# Patient Record
Sex: MALE | Race: WHITE | NOT HISPANIC OR LATINO | URBAN - METROPOLITAN AREA
[De-identification: names, ages, dates, MRNs, and addresses within clinical notes are randomized per-mention and may not be internally consistent; named-entity substitution may affect disease eponyms.]

---

## 2017-02-22 ENCOUNTER — APPOINTMENT (RX ONLY)
Dept: URBAN - METROPOLITAN AREA CLINIC 349 | Facility: CLINIC | Age: 72
Setting detail: DERMATOLOGY
End: 2017-02-22

## 2017-02-22 DIAGNOSIS — L57.0 ACTINIC KERATOSIS: ICD-10-CM

## 2017-02-22 DIAGNOSIS — L30.9 DERMATITIS, UNSPECIFIED: ICD-10-CM | Status: RESOLVED

## 2017-02-22 PROCEDURE — ? LIQUID NITROGEN

## 2017-02-22 PROCEDURE — 17000 DESTRUCT PREMALG LESION: CPT

## 2017-02-22 PROCEDURE — ? COUNSELING

## 2017-02-22 PROCEDURE — 99213 OFFICE O/P EST LOW 20 MIN: CPT | Mod: 25

## 2017-02-22 PROCEDURE — ? RECOMMENDATIONS

## 2017-02-22 ASSESSMENT — LOCATION DETAILED DESCRIPTION DERM
LOCATION DETAILED: LEFT LATERAL FRONTAL SCALP
LOCATION DETAILED: LEFT THENAR EMINENCE
LOCATION DETAILED: RIGHT THENAR EMINENCE
LOCATION DETAILED: LEFT LATERAL FRONTAL SCALP

## 2017-02-22 ASSESSMENT — LOCATION ZONE DERM
LOCATION ZONE: SCALP
LOCATION ZONE: HAND
LOCATION ZONE: SCALP

## 2017-02-22 ASSESSMENT — LOCATION SIMPLE DESCRIPTION DERM
LOCATION SIMPLE: LEFT SCALP
LOCATION SIMPLE: LEFT SCALP
LOCATION SIMPLE: LEFT HAND
LOCATION SIMPLE: RIGHT HAND

## 2017-02-22 ASSESSMENT — PAIN INTENSITY VAS: HOW INTENSE IS YOUR PAIN 0 BEING NO PAIN, 10 BEING THE MOST SEVERE PAIN POSSIBLE?: NO PAIN

## 2017-02-22 NOTE — PROCEDURE: LIQUID NITROGEN
Number Of Freeze-Thaw Cycles: 2 freeze-thaw cycles
Consent: The patient's consent was obtained including but not limited to risks of crusting, scabbing, blistering, scarring, darker or lighter pigmentary change, recurrence, incomplete removal and infection.
Post-Care Instructions: I reviewed with the patient in detail post-care instructions. Patient is to wear sunprotection, and avoid picking at any of the treated lesions. Pt may apply Vaseline to crusted or scabbing areas.
Detail Level: Detailed
Render Post-Care Instructions In Note?: no
Duration Of Freeze Thaw-Cycle (Seconds): 4

## 2017-02-22 NOTE — PROCEDURE: RECOMMENDATIONS
Recommendations (Free Text): Continue triamcinolone cream twice daily to affected area as needed \\nCerave moisturizer twice a day
Detail Level: Detailed

## 2017-04-13 ENCOUNTER — HOSPITAL ENCOUNTER (OUTPATIENT)
Dept: GENERAL RADIOLOGY | Age: 72
Discharge: HOME | End: 2017-04-13
Payer: COMMERCIAL

## 2017-04-13 DIAGNOSIS — R05.9 COUGH: ICD-10-CM

## 2017-04-13 PROCEDURE — 71020 XR CHEST PA LAT: CPT

## 2017-08-23 ENCOUNTER — APPOINTMENT (RX ONLY)
Dept: URBAN - METROPOLITAN AREA CLINIC 349 | Facility: CLINIC | Age: 72
Setting detail: DERMATOLOGY
End: 2017-08-23

## 2017-08-23 DIAGNOSIS — L57.8 OTHER SKIN CHANGES DUE TO CHRONIC EXPOSURE TO NONIONIZING RADIATION: ICD-10-CM | Status: RESOLVED

## 2017-08-23 PROBLEM — D04.4 CARCINOMA IN SITU OF SKIN OF SCALP AND NECK: Status: ACTIVE | Noted: 2017-08-23

## 2017-08-23 PROCEDURE — A4550 SURGICAL TRAYS: HCPCS

## 2017-08-23 PROCEDURE — 88305 TISSUE EXAM BY PATHOLOGIST: CPT

## 2017-08-23 PROCEDURE — 11100: CPT

## 2017-08-23 PROCEDURE — ? COUNSELING

## 2017-08-23 PROCEDURE — 99213 OFFICE O/P EST LOW 20 MIN: CPT | Mod: 25

## 2017-08-23 PROCEDURE — ? PATHOLOGY BILLING

## 2017-08-23 PROCEDURE — ? BIOPSY BY SHAVE METHOD

## 2017-08-23 ASSESSMENT — LOCATION ZONE DERM: LOCATION ZONE: FACE

## 2017-08-23 ASSESSMENT — LOCATION DETAILED DESCRIPTION DERM: LOCATION DETAILED: SUPERIOR MID FOREHEAD

## 2017-08-23 ASSESSMENT — LOCATION SIMPLE DESCRIPTION DERM: LOCATION SIMPLE: SUPERIOR FOREHEAD

## 2017-08-23 NOTE — PROCEDURE: BIOPSY BY SHAVE METHOD
Bill For Surgical Tray: yes
Hemostasis: Aluminum Chloride
Size Of Lesion In Cm: 0.6
Cryotherapy Text: The wound bed was treated with cryotherapy after the biopsy was performed.
Anesthesia Volume In Cc (Will Not Render If 0): 0.5
Accession #: [id number]
Notification Instructions: Patient will be notified of biopsy results. However, patient instructed to call the office if not contacted within 2 weeks.
Wound Care: Vaseline
Additional Anesthesia Volume In Cc (Will Not Render If 0): 0
Post-Care Instructions: I reviewed with the patient in detail post-care instructions. Patient is to keep the biopsy site dry overnight, and then apply bacitracin twice daily until healed. Patient may apply hydrogen peroxide soaks to remove any crusting.\\nAft the procedure, the patient was observed for 5-10 minutes and was oriented to person, place, and time.  Denied feeling dizzy, queasy, and stated that they did not feel that they were going to faint.
Destruction After The Procedure: No
Electrodesiccation Text: The wound bed was treated with electrodesiccation after the biopsy was performed.
Biopsy Type: H and E
Anesthesia Type: 1% lidocaine with 1:500,000 epinephrine and a 1:10 solution of 8.4% sodium bicarbonate
Billing Type: Third-Party Bill
Type Of Destruction Used: Electrodesiccation
Detail Level: Detailed
Biopsy Method: Cheryl oneal
Dressing: bandage
Consent: Written consent was obtained and risks were reviewed including but not limited to scarring, infection, bleeding, scabbing, incomplete removal, nerve damage and allergy to anesthesia.

## 2017-09-22 ENCOUNTER — APPOINTMENT (RX ONLY)
Dept: URBAN - METROPOLITAN AREA CLINIC 349 | Facility: CLINIC | Age: 72
Setting detail: DERMATOLOGY
End: 2017-09-22

## 2017-09-22 PROBLEM — D04.4 CARCINOMA IN SITU OF SKIN OF SCALP AND NECK: Status: ACTIVE | Noted: 2017-09-22

## 2017-09-22 PROBLEM — L85.3 XEROSIS CUTIS: Status: ACTIVE | Noted: 2017-09-22

## 2017-09-22 PROCEDURE — ? COUNSELING

## 2017-09-22 PROCEDURE — A4550 SURGICAL TRAYS: HCPCS

## 2017-09-22 PROCEDURE — ? CURETTAGE AND DESTRUCTION

## 2017-09-22 PROCEDURE — 17271 DSTR MAL LES S/N/H/F/G 0.6-1: CPT

## 2017-09-22 NOTE — PROCEDURE: CURETTAGE AND DESTRUCTION
Bill For Surgical Tray: yes
Number Of Curettages: 3
Post-Care Instructions: I reviewed with the patient in detail post-care instructions. Patient is to keep the area dry for 48 hours, and not to engage in any swimming until the area is healed. Should the patient develop any fevers, chills, bleeding, severe pain patient will contact the office immediately.
Cautery Type: electrodesiccation
Anesthesia Type: 1% lidocaine with 1:100,000 epinephrine and a 1:10 solution of 8.4% sodium bicarbonate
Size Of Lesion After Curettage: 0.7
Size Of Lesion In Cm: 0.6
Render Post-Care Instructions In Note?: no
Bill As A Line Item Or As Units: Line Item
Additional Information: (Optional): The wound was cleaned, and a pressure dressing was applied.  The patient received detailed post-op details in length. Aft the procedure, the patient was observed for 5-10 minutes and was oriented to person, place, and time.  Denied feeling dizzy, queasy, and stated that they did not feel that they were going to faint.
What Was Performed First?: Curettage
Consent was obtained from the patient. The risks, benefits and alternatives to therapy were discussed in detail. Specifically, the risks of infection, scarring, bleeding, prolonged wound healing, nerve injury, incomplete removal, allergy to anesthesia and recurrence were addressed. Alternatives to ED&C, such as: surgical removal and XRT were also discussed.  Prior to the procedure, the treatment site was clearly identified and confirmed by the patient. All components of Universal Protocol/PAUSE Rule completed.
Anesthesia Volume In Cc: 0.5
Detail Level: Detailed

## 2018-02-22 ENCOUNTER — APPOINTMENT (RX ONLY)
Dept: URBAN - METROPOLITAN AREA CLINIC 349 | Facility: CLINIC | Age: 73
Setting detail: DERMATOLOGY
End: 2018-02-22

## 2018-02-22 DIAGNOSIS — L57.0 ACTINIC KERATOSIS: ICD-10-CM

## 2018-02-22 DIAGNOSIS — M67.4 GANGLION: ICD-10-CM

## 2018-02-22 DIAGNOSIS — Z85.828 PERSONAL HISTORY OF OTHER MALIGNANT NEOPLASM OF SKIN: ICD-10-CM

## 2018-02-22 PROBLEM — L85.3 XEROSIS CUTIS: Status: ACTIVE | Noted: 2018-02-22

## 2018-02-22 PROBLEM — D48.5 NEOPLASM OF UNCERTAIN BEHAVIOR OF SKIN: Status: ACTIVE | Noted: 2018-02-22

## 2018-02-22 PROBLEM — M67.432 GANGLION, LEFT WRIST: Status: ACTIVE | Noted: 2018-02-22

## 2018-02-22 PROCEDURE — 17000 DESTRUCT PREMALG LESION: CPT

## 2018-02-22 PROCEDURE — 17003 DESTRUCT PREMALG LES 2-14: CPT

## 2018-02-22 PROCEDURE — ? COUNSELING

## 2018-02-22 PROCEDURE — ? RECOMMENDATIONS

## 2018-02-22 PROCEDURE — 99213 OFFICE O/P EST LOW 20 MIN: CPT | Mod: 25

## 2018-02-22 PROCEDURE — ? LIQUID NITROGEN

## 2018-02-22 ASSESSMENT — LOCATION DETAILED DESCRIPTION DERM
LOCATION DETAILED: LEFT SUPERIOR PARIETAL SCALP
LOCATION DETAILED: RIGHT MEDIAL MALAR CHEEK
LOCATION DETAILED: LEFT VENTRAL WRIST
LOCATION DETAILED: RIGHT CENTRAL MALAR CHEEK
LOCATION DETAILED: RIGHT LATERAL FOREHEAD

## 2018-02-22 ASSESSMENT — LOCATION SIMPLE DESCRIPTION DERM
LOCATION SIMPLE: LEFT WRIST
LOCATION SIMPLE: RIGHT CHEEK
LOCATION SIMPLE: SCALP
LOCATION SIMPLE: RIGHT FOREHEAD

## 2018-02-22 ASSESSMENT — PAIN INTENSITY VAS: HOW INTENSE IS YOUR PAIN 0 BEING NO PAIN, 10 BEING THE MOST SEVERE PAIN POSSIBLE?: NO PAIN

## 2018-02-22 ASSESSMENT — LOCATION ZONE DERM
LOCATION ZONE: FACE
LOCATION ZONE: SCALP
LOCATION ZONE: ARM

## 2018-02-22 NOTE — PROCEDURE: RECOMMENDATIONS
Detail Level: Zone
Recommendations (Free Text): Pt t9 see an orthopedist to have them assess lesion\\nPt states that he already has an apt and will have them look at area at that time

## 2018-03-12 ENCOUNTER — HOSPITAL ENCOUNTER (OUTPATIENT)
Dept: PHYSICAL THERAPY | Age: 73
Discharge: HOME | End: 2018-03-12
Payer: COMMERCIAL

## 2018-03-12 ENCOUNTER — HOSPITAL ENCOUNTER (OUTPATIENT)
Dept: SURGERY | Age: 73
Discharge: HOME | End: 2018-03-12
Payer: COMMERCIAL

## 2018-03-12 LAB
ANION GAP SERPL CALC-SCNC: 14 MMOL/L (ref 7–16)
APPEARANCE UR: NORMAL
APTT PPP: 28.6 SEC (ref 23.2–35.3)
ATRIAL RATE: 80 BPM
BACTERIA SPEC CULT: ABNORMAL
BASOPHILS # BLD: 0 K/UL (ref 0–0.2)
BASOPHILS NFR BLD: 0 % (ref 0–2)
BILIRUB UR QL: NEGATIVE
BUN SERPL-MCNC: 9 MG/DL (ref 8–23)
CALCIUM SERPL-MCNC: 9.8 MG/DL (ref 8.3–10.4)
CALCULATED P AXIS, ECG09: 53 DEGREES
CALCULATED R AXIS, ECG10: 47 DEGREES
CALCULATED T AXIS, ECG11: 35 DEGREES
CHLORIDE SERPL-SCNC: 104 MMOL/L (ref 98–107)
CO2 SERPL-SCNC: 27 MMOL/L (ref 21–32)
COLOR UR: YELLOW
CREAT SERPL-MCNC: 0.81 MG/DL (ref 0.8–1.5)
DIAGNOSIS, 93000: NORMAL
DIFFERENTIAL METHOD BLD: ABNORMAL
EOSINOPHIL # BLD: 0.1 K/UL (ref 0–0.8)
EOSINOPHIL NFR BLD: 1 % (ref 0.5–7.8)
ERYTHROCYTE [DISTWIDTH] IN BLOOD BY AUTOMATED COUNT: 13.5 % (ref 11.9–14.6)
GLUCOSE SERPL-MCNC: 98 MG/DL (ref 65–100)
GLUCOSE UR STRIP.AUTO-MCNC: NEGATIVE MG/DL
HCT VFR BLD AUTO: 39.6 % (ref 41.1–50.3)
HGB BLD-MCNC: 13.6 G/DL (ref 13.6–17.2)
HGB UR QL STRIP: NEGATIVE
IMM GRANULOCYTES # BLD: 0 K/UL (ref 0–0.5)
IMM GRANULOCYTES NFR BLD AUTO: 0 % (ref 0–5)
INR PPP: 0.9
KETONES UR QL STRIP.AUTO: NEGATIVE MG/DL
LEUKOCYTE ESTERASE UR QL STRIP.AUTO: NEGATIVE
LYMPHOCYTES # BLD: 2.1 K/UL (ref 0.5–4.6)
LYMPHOCYTES NFR BLD: 21 % (ref 13–44)
MCH RBC QN AUTO: 32.8 PG (ref 26.1–32.9)
MCHC RBC AUTO-ENTMCNC: 34.3 G/DL (ref 31.4–35)
MCV RBC AUTO: 95.4 FL (ref 79.6–97.8)
MONOCYTES # BLD: 0.7 K/UL (ref 0.1–1.3)
MONOCYTES NFR BLD: 7 % (ref 4–12)
NEUTS SEG # BLD: 6.9 K/UL (ref 1.7–8.2)
NEUTS SEG NFR BLD: 71 % (ref 43–78)
NITRITE UR QL STRIP.AUTO: NEGATIVE
P-R INTERVAL, ECG05: 162 MS
PH UR STRIP: 6 [PH] (ref 5–9)
PLATELET # BLD AUTO: 274 K/UL (ref 150–450)
PMV BLD AUTO: 10.2 FL (ref 10.8–14.1)
POTASSIUM SERPL-SCNC: 3.8 MMOL/L (ref 3.5–5.1)
PROT UR STRIP-MCNC: NEGATIVE MG/DL
PROTHROMBIN TIME: 12.7 SEC (ref 11.5–14.5)
Q-T INTERVAL, ECG07: 364 MS
QRS DURATION, ECG06: 98 MS
QTC CALCULATION (BEZET), ECG08: 419 MS
RBC # BLD AUTO: 4.15 M/UL (ref 4.23–5.67)
SERVICE CMNT-IMP: ABNORMAL
SODIUM SERPL-SCNC: 145 MMOL/L (ref 136–145)
SP GR UR REFRACTOMETRY: 1.01 (ref 1–1.02)
UROBILINOGEN UR QL STRIP.AUTO: 0.2 EU/DL (ref 0.2–1)
VENTRICULAR RATE, ECG03: 80 BPM
WBC # BLD AUTO: 9.9 K/UL (ref 4.3–11.1)

## 2018-03-12 PROCEDURE — G8980 MOBILITY D/C STATUS: HCPCS

## 2018-03-12 PROCEDURE — 80048 BASIC METABOLIC PNL TOTAL CA: CPT | Performed by: ORTHOPAEDIC SURGERY

## 2018-03-12 PROCEDURE — 87641 MR-STAPH DNA AMP PROBE: CPT | Performed by: ORTHOPAEDIC SURGERY

## 2018-03-12 PROCEDURE — G8978 MOBILITY CURRENT STATUS: HCPCS

## 2018-03-12 PROCEDURE — 81003 URINALYSIS AUTO W/O SCOPE: CPT | Performed by: ORTHOPAEDIC SURGERY

## 2018-03-12 PROCEDURE — 85025 COMPLETE CBC W/AUTO DIFF WBC: CPT | Performed by: ORTHOPAEDIC SURGERY

## 2018-03-12 PROCEDURE — 85730 THROMBOPLASTIN TIME PARTIAL: CPT | Performed by: ORTHOPAEDIC SURGERY

## 2018-03-12 PROCEDURE — 97161 PT EVAL LOW COMPLEX 20 MIN: CPT

## 2018-03-12 PROCEDURE — 36415 COLL VENOUS BLD VENIPUNCTURE: CPT | Performed by: ORTHOPAEDIC SURGERY

## 2018-03-12 PROCEDURE — 85610 PROTHROMBIN TIME: CPT | Performed by: ORTHOPAEDIC SURGERY

## 2018-03-12 PROCEDURE — 77030027138 HC INCENT SPIROMETER -A

## 2018-03-12 PROCEDURE — 93005 ELECTROCARDIOGRAM TRACING: CPT | Performed by: ANESTHESIOLOGY

## 2018-03-12 PROCEDURE — G8979 MOBILITY GOAL STATUS: HCPCS

## 2018-03-12 RX ORDER — CHOLECALCIFEROL (VITAMIN D3) 125 MCG
CAPSULE ORAL
COMMUNITY
End: 2018-03-12

## 2018-03-12 RX ORDER — LEVOCETIRIZINE DIHYDROCHLORIDE 5 MG/1
5 TABLET, FILM COATED ORAL DAILY
COMMUNITY

## 2018-03-12 RX ORDER — MELOXICAM 15 MG/1
15 TABLET ORAL
COMMUNITY
End: 2018-04-04

## 2018-03-12 NOTE — PERIOP NOTES
Patient verified name, , and surgery as listed in [facility]. Type 3 surgery, walk in assessment complete. Labs per surgeon: CBC, BMP, U/A, PT/PTT ; results within [facility] protocols. MRSA nasal swab pending. Labs per anesthesia protocol: included in surgeons orders. EKG: done today; within [facility] protocols. Hibiclens and instructions to return bottle on DOS given per hospital policy. Nasal Swab collected per MD order and instructions for Mupirocin nasal ointment if required. Patient provided with handouts including Guide to Surgery, Pain Management, Hand Hygiene, Blood Transfusion Education, and [facility] [facility] Brochure. Patient answered medical/surgical history questions at their best of ability. All prior to admission medications documented in [facility]. Original medication prescription bottles visualized during patient appointment. Patient instructed to hold all vitamins 7 days prior to surgery and NSAIDS 5 days prior to surgery. Medications to be held: vitamins, meloxicam.     Patient instructed to continue previous medications as prescribed prior to surgery and to take the following medications the day of surgery according to anesthesia guidelines with a small sip of water: proair if needed, 81 mg aspirin, atorvastatin, mucinex, xyzal, flonase, montelukast, pantoprazole. Instructed to bring inhaler, flonase, xyzal dos. Patient teach back successful and patient demonstrates knowledge of instruction.

## 2018-03-12 NOTE — PROGRESS NOTES
Alex Wolff  : (38 y.o.) [address] at [address], [address] [place] [address]  [phone]       Physical Therapy Prehab Plan of Treatment and Evaluation Summary:3/12/2018    ICD-10: Treatment Diagnosis:   · Pain in Left Knee (M25.562)  · Stiffness of Left Knee, Not elsewhere classified (M25.662)  · Difficulty in walking, Not elsewhere classified (R26.2)  Precautions/Allergies:   Review of patient's allergies indicates no known allergies. MEDICAL/REFERRING DIAGNOSIS:  Unilateral primary osteoarthritis, left knee [M17.12]  REFERRING PHYSICIAN: Tony Willis MD  DATE OF SURGERY: 4/3/18   Assessment:   Comments:  Pt. Plans to go home with wife. He reports right sided back and hip pain as well. PROBLEM LIST (Impacting functional limitations):  Mr. Trinity Rehman presents with the following left lower extremity(s) problems:  1. Strength  2. Range of Motion  3. Home Exercise Program  4. Pain   INTERVENTIONS PLANNED:  1. Home Exercise Program  2. Educational Discussion     TREATMENT PLAN: Effective Dates: 3/12/2018 TO 3/12/2018. Frequency/Duration: Patient to continue to perform home exercise program at least twice per day up until his surgery. GOALS: (Goals have been discussed and agreed upon with patient.)  Discharge Goals: Time Frame: 1 Day  1. Patient will demonstrate independence with a home exercise program designed to increase strength, range of motion and pain control to minimize functional deficits and optimize patient for total joint replacement. Rehabilitation Potential For Stated Goals: Good  Regarding Lety Begummelissa Penningtoney's therapy, I certify that the treatment plan above will be carried out by a therapist or under their direction.   Thank you for this referral,  Jimenez Chaney, PT               HISTORY:   Present Symptoms:  Pain Intensity 1:  (5 at worst)  Pain Location 1: Knee   History of Present Injury/Illness (Reason for Referral):  Medical/Referring Diagnosis: Unilateral primary osteoarthritis, left knee [M17.12]   Past Medical History/Comorbidities:   Mr. Lucia Barajas  has a past medical history of Stewart esophagus; Current smoker; GERD (gastroesophageal reflux disease); High frequency hearing loss; Hypercholesteremia; Hypertension; OA (osteoarthritis); Rhinitis; SNHL (sensorineural hearing loss); Tinnitus of both ears; and Wheezing. He also has no past medical history of Adverse effect of anesthesia; Aneurysm ([address] Arrhythmia; Asthma; Autoimmune disease ([address] CAD (coronary artery disease); Cancer ([address] Chronic kidney disease; Chronic obstructive pulmonary disease ([address] Chronic pain; Coagulation disorder ([address] Diabetes ([address] Difficult intubation; Endocarditis; Heart failure ([address] Ill-defined condition; Liver disease; Malignant hyperthermia due to anesthesia; Morbid obesity ([address] Nausea & vomiting; Nicotine vapor product user; Non-nicotine vapor product user; Pseudocholinesterase deficiency; Psychiatric disorder; PUD (peptic ulcer disease); Rheumatic fever; Seizures ([address] Sleep apnea; Stroke [facility]); Thromboembolus ([address] or Thyroid disease. Mr. Lucia Barajas  has a past surgical history that includes hx colonoscopy and hx knee arthroscopy (Right, 2005).   Social History/Living Environment:   Home Environment: Private residence  # Steps to Enter: 0  One/Two Story Residence: Two story  # of Interior Steps: 12  Interior Rails: Left  Lift Chair Available: No  Living Alone: No  Support Systems: Spouse/Significant Other/Partner  Patient Expects to be Discharged to[de-identified] Private residence  Current DME Used/Available at Home: None  Tub or Shower Type: Shower  Work/Activity:  Employed, light activity  Dominant Side:  LEFT  Current Medications:  See Pre-assessment nursing note   Number of Personal Factors/Comorbidities that affect the Plan of Care: 1-2: MODERATE COMPLEXITY   EXAMINATION:   ADLs (Current Functional Status):   Ambulation:  [x] Independent  [] Walk Indoors Only  [] Walk Outdoors  [] Use Assistive Device  [] Use Wheelchair Only Dressing:  [x] Independent  Requires Assistance from Someone for:  [] Sock/Shoes  [] Pants  [] Everything   Bathing/Showering:   [x] Independent  [] Requires Assistance from Someone  [] Sponge Bath Only Household Activities:  [x] Routine house and yard work  [] Light Housework Only  [] None   Observation/Orthostatic Postural Assessment:   Exceptions to WDLGenu varus left  ROM/Flexibility:   Gross Assessment: Yes  AROM: Within functional limits (right LE)                LLE Assessment  LLE Assessment (WDL): Exception to WDL  LLE AROM  L Knee Flexion: 103  L Knee Extension: 3          Strength:   Gross Assessment: Yes  Strength: Within functional limits (right LE)       LLE Strength  L Knee Flexion: 4  L Knee Extension: 4          Functional Mobility:    Gross Assessment: Yes    Gait Description (WDL): Exceptions to WDL  Stand to Sit: Independent, Additional time  Sit to Stand: Independent, Additional time  Distance (ft): 250 Feet (ft)  Ambulation - Level of Assistance: Independent  Speed/Afsaneh: Delayed  Stance: Left decreased  Gait Abnormalities: Antalgic          Balance:    Sitting: Intact  Standing: Intact   Body Structures Involved:  1. Bones  2. Joints  3. Muscles  4. Ligaments Body Functions Affected:  1. Movement Related Activities and Participation Affected:  1. Mobility   Number of elements that affect the Plan of Care: 3: MODERATE COMPLEXITY   CLINICAL PRESENTATION:   Presentation: Stable and uncomplicated: LOW COMPLEXITY   CLINICAL DECISION MAKING:   Outcome Measure: Tool Used: Lower Extremity Functional Scale (LEFS)  Score:  Initial: 37/80 Most Recent: X/80 (Date: -- )   Interpretation of Score: 20 questions each scored on a 5 point scale with 0 representing \"extreme difficulty or unable to perform\" and 4 representing \"no difficulty\". The lower the score, the greater the functional disability. 80/80 represents no disability.   Minimal detectable change is 9 points. Score 80 79-65 64-49 48-33 32-17 16-1 0   Modifier CH CI CJ CK CL CM CN     ? Mobility - Walking and Moving Around:     - CURRENT STATUS: CK - 40%-59% impaired, limited or restricted    - GOAL STATUS: CK - 40%-59% impaired, limited or restricted    - D/C STATUS:  CK - 40%-59% impaired, limited or restricted  Medical Necessity:   · Mr. Tammie Shay is expected to optimize his lower extremity strength and ROM in preparation for joint replacement surgery. Reason for Services/Other Comments:  · Achieve baseline assesment of musculoskeletal system, functional mobility and home environment. , educate in PT HEP in preparation for surgery, educate in hospital plan of care. Use of outcome tool(s) and clinical judgement create a POC that gives a: Clear prediction of patient's progress: LOW COMPLEXITY   TREATMENT:   Treatment/Session Assessment:  Patient was instructed in PT- HEP to increase strength and ROM in LEs. Answered all questions. · Post session pain:  No complaints  · Compliance with Program/Exercises: compliant most of the time.   Total Treatment Duration:  PT Patient Time In/Time Out  Time In: 1300  Time Out: 200 [facility] [place]

## 2018-03-12 NOTE — PERIOP NOTES
Gogo Toledo MD    Your patient recently had labs drawn during a hospital appointment due to an upcoming surgery. The results are attached. If you have any questions or concerns please reach out to your patient for a follow-up in your office. Please do not respond to this message as my mailbox is not monitored. You may call [phone] with questions or concerns. Recent Results (from the past 12 hour(s))   CBC WITH AUTOMATED DIFF    Collection Time: 03/12/18 12:00 PM   Result Value Ref Range    WBC 9.9 4.3 - 11.1 K/uL    RBC 4.15 (L) 4.23 - 5.67 M/uL    HGB 13.6 13.6 - 17.2 g/dL    HCT 39.6 (L) 41.1 - 50.3 %    MCV 95.4 79.6 - 97.8 FL    MCH 32.8 26.1 - 32.9 PG    MCHC 34.3 31.4 - 35.0 g/dL    RDW 13.5 11.9 - 14.6 %    PLATELET 940 901 - 920 K/uL    MPV 10.2 (L) 10.8 - 14.1 FL    DF AUTOMATED      NEUTROPHILS 71 43 - 78 %    LYMPHOCYTES 21 13 - 44 %    MONOCYTES 7 4.0 - 12.0 %    EOSINOPHILS 1 0.5 - 7.8 %    BASOPHILS 0 0.0 - 2.0 %    IMMATURE GRANULOCYTES 0 0.0 - 5.0 %    ABS. NEUTROPHILS 6.9 1.7 - 8.2 K/UL    ABS. LYMPHOCYTES 2.1 0.5 - 4.6 K/UL    ABS. MONOCYTES 0.7 0.1 - 1.3 K/UL    ABS. EOSINOPHILS 0.1 0.0 - 0.8 K/UL    ABS. BASOPHILS 0.0 0.0 - 0.2 K/UL    ABS. IMM.  GRANS. 0.0 0.0 - 0.5 K/UL   METABOLIC PANEL, BASIC    Collection Time: 03/12/18 12:00 PM   Result Value Ref Range    Sodium 145 136 - 145 mmol/L    Potassium 3.8 3.5 - 5.1 mmol/L    Chloride 104 98 - 107 mmol/L    CO2 27 21 - 32 mmol/L    Anion gap 14 7 - 16 mmol/L    Glucose 98 65 - 100 mg/dL    BUN 9 8 - 23 MG/DL    Creatinine 0.81 0.8 - 1.5 MG/DL    GFR est AA >60 >60 ml/min/1.73m2    GFR est non-AA >60 >60 ml/min/1.73m2    Calcium 9.8 8.3 - 10.4 MG/DL   PROTHROMBIN TIME + INR    Collection Time: 03/12/18 12:00 PM   Result Value Ref Range    Prothrombin time 12.7 11.5 - 14.5 sec    INR 0.9     PTT    Collection Time: 03/12/18 12:00 PM   Result Value Ref Range    aPTT 28.6 23.2 - 35.3 SEC   URINALYSIS W/ RFLX MICROSCOPIC    Collection Time: 03/12/18 12:00 PM   Result Value Ref Range    Color YELLOW      Appearance CLOUDY      Specific gravity 1.009 1.001 - 1.023      pH (UA) 6.0 5.0 - 9.0      Protein NEGATIVE  NEG mg/dL    Glucose NEGATIVE  mg/dL    Ketone NEGATIVE  NEG mg/dL    Bilirubin NEGATIVE  NEG      Blood NEGATIVE  NEG      Urobilinogen 0.2 0.2 - 1.0 EU/dL    Nitrites NEGATIVE  NEG      Leukocyte Esterase NEGATIVE  NEG     EKG, 12 LEAD, INITIAL    Collection Time: 03/12/18  1:20 PM   Result Value Ref Range    Ventricular Rate 80 BPM    Atrial Rate 80 BPM    P-R Interval 162 ms    QRS Duration 98 ms    Q-T Interval 364 ms    QTC Calculation (Bezet) 419 ms    Calculated P Axis 53 degrees    Calculated R Axis 47 degrees    Calculated T Axis 35 degrees    Diagnosis       Normal sinus rhythm  Normal ECG  When compared with ECG of 12-MAR-2018 13:17,  Sinus rhythm has replaced Ectopic atrial rhythm

## 2018-03-13 PROBLEM — I10 UNCONTROLLED HYPERTENSION: Status: ACTIVE | Noted: 2018-03-13

## 2018-03-13 RX ORDER — MUPIROCIN 20 MG/G
OINTMENT TOPICAL 2 TIMES DAILY
COMMUNITY
Start: 2018-03-29 | End: 2018-04-04

## 2018-03-13 NOTE — ADVANCED PRACTICE NURSE
Total Joint Surgery Preoperative Chart Review      Patient ID:  Darwin Sapp  [id number]  4 y.o.  1945  Surgeon: Dr. Roger Wilder  Date of Surgery: 4/3/2018  Procedure: Total Left Knee Arthroplasty  Primary Care Physician: Yuni Palmer [phone]  Specialty Physician(s):      Subjective:   Darwin Sapp is a 72 y.o. WHITE OR  male who presents for preoperative evaluation for Total Left Knee arthroplasty. This is a preoperative chart review note based on data collected by the nurse at the surgical Pre-Assessment visit. Past Medical History:   Diagnosis Date    Stewart esophagus     Current smoker     0.5 pack/day    GERD (gastroesophageal reflux disease)     on med for control     High frequency hearing loss     Hypercholesteremia     on med    Hypertension     on med for control     OA (osteoarthritis)     Rhinitis     SNHL (sensorineural hearing loss)     Tinnitus of both ears     Wheezing     pro-air inhaler prn; last used 3/12/18; [facility] work-up was negative for asthma       Past Surgical History:   Procedure Laterality Date    HX COLONOSCOPY      HX KNEE ARTHROSCOPY Right 2005    muscle repaired     Family History   Problem Relation Age of Onset    Hypertension Mother     High Cholesterol Mother     Hypertension Father     Depression Father     Ovarian Cancer Sister     Hypertension Brother     Depression Brother     Heart Attack Maternal Grandmother     Heart Attack Maternal Grandfather     Heart Attack Paternal Grandmother       Social History   Substance Use Topics    Smoking status: Current Every Day Smoker     Packs/day: 0.50     Years: 30.00     Types: Cigarettes    Smokeless tobacco: Never Used      Comment: smokes <1 pack of cigarettes per day    Alcohol use Yes      Comment: occasionally        Prior to Admission medications    Medication Sig Start Date End Date Taking?  Authorizing Provider   mupirocin (BACTROBAN) 2 % ointment by Both Nostrils route two (2) times a day. 3/29/18 4/3/18 Yes Historical Provider   fluticasone (FLONASE SENSIMIST) 27.5 mcg/actuation nasal spray 1 [place] by Both Nostrils route daily. Take / use AM day of surgery  per anesthesia protocols. Indications: Allergic Rhinitis   Yes Historical Provider   guaiFENesin (MUCINEX) 1,200 mg Ta12 ER tablet Take 1,200 mg by mouth two (2) times a day. Take / use AM day of surgery  per anesthesia protocols. Yes Historical Provider   meloxicam (MOBIC) 15 mg tablet Take 15 mg by mouth nightly. Indications: OSTEOARTHRITIS   Yes Historical Provider   levocetirizine (XYZAL) 5 mg tablet Take 5 mg by mouth daily. Take / use AM day of surgery  per anesthesia protocols. Indications: Allergic Rhinitis   Yes Historical Provider   aspirin delayed-release 81 mg tablet Take 81 mg by mouth daily. Take / use AM day of surgery  per anesthesia protocols. Yes Historical Provider   GLUC العلي/CHONDRO العلي A/VIT C/MN (GLUCOSAMINE 1500 COMPLEX PO) Take 1,500 mg by mouth daily. Yes Historical Provider   albuterol (PROAIR HFA) 90 mcg/actuation inhaler Take 1 Puff by inhalation every six (6) hours as needed for Wheezing. 2/19/18  Yes Sharon Maynard MD   montelukast (SINGULAIR) 10 mg tablet TAKE 1 TABLET BY MOUTH DAILY 11/27/17  Yes Sharon Maynard MD   atorvastatin (LIPITOR) 20 mg tablet TAKE 1 TABLET BY MOUTH DAILY 11/27/17  Yes Sharon Maynard MD   losartan (COZAAR) 50 mg tablet Take 1 Tab by mouth daily. 10/17/17  Yes Sharon Maynard MD   pantoprazole (PROTONIX) 40 mg tablet TAKE 1 TABLET BY MOUTH EVERY DAY 10/16/17  Yes Sharon Maynard MD   multivitamin (ONE A DAY) tablet Take 1 Tab by mouth daily. Yes Historical Provider   FERROUS FUMARATE/VIT BCOMP,C (SUPER B COMPLEX PO) Take 1 Tab by mouth daily.    Yes Historical Provider     No Known Allergies       Objective:     Physical Exam:   Patient Vitals for the past 24 hrs:   Temp Pulse BP SpO2   03/12/18 1342 96.2 °F (35.7 °C) 86 (!) 165/101 98 % ECG:    EKG Results     Procedure 720 Value Units Date/Time    EKG, 12 LEAD, INITIAL [[id number]] Collected:  03/12/18 1320    Order Status:  Completed Updated:  03/12/18 1510     Ventricular Rate 80 BPM      Atrial Rate 80 BPM      P-R Interval 162 ms      QRS Duration 98 ms      Q-T Interval 364 ms      QTC Calculation (Bezet) 419 ms      Calculated P Axis 53 degrees      Calculated R Axis 47 degrees      Calculated T Axis 35 degrees      Diagnosis --     Normal sinus rhythm  Normal ECG  When compared with ECG of 12-MAR-2018 13:17,  Sinus rhythm has replaced Ectopic atrial rhythm  Confirmed by GONZALEZ SHELTON (), iRchar Ortega ([id number]) on 3/12/2018 3:10:29 PM            Data Review:   Labs:   Recent Results (from the past 24 hour(s))   EKG, 12 LEAD, INITIAL    Collection Time: 03/12/18  1:20 PM   Result Value Ref Range    Ventricular Rate 80 BPM    Atrial Rate 80 BPM    P-R Interval 162 ms    QRS Duration 98 ms    Q-T Interval 364 ms    QTC Calculation (Bezet) 419 ms    Calculated P Axis 53 degrees    Calculated R Axis 47 degrees    Calculated T Axis 35 degrees    Diagnosis       Normal sinus rhythm  Normal ECG  When compared with ECG of 12-MAR-2018 13:17,  Sinus rhythm has replaced Ectopic atrial rhythm  Confirmed by Fiona Zhang MD (), Richar Ortega ([id number]) on 3/12/2018 3:10:29 PM     MSSA/MRSA SC BY PCR, NASAL SWAB    Collection Time: 03/12/18  2:13 PM   Result Value Ref Range    Special Requests: NO SPECIAL REQUESTS      Culture result: (A)       MRSA target DNA detected, SA target DNA detected. A positive test result does not necessarily indicate the presence of viable organisms. It is however, presumptive for the presence of MRSA or SA. Results for Deysi Evans (MRN [id number]) as of 3/13/2018 12:12   Ref.  Range 3/12/2018 12:00   WBC Latest Ref Range: 4.3 - 11.1 K/uL 9.9   RBC Latest Ref Range: 4.23 - 5.67 M/uL 4.15 (L)   HGB Latest Ref Range: 13.6 - 17.2 g/dL 13.6   HCT Latest Ref Range: 41.1 - 50.3 % 39.6 (L)   MCV Latest Ref Range: 79.6 - 97.8 FL 95.4   MCH Latest Ref Range: 26.1 - 32.9 PG 32.8   MCHC Latest Ref Range: 31.4 - 35.0 g/dL 34.3   RDW Latest Ref Range: 11.9 - 14.6 % 13.5     Results for Michelle Zapata (MRN [id number]) as of 3/13/2018 12:12   Ref. Range 3/12/2018 12:00   Sodium Latest Ref Range: 136 - 145 mmol/L 145   Potassium Latest Ref Range: 3.5 - 5.1 mmol/L 3.8   Chloride Latest Ref Range: 98 - 107 mmol/L 104   CO2 Latest Ref Range: 21 - 32 mmol/L 27   Anion gap Latest Ref Range: 7 - 16 mmol/L 14   Glucose Latest Ref Range: 65 - 100 mg/dL 98   BUN Latest Ref Range: 8 - 23 MG/DL 9   Creatinine Latest Ref Range: 0.8 - 1.5 MG/DL 0.81   Calcium Latest Ref Range: 8.3 - 10.4 MG/DL 9.8   GFR est non-AA Latest Ref Range: >60 ml/min/1.73m2 >60   GFR est AA Latest Ref Range: >60 ml/min/1.73m2 >60     Problem List:  )  Patient Active Problem List   Diagnosis Code    Hypercholesteremia E78.00    Hypertension I10    Tinnitus of both ears H93.13    SNHL (sensorineural hearing loss) H90.5    High frequency hearing loss H91.90    Uncontrolled hypertension I10       Total Joint Surgery Pre-Assessment Recommendations:           Advanced age 72 with GERD and uncontrolled HTN with /101  Patient would benefit from inpatient hospitalization with total knee surgery.          Signed By: Julio Gan NP-ALMAS    March 13, 2018

## 2018-03-13 NOTE — PERIOP NOTES
Nasal swab results reviewed:   Culture result:  (A)    Final   MRSA target DNA detected, SA target DNA detected.       A positive test result does not necessarily indicate the presence of viable organisms.  It is however, presumptive for the presence of MRSA or SA         Called pt and informed of results    Instructed:Called Mupirocin Rx to [facility] . Pt to apply to ea nostril intranasally twice a day for 5 days beginning :3/29/18

## 2018-03-14 VITALS
HEART RATE: 86 BPM | HEIGHT: 70 IN | OXYGEN SATURATION: 98 % | SYSTOLIC BLOOD PRESSURE: 165 MMHG | TEMPERATURE: 96.2 F | DIASTOLIC BLOOD PRESSURE: 101 MMHG | WEIGHT: 193.5 LBS | BODY MASS INDEX: 27.7 KG/M2

## 2018-03-14 NOTE — PROGRESS NOTES
18 1200   Oxygen Therapy   O2 Sat (%) 98 %   Pulse via Oximetry 85 beats per minute   O2 Device Room air   Pre-Treatment   Breath Sounds Bilateral Rhonchi  (CLEARED AFTER COUGH)   Pre FEV1 (liters) 2.3 liters   % Predicted 80   Incentive Spirometry Treatment   Actual Volume (ml) 2750 ml     Initial respiratory Assessment completed with pt. Pt was interviewed and evaluated in Joint camp prior to surgery. Patient ID:  Thien Fu  [id number]  75 y.o.  1945  Surgeon: Dr. Nancy Plummer  Date of Surgery: 4/3/2018  Procedure: Total Left Knee Arthroplasty    Primary Care Physician: Ariana Sutherland [phone]  Specialists: DR Dean [facility] 2017 FOR COUGH                                 Pt instructed in the use of Incentive Spirometry. Pt instructed to bring Incentive Spirometer back on date of surgery & to start using Is upon return to pt room.     Pt taught proper cough technique    History of smokin/2 PPD FOR 30 YEARS                                                       CURRENT-SMOKING CESSATION INFO TO PT  Quit date:            Secondhand smoke:      Past procedures with Oxygen desaturation:NONE    Past Medical History:   Diagnosis Date    Stewart esophagus     Current smoker     0.5 pack/day    GERD (gastroesophageal reflux disease)     on med for control     High frequency hearing loss     Hypercholesteremia     on med    Hypertension     on med for control     OA (osteoarthritis)     Rhinitis     SNHL (sensorineural hearing loss)     Tinnitus of both ears     Wheezing     pro-air inhaler prn; last used 3/12/18; [facility] work-[facility] was negative for asthma                                                                                                                                                          Respiratory history:DENIES SOB                                                                  Respiratory meds:  PROAIR MDI FAMILY PRESENT:            SPOUSE,                                                                                       PAST SLEEP STUDY:                          NO  HX OF RUTHANN:                                         NO                                     RUTHANN assessment:                                               SLEEPS ON SIDE                                                         PHYSICAL EXAM   Body mass index is 27.76 kg/(m^2).    Visit Vitals    BP (!) 165/101 (BP 1 Location: Right arm, BP Patient Position: At rest;Sitting)    Pulse 86    Temp 96.2 °F (35.7 °C)    Ht 5' 10\" (1.778 m)    Wt 87.8 kg (193 lb 8 oz)    SpO2 98%    BMI 27.76 kg/m2     Neck circumference:  42.5    cm    Loud snoring:        YES                                 Witnessed apnea or wakening gasping or choking:,                                                                                                            APNEA    Awakens with headaches:                                                  DENIES    Morning or daytime tiredness/ sleepiness:                    DENIES                                                                                           Dry mouth or sore throat in morning:                                                                                        DENIES    Segura stage:  3    SACS score:37    STOP/BAN                              CPAP:                       NONE                                        ALBUTEROL NEB Q6 PRN          SPACER         CONT SAT HS             Referrals:    Pt. Phone Number:

## 2018-03-30 NOTE — H&P
H&P    Patient ID:  Mauricio Painter  [id number]  25 y.o.  1945  Surgeon:  Jordin Heller MD  Date of Surgery: * No surgery date entered *  Procedure: Left Knee Total Arthroplasty  Primary Care Physician: Terry Prince MD        Subjective:  Mauricio Painter is a 72 y.o. WHITE [place]  male who presents with Left Knee pain. He has history of Left Knee pain for several months ago. Symptoms worse with walking and squatting and relieved with rest. Conservative treatment consisting of  therapeutic injections into the knee has not helped. The patient  lives with their spouse. The patients goal after surgery is improved pain and function.         Past Medical History:   Diagnosis Date    Stewart esophagus     Current smoker     0.5 pack/day    GERD (gastroesophageal reflux disease)     on med for control     High frequency hearing loss     Hypercholesteremia     on med    Hypertension     on med for control     OA (osteoarthritis)     Rhinitis     SNHL (sensorineural hearing loss)     Tinnitus of both ears     Wheezing     pro-air inhaler prn; last used 3/12/18; [facility] work-[facility] was negative for asthma       Past Surgical History:   Procedure Laterality Date    HX COLONOSCOPY      HX KNEE ARTHROSCOPY Right 2005    muscle repaired     Family History   Problem Relation Age of Onset    Hypertension Mother     High Cholesterol Mother     Hypertension Father     Depression Father     Ovarian Cancer Sister     Hypertension Brother     Depression Brother     Heart Attack Maternal Grandmother     Heart Attack Maternal Grandfather     Heart Attack Paternal Grandmother       Social History   Substance Use Topics    Smoking status: Current Every Day Smoker     Packs/day: 0.50     Years: 30.00     Types: Cigarettes    Smokeless tobacco: Never Used      Comment: smokes <1 pack of cigarettes per day    Alcohol use Yes      Comment: occasionally        Prior to Admission medications Medication Sig Start Date End Date Taking? Authorizing Provider   mupirocin (BACTROBAN) 2 % ointment by Both Nostrils route two (2) times a day. 3/29/18 4/3/18  Historical Provider   fluticasone (FLONASE SENSIMIST) 27.5 mcg/actuation nasal spray 1 Golva by Both Nostrils route daily. Take / use AM day of surgery  per anesthesia protocols. Indications: Allergic Rhinitis    Historical Provider   guaiFENesin (MUCINEX) 1,200 mg Ta12 ER tablet Take 1,200 mg by mouth two (2) times a day. Take / use AM day of surgery  per anesthesia protocols. Historical Provider   meloxicam (MOBIC) 15 mg tablet Take 15 mg by mouth nightly. Indications: OSTEOARTHRITIS    Historical Provider   levocetirizine (XYZAL) 5 mg tablet Take 5 mg by mouth daily. Take / use AM day of surgery  per anesthesia protocols. Indications: Allergic Rhinitis    Historical Provider   aspirin delayed-release 81 mg tablet Take 81 mg by mouth daily. Take / use AM day of surgery  per anesthesia protocols. Historical Provider   GLUC العلي/CHONDRO العلي A/VIT C/MN (GLUCOSAMINE 1500 COMPLEX PO) Take 1,500 mg by mouth daily. Historical Provider   albuterol (PROAIR HFA) 90 mcg/actuation inhaler Take 1 Puff by inhalation every six (6) hours as needed for Wheezing. 2/19/18   Kelsey Wills MD   montelukast (SINGULAIR) 10 mg tablet TAKE 1 TABLET BY MOUTH DAILY 11/27/17   Kelsey Wills MD   atorvastatin (LIPITOR) 20 mg tablet TAKE 1 TABLET BY MOUTH DAILY 11/27/17   Kelsey Wills MD   losartan (COZAAR) 50 mg tablet Take 1 Tab by mouth daily. 10/17/17   Kelsey Wills MD   pantoprazole (PROTONIX) 40 mg tablet TAKE 1 TABLET BY MOUTH EVERY DAY 10/16/17   Kelsey Wills MD   multivitamin (ONE A DAY) tablet Take 1 Tab by mouth daily. Historical Provider   FERROUS FUMARATE/VIT BCOMP,C (SUPER B COMPLEX PO) Take 1 Tab by mouth daily.     Historical Provider     No Known Allergies     REVIEW OF SYSTEMS:  CONSTITUTIONAL: Denies fever, decreased appetite, weight loss/gain, night sweats or fatigue. HEENT: Denies vision or hearing changes. denies glasses. denies hearing aids. CARDIAC: Denies CP, palpitations, rheumatic fever, murmur, peripheral edema, carotid artery disease or syncopal episodes. RESPIRATORY: Denies dyspnea on exertion, asthma, COPD or orthopnea. GI: Denies GERD, history of GI bleed or melena, PUD, hepatitis or cirrhosis. : Denies dysuria, hematuria. denies BPH symptoms. HEMATOLOGIC: Denies anemia or blood disorders. ENDOCRINE: Denies thyroid disease. MUSCULOSKELETAL: See HPI. NEUROLOGIC: Denies seizure, peripheral neuropathy or memory loss. PSYCH: Denies depression, anxiety or insomnia. SKIN: Denies rash or open sores. Objective:    PHYSICAL EXAM  GENERAL: No data found. EYES: PERRL. EOM intact. MOUTH:Teeth and Gums normal. NECK: Full ROM. Trachea midline. No thyromegaly or JVD. CARDIOVASCULAR: Regular rate and rhythm. No murmur or gallops. No carotid bruits. Peripheral pulses: radial 2 +, PT 2+, DP 2+ bilaterally. LUNGS: CTA bilaterally. No wheezes, rhonchi or rales. GI: positive BS. Abdomen nontender. NEUROLOGIC: Alert and oriented x 3. Bilateral equal strong had grasp and bilateral equal strong plantar flexion and dorsiflexion. GAIT: abnormal  MUSCULOSKELETAL: ROM: full range with pain. Tenderness: None. Crepitus: present. SKIN: No rash, bruising, swelling, redness or warmth. Labs:  No results found for this or any previous visit (from the past 24 hour(s)). Xray Left Knee: Joint space narrowing    Assessment:  Advanced Left Knee Osteoarthritis. Total Left Knee Arthroplasty Indicated.   Patient Active Problem List   Diagnosis Code    Hypercholesteremia E78.00    Hypertension I10    Tinnitus of both ears H93.13    SNHL (sensorineural hearing loss) H90.5    High frequency hearing loss H91.90    Uncontrolled hypertension I10       Plan:  I have advised the patient of the risks and consequences, including possible complications of performing total joint replacement, as well as not doing this operation. The patient had the opportunity to ask questions and have them answered to their satisfaction.      Signed:  ZHANNA Cordova 3/30/2018

## 2018-04-02 ENCOUNTER — ANESTHESIA EVENT (OUTPATIENT)
Dept: SURGERY | Age: 73
DRG: 470 | End: 2018-04-02
Payer: COMMERCIAL

## 2018-04-03 ENCOUNTER — ANESTHESIA (OUTPATIENT)
Dept: SURGERY | Age: 73
DRG: 470 | End: 2018-04-03
Payer: COMMERCIAL

## 2018-04-03 ENCOUNTER — HOME HEALTH ADMISSION (OUTPATIENT)
Dept: HOME HEALTH SERVICES | Facility: HOME HEALTH | Age: 73
End: 2018-04-03
Payer: COMMERCIAL

## 2018-04-03 ENCOUNTER — HOSPITAL ENCOUNTER (INPATIENT)
Age: 73
LOS: 1 days | Discharge: HOME HEALTH | DRG: 470 | End: 2018-04-04
Attending: ORTHOPAEDIC SURGERY | Admitting: ORTHOPAEDIC SURGERY
Payer: COMMERCIAL

## 2018-04-03 DIAGNOSIS — Z96.652 TOTAL KNEE REPLACEMENT STATUS, LEFT: Primary | ICD-10-CM

## 2018-04-03 PROBLEM — M19.90 OSTEOARTHRITIS: Status: ACTIVE | Noted: 2018-04-03

## 2018-04-03 LAB
ABO + RH BLD: NORMAL
BLOOD GROUP ANTIBODIES SERPL: NORMAL
GLUCOSE BLD STRIP.AUTO-MCNC: 127 MG/DL (ref 65–100)
HGB BLD-MCNC: 11.7 G/DL (ref 13.6–17.2)
SPECIMEN EXP DATE BLD: NORMAL

## 2018-04-03 PROCEDURE — 77030032490 HC SLV COMPR SCD KNE COVD -B

## 2018-04-03 PROCEDURE — 74011250636 HC RX REV CODE- 250/636: Performed by: ANESTHESIOLOGY

## 2018-04-03 PROCEDURE — 74011250636 HC RX REV CODE- 250/636: Performed by: ORTHOPAEDIC SURGERY

## 2018-04-03 PROCEDURE — 74011000250 HC RX REV CODE- 250

## 2018-04-03 PROCEDURE — C1713 ANCHOR/SCREW BN/BN,TIS/BN: HCPCS | Performed by: ORTHOPAEDIC SURGERY

## 2018-04-03 PROCEDURE — 77030036688 HC BLNKT CLD THER S2SG -B

## 2018-04-03 PROCEDURE — 86900 BLOOD TYPING SEROLOGIC ABO: CPT | Performed by: ORTHOPAEDIC SURGERY

## 2018-04-03 PROCEDURE — 76010000162 HC OR TIME 1.5 TO 2 HR INTENSV-TIER 1: Performed by: ORTHOPAEDIC SURGERY

## 2018-04-03 PROCEDURE — 77030006777 HC BLD SAW OSC CNMD -B: Performed by: ORTHOPAEDIC SURGERY

## 2018-04-03 PROCEDURE — 77030011208: Performed by: ORTHOPAEDIC SURGERY

## 2018-04-03 PROCEDURE — 77030034849: Performed by: ORTHOPAEDIC SURGERY

## 2018-04-03 PROCEDURE — 77030013727 HC IRR FAN PULSVC ZIMM -B: Performed by: ORTHOPAEDIC SURGERY

## 2018-04-03 PROCEDURE — 76942 ECHO GUIDE FOR BIOPSY: CPT | Performed by: ORTHOPAEDIC SURGERY

## 2018-04-03 PROCEDURE — 97161 PT EVAL LOW COMPLEX 20 MIN: CPT

## 2018-04-03 PROCEDURE — 77030003665 HC NDL SPN BBMI -A: Performed by: ANESTHESIOLOGY

## 2018-04-03 PROCEDURE — 77030020782 HC GWN BAIR PAWS FLX 3M -B: Performed by: ANESTHESIOLOGY

## 2018-04-03 PROCEDURE — 74011250636 HC RX REV CODE- 250/636

## 2018-04-03 PROCEDURE — 36415 COLL VENOUS BLD VENIPUNCTURE: CPT | Performed by: ORTHOPAEDIC SURGERY

## 2018-04-03 PROCEDURE — 77030018836 HC SOL IRR NACL ICUM -A: Performed by: ORTHOPAEDIC SURGERY

## 2018-04-03 PROCEDURE — 82962 GLUCOSE BLOOD TEST: CPT

## 2018-04-03 PROCEDURE — 77030037623 HC FEM TRIAL PK ATTUNE INTTN J&J -D: Performed by: ORTHOPAEDIC SURGERY

## 2018-04-03 PROCEDURE — 77030006804 HC BLD SAW RECIP CNMD -B: Performed by: ORTHOPAEDIC SURGERY

## 2018-04-03 PROCEDURE — 65270000029 HC RM PRIVATE

## 2018-04-03 PROCEDURE — 76010010054 HC POST OP PAIN BLOCK: Performed by: ORTHOPAEDIC SURGERY

## 2018-04-03 PROCEDURE — 94760 N-INVAS EAR/PLS OXIMETRY 1: CPT

## 2018-04-03 PROCEDURE — 74011000250 HC RX REV CODE- 250: Performed by: ORTHOPAEDIC SURGERY

## 2018-04-03 PROCEDURE — 94762 N-INVAS EAR/PLS OXIMTRY CONT: CPT

## 2018-04-03 PROCEDURE — 77030011640 HC PAD GRND REM COVD -A: Performed by: ORTHOPAEDIC SURGERY

## 2018-04-03 PROCEDURE — 77030008467 HC STPLR SKN COVD -B: Performed by: ORTHOPAEDIC SURGERY

## 2018-04-03 PROCEDURE — 76210000017 HC OR PH I REC 1.5 TO 2 HR: Performed by: ORTHOPAEDIC SURGERY

## 2018-04-03 PROCEDURE — 74011000258 HC RX REV CODE- 258: Performed by: ORTHOPAEDIC SURGERY

## 2018-04-03 PROCEDURE — 77030013819 HC MX SYS CEM ZIMM -B: Performed by: ORTHOPAEDIC SURGERY

## 2018-04-03 PROCEDURE — 86580 TB INTRADERMAL TEST: CPT | Performed by: ORTHOPAEDIC SURGERY

## 2018-04-03 PROCEDURE — 77030003602 HC NDL NRV BLK BBMI -B: Performed by: ANESTHESIOLOGY

## 2018-04-03 PROCEDURE — 77030012935 HC DRSG AQUACEL BMS -B: Performed by: ORTHOPAEDIC SURGERY

## 2018-04-03 PROCEDURE — 77030019557 HC ELECTRD VES SEAL MEDT -F: Performed by: ORTHOPAEDIC SURGERY

## 2018-04-03 PROCEDURE — 74011000302 HC RX REV CODE- 302: Performed by: ORTHOPAEDIC SURGERY

## 2018-04-03 PROCEDURE — 97165 OT EVAL LOW COMPLEX 30 MIN: CPT

## 2018-04-03 PROCEDURE — 77030007880 HC KT SPN EPDRL BBMI -B: Performed by: ANESTHESIOLOGY

## 2018-04-03 PROCEDURE — 0SRD0J9 REPLACEMENT OF LEFT KNEE JOINT WITH SYNTHETIC SUBSTITUTE, CEMENTED, OPEN APPROACH: ICD-10-PCS | Performed by: ORTHOPAEDIC SURGERY

## 2018-04-03 PROCEDURE — 76060000034 HC ANESTHESIA 1.5 TO 2 HR: Performed by: ORTHOPAEDIC SURGERY

## 2018-04-03 PROCEDURE — 74011000250 HC RX REV CODE- 250: Performed by: ANESTHESIOLOGY

## 2018-04-03 PROCEDURE — 85018 HEMOGLOBIN: CPT | Performed by: ORTHOPAEDIC SURGERY

## 2018-04-03 PROCEDURE — 77030033067 HC SUT PDO STRATFX SPIR J&J -B: Performed by: ORTHOPAEDIC SURGERY

## 2018-04-03 PROCEDURE — 77030031139 HC SUT VCRL2 J&J -A: Performed by: ORTHOPAEDIC SURGERY

## 2018-04-03 PROCEDURE — 74011250637 HC RX REV CODE- 250/637: Performed by: ORTHOPAEDIC SURGERY

## 2018-04-03 PROCEDURE — C1776 JOINT DEVICE (IMPLANTABLE): HCPCS | Performed by: ORTHOPAEDIC SURGERY

## 2018-04-03 PROCEDURE — 77030018673: Performed by: ORTHOPAEDIC SURGERY

## 2018-04-03 DEVICE — ATTUNE KNEE SYSTEM TIBIAL INSERT ROTATING PLATFORM POSTERIOR STABILIZED 6 5MM AOX
Type: IMPLANTABLE DEVICE | Site: KNEE | Status: FUNCTIONAL
Brand: ATTUNE

## 2018-04-03 DEVICE — ATTUNE PATELLA MEDIALIZED DOME 38MM CEMENTED AOX
Type: IMPLANTABLE DEVICE | Site: KNEE | Status: FUNCTIONAL
Brand: ATTUNE

## 2018-04-03 DEVICE — ATTUNE KNEE SYSTEM TIBIAL BASE ROTATING PLATFORM SIZE 5 CEMENTED
Type: IMPLANTABLE DEVICE | Site: KNEE | Status: FUNCTIONAL
Brand: ATTUNE

## 2018-04-03 DEVICE — ATTUNE KNEE SYSTEM FEMORAL POSTERIOR STABILIZED SIZE 6 LEFT CEMENTED
Type: IMPLANTABLE DEVICE | Site: KNEE | Status: FUNCTIONAL
Brand: ATTUNE

## 2018-04-03 DEVICE — (D)CEMENT BNE HV R 40GM -- DUPE USE ITEM 353850: Type: IMPLANTABLE DEVICE | Site: KNEE | Status: FUNCTIONAL

## 2018-04-03 RX ORDER — LIDOCAINE HYDROCHLORIDE 10 MG/ML
0.1 INJECTION INFILTRATION; PERINEURAL AS NEEDED
Status: DISCONTINUED | OUTPATIENT
Start: 2018-04-03 | End: 2018-04-03 | Stop reason: HOSPADM

## 2018-04-03 RX ORDER — OXYCODONE HYDROCHLORIDE 5 MG/1
5 TABLET ORAL
Status: DISCONTINUED | OUTPATIENT
Start: 2018-04-03 | End: 2018-04-03 | Stop reason: HOSPADM

## 2018-04-03 RX ORDER — OXYCODONE HYDROCHLORIDE 5 MG/1
10 TABLET ORAL
Status: DISCONTINUED | OUTPATIENT
Start: 2018-04-03 | End: 2018-04-03 | Stop reason: HOSPADM

## 2018-04-03 RX ORDER — ACETAMINOPHEN 500 MG
1000 TABLET ORAL EVERY 6 HOURS
Status: DISCONTINUED | OUTPATIENT
Start: 2018-04-04 | End: 2018-04-04 | Stop reason: HOSPADM

## 2018-04-03 RX ORDER — TRANEXAMIC ACID 100 MG/ML
INJECTION, SOLUTION INTRAVENOUS AS NEEDED
Status: DISCONTINUED | OUTPATIENT
Start: 2018-04-03 | End: 2018-04-03 | Stop reason: HOSPADM

## 2018-04-03 RX ORDER — SODIUM CHLORIDE 0.9 % (FLUSH) 0.9 %
5-10 SYRINGE (ML) INJECTION EVERY 8 HOURS
Status: DISCONTINUED | OUTPATIENT
Start: 2018-04-03 | End: 2018-04-04 | Stop reason: HOSPADM

## 2018-04-03 RX ORDER — CELECOXIB 200 MG/1
200 CAPSULE ORAL EVERY 12 HOURS
Status: DISCONTINUED | OUTPATIENT
Start: 2018-04-03 | End: 2018-04-04 | Stop reason: HOSPADM

## 2018-04-03 RX ORDER — ROPIVACAINE HYDROCHLORIDE 2 MG/ML
INJECTION, SOLUTION EPIDURAL; INFILTRATION; PERINEURAL AS NEEDED
Status: DISCONTINUED | OUTPATIENT
Start: 2018-04-03 | End: 2018-04-03 | Stop reason: HOSPADM

## 2018-04-03 RX ORDER — ACETAMINOPHEN 10 MG/ML
1000 INJECTION, SOLUTION INTRAVENOUS ONCE
Status: COMPLETED | OUTPATIENT
Start: 2018-04-03 | End: 2018-04-03

## 2018-04-03 RX ORDER — ASPIRIN 81 MG/1
81 TABLET ORAL EVERY 12 HOURS
Status: DISCONTINUED | OUTPATIENT
Start: 2018-04-03 | End: 2018-04-04 | Stop reason: HOSPADM

## 2018-04-03 RX ORDER — ATORVASTATIN CALCIUM 10 MG/1
20 TABLET, FILM COATED ORAL DAILY
Status: DISCONTINUED | OUTPATIENT
Start: 2018-04-05 | End: 2018-04-04 | Stop reason: HOSPADM

## 2018-04-03 RX ORDER — LORATADINE 10 MG/1
10 TABLET ORAL DAILY
Status: DISCONTINUED | OUTPATIENT
Start: 2018-04-04 | End: 2018-04-04 | Stop reason: HOSPADM

## 2018-04-03 RX ORDER — OXYCODONE HYDROCHLORIDE 5 MG/1
10 TABLET ORAL
Status: DISCONTINUED | OUTPATIENT
Start: 2018-04-03 | End: 2018-04-04 | Stop reason: HOSPADM

## 2018-04-03 RX ORDER — MIDAZOLAM HYDROCHLORIDE 1 MG/ML
2 INJECTION, SOLUTION INTRAMUSCULAR; INTRAVENOUS ONCE
Status: DISCONTINUED | OUTPATIENT
Start: 2018-04-03 | End: 2018-04-03 | Stop reason: HOSPADM

## 2018-04-03 RX ORDER — VANCOMYCIN HYDROCHLORIDE
1250 ONCE
Status: COMPLETED | OUTPATIENT
Start: 2018-04-03 | End: 2018-04-03

## 2018-04-03 RX ORDER — PROMETHAZINE HYDROCHLORIDE 25 MG/1
25 TABLET ORAL
Status: DISCONTINUED | OUTPATIENT
Start: 2018-04-03 | End: 2018-04-04 | Stop reason: HOSPADM

## 2018-04-03 RX ORDER — DIPHENHYDRAMINE HCL 25 MG
25 CAPSULE ORAL
Status: DISCONTINUED | OUTPATIENT
Start: 2018-04-03 | End: 2018-04-04 | Stop reason: HOSPADM

## 2018-04-03 RX ORDER — SODIUM CHLORIDE 9 MG/ML
100 INJECTION, SOLUTION INTRAVENOUS CONTINUOUS
Status: DISCONTINUED | OUTPATIENT
Start: 2018-04-03 | End: 2018-04-04 | Stop reason: HOSPADM

## 2018-04-03 RX ORDER — HYDROMORPHONE HYDROCHLORIDE 2 MG/ML
1 INJECTION, SOLUTION INTRAMUSCULAR; INTRAVENOUS; SUBCUTANEOUS
Status: DISCONTINUED | OUTPATIENT
Start: 2018-04-03 | End: 2018-04-04 | Stop reason: HOSPADM

## 2018-04-03 RX ORDER — FENTANYL CITRATE 50 UG/ML
100 INJECTION, SOLUTION INTRAMUSCULAR; INTRAVENOUS ONCE
Status: DISCONTINUED | OUTPATIENT
Start: 2018-04-03 | End: 2018-04-03 | Stop reason: HOSPADM

## 2018-04-03 RX ORDER — FAMOTIDINE 20 MG/1
20 TABLET, FILM COATED ORAL ONCE
Status: DISCONTINUED | OUTPATIENT
Start: 2018-04-03 | End: 2018-04-03 | Stop reason: HOSPADM

## 2018-04-03 RX ORDER — LOSARTAN POTASSIUM 50 MG/1
50 TABLET ORAL DAILY
Status: DISCONTINUED | OUTPATIENT
Start: 2018-04-04 | End: 2018-04-04 | Stop reason: HOSPADM

## 2018-04-03 RX ORDER — CEFAZOLIN SODIUM/WATER 2 G/20 ML
2 SYRINGE (ML) INTRAVENOUS ONCE
Status: COMPLETED | OUTPATIENT
Start: 2018-04-03 | End: 2018-04-03

## 2018-04-03 RX ORDER — MIDAZOLAM HYDROCHLORIDE 1 MG/ML
2 INJECTION, SOLUTION INTRAMUSCULAR; INTRAVENOUS ONCE
Status: COMPLETED | OUTPATIENT
Start: 2018-04-03 | End: 2018-04-03

## 2018-04-03 RX ORDER — SODIUM CHLORIDE, SODIUM LACTATE, POTASSIUM CHLORIDE, CALCIUM CHLORIDE 600; 310; 30; 20 MG/100ML; MG/100ML; MG/100ML; MG/100ML
75 INJECTION, SOLUTION INTRAVENOUS CONTINUOUS
Status: DISCONTINUED | OUTPATIENT
Start: 2018-04-03 | End: 2018-04-03 | Stop reason: HOSPADM

## 2018-04-03 RX ORDER — PANTOPRAZOLE SODIUM 40 MG/1
40 TABLET, DELAYED RELEASE ORAL
Status: DISCONTINUED | OUTPATIENT
Start: 2018-04-04 | End: 2018-04-04 | Stop reason: HOSPADM

## 2018-04-03 RX ORDER — NALOXONE HYDROCHLORIDE 0.4 MG/ML
.2-.4 INJECTION, SOLUTION INTRAMUSCULAR; INTRAVENOUS; SUBCUTANEOUS
Status: DISCONTINUED | OUTPATIENT
Start: 2018-04-03 | End: 2018-04-04 | Stop reason: HOSPADM

## 2018-04-03 RX ORDER — ONDANSETRON 8 MG/1
8 TABLET, ORALLY DISINTEGRATING ORAL
Status: DISCONTINUED | OUTPATIENT
Start: 2018-04-03 | End: 2018-04-04 | Stop reason: HOSPADM

## 2018-04-03 RX ORDER — GUAIFENESIN 600 MG/1
1200 TABLET, EXTENDED RELEASE ORAL EVERY 12 HOURS
Status: DISCONTINUED | OUTPATIENT
Start: 2018-04-03 | End: 2018-04-04 | Stop reason: HOSPADM

## 2018-04-03 RX ORDER — MONTELUKAST SODIUM 10 MG/1
10 TABLET ORAL
Status: DISCONTINUED | OUTPATIENT
Start: 2018-04-03 | End: 2018-04-03

## 2018-04-03 RX ORDER — MIDAZOLAM HYDROCHLORIDE 1 MG/ML
INJECTION, SOLUTION INTRAMUSCULAR; INTRAVENOUS AS NEEDED
Status: DISCONTINUED | OUTPATIENT
Start: 2018-04-03 | End: 2018-04-03 | Stop reason: HOSPADM

## 2018-04-03 RX ORDER — FLUTICASONE PROPIONATE 50 MCG
1 SPRAY, SUSPENSION (ML) NASAL DAILY
Status: DISCONTINUED | OUTPATIENT
Start: 2018-04-04 | End: 2018-04-04 | Stop reason: HOSPADM

## 2018-04-03 RX ORDER — MONTELUKAST SODIUM 10 MG/1
10 TABLET ORAL DAILY
Status: DISCONTINUED | OUTPATIENT
Start: 2018-04-05 | End: 2018-04-04 | Stop reason: HOSPADM

## 2018-04-03 RX ORDER — AMOXICILLIN 250 MG
2 CAPSULE ORAL DAILY
Status: DISCONTINUED | OUTPATIENT
Start: 2018-04-04 | End: 2018-04-04 | Stop reason: HOSPADM

## 2018-04-03 RX ORDER — CEFAZOLIN SODIUM/WATER 2 G/20 ML
2 SYRINGE (ML) INTRAVENOUS EVERY 8 HOURS
Status: COMPLETED | OUTPATIENT
Start: 2018-04-03 | End: 2018-04-04

## 2018-04-03 RX ORDER — ZOLPIDEM TARTRATE 5 MG/1
5 TABLET ORAL
Status: DISCONTINUED | OUTPATIENT
Start: 2018-04-03 | End: 2018-04-04 | Stop reason: HOSPADM

## 2018-04-03 RX ORDER — DEXAMETHASONE SODIUM PHOSPHATE 4 MG/ML
INJECTION, SOLUTION INTRA-ARTICULAR; INTRALESIONAL; INTRAMUSCULAR; INTRAVENOUS; SOFT TISSUE AS NEEDED
Status: DISCONTINUED | OUTPATIENT
Start: 2018-04-03 | End: 2018-04-03 | Stop reason: HOSPADM

## 2018-04-03 RX ORDER — FENTANYL CITRATE 50 UG/ML
INJECTION, SOLUTION INTRAMUSCULAR; INTRAVENOUS AS NEEDED
Status: DISCONTINUED | OUTPATIENT
Start: 2018-04-03 | End: 2018-04-03 | Stop reason: HOSPADM

## 2018-04-03 RX ORDER — DEXAMETHASONE SODIUM PHOSPHATE 100 MG/10ML
10 INJECTION INTRAMUSCULAR; INTRAVENOUS ONCE
Status: DISCONTINUED | OUTPATIENT
Start: 2018-04-04 | End: 2018-04-04 | Stop reason: HOSPADM

## 2018-04-03 RX ORDER — PROPOFOL 10 MG/ML
INJECTION, EMULSION INTRAVENOUS
Status: DISCONTINUED | OUTPATIENT
Start: 2018-04-03 | End: 2018-04-03 | Stop reason: HOSPADM

## 2018-04-03 RX ORDER — ONDANSETRON 2 MG/ML
INJECTION INTRAMUSCULAR; INTRAVENOUS AS NEEDED
Status: DISCONTINUED | OUTPATIENT
Start: 2018-04-03 | End: 2018-04-03 | Stop reason: HOSPADM

## 2018-04-03 RX ORDER — SODIUM CHLORIDE 0.9 % (FLUSH) 0.9 %
5-10 SYRINGE (ML) INJECTION AS NEEDED
Status: DISCONTINUED | OUTPATIENT
Start: 2018-04-03 | End: 2018-04-04 | Stop reason: HOSPADM

## 2018-04-03 RX ORDER — BUPIVACAINE HYDROCHLORIDE 7.5 MG/ML
INJECTION, SOLUTION INTRASPINAL AS NEEDED
Status: DISCONTINUED | OUTPATIENT
Start: 2018-04-03 | End: 2018-04-03 | Stop reason: HOSPADM

## 2018-04-03 RX ORDER — OXYCODONE HYDROCHLORIDE 5 MG/1
5 TABLET ORAL
Status: DISCONTINUED | OUTPATIENT
Start: 2018-04-03 | End: 2018-04-04 | Stop reason: HOSPADM

## 2018-04-03 RX ORDER — ATORVASTATIN CALCIUM 10 MG/1
20 TABLET, FILM COATED ORAL
Status: DISCONTINUED | OUTPATIENT
Start: 2018-04-03 | End: 2018-04-03

## 2018-04-03 RX ORDER — KETOROLAC TROMETHAMINE 30 MG/ML
INJECTION, SOLUTION INTRAMUSCULAR; INTRAVENOUS AS NEEDED
Status: DISCONTINUED | OUTPATIENT
Start: 2018-04-03 | End: 2018-04-03 | Stop reason: HOSPADM

## 2018-04-03 RX ORDER — ALBUTEROL SULFATE 90 UG/1
1 AEROSOL, METERED RESPIRATORY (INHALATION)
Status: DISCONTINUED | OUTPATIENT
Start: 2018-04-03 | End: 2018-04-04 | Stop reason: HOSPADM

## 2018-04-03 RX ORDER — HYDROMORPHONE HYDROCHLORIDE 2 MG/ML
0.5 INJECTION, SOLUTION INTRAMUSCULAR; INTRAVENOUS; SUBCUTANEOUS
Status: DISCONTINUED | OUTPATIENT
Start: 2018-04-03 | End: 2018-04-03 | Stop reason: HOSPADM

## 2018-04-03 RX ORDER — ROPIVACAINE HYDROCHLORIDE 5 MG/ML
INJECTION, SOLUTION EPIDURAL; INFILTRATION; PERINEURAL AS NEEDED
Status: DISCONTINUED | OUTPATIENT
Start: 2018-04-03 | End: 2018-04-03 | Stop reason: HOSPADM

## 2018-04-03 RX ADMIN — ROPIVACAINE HYDROCHLORIDE 40 ML: 2 INJECTION, SOLUTION EPIDURAL; INFILTRATION; PERINEURAL at 08:40

## 2018-04-03 RX ADMIN — FENTANYL CITRATE 25 MCG: 50 INJECTION, SOLUTION INTRAMUSCULAR; INTRAVENOUS at 09:52

## 2018-04-03 RX ADMIN — ACETAMINOPHEN 1000 MG: 10 INJECTION, SOLUTION INTRAVENOUS at 17:54

## 2018-04-03 RX ADMIN — MIDAZOLAM HYDROCHLORIDE 2 MG: 1 INJECTION, SOLUTION INTRAMUSCULAR; INTRAVENOUS at 08:37

## 2018-04-03 RX ADMIN — ASPIRIN 81 MG: 81 TABLET, COATED ORAL at 21:52

## 2018-04-03 RX ADMIN — Medication 2 G: at 09:14

## 2018-04-03 RX ADMIN — Medication 2 G: at 17:54

## 2018-04-03 RX ADMIN — MIDAZOLAM HYDROCHLORIDE 1 MG: 1 INJECTION, SOLUTION INTRAMUSCULAR; INTRAVENOUS at 09:18

## 2018-04-03 RX ADMIN — PROPOFOL: 10 INJECTION, EMULSION INTRAVENOUS at 10:10

## 2018-04-03 RX ADMIN — GUAIFENESIN 1200 MG: 600 TABLET, EXTENDED RELEASE ORAL at 17:54

## 2018-04-03 RX ADMIN — ONDANSETRON 4 MG: 2 INJECTION INTRAMUSCULAR; INTRAVENOUS at 09:28

## 2018-04-03 RX ADMIN — CELECOXIB 200 MG: 200 CAPSULE ORAL at 21:52

## 2018-04-03 RX ADMIN — FENTANYL CITRATE 25 MCG: 50 INJECTION, SOLUTION INTRAMUSCULAR; INTRAVENOUS at 10:31

## 2018-04-03 RX ADMIN — BUPIVACAINE HYDROCHLORIDE 1.8 ML: 7.5 INJECTION, SOLUTION INTRASPINAL at 09:20

## 2018-04-03 RX ADMIN — LIDOCAINE HYDROCHLORIDE 0.1 ML: 10 INJECTION, SOLUTION INFILTRATION; PERINEURAL at 08:01

## 2018-04-03 RX ADMIN — SODIUM CHLORIDE, SODIUM LACTATE, POTASSIUM CHLORIDE, AND CALCIUM CHLORIDE: 600; 310; 30; 20 INJECTION, SOLUTION INTRAVENOUS at 09:56

## 2018-04-03 RX ADMIN — SODIUM CHLORIDE, SODIUM LACTATE, POTASSIUM CHLORIDE, AND CALCIUM CHLORIDE 75 ML/HR: 600; 310; 30; 20 INJECTION, SOLUTION INTRAVENOUS at 07:59

## 2018-04-03 RX ADMIN — FENTANYL CITRATE 25 MCG: 50 INJECTION, SOLUTION INTRAMUSCULAR; INTRAVENOUS at 10:04

## 2018-04-03 RX ADMIN — SODIUM CHLORIDE 100 ML/HR: 900 INJECTION, SOLUTION INTRAVENOUS at 17:58

## 2018-04-03 RX ADMIN — SODIUM CHLORIDE, SODIUM LACTATE, POTASSIUM CHLORIDE, AND CALCIUM CHLORIDE: 600; 310; 30; 20 INJECTION, SOLUTION INTRAVENOUS at 09:12

## 2018-04-03 RX ADMIN — SODIUM CHLORIDE 100 ML/HR: 900 INJECTION, SOLUTION INTRAVENOUS at 21:55

## 2018-04-03 RX ADMIN — VANCOMYCIN HYDROCHLORIDE 1250 MG: 10 INJECTION, POWDER, LYOPHILIZED, FOR SOLUTION INTRAVENOUS at 08:01

## 2018-04-03 RX ADMIN — DEXAMETHASONE SODIUM PHOSPHATE 10 MG: 4 INJECTION, SOLUTION INTRA-ARTICULAR; INTRALESIONAL; INTRAMUSCULAR; INTRAVENOUS; SOFT TISSUE at 09:28

## 2018-04-03 RX ADMIN — TRANEXAMIC ACID 1000 MG: 100 INJECTION, SOLUTION INTRAVENOUS at 09:25

## 2018-04-03 RX ADMIN — MIDAZOLAM HYDROCHLORIDE 1 MG: 1 INJECTION, SOLUTION INTRAMUSCULAR; INTRAVENOUS at 09:25

## 2018-04-03 RX ADMIN — TUBERCULIN PURIFIED PROTEIN DERIVATIVE 5 UNITS: 5 INJECTION, SOLUTION INTRADERMAL at 08:02

## 2018-04-03 RX ADMIN — PROPOFOL 50 MCG/KG/MIN: 10 INJECTION, EMULSION INTRAVENOUS at 09:25

## 2018-04-03 RX ADMIN — FENTANYL CITRATE 25 MCG: 50 INJECTION, SOLUTION INTRAMUSCULAR; INTRAVENOUS at 09:56

## 2018-04-03 NOTE — PROGRESS NOTES
TRANSFER - IN REPORT:    Verbal report received from [facility] RN(name) on Rodney Urrutia  being received from PACU(unit) for routine post - op      Report consisted of patients Situation, Background, Assessment and   Recommendations(SBAR). Information from the following report(s) SBAR, Kardex, OR Summary, Procedure Summary, Intake/Output and MAR was reviewed with the receiving nurse. Opportunity for questions and clarification was provided. Assessment completed upon patients arrival to unit and care assumed.

## 2018-04-03 NOTE — OP NOTES
[facility]  Cemented Total Knee Arthroplasty  Patient:Dayton Bustillo   : 1945  Medical Record ETWPRF:[id number]  Pre-operative Diagnosis:  Primary osteoarthritis of left knee [M17.12]  Post-operative Diagnosis: Primary osteoarthritis of left knee [M17.12]    Surgeon: Donald Graves MD  Assistant: Saji Alarcon PA-C    Anesthesia: Spinal    Procedure: Total Knee Arthroplasty with use of Bone Cement  The complexity of the total joint surgery requires the use of a first assistant for positioning, retraction and assistance in closure. The patient's Body mass index is 27.76 kg/(m^2). , BMI's greater then 40 make surgical exposure and retraction extremely difficult and increase operative time. Tourniquet Time: none  EBL: 150cc  Additional Findings: Severe DJD  Releases none    Magy Galvan was brought to the operating room and positioned on the operating table. He was anethestized  A castillo catheter was placed preoperatively and IV antibiotics was administered. Prior to the incision being made a timeout was called identifying the patient, procedure ,operative side and surgeon. . The left leg was prepped and draped in the usual sterile manner  An anterior longitudinal incision was accomplished just medial to the tibial tubercle and extending approximal 6 centimeters proximal to the superior pole of the patella. A medial parapatellar capsular incision was performed. The medial capsular flap was elevated around to the insertion of the semimembranous tendon. The patella was everted and the knee flexed and externally rotated. The medial and external menisci were excised. The lateral half of the fat pad excised and the patella femoral ligament was released. The anterior cruciate ligament was resected and the posterior cruciate ligament was substituted. Using extramedullary instrumentation, the tibial cut was accomplished with appropriate posterior slope.   Approxiamately 2 mm of bone was removed from the low side of the tibia. The distal femur was next addressed. A drill hole was made above the intracondylar notch. Using appropriate intramedullary instrumentation,a 6 degree valgus distal cut was accomplished. A femur was sized. The anterior and posterior cuts were then made about the distal femur. The osteophytes were removed from the tibial and femoral surfaces. The flexion and extension gaps were assessed with the appropriate spacer blocks. Additional surgical procedures included none. The flexion and extension gaps were deemed appropriately balanced. The appropriate cutting blocks were then utilized to perform the anterior chamfer, posterior chamfer and notch cuts, with appropriate lateral tranlation accomplished for the patellofemoral groove. The tibia was sized. The tibial base plate was pinned into place with the appropriate external rotation and stem site prepared. A preliminary range of motion was accomplished with the above size trial components. A polyethylene insert allowed the patient to obtain full extension as well as appropriate flexion. The patient's ligaments were stable in flexion and extension to medial and lateral stressing and the alignment was through the appropriate mechanical axis. The patella was then everted. The bone was resected appropriately for a pegged patella button. A trial reduction revealed appropriate tracking through the patellofemoral groove. All trial components were removed and the cut surfaces prepared for cementing with irrigation and debridement of the bone interstices. There were no femoral deficiencies. There were no tibial deficiencies. No augmentation was utilized. The implants were cemented into position and pressurized in the usual fashion. Bone and cement debris were meticulously removed. The betadine lavage protocol was used.     Major Silvia knee was placed through range of motion and noted to be stable as mentioned above with the trail components. The wound was dry, therefore no drain was used. The operative knee was injected with 60cc of Naropin, 10 cc's of morphine and 1 cc of 30mg of Toradol. The capsular layer was closed using a #1 vicryl suture, while subcutaneous layers were closed using 2-0 Vicryl interrupted sutures. Finally the skin was closed using 3-0 Vicryl and skin staples, which were applied in occlusive fashion and sterile bandage applied. An Iceman cryo pad was applied on the operative leg. Sponge count and needle counts were correct. Amador Cummins left the operating room     Implants:   Implant Name Type Inv.  Item Serial No.  Lot No. LRB No. Used   CEMENT BNE HV R 40GM -- PALACOS R [id number] - [id number]  CEMENT BNE HV R 40GM -- PALACOS R [id number] [address] [id number] Left 1   CEMENT BNE HV R 40GM -- PALACOS R [id number] - [id number]  CEMENT BNE HV R 40GM -- Cem Genin [id number] [address] [id number] Left 1   PAT KALA DOME MEDIAL 38MM -- ATTUNE - [id number]  PAT KALA DOME MEDIAL 38MM -- ATTUNE [id number] [facility] [id number] Left 1   BASEPLT TIB FB SZ 5 KALA -- ATTUNE - [id number]  BASEPLT TIB FB SZ 5 KALA -- ATTUNE [id number] [facility] [id number] Left 1   FEM PS SZ 6 LT KALA -- ATTUNE - [id number]  FEM PS SZ 6 LT KALA -- ATTUNE [id number] [facility] [id number] Left 1   IMPLANT RECORD             1     Signed By: Deep Solis MD

## 2018-04-03 NOTE — PERIOP NOTES
Betadine lavage:  17.5cc of betadine lot #[id number]  , exp. Date : 11/19 ,  in 500cc of . 9NS Lot #[id number] , exp. Date : [id number].

## 2018-04-03 NOTE — ANESTHESIA PROCEDURE NOTES
Peripheral Block    Start time: 4/3/2018 8:37 AM  End time: 4/3/2018 8:40 AM  Performed by: Rafi Gauthier  Authorized by: Rafi Gauthier       Pre-procedure: Indications: at surgeon's request, post-op pain management and procedure for pain    Preanesthetic Checklist: patient identified, risks and benefits discussed, site marked, timeout performed, anesthesia consent given and patient being monitored    Timeout Time: 08:37          Block Type:   Block Type:   Adductor canal  Laterality:  Left  Monitoring:  Standard ASA monitoring, continuous pulse ox, frequent vital sign checks, heart rate, responsive to questions and oxygen  Injection Technique:  Single shot  Procedures: ultrasound guided    Patient Position: supine  Prep: chlorhexidine    Location:  Mid thigh  Needle Type:  Stimuplex  Needle Gauge:  21 G  Needle Localization:  Ultrasound guidance and anatomical landmarks  Medication Injected:  0.2%  ropivacaine  Volume (mL):  40    Assessment:  Number of attempts:  1  Injection Assessment:  Incremental injection every 5 mL, local visualized surrounding nerve on ultrasound, negative aspiration for blood, no paresthesia, no intravascular symptoms and ultrasound image on chart  Patient tolerance:  Patient tolerated the procedure well with no immediate complications

## 2018-04-03 NOTE — PERIOP NOTES
TRANSFER - OUT REPORT:    Verbal report given to Aren Jaime. RN on Ledell Epley  being transferred to PACU for routine progression of care       Report consisted of patients Situation, Background, Assessment and   Recommendations(SBAR). Information from the following report(s) SBAR, Kardex, [facility] and Recent Results was reviewed with the receiving nurse. Lines:   Peripheral IV 04/03/18 Right Hand (Active)   Site Assessment Clean, dry, & intact 4/3/2018  7:59 AM   Phlebitis Assessment 0 4/3/2018  7:59 AM   Infiltration Assessment 0 4/3/2018  7:59 AM   Dressing Status Clean, dry, & intact 4/3/2018  7:59 AM   Dressing Type Transparent;Tape 4/3/2018  7:59 AM   Hub Color/Line Status Pink; Infusing 4/3/2018  7:59 AM   Action Taken Blood drawn 4/3/2018  7:59 AM        Opportunity for questions and clarification was provided.       Patient transported with:   Tv[employer]i

## 2018-04-03 NOTE — ANESTHESIA PROCEDURE NOTES
Spinal Block    Performed by: Jhonathan Nelson  Authorized by: Jhonathan Nelson     Pre-procedure:   Indications: primary anesthetic  Preanesthetic Checklist: patient identified, risks and benefits discussed, anesthesia consent, patient being monitored and timeout performed    Timeout Time: 09:17          Spinal Block:   Patient Position:  Seated  Prep Region:  Lumbar  Prep: Betadine      Location:  L3-4  Technique:  Single shot  Local:  Lidocaine 1%  Local Dose (mL):  3    Needle:   Needle Type:  Quincke  Needle Gauge:  25 G  Attempts:  1      Events: CSF confirmed, no blood with aspiration and no paresthesia        Assessment:  Insertion:  Uncomplicated  Patient tolerance:  Patient tolerated the procedure well with no immediate complications

## 2018-04-03 NOTE — PERIOP NOTES
TRANSFER - IN REPORT:    Verbal report received from [facility] RN (name) on Magy Galvan  being received from [facility] [place] (unit) for routine progression of care      Report consisted of patients Situation, Background, Assessment and   Recommendations(SBAR). Information from the following report(s) SBAR, Kardex, MAR, Recent Results and Med Rec Status was reviewed with the receiving nurse. Opportunity for questions and clarification was provided.

## 2018-04-03 NOTE — ANESTHESIA POSTPROCEDURE EVALUATION
Post-Anesthesia Evaluation and Assessment    Patient: Merlene Mooney MRN: [id number]  SSN: [id number]    YOB: 1945  Age: 72 y.o. Sex: male       Cardiovascular Function/Vital Signs  Visit Vitals    /65    Pulse 74    Temp 36.2 °C (97.2 °F)    Resp 16    Ht 5' 10\" (1.778 m)    Wt 87.8 kg (193 lb 8 oz)    SpO2 100%    BMI 27.76 kg/m2       Patient is status post spinal anesthesia for Procedure(s):  LEFT KNEE ARTHROPLASTY TOTAL/DEPUY /  Lethaniel Males 2gm / Claven Simmer. Nausea/Vomiting: None    Postoperative hydration reviewed and adequate. Pain:  Pain Scale 1: Visual (04/03/18 1105)  Pain Intensity 1: 0 (04/03/18 1105)   Managed    Neurological Status:   Neuro (WDL): Exceptions to WDL (04/03/18 1110)  Neuro  Neurologic State: Drowsy (04/03/18 1110)  LLE Motor Response: Pharmacologically paralyzed;Numbness (04/03/18 1110)  RLE Motor Response: Pharmacologically paralyzed;Numbness (04/03/18 1110)   At baseline    Mental Status and Level of Consciousness: Arousable    Pulmonary Status:   O2 Device: Nasal cannula (04/03/18 1105)   Adequate oxygenation and airway patent    Complications related to anesthesia: None    Post-anesthesia assessment completed.  No concerns    Signed By: Baldwin Hammans, MD     April 3, 2018

## 2018-04-03 NOTE — PHYSICIAN ADVISORY
Letter of Determination: Inpatient Status Appropriate    This patient was originally hospitalized as Inpatient Status on 4/3/2018 for scheduled left total knee arthroplasty. This patient is appropriate for Inpatient Admission in accordance with CMS regulation Section 43 .3. Specifically, patient's stay is expected to be more than Two Midnights and was medically necessary. The patient's stay was medically necessary based on age > 72, hypertension with blood pressure of 165/101 mmHg. Consistent with CMS guidelines, patient meets for inpatient status. It is our recommendation that this patient's hospitalization status should be INPATIENT status.      The final decision regarding the patient's hospitalization status depends on the attending physician's judgement.     Pearl Tolliver MD, JOAQUINA,   Physician Stephen Diaz.

## 2018-04-03 NOTE — INTERVAL H&P NOTE
H&P Update:  Ledell Epley was seen and examined. History and physical has been reviewed. The patient has been examined.  There have been no significant clinical changes since the completion of the originally dated History and Physical.    Signed By: ZHANNA Peres     April 3, 2018 8:49 AM

## 2018-04-03 NOTE — IP AVS SNAPSHOT
[address] 
[phone] Patient: Magy Galvan MRN: [id number] CXX:[id number] About your hospitalization You were admitted on:  April 3, 2018 You last received care in the:  [facility] You were discharged on:  April 4, 2018 Why you were hospitalized Your primary diagnosis was: Total Knee Replacement Status, Left Your diagnoses also included:  Osteoarthritis, Hypercholesteremia, Hypertension Follow-up Information Follow up With Details Comments Contact Info oSny Hobbs MD  As needed [address] 
[address] [facility] [address] 
[phone] Donald Graves MD  As scheduled by office [address] [address] 
[phone] [address]  Will contact you within 48 hrs [address] [facility] [address] [facility] [address] 
[phone] Discharge Orders None A check lisa indicates which time of day the medication should be taken. My Medications START taking these medications Instructions Each Dose to Equal  
 Morning Noon Evening Bedtime  
 oxyCODONE IR 5 mg immediate release tablet Commonly known as:  Gayatri Parody Your next dose is: Today Take 1-2 Tabs by mouth every three (3) hours as needed. Max Daily Amount: 80 mg.  
 5-10 mg CHANGE how you take these medications Instructions Each Dose to Equal  
 Morning Noon Evening Bedtime  
 aspirin delayed-release 81 mg tablet What changed:   
- when to take this 
- additional instructions Your next dose is: Today Take 1 Tab by mouth every twelve (12) hours every twelve (12) hours for 30 days. 81 mg CONTINUE taking these medications Instructions Each Dose to Equal  
 Morning Noon Evening Bedtime  
 albuterol 90 mcg/actuation inhaler Commonly known as:  PROAIR HFA  
 Your next dose is: Today Take 1 Puff by inhalation every six (6) hours as needed for Wheezing. 1 Puff  
    
   
   
  
   
  
 atorvastatin 20 mg tablet Commonly known as:  LIPITOR Your next dose is:  Tomorrow TAKE 1 TABLET BY MOUTH DAILY  
     
  
   
   
   
  
 FLONASE SENSIMIST 27.5 mcg/actuation nasal spray Generic drug:  fluticasone Your next dose is:  Tomorrow 1 [place] by Both Nostrils route daily. Take / use AM day of surgery  per anesthesia protocols. Indications: Allergic Rhinitis 1 Spray  
    
  
   
   
   
  
 losartan 50 mg tablet Commonly known as:  COZAAR Your next dose is:  Tomorrow Take 1 Tab by mouth daily. 50 mg  
    
  
   
   
   
  
 montelukast 10 mg tablet Commonly known as:  SINGULAIR Your next dose is:  Tomorrow TAKE 1 TABLET BY MOUTH DAILY MUCINEX 1,200 mg Ta12 ER tablet Generic drug:  guaiFENesin Your next dose is: Today Take 1,200 mg by mouth two (2) times a day. Take / use AM day of surgery  per anesthesia protocols. 1200 mg  
    
   
   
  
   
  
 pantoprazole 40 mg tablet Commonly known as:  PROTONIX Your next dose is:  Tomorrow TAKE 1 TABLET BY MOUTH EVERY DAY  
     
  
   
   
   
  
 XYZAL 5 mg tablet Generic drug:  levocetirizine Your next dose is:  Tomorrow Take 5 mg by mouth daily. Take / use AM day of surgery  per anesthesia protocols. Indications: Allergic Rhinitis 5 mg STOP taking these medications GLUCOSAMINE 1500 COMPLEX PO  
   
  
 meloxicam 15 mg tablet Commonly known as:  MOBIC  
   
  
 multivitamin tablet Commonly known as:  ONE A DAY  
   
  
 mupirocin 2 % ointment Commonly known as:  BACTROBAN  
   
  
 SUPER B COMPLEX PO Where to Get Your Medications Information on where to get these meds will be given to you by the nurse or doctor. ! Ask your nurse or doctor about these medications  
  aspirin delayed-release 81 mg tablet  
 oxyCODONE IR 5 mg immediate release tablet Opioid Education Prescription Opioids: What You Need to Know: 
 
Prescription opioids can be used to help relieve moderate-to-severe pain and are often prescribed following a surgery or injury, or for certain health conditions. These medications can be an important part of treatment but also come with serious risks. Opioids are strong pain medicines. Examples include hydrocodone, oxycodone, fentanyl, and morphine. Heroin is an example of an illegal opioid. It is important to work with your health care provider to make sure you are getting the safest, most effective care. WHAT ARE THE RISKS AND SIDE EFFECTS OF OPIOID USE? Prescription opioids carry serious risks of addiction and overdose, especially with prolonged use. An opioid overdose, often marked by slow breathing, can cause sudden death. The use of prescription opioids can have a number of side effects as well, even when taken as directed. · Tolerance-meaning you might need to take more of a medication for the same pain relief · Physical dependence-meaning you have symptoms of withdrawal when the medication is stopped. Withdrawal symptoms can include nausea, sweating, chills, diarrhea, stomach cramps, and muscle aches. Withdrawal can last up to several weeks, depending on which drug you took and how long you took it. · Increased sensitivity to pain · Constipation · Nausea, vomiting, and dry mouth · Sleepiness and dizziness · Confusion · Depression · Low levels of testosterone that can result in lower sex drive, energy, and strength · Itching and sweating RISKS ARE GREATER WITH:      
· History of drug misuse, substance use disorder, or overdose · Mental health conditions (such as depression or anxiety) · Sleep apnea · Older age (72 years or older) · Pregnancy Avoid alcohol while taking prescription opioids. Also, unless specifically advised by your health care provider, medications to avoid include: · Benzodiazepines (such as Xanax or Valium) · Muscle relaxants (such as Soma or Flexeril) · Hypnotics (such as Ambien or Lunesta) · Other prescription opioids KNOW YOUR OPTIONS Talk to your health care provider about ways to manage your pain that don't involve prescription opioids. Some of these options may actually work better and have fewer risks and side effects. Options may include: 
· Pain relievers such as acetaminophen, ibuprofen, and naproxen · Some medications that are also used for depression or seizures · Physical therapy and exercise · Counseling to help patients learn how to cope better with triggers of pain and stress. · Application of heat or cold compress · Massage therapy · Relaxation techniques Be Informed Make sure you know the name of your medication, how much and how often to take it, and its potential risks & side effects. IF YOU ARE PRESCRIBED OPIOIDS FOR PAIN: 
· Never take opioids in greater amounts or more often than prescribed. Remember the goal is not to be pain-free but to manage your pain at a tolerable level. · Follow up with your primary care provider to: · Work together to create a plan on how to manage your pain. · Talk about ways to help manage your pain that don't involve prescription opioids. · Talk about any and all concerns and side effects. · Help prevent misuse and abuse. · Never sell or share prescription opioids · Help prevent misuse and abuse. · Store prescription opioids in a secure place and out of reach of others (this may include visitors, children, friends, and family).  
· Safely dispose of unused/unwanted prescription opioids: Find your community drug take-back program or your pharmacy mail-back program, or flush them down the toilet, following guidance from the Food and Drug Administration (www.fda.gov/Drugs/ResourcesForYou). · Visit www.cdc.gov/drugoverdose to learn about the risks of opioid abuse and overdose. · If you believe you may be struggling with addiction, tell your health care provider and ask for guidance or call Vishal Barakat at [phone]. Discharge Instructions [facility] [employer] Patient Discharge Instructions Guy Tello / [id number] : 1945 Admitted 4/3/2018 Discharged: 2018 IF YOU HAVE ANY PROBLEMS ONCE YOU ARE AT HOME CALL THE FOLLOWING NUMBERS:  
Main office number: [phone] Take Home Medications · It is important that you take the medication exactly as they are prescribed. · Keep your medication in the bottles provided by the pharmacist and keep a list of the medication names, dosages, and times to be taken in your wallet. · Do not take other medications without consulting your doctor. What to do at [facility] Resume your prehospital diet. If you have excessive nausea or vomitting call your doctor's office Home Physical Therapy is arranged. Use rolling walker when walking. Patients who have had a joint replacement should not drive until you are seen for your follow up appointment by Dr. Dusty Ormond. When to Call - Call if you have a temperature greater then 101 
- Unable to keep food down - Loose control of your bladder or bowel function - Are unable to bear any weight  
- Need a pain medication refill DISCHARGE SUMMARY from Nurse The following personal items collected during your admission are returned to you:  
Dental Appliance: Dental Appliances: None Vision: Visual Aid: Glasses Hearing Aid:   na 
Jewelry: Jewelry: None Clothing: Clothing: At bedside Other Valuables: Other Valuables: Cell Phone (Cell phone given to [place]) Valuables sent to safe:   na 
 
PATIENT INSTRUCTIONS: 
 
 After general anesthesia or intravenous sedation, for 24 hours or while taking prescription Narcotics: · Limit your activities · Do not drive and operate hazardous machinery · Do not make important personal or business decisions · Do  not drink alcoholic beverages · If you have not urinated within 8 hours after discharge, please contact your surgeon on call. Report the following to your surgeon: 
· Excessive pain, swelling, redness or odor of or around the surgical area · Temperature over 101 · Nausea and vomiting lasting longer than 4 hours or if unable to take medications · Any signs of decreased circulation or nerve impairment to extremity: change in color, persistent  numbness, tingling, coldness or increase pain · Any questions, call office @ [phone] Keep scheduled follow up appointment. If need to change, call office @ [phone]. *  Please give a list of your current medications to your Primary Care Provider. *  Please update this list whenever your medications are discontinued, doses are 
    changed, or new medications (including over-the-counter products) are added. *  Please carry medication information at all times in case of emergency situations. Total Knee Replacement: What to Expect at Home Your Recovery When you leave the hospital, you should be able to move around with a walker or crutches. But you will need someone to help you at home for the next few weeks or until you have more energy and can move around better. If there is no one to help you at home, you may go to a rehabilitation center. You will go home with a bandage and stitches or staples. Change the bandage as your doctor tells you to. Your doctor will remove your stitches or staples 10 to 21 days after your surgery. You may still have some mild pain, and the area may be swollen for 3 to 6 months after surgery. Your knee will continue to improve for 6 to 12 months.  You will probably use a walker for 1 to 3 weeks and then use crutches. When you are ready, you can use a cane. You will probably be able to walk on your own in 4 to 8 weeks. You will need to do months of physical rehabilitation (rehab) after a knee replacement. Rehab will help you strengthen the muscles of the knee and help you regain movement. After you recover, your artificial knee will allow you to do normal daily activities with less pain or no pain at all. You may be able to hike, dance, ride a bike, and play golf. Talk to your doctor about whether you can do more strenuous activities. Always tell your caregivers that you have an artificial knee. How long it will take to walk on your own, return to normal activities, and go back to work depends on your health and how well your rehabilitation (rehab) program goes. The better you do with your rehab exercises, the quicker you will get your strength and movement back. This care sheet gives you a general idea about how long it will take for you to recover. But each person recovers at a different pace. Follow the steps below to get better as quickly as possible. How can you care for yourself at home? Activity ? · Rest when you feel tired. You may take a nap, but do not stay in bed all day. When you sit, use a chair with arms. You can use the arms to help you stand up. ? · Work with your physical therapist to find the best way to exercise. You may be able to take frequent, short walks using crutches or a walker. What you can do as your knee heals will depend on whether your new knee is cemented or uncemented. You may not be able to do certain things for a while if your new knee is uncemented. ? · After your knee has healed enough, you can do more strenuous activities with caution. ¨ You can golf, but use a golf cart, and do not wear shoes with spikes. ¨ You can bike on a flat road or on a stationary bike. Avoid biking up hills. ¨ Your doctor may suggest that you stay away from activities that put stress on your knee. These include tennis or badminton, squash or racquetball, contact sports like football, jumping (such as in basketball), jogging, or running. ¨ Avoid activities where you might fall. These include horseback riding, skiing, and mountain biking. ? · Do not sit for more than 1 hour at a time. Get up and walk around for a while before you sit again. If you must sit for a long time, prop up your leg with a chair or footstool. This will help you avoid swelling. ? · Ask your doctor when you can shower. You may need to take sponge baths until your stitches or staples have been removed. ? · Ask your doctor when you can drive again. It may take up to 8 weeks after knee replacement surgery before it is safe for you to drive. ? · When you get into a car, sit on the edge of the seat. Then pull in your legs, and turn to face the front. ? · You should be able to do many everyday activities 3 to 6 weeks after your surgery. You will probably need to take 4 to 16 weeks off from work. When you can go back to work depends on the type of work you do and how you feel. ? · Ask your doctor when it is okay for you to have sex. ? · Do not lift anything heavier than 10 pounds and do not lift weights for 12 weeks. Diet ? · By the time you leave the hospital, you should be eating your normal diet. If your stomach is upset, try bland, low-fat foods like plain rice, broiled chicken, toast, and yogurt. Your doctor may suggest that you take iron and vitamin supplements. ? · Drink plenty of fluids (unless your doctor tells you not to). ? · Eat healthy foods, and watch your portion sizes. Try to stay at your ideal weight. Too much weight puts more stress on your new knee. ? · You may notice that your bowel movements are not regular right after your surgery. This is common.  Try to avoid constipation and straining with bowel movements. You may want to take a fiber supplement every day. If you have not had a bowel movement after a couple of days, ask your doctor about taking a mild laxative. Medicines ? · Your doctor will tell you if and when you can restart your medicines. He or she will also give you instructions about taking any new medicines. ? · If you take blood thinners, such as warfarin (Coumadin), clopidogrel (Plavix), or aspirin, be sure to talk to your doctor. He or she will tell you if and when to start taking those medicines again. Make sure that you understand exactly what your doctor wants you to do.  
? · Your doctor may give you a blood-thinning medicine to prevent blood clots. If you take a blood thinner, be sure you get instructions about how to take your medicine safely. Blood thinners can cause serious bleeding problems. This medicine could be in pill form or as a shot (injection). If a shot is necessary, your doctor will tell you how to do this. ? · Be safe with medicines. Take pain medicines exactly as directed. ¨ If the doctor gave you a prescription medicine for pain, take it as prescribed. ¨ If you are not taking a prescription pain medicine, ask your doctor if you can take an over-the-counter medicine. ¨ Plan to take your pain medicine 30 minutes before exercises. It is easier to prevent pain before it starts than to stop it once it has started. ? · If you think your pain medicine is making you sick to your stomach: 
¨ Take your medicine after meals (unless your doctor has told you not to). ¨ Ask your doctor for a different pain medicine. ? · If your doctor prescribed antibiotics, take them as directed. Do not stop taking them just because you feel better. You need to take the full course of antibiotics. Incision care ? · You will have a bandage over the cut (incision) and staples or stitches. Take the bandage off when your doctor says it is okay. ? · Your doctor will remove the staples or stitches 10 days to 3 weeks after the surgery and replace them with strips of tape. Leave the tape on for a week or until it falls off. Exercise ? · Your rehab program will give you a number of exercises to do to help you get back your knee's range of motion and strength. Always do them as your therapist tells you. Ice and elevation ? · For pain and swelling, put ice or a cold pack on the area for 10 to 20 minutes at a time. Put a thin cloth between the ice and your skin. Other instructions ? · Continue to wear your support stockings as your doctor says. These help to prevent blood clots. The length of time that you will have to wear them depends on your activity level and the amount of swelling. ? · You have metal pieces in your knee. These may set off some airport metal detectors. Carry a medical alert card that says you have an artificial joint, just in case. Follow-up care is a key part of your treatment and safety. Be sure to make and go to all appointments, and call your doctor if you are having problems. It's also a good idea to know your test results and keep a list of the medicines you take. When should you call for help? Call 911 anytime you think you may need emergency care. For example, call if: 
? · You passed out (lost consciousness). ? · You have severe trouble breathing. ? · You have sudden chest pain and shortness of breath, or you cough up blood. ?Call your doctor now or seek immediate medical care if: 
? · You have signs of infection, such as: 
¨ Increased pain, swelling, warmth, or redness. ¨ Red streaks leading from the incision. ¨ Pus draining from the incision. ¨ A fever. ? · You have signs of a blood clot, such as: 
¨ Pain in your calf, back of the knee, thigh, or groin. ¨ Redness and swelling in your leg or groin. ? · Your incision comes open and begins to bleed, or the bleeding increases. ? · You have pain that does not get better after you take pain medicine. ? Watch closely for changes in your health, and be sure to contact your doctor if: 
? · You do not have a bowel movement after taking a laxative. Where can you learn more? Go to http://rosalind-isa.info/. Enter G775 in the search box to learn more about \"Total Knee Replacement: What to Expect at Home. \" Current as of: March 21, 2017 Content Version: 11.4 © 2081-3786 [employer]. Care instructions adapted under license by [employer] (which disclaims liability or warranty for this information). If you have questions about a medical condition or this instruction, always ask your healthcare professional. [address] any warranty or liability for your use of this information. These are general instructions for a healthy lifestyle: No smoking/ No tobacco products/ Avoid exposure to second hand smoke Surgeon General's Warning:  Quitting smoking now greatly reduces serious risk to your health. Obesity, smoking, and sedentary lifestyle greatly increases your risk for illness A healthy diet, regular physical exercise & weight monitoring are important for maintaining a healthy lifestyle You may be retaining fluid if you have a history of heart failure or if you experience any of the following symptoms:  Weight gain of 3 pounds or more overnight or 5 pounds in a week, increased swelling in our hands or feet or shortness of breath while lying flat in bed. Please call your doctor as soon as you notice any of these symptoms; do not wait until your next office visit. Recognize signs and symptoms of STROKE: 
 
F-face looks uneven A-arms unable to move or move even S-speech slurred or non-existent T-time-call 911 as soon as signs and symptoms begin-DO NOT go Back to bed or wait to see if you get better-TIME IS BRAIN. The discharge information has been reviewed with the patient. The patient verbalized understanding. Information obtained by : 
I understand that if any problems occur once I am at home I am to contact my physician. I understand and acknowledge receipt of the instructions indicated above. Physician's or R.N.'s Signature                                                                  Date/Time Patient or Representative Signature                                                          Date/Time [place] Announcement We are excited to announce that we are making your provider's discharge notes available to you in [place]. You will see these notes when they are completed and signed by the physician that discharged you from your recent hospital stay. If you have any questions or concerns about any information you see in [place], please call the Health Information Department where you were seen or reach out to your Primary Care Provider for more information about your plan of care. Introducing [facility]! Dear Patrick Bianchi: Thank you for requesting a [place] account. Our records indicate that you already have an active [place] account. You can access your account anytime at [email or web address]. [employer] Did you know that you can access your hospital and ER discharge instructions at any time in [place]? You can also review all of your test results from your hospital stay or ER visit. Additional Information If you have questions, please visit the Frequently Asked Questions section of the [place] website at [email or web address] [employer]/. Remember, MyChart is NOT to be used for urgent needs. For medical emergencies, dial 911. Now available from your iPhone and Android! Introducing Raymundo Green As a [employer] patient, I wanted to make you aware of our electronic visit tool called Raymundo Green. [employer] 24/7 allows you to connect within minutes with a medical provider 24 hours a day, seven days a week via a mobile device or tablet or logging into a secure website from your computer. You can access Raymundo Green from anywhere in the [place]. A virtual visit might be right for you when you have a simple condition and feel like you just dont want to get out of bed, or cant get away from work for an appointment, when your regular [employer] provider is not available (evenings, weekends or holidays), or when youre out of town and need minor care. Electronic visits cost only $49 and if the [employer] 24/7 provider determines a prescription is needed to treat your condition, one can be electronically transmitted to a nearby pharmacy*. Please take a moment to enroll today if you have not already done so. The enrollment process is free and takes just a few minutes. To enroll, please download the [employer] 24/7 micah to your tablet or phone, or visit [email or web address] to enroll on your computer. And, as an [address] [place] [address] patient with a [employer] account, the results of your visits will be scanned into your electronic medical record and your primary care provider will be able to view the scanned results. We urge you to continue to see your regular [employer] provider for your ongoing medical care. And while your primary care provider may not be the one available when you seek a Raymundo Green virtual visit, the peace of mind you get from getting a real diagnosis real time can be priceless.    
 
For more information on [facility], view our Frequently Asked Questions (FAQs) at [email or web address]. Sincerely, 
 
Ruthie Pickard MD 
Chief Medical Officer [address] *:  certain medications cannot be prescribed via [facility] Providers Seen During Your Hospitalization Provider Specialty Primary office phone Mendy Hernandez MD Orthopedic Surgery [phone] Immunizations Administered for This Admission Name Date  
 TB Skin Test (PPD) Intradermal 4/3/2018 Your Primary Care Physician (PCP) Primary Care Physician Office Phone Office Fax Jorge Weiner [phone] You are allergic to the following No active allergies Recent Documentation Height Weight BMI Smoking Status 1.778 m 87.8 kg 27.76 kg/m2 Current Every Day Smoker Emergency Contacts Name Discharge Info Relation Home Work Mobile SaucedoSuzanne DISCHARGE CAREGIVER [3] Spouse [3]   [phone] Patient Belongings The following personal items are in your possession at time of discharge: 
  Dental Appliances: None  Visual Aid: Glasses      Home Medications: None   Jewelry: None  Clothing: At bedside    Other Valuables: Cell Phone (Cell phone given to [place]) Please provide this summary of care documentation to your next provider. Signatures-by signing, you are acknowledging that this After Visit Summary has been reviewed with you and you have received a copy. Patient Signature:  ____________________________________________________________ Date:  ____________________________________________________________  
  
Fayrene Retort Provider Signature:  ____________________________________________________________ Date:  ____________________________________________________________

## 2018-04-03 NOTE — CONSULTS
Hospitalist H&P/Consult Note     Admit Date:  4/3/2018  5:39 AM   Name:  Jarod Diaz   Age:  72 y.o.  :  1945   MRN:  [id number]   PCP:  Tiff Kirkpatrick MD  Treatment Team: Attending Provider: Eduardo Posada MD; Consulting Provider: Giana Chicas MD; Consulting Provider: Kyle Ortega MD; Utilization Review: Davis Ge    HPI:     72years old M with PMH of DJD, OA, GERD presented to the hospital for Total  L Knee replacement. Hospitalist consulted for asthma/COPD. Patient stated he had  a full work up at Pulmonary office and he was informed he just has \"allergies\". Patient denies previous history of asthma or COPD. Patient stated is having L knee pain for years. Patient reported trying \"steroids shots\" with no improvement of the symptoms. Patient stated is not having any pain after surgery. Patient denies SOB, wheezing or any other complaints. 10 systems reviewed and negative except as noted in HPI. Past Medical History:   Diagnosis Date    Stewart esophagus     Current smoker     0.5 pack/day    GERD (gastroesophageal reflux disease)     on med for control     High frequency hearing loss     Hypercholesteremia     on med    Hypertension     on med for control     OA (osteoarthritis)     Rhinitis     SNHL (sensorineural hearing loss)     Tinnitus of both ears     Wheezing     pro-air inhaler prn; last used 3/12/18; [facility] work-up was negative for asthma       Past Surgical History:   Procedure Laterality Date    HX COLONOSCOPY      HX KNEE ARTHROSCOPY Right     muscle repaired      Prior to Admission Medications   Prescriptions Last Dose Informant Patient Reported? Taking? FERROUS FUMARATE/VIT BCOMP,C (SUPER B COMPLEX PO) 3/27/2018 at Unknown time  Yes Yes   Sig: Take 1 Tab by mouth daily. GLUC العلي/CHONDRO العلي A/VIT C/MN (GLUCOSAMINE 1500 COMPLEX PO) 3/27/2018 at Unknown time  Yes Yes   Sig: Take 1,500 mg by mouth daily.    albuterol (PROAIR HFA) 90 mcg/actuation inhaler 4/3/2018 at Unknown time  No Yes   Sig: Take 1 Puff by inhalation every six (6) hours as needed for Wheezing. aspirin delayed-release 81 mg tablet 4/3/2018 at Unknown time  Yes Yes   Sig: Take 81 mg by mouth daily. Take / use AM day of surgery  per anesthesia protocols. atorvastatin (LIPITOR) 20 mg tablet 4/3/2018 at Unknown time  No Yes   Sig: TAKE 1 TABLET BY MOUTH DAILY   fluticasone (FLONASE SENSIMIST) 27.5 mcg/actuation nasal spray 2018 at Unknown time  Yes Yes   [place] by Both Nostrils route daily. Take / use AM day of surgery  per anesthesia protocols. Indications: Allergic Rhinitis   guaiFENesin (MUCINEX) 1,200 mg Ta12 ER tablet 2018 at Unknown time  Yes Yes   Sig: Take 1,200 mg by mouth two (2) times a day. Take / use AM day of surgery  per anesthesia protocols. levocetirizine (XYZAL) 5 mg tablet 4/3/2018 at Unknown time  Yes Yes   Sig: Take 5 mg by mouth daily. Take / use AM day of surgery  per anesthesia protocols. Indications: Allergic Rhinitis   losartan (COZAAR) 50 mg tablet 2018 at Unknown time  No Yes   Sig: Take 1 Tab by mouth daily. meloxicam (MOBIC) 15 mg tablet 3/27/2018 at Unknown time  Yes Yes   Sig: Take 15 mg by mouth nightly. Indications: OSTEOARTHRITIS   montelukast (SINGULAIR) 10 mg tablet 4/3/2018 at Unknown time  No Yes   Sig: TAKE 1 TABLET BY MOUTH DAILY   multivitamin (ONE A DAY) tablet 3/27/2018 at Unknown time  Yes Yes   Sig: Take 1 Tab by mouth daily. mupirocin (BACTROBAN) 2 % ointment 4/3/2018 at Unknown time  Yes Yes   Sig: by Both Nostrils route two (2) times a day.    pantoprazole (PROTONIX) 40 mg tablet 4/3/2018 at Unknown time  No Yes   Sig: TAKE 1 TABLET BY MOUTH EVERY DAY      Facility-Administered Medications: None     No Known Allergies   Social History   Substance Use Topics    Smoking status: Current Every Day Smoker     Packs/day: 0.50     Years: 30.00     Types: Cigarettes    Smokeless tobacco: Never Used Comment: smokes <1 pack of cigarettes per day    Alcohol use Yes      Comment: occasionally       Family History   Problem Relation Age of Onset    Hypertension Mother     High Cholesterol Mother     Hypertension Father     Depression Father     Ovarian Cancer Sister     Hypertension Brother     Depression Brother     Heart Attack Maternal Grandmother     Heart Attack Maternal Grandfather     Heart Attack Paternal Grandmother       Immunization History   Administered Date(s) Administered    Influenza Vaccine 10/31/2016    Pneumococcal Conjugate (PCV-13) 02/19/2018    TB Skin Test (PPD) Intradermal 04/03/2018       Objective:     Patient Vitals for the past 24 hrs:   Temp Pulse Resp BP SpO2   04/03/18 1253 97.8 °F (36.6 °C) 81 18 113/79 98 %   04/03/18 1251 - - - - 93 %   04/03/18 1215 - 77 16 113/65 99 %   04/03/18 1200 - 75 16 119/67 99 %   04/03/18 1145 - 74 16 117/65 100 %   04/03/18 1130 - 74 16 106/68 100 %   04/03/18 1115 - 79 16 101/66 99 %   04/03/18 1110 - 81 16 97/61 -   04/03/18 1105 - 83 16 97/57 97 %   04/03/18 1100 97.2 °F (36.2 °C) 86 16 104/63 98 %   04/03/18 0855 - 79 16 125/73 99 %   04/03/18 0850 - 84 14 116/72 97 %   04/03/18 0845 - 81 16 121/75 97 %   04/03/18 0840 - 83 14 131/79 98 %   04/03/18 0837 - 83 16 133/80 99 %   04/03/18 0831 - 88 16 127/81 98 %   04/03/18 0727 97.6 °F (36.4 °C) 88 16 133/87 95 %     Oxygen Therapy  O2 Sat (%): 98 % (04/03/18 1253)  Pulse via Oximetry: 88 beats per minute (04/03/18 1251)  O2 Device: Room air (04/03/18 1251)  O2 Flow Rate (L/min): 2 l/min (04/03/18 1215)    Intake/Output Summary (Last 24 hours) at 04/03/18 1532  Last data filed at 04/03/18 1154   Gross per 24 hour   Intake             1920 ml   Output              450 ml   Net             1470 ml       Physical Exam:  General:    Well nourished. Alert. Eyes:   Normal sclera. Extraocular movements intact. ENT:  Normocephalic, atraumatic. Moist mucous membranes  CV:   RRR.   No murmur, rub, or gallop. Lungs:  CTAB. No wheezing, rhonchi, or rales. Abdomen: Soft, nontender, nondistended. Bowel sounds normal.   Extremities: L knee with positive pulse, moving toes. No tenderness or drainage      Data Review:   Recent Results (from the past 24 hour(s))   TYPE & SCREEN    Collection Time: 04/03/18  8:08 AM   Result Value Ref Range    Crossmatch Expiration 04/06/2018     ABO/Rh(D) A POSITIVE     Antibody screen NEG    GLUCOSE, POC    Collection Time: 04/03/18  8:12 AM   Result Value Ref Range    Glucose (POC) 127 (H) 65 - 100 mg/dL       Imaging Studies:  CXR Results  (Last 48 hours)    None        CT Results  (Last 48 hours)    None          Assessment and Plan:     Hospital Problems as of 4/3/2018  Date Reviewed: 2/19/2018          Codes Class Noted - Resolved POA    Osteoarthritis ICD-10-CM: M19.90  ICD-9-CM: 715.90  4/3/2018 - Present Unknown        Hypercholesteremia ICD-10-CM: E78.00  ICD-9-CM: 272.0  Unknown - Present Yes        Hypertension ICD-10-CM: I10  ICD-9-CM: 401.9  Unknown - Present Yes              A/P:  -Total L Knee Arthroplasty  DVT ppx and management as primary team    -History COPD/asthma  No wheezing and denies history    -HTN  Controlled  Cont losartan    We appreciate the opportunity to participate in this patient's care. Will sign off as medical conditions are stable. Please call PRN        Signed:   Jorge Malcolm MD

## 2018-04-03 NOTE — PROGRESS NOTES
Problem: Mobility Impaired (Adult and Pediatric)  Goal: *Acute Goals and Plan of Care (Insert Text)  GOALS (1-4 days):  (1.)Mr. Maricruz Austin will move from supine to sit and sit to supine  in bed with STAND BY ASSIST.  (2.)Mr. Maricruz Austin will transfer from bed to chair and chair to bed with STAND BY ASSIST using the least restrictive device. (3.)Mr. Maricruz Austin will ambulate with STAND BY ASSIST for 200 feet with the least restrictive device. (4.)Mr. Maricruz Austin will ambulate up/down 12 steps with left railing with MINIMAL ASSIST with device as needed. (5.)Mr. Maricruz Austin will increase left knee ROM to 5°-80°.  ________________________________________________________________________________________________      PHYSICAL THERAPY [facility]Ka: Initial Assessment, PM 4/3/2018  INPATIENT: Hospital Day: 1  [place] / Product Type: Medicare /      NAME/AGE/GENDER: Ledell Epley is a 72 y.o. male   PRIMARY DIAGNOSIS:  Primary osteoarthritis of left knee [M17.12]   Procedure(s) and Anesthesia Type:     * LEFT KNEE ARTHROPLASTY TOTAL/DEPUY /  Nile Orozco 2gm / Amelia Montemayorer - Spinal (Left)  ICD-10: Treatment Diagnosis:    · Pain in Left Knee (M25.562)  · Stiffness of Left Knee, Not elsewhere classified (M25.662)  · Difficulty in walking, Not elsewhere classified (R26.2)      ASSESSMENT:     Mr. Maricruz Austin presents with limited rom and strength of left LE as well as decreased functional mobility and gait s/p left tka. He plans on going home with HHPT. This section established at most recent assessment   PROBLEM LIST (Impairments causing functional limitations):  1. Decreased Strength  2. Decreased ADL/Functional Activities  3. Decreased Transfer Abilities  4. Decreased Ambulation Ability/Technique  5. Decreased Balance  6. Increased Pain  7. Decreased Activity Tolerance  8. Decreased Flexibility/Joint Mobility  9.  Decreased [place] with Home Exercise Program   INTERVENTIONS PLANNED: (Benefits and precautions of physical therapy have been discussed with the patient.)  1. Bed Mobility  2. Gait Training  3. Home Exercise Program (HEP)  4. Therapeutic Exercise/Strengthening  5. Transfer Training  6. Range of Motion: active/assisted/passive  7. Therapeutic Activities  8. Group Therapy     TREATMENT PLAN: Frequency/Duration: Follow patient BID for duration of hospital stay to address above goals. Rehabilitation Potential For Stated Goals: Good     RECOMMENDED REHABILITATION/EQUIPMENT: (at time of discharge pending progress): Continue Skilled Therapy and Home Health: Physical Therapy. HISTORY:   History of Present Injury/Illness (Reason for Referral):  S/p left tka  Past Medical History/Comorbidities:   Mr. Feliciano Renteria  has a past medical history of Stewart esophagus; Current smoker; GERD (gastroesophageal reflux disease); High frequency hearing loss; Hypercholesteremia; Hypertension; OA (osteoarthritis); Rhinitis; SNHL (sensorineural hearing loss); Tinnitus of both ears; and Wheezing. He also has no past medical history of Adverse effect of anesthesia; Aneurysm ([address] Arrhythmia; Asthma; Autoimmune disease ([address] CAD (coronary artery disease); Cancer ([address] Chronic kidney disease; Chronic obstructive pulmonary disease ([address] Chronic pain; Coagulation disorder ([address] Diabetes ([address] Difficult intubation; Endocarditis; Heart failure ([address] Ill-defined condition; Liver disease; Malignant hyperthermia due to anesthesia; Morbid obesity ([address] Nausea & vomiting; Nicotine vapor product user; Non-nicotine vapor product user; Pseudocholinesterase deficiency; Psychiatric disorder; PUD (peptic ulcer disease); Rheumatic fever; Seizures ([address] Sleep apnea; Stroke [facility]); Thromboembolus ([address] or Thyroid disease. Mr. Feliciano Renteria  has a past surgical history that includes hx colonoscopy and hx knee arthroscopy (Right, 2005).   Social History/Living Environment:   Patient Expects to be Discharged to[de-identified] Other (comment) (OR)  Prior Level of Function/Work/Activity:  independent   Number of Personal Factors/Comorbidities that affect the Plan of Care: 1-2: MODERATE COMPLEXITY   EXAMINATION:   Most Recent Physical Functioning:      Gross Assessment  AROM: Within functional limits (right LE)  Strength: Within functional limits (right LE)          LLE AROM  L Knee Flexion: 60  L Knee Extension: 15          Bed Mobility  Supine to Sit: Contact guard assistance  Sit to Supine: Contact guard assistance    Transfers  Sit to Stand: Minimum assistance; Additional time  Stand to Sit: Additional time;Minimum assistance    Balance  Sitting: Intact  Standing: Pull to stand; With support              Weight Bearing Status  Left Side Weight Bearing: As tolerated  Distance (ft): 5 Feet (ft)  Ambulation - Level of Assistance: Minimal assistance  Assistive Device: Walker, rolling  Speed/Afsaneh: Delayed  Step Length: Left shortened;Right shortened  Stance: Left decreased  Gait Abnormalities: Antalgic  Interventions: Manual cues; Safety awareness training; Tactile cues; Verbal cues     Braces/Orthotics:     Left Knee Cold  Type: Cryocuff      Body Structures Involved:  1. Bones  2. Joints  3. Muscles  4. Ligaments Body Functions Affected:  1. Movement Related Activities and Participation Affected:  1. Mobility   Number of elements that affect the Plan of Care: 3: MODERATE COMPLEXITY   CLINICAL PRESENTATION:   Presentation: Stable and uncomplicated: LOW COMPLEXITY   CLINICAL DECISION MAKIN [place] [id number] AM-PAC 6 Clicks   Basic Mobility Inpatient Short Form  How much difficulty does the patient currently have. .. Unable A Lot A Little None   1. Turning over in bed (including adjusting bedclothes, sheets and blankets)? [] 1   [] 2   [x] 3   [] 4   2. Sitting down on and standing up from a chair with arms ( e.g., wheelchair, bedside commode, etc.)   [] 1   [] 2   [x] 3   [] 4   3. Moving from lying on back to sitting on the side of the bed?    [] 1   [] 2   [x] 3   [] 4   How much help from another person does the patient currently need. .. Total A Lot A Little None   4. Moving to and from a bed to a chair (including a wheelchair)? [] 1   [] 2   [x] 3   [] 4   5. Need to walk in hospital room? [] 1   [] 2   [x] 3   [] 4   6. Climbing 3-5 steps with a railing? [] 1   [x] 2   [] 3   [] 4   © 2007, Trustees of [address] [place] [address], under license to [employer]. All rights reserved        Score:  Initial: 17 Most Recent: X (Date: -- )    Interpretation of Tool:  Represents activities that are increasingly more difficult (i.e. Bed mobility, Transfers, Gait). Score 24 23 22-20 19-15 14-10 9-7 6     Modifier CH CI CJ CK CL CM CN      ? Mobility - Walking and Moving Around:     - CURRENT STATUS: CK - 40%-59% impaired, limited or restricted    - GOAL STATUS: CJ - 20%-39% impaired, limited or restricted    - D/C STATUS:  ---------------To be determined---------------  [place] / Product Type: Medicare /      Medical Necessity:     · Patient is expected to demonstrate progress in strength, range of motion and balance to decrease assistance required with theraputic exercises and functional mobility. Reason for Services/Other Comments:  · Patient continues to require present interventions due to patient's inability to perform theraputic exercises and functional mobility independently.    Use of outcome tool(s) and clinical judgement create a POC that gives a: Clear prediction of patient's progress: LOW COMPLEXITY            TREATMENT:   (In addition to Assessment/Re-Assessment sessions the following treatments were rendered)     Pre-treatment Symptoms/Complaints:  none  Pain: Initial:      Post Session:  0     Assessment/Reassessment only, no treatment provided today    Date:   Date:   Date:     ACTIVITY/EXERCISE AM PM AM PM AM PM   GROUP THERAPY  []  []  []  []  []  []   Ankle Pumps         Quad Sets         Gluteal Sets Hip ABd/ADduction         Straight Leg Raises         Knee Slides         Short Arc Quads         Long Arc Quads         Chair Slides                  B = bilateral; AA = active assistive; A = active; P = passive      Treatment/Session Assessment:     Response to Treatment:  Pt. Did well, some LE numbness limiting gait. Education:  [x] Home Exercises  [x] Fall Precautions  [] Hip Precautions [] D/C Instruction Review  [x] Knee/Hip Prosthesis Review  [x] Walker Management/Safety [] Adaptive Equipment as Needed       Interdisciplinary Collaboration:   o Occupational Therapist  o Registered Nurse    After treatment position/precautions:   o Supine in bed  o Bed/Chair-wheels locked  o Bed in low position  o Caregiver at bedside  o Call light within reach  o Family at bedside    Compliance with Program/Exercises: Will assess as treatment progresses. No questions. Recommendations/Intent for next treatment session:  Treatment next visit will focus on increasing Mr. Pedersons independence with bed mobility, transfers, gait training, strength/ROM exercises, modalities for pain, and patient education.       Total Treatment Duration:  PT Patient Time In/Time Out  Time In: 1410  Time Out: KEVYN Aquino

## 2018-04-03 NOTE — PROGRESS NOTES
Received to room from PACU. Alert and oriented x3. Denies pain. Surgical bandage to left knee is clean dry and intact. No NV deficits noted. Discussed pain and diet. Oriented to bed functions. Family at bedside.

## 2018-04-03 NOTE — PROGRESS NOTES
04/03/18 1251   Oxygen Therapy   O2 Sat (%) 93 %   Pulse via Oximetry 88 beats per minute   O2 Device Room air   Incentive Spirometry Treatment   Actual Volume (ml) 2500 ml   Number of Attempts 2   Patient achieved 2500 Ml/sec on IS. Patient encouraged to do 10 breaths every hour while awake-patient agreed and demonstrated. No shortness of breath or distress noted. BS are clear b/l. [facility] notes reviewed- continuous SAT monitoring ordered during hours of sleep and Albuterol ordered as needed.

## 2018-04-03 NOTE — ANESTHESIA PREPROCEDURE EVALUATION
Anesthetic History               Review of Systems / Medical History  Patient summary reviewed and pertinent labs reviewed    Pulmonary    COPD: moderate      Smoker         Neuro/Psych              Cardiovascular    Hypertension                   GI/Hepatic/Renal     GERD           Endo/Other        Arthritis     Other Findings              Physical Exam    Airway  Mallampati: II  TM Distance: > 6 cm  Neck ROM: normal range of motion        Cardiovascular  Regular rate and rhythm,  S1 and S2 normal,  no murmur, click, rub, or gallop  Rhythm: regular  Rate: normal         Dental  No notable dental hx       Pulmonary  Breath sounds clear to auscultation               Abdominal         Other Findings            Anesthetic Plan    ASA: 3  Anesthesia type: spinal      Post-op pain plan if not by surgeon: peripheral nerve block single      Anesthetic plan and risks discussed with: Patient

## 2018-04-03 NOTE — PERIOP NOTES
TRANSFER - OUT REPORT:    Verbal report given to [facility] on Amador Cummins  being transferred to [facility] room 313 for routine progression of care       Report consisted of patients Situation, Background, Assessment and   Recommendations(SBAR). Information from the following report(s) SBAR, Intake/Output and MAR was reviewed with the receiving nurse. Opportunity for questions and clarification was provided.       Patient transported with:   O2 @ 2 liters  Tech

## 2018-04-04 VITALS
WEIGHT: 193.5 LBS | RESPIRATION RATE: 20 BRPM | BODY MASS INDEX: 27.7 KG/M2 | HEART RATE: 90 BPM | OXYGEN SATURATION: 98 % | TEMPERATURE: 98 F | SYSTOLIC BLOOD PRESSURE: 138 MMHG | HEIGHT: 70 IN | DIASTOLIC BLOOD PRESSURE: 77 MMHG

## 2018-04-04 PROBLEM — Z96.652 TOTAL KNEE REPLACEMENT STATUS, LEFT: Status: ACTIVE | Noted: 2018-04-04

## 2018-04-04 LAB
ANION GAP SERPL CALC-SCNC: 12 MMOL/L (ref 7–16)
BUN SERPL-MCNC: 13 MG/DL (ref 8–23)
CALCIUM SERPL-MCNC: 8 MG/DL (ref 8.3–10.4)
CHLORIDE SERPL-SCNC: 105 MMOL/L (ref 98–107)
CO2 SERPL-SCNC: 23 MMOL/L (ref 21–32)
CREAT SERPL-MCNC: 0.93 MG/DL (ref 0.8–1.5)
GLUCOSE SERPL-MCNC: 137 MG/DL (ref 65–100)
HGB BLD-MCNC: 11 G/DL (ref 13.6–17.2)
POTASSIUM SERPL-SCNC: 3.9 MMOL/L (ref 3.5–5.1)
SODIUM SERPL-SCNC: 140 MMOL/L (ref 136–145)

## 2018-04-04 PROCEDURE — 85018 HEMOGLOBIN: CPT | Performed by: ORTHOPAEDIC SURGERY

## 2018-04-04 PROCEDURE — 74011250637 HC RX REV CODE- 250/637: Performed by: ORTHOPAEDIC SURGERY

## 2018-04-04 PROCEDURE — 74011250636 HC RX REV CODE- 250/636: Performed by: ORTHOPAEDIC SURGERY

## 2018-04-04 PROCEDURE — 36415 COLL VENOUS BLD VENIPUNCTURE: CPT | Performed by: ORTHOPAEDIC SURGERY

## 2018-04-04 PROCEDURE — 97150 GROUP THERAPEUTIC PROCEDURES: CPT

## 2018-04-04 PROCEDURE — 97535 SELF CARE MNGMENT TRAINING: CPT

## 2018-04-04 PROCEDURE — 97110 THERAPEUTIC EXERCISES: CPT

## 2018-04-04 PROCEDURE — 94760 N-INVAS EAR/PLS OXIMETRY 1: CPT

## 2018-04-04 PROCEDURE — 97116 GAIT TRAINING THERAPY: CPT

## 2018-04-04 PROCEDURE — 80048 BASIC METABOLIC PNL TOTAL CA: CPT | Performed by: ORTHOPAEDIC SURGERY

## 2018-04-04 RX ORDER — ASPIRIN 81 MG/1
81 TABLET ORAL EVERY 12 HOURS
Qty: 60 TAB | Refills: 0 | Status: SHIPPED | OUTPATIENT
Start: 2018-04-04 | End: 2018-05-04

## 2018-04-04 RX ORDER — OXYCODONE HYDROCHLORIDE 5 MG/1
5-10 TABLET ORAL
Qty: 60 TAB | Refills: 0 | Status: SHIPPED | OUTPATIENT
Start: 2018-04-04 | End: 2018-09-10

## 2018-04-04 RX ADMIN — ACETAMINOPHEN 1000 MG: 500 TABLET, FILM COATED ORAL at 11:39

## 2018-04-04 RX ADMIN — ACETAMINOPHEN 1000 MG: 500 TABLET, FILM COATED ORAL at 00:54

## 2018-04-04 RX ADMIN — ACETAMINOPHEN 1000 MG: 500 TABLET, FILM COATED ORAL at 05:14

## 2018-04-04 RX ADMIN — Medication 2 G: at 00:54

## 2018-04-04 RX ADMIN — SENNOSIDES AND DOCUSATE SODIUM 2 TABLET: 8.6; 5 TABLET ORAL at 08:32

## 2018-04-04 RX ADMIN — LOSARTAN POTASSIUM 50 MG: 50 TABLET ORAL at 08:32

## 2018-04-04 RX ADMIN — PANTOPRAZOLE SODIUM 40 MG: 40 TABLET, DELAYED RELEASE ORAL at 05:14

## 2018-04-04 RX ADMIN — LORATADINE 10 MG: 10 TABLET ORAL at 08:32

## 2018-04-04 RX ADMIN — ASPIRIN 81 MG: 81 TABLET, COATED ORAL at 08:32

## 2018-04-04 RX ADMIN — GUAIFENESIN 600 MG: 600 TABLET, EXTENDED RELEASE ORAL at 08:32

## 2018-04-04 RX ADMIN — CELECOXIB 200 MG: 200 CAPSULE ORAL at 08:32

## 2018-04-04 NOTE — PROGRESS NOTES
Problem: Mobility Impaired (Adult and Pediatric)  Goal: *Acute Goals and Plan of Care (Insert Text)  GOALS (1-4 days):  (1.)Mr. Belinda Tang will move from supine to sit and sit to supine  in bed with STAND BY ASSIST. 4/4  (2.)Mr. Belinda Tang will transfer from bed to chair and chair to bed with STAND BY ASSIST using the least restrictive device. 4/4  (3.)Mr. Belinda Tang will ambulate with STAND BY ASSIST for 200 feet with the least restrictive device. 4/4  (4.)Mr. Belinda Tang will ambulate up/down 12 steps with left railing with MINIMAL ASSIST with device as needed. 4/4  (5.)Mr. Belinda Tang will increase left knee ROM to 5°-80°. 4/4  ________________________________________________________________________________________________      PHYSICAL THERAPY [facility] tKa: Daily Note, Discharge, PM 4/4/2018  INPATIENT: Hospital Day: 2  Payor: Armando Maher / Plan: [address] 954 / Product Type: PPO /      NAME/AGE/GENDER: Vasu Boyd is a 72 y.o. male   PRIMARY DIAGNOSIS:  Primary osteoarthritis of left knee [M17.12]   Procedure(s) and Anesthesia Type:     * LEFT KNEE ARTHROPLASTY TOTAL/DEPUY /  [place] [address] 2gm / Edmundo Diones - Spinal (Left)   : Treatment Diagnosis:    · Pain in Left Knee (M25.562)  · Stiffness of Left Knee, Not elsewhere classified (M25.662)  · Difficulty in walking, Not elsewhere classified (R26.2)      ASSESSMENT:     Mr. Belinda Tang presents with limited rom and strength of left LE as well as decreased functional mobility and gait s/p left tka. He plans on going home with HHPT. 4/4 am he is supine upon arrival.  He performs exercises in the bed without any problems. He has a good quad contraction and can do a SLR without help. He increase his distance using RW with CGA and no LOB. He will sit up for awhile and then come to group therapy and ? Leaving afternoon. 4/4 pm he performs exercises without any problems. He increase his distance using RW with SBA. He is ready for D/C.   Mr. Murcia Mohamud functional progress occurred more rapidly than expected as evidenced by goal attainment. He will be discharge to his home environment with a PT HEP, assistive device(s), and Home Health PT services. This section established at most recent assessment   PROBLEM LIST (Impairments causing functional. limitations):  1. Decreased Strength  2. Decreased ADL/Functional Activities  3. Decreased Transfer Abilities  4. Decreased Ambulation Ability/Technique  5. Decreased Balance  6. Increased Pain  7. Decreased Activity Tolerance  8. Decreased Flexibility/Joint Mobility  9. Decreased [place] with Home Exercise Program   INTERVENTIONS PLANNED: (Benefits and precautions of physical therapy have been discussed with the patient.)  1. Bed Mobility  2. Gait Training  3. Home Exercise Program (HEP)  4. Therapeutic Exercise/Strengthening  5. Transfer Training  6. Range of Motion: active/assisted/passive  7. Therapeutic Activities  8. Group Therapy     TREATMENT PLAN: Frequency/Duration: Follow patient BID for duration of hospital stay to address above goals. Rehabilitation Potential For Stated Goals: Good     RECOMMENDED REHABILITATION/EQUIPMENT: (at time of discharge pending progress): Continue Skilled Therapy and Home Health: Physical Therapy. HISTORY:   History of Present Injury/Illness (Reason for Referral):  S/p left tka  Past Medical History/Comorbidities:   Mr. Feliciano Renteria  has a past medical history of Stewart esophagus; Current smoker; GERD (gastroesophageal reflux disease); High frequency hearing loss; Hypercholesteremia; Hypertension; OA (osteoarthritis); Rhinitis; SNHL (sensorineural hearing loss); Tinnitus of both ears; and Wheezing. He also has no past medical history of Adverse effect of anesthesia; Aneurysm ([address] Arrhythmia; Asthma; Autoimmune disease ([address] CAD (coronary artery disease); Cancer ([address] Chronic kidney disease; Chronic obstructive pulmonary disease ([address] Chronic pain; Coagulation disorder ([address]  Diabetes ([address] Difficult intubation; Endocarditis; Heart failure ([facility] Ill-defined condition; Liver disease; Malignant hyperthermia due to anesthesia; Morbid obesity ([facility] Nausea & vomiting; Nicotine vapor product user; Non-nicotine vapor product user; Pseudocholinesterase deficiency; Psychiatric disorder; PUD (peptic ulcer disease); Rheumatic fever; Seizures ([facility] [address] [facility] Sleep apnea; Stroke [facility]); Thromboembolus ([facility] [address] [facility] or Thyroid disease. Mr. Marlo Torre  has a past surgical history that includes hx colonoscopy and hx knee arthroscopy (Right, 2005). Social History/Living Environment:   Home Environment: Private residence  # Steps to Enter: 0  One/Two Story Residence: Two story  # of Interior Steps: 12  Interior Rails: Left  Lift Chair Available: Yes  Living Alone: No  Support Systems: Spouse/Significant Other/Partner  Patient Expects to be Discharged to[de-identified] Private residence  Current DME Used/Available at Home: None  Tub or Shower Type: Shower  Prior Level of Function/Work/Activity:  independent   Number of Personal Factors/Comorbidities that affect the Plan of Care: 1-2: MODERATE COMPLEXITY   EXAMINATION:   Most Recent Physical Functioning:                 LLE AROM  L Knee Flexion: 93  L Knee Extension: 5          Bed Mobility  Supine to Sit: Stand-by assistance  Sit to Supine:  (left up in chair)    Transfers  Sit to Stand: Stand-by assistance  Stand to Sit: Stand-by assistance                   Weight Bearing Status  Left Side Weight Bearing: As tolerated  Distance (ft): 288 Feet (ft) (x 2)  Ambulation - Level of Assistance: Stand-by assistance  Assistive Device: Walker, rolling  Speed/Afsaneh: Delayed  Step Length: Left shortened;Right shortened  Stance: Left decreased  Gait Abnormalities: Antalgic  Interventions: Safety awareness training     Braces/Orthotics:     Left Knee Cold  Type: Cryocuff      Body Structures Involved:  1. Bones  2. Joints  3. Muscles  4. Ligaments Body Functions Affected:  1.  Movement Related Activities and Participation Affected:  1. Mobility   Number of elements that affect the Plan of Care: 3: MODERATE COMPLEXITY   CLINICAL PRESENTATION:   Presentation: Stable and uncomplicated: LOW COMPLEXITY   CLINICAL DECISION MAKING:   [facility] AM-PAC 6 Clicks   Basic Mobility Inpatient Short Form  How much difficulty does the patient currently have. .. Unable A Lot A Little None   1. Turning over in bed (including adjusting bedclothes, sheets and blankets)? [] 1   [] 2   [x] 3   [] 4   2. Sitting down on and standing up from a chair with arms ( e.g., wheelchair, bedside commode, etc.)   [] 1   [] 2   [x] 3   [] 4   3. Moving from lying on back to sitting on the side of the bed? [] 1   [] 2   [x] 3   [] 4   How much help from another person does the patient currently need. .. Total A Lot A Little None   4. Moving to and from a bed to a chair (including a wheelchair)? [] 1   [] 2   [x] 3   [] 4   5. Need to walk in hospital room? [] 1   [] 2   [x] 3   [] 4   6. Climbing 3-5 steps with a railing? [] 1   [x] 2   [] 3   [] 4   © 2007, Trustees of [facility], under license to [employer]. All rights reserved        Score:  Initial: 17 Most Recent: X (Date: -- )    Interpretation of Tool:  Represents activities that are increasingly more difficult (i.e. Bed mobility, Transfers, Gait). Score 24 23 22-20 19-15 14-10 9-7 6     Modifier CH CI CJ CK CL CM CN      ? Mobility - Walking and Moving Around:     - CURRENT STATUS: CK - 40%-59% impaired, limited or restricted    - GOAL STATUS: CJ - 20%-39% impaired, limited or restricted    - D/C STATUS:  ---------------To be determined---------------  Payor: BLUE CROSS / [place] BLUE [employer] [facility] / Product Type: PPO /      Medical Necessity:     · Patient is expected to demonstrate progress in strength, range of motion and balance to decrease assistance required with theraputic exercises and functional mobility.   Reason for Services/Other Comments:  · Patient continues to require present interventions due to patient's inability to perform theraputic exercises and functional mobility independently. Use of outcome tool(s) and clinical judgement create a POC that gives a: Clear prediction of patient's progress: LOW COMPLEXITY            TREATMENT:   (In addition to Assessment/Re-Assessment sessions the following treatments were rendered)     Pre-treatment Symptoms/Complaints:  none  Pain: Initial:   Pain Intensity 1: 0 (0/10 after therapy)  Pain: after tx     Gait Training (15 Minutes):  Gait training to improve and/or restore physical functioning as related to mobility. Ambulated 288 Feet (ft) (x 2) with Stand-by assistance using a Walker, rolling and minimal Safety awareness training related to their knee position and motion to promote proper body alignment. Therapeutic Exercise: (45 Minutes (group therapy)):  Exercises per grid below to improve strength. Required minimal verbal cues to promote proper body alignment. Progressed range as indicated. Date:  4/4   Date:   Date:     ACTIVITY/EXERCISE AM PM AM PM AM PM   GROUP THERAPY  []  [x]  []  []  []  []   Ankle Pumps 15 15       Quad Sets 15 15       Gluteal Sets 15 15       Hip ABd/ADduction 15 15       Straight Leg Raises 15 15       Knee Slides 15 15       Short Arc Quads 15 15       Long Arc Quads         Chair Slides  15                B = bilateral; AA = active assistive; A = active; P = passive      Treatment/Session Assessment:     Response to Treatment:  Pt tolerated group therapy well.   He is ready for D/C    Education:  [x] Home Exercises  [x] Fall Precautions  [] Hip Precautions [] D/C Instruction Review  [] Knee/Hip Prosthesis Review  [x] Walker Management/Safety [] Adaptive Equipment as Needed       Interdisciplinary Collaboration:   o Registered Nurse    After treatment position/precautions:   o Up in chair  o Bed/Chair-wheels locked  o Bed in low position  o Caregiver at bedside  o Call light within reach  o Family at bedside    Compliance with Program/Exercises: Will assess as treatment progresses. No questions.     Recommendations/Intent for next treatment session:  He is being D/C with [facility] PT     Total Treatment Duration:  PT Patient Time In/Time Out  Time In: 1300  Time Out: 920 Alyssia Azevedo, PTA

## 2018-04-04 NOTE — PROGRESS NOTES
04/04/18 0758   Oxygen Therapy   O2 Sat (%) 96 %   Pulse via Oximetry 81 beats per minute   O2 Device Room air   O2 Flow Rate (L/min) 0 l/min   Incentive Spirometry Treatment   Actual Volume (ml) 2200 ml   Number of Attempts 1

## 2018-04-04 NOTE — PROGRESS NOTES
Problem: Self Care Deficits Care Plan (Adult)  Goal: *Acute Goals and Plan of Care (Insert Text)  GOALS:   DISCHARGE GOALS (in preparation for going home/rehab):  3 days  1. Mr. Charmaine Langston will perform one lower body dressing activity with minimal assistance required to demonstrate improved functional mobility and safety. 2.  Mr. Charmaine Langston will perform one lower body bathing activity with minimal assistance required to demonstrate improved functional mobility and safety. 3.  Mr. Charmaine Langston will perform toileting/toilet transfer with contact guard assistance to demonstrate improved functional mobility and safety. 4.  Mr. Charmaine Langston will perform shower transfer with contact guard assistance to demonstrate improved functional mobility and safety. JOINT CAMP OCCUPATIONAL THERAPY TKA: Daily Note and AM 4/4/2018  INPATIENT: Hospital Day: 2  [place] / [place] / Product Type: Medicare /      NAME/AGE/GENDER: Mauricio Painter is a 72 y.o. male   PRIMARY DIAGNOSIS:  Primary osteoarthritis of left knee [M17.12]   Procedure(s) and Anesthesia Type:     * LEFT KNEE ARTHROPLASTY TOTAL/DEPUY /  Zunilda Bound 2gm / Vinie Byrne - Spinal (Left)  ICD-10: Treatment Diagnosis:    · Pain in Left Knee (M25.562)  · Stiffness of Left Knee, Not elsewhere classified (R84.615)  · Other lack of cordination (R27.8)      ASSESSMENT:     Mr. Charmaine Langston is s/p left TKA and presents with decreased weight bearing on left LE and decreased independence with functional mobility and activities of daily living. Patient would benefit from skilled Occupational Therapy to maximize independence and safety with self-care task and functional mobility. Pt would also benefit from education on adaptive equipment and safety precautions in preparation for going home or for recommendations for post-hospital rehab program.  Patient plans for further rehab at home with home health services and good family support.   OT reviewed therapy schedule and plan of care with patient. Patient was able to transfer and preform self care skills as charted below. Patient instructed to call for assistance when needing to get up from the bed and all needs in reach. Patient verbalized understanding of call light. 4/4/2018 patient received dressed and sitting in recliner. He plans to go home today with assist from his wife. He declined shower and was educated on safety in the shower as well as hibaclens use. He was trained in effective ADL compensatory techniques. Educated patient on home safety with walker use. Patient in recliner and educated to call for assistance when getting out of recliner. Patient verbalized understanding of call light. This section established at most recent assessment   PROBLEM LIST (Impairments causing functional limitations):  1. Decreased Strength  2. Decreased ADL/Functional Activities  3. Decreased Transfer Abilities  4. Increased Pain  5. Increased Fatigue  6. Decreased Flexibility/Joint Mobility  7. Decreased Knowledge of Precautions   INTERVENTIONS PLANNED: (Benefits and precautions of occupational therapy have been discussed with the patient.)  1. Activities of daily living training  2. Adaptive equipment training  3. Balance training  4. Clothing management  5. Donning&doffing training  6. Theraputic activity     TREATMENT PLAN: Frequency/Duration: Follow patient 1 time to address above goals. Rehabilitation Potential For Stated Goals: Good     RECOMMENDED REHABILITATION/EQUIPMENT: (at time of discharge pending progress): Continue Skilled Therapy and Home Health: Physical Therapy. OCCUPATIONAL PROFILE AND HISTORY:   History of Present Injury/Illness (Reason for Referral): Pt presents this date s/p (left) TKA. Past Medical History/Comorbidities:   Mr. PRESENCE SAINT ALISA [place]  has a past medical history of Stewart esophagus; Current smoker; GERD (gastroesophageal reflux disease); High frequency hearing loss; Hypercholesteremia;  Hypertension; OA (osteoarthritis); Rhinitis; SNHL (sensorineural hearing loss); Tinnitus of both ears; and Wheezing. He also has no past medical history of Adverse effect of anesthesia; Aneurysm ([address] Arrhythmia; Asthma; Autoimmune disease ([address] CAD (coronary artery disease); Cancer ([address] Chronic kidney disease; Chronic obstructive pulmonary disease ([facility] Chronic pain; Coagulation disorder ([facility] [address] [facility] Diabetes ([facility] Difficult intubation; Endocarditis; Heart failure ([address] Ill-defined condition; Liver disease; Malignant hyperthermia due to anesthesia; Morbid obesity ([facility] Nausea & vomiting; Nicotine vapor product user; Non-nicotine vapor product user; Pseudocholinesterase deficiency; Psychiatric disorder; PUD (peptic ulcer disease); Rheumatic fever; Seizures ([facility] Sleep apnea; Stroke [facility]); Thromboembolus ([facility] [address] or Thyroid disease. Mr. Carlyle Huddleston  has a past surgical history that includes hx colonoscopy and hx knee arthroscopy (Right, 2005). Social History/Living Environment:   Home Environment: Private residence  # Steps to Enter: 0  One/Two Story Residence: Two story  # of Interior Steps: 12  Interior Rails: Left  Lift Chair Available: Yes  Living Alone: No  Support Systems: Spouse/Significant Other/Partner  Patient Expects to be Discharged to[de-identified] Private residence  Current DME Used/Available at Home: None  Tub or Shower Type: Shower  Prior Level of Function/Work/Activity:  Mod I with ADLs no dme     Number of Personal Factors/Comorbidities that affect the Plan of Care: Brief history (0):  LOW COMPLEXITY   ASSESSMENT OF OCCUPATIONAL PERFORMANCE[de-identified]   Most Recent Physical Functioning:                                                   Basic ADLs (From Assessment) Complex ADLs (From Assessment)   Basic ADL  Feeding: Setup  Oral Facial Hygiene/Grooming: Supervision  Bathing: Moderate assistance  Type of Bath: Patient refused  Upper Body Dressing: Supervision  Lower Body Dressing: Moderate assistance  Toileting:  Total assistance (catheter)     Grooming/Bathing/Dressing Activities of Daily Living                           Lower Body Dressing Assistance  Socks: Stand-by assistance  Slip on Shoes with Back: Stand-by assistance Bed/Mat Mobility  Supine to Sit: Stand-by assistance  Sit to Supine:  (left up in chair)  Sit to Stand: Contact guard assistance         Physical Skills Involved:  1. Range of Motion  2. Balance  3. Strength Cognitive Skills Affected (resulting in the inability to perform in a timely and safe manner): 1. none Psychosocial Skills Affected:  1. none   Number of elements that affect the Plan of Care: 1-3:  LOW COMPLEXITY   CLINICAL DECISION MAKIN[address] [place] [address] AM-PAC 6 Clicks   Daily Activity Inpatient Short Form  How much help from another person does the patient currently need. .. Total A Lot A Little None   1. Putting on and taking off regular lower body clothing? [] 1   [x] 2   [] 3   [] 4   2. Bathing (including washing, rinsing, drying)? [] 1   [x] 2   [] 3   [] 4   3. Toileting, which includes using toilet, bedpan or urinal?   [x] 1   [] 2   [] 3   [] 4   4. Putting on and taking off regular upper body clothing? [] 1   [] 2   [x] 3   [] 4   5. Taking care of personal grooming such as brushing teeth? [] 1   [] 2   [x] 3   [] 4   6. Eating meals? [] 1   [] 2   [] 3   [x] 4   © , Trustees of [address] [place] [address], under license to [employer]. All rights reserved     Score:  Initial: 15 Most Recent: X (Date: -- )    Interpretation of Tool:  Represents activities that are increasingly more difficult (i.e. Bed mobility, Transfers, Gait). Score 24 23 22-20 19-15 14-10 9-7 6     Modifier CH CI CJ CK CL CM CN      ?  Self Care:     - CURRENT STATUS: CK - 40%-59% impaired, limited or restricted    - GOAL STATUS: CJ - 20%-39% impaired, limited or restricted    - D/C STATUS:  ---------------To be determined---------------  [place] / [place] / Product Type: Medicare /      Medical Necessity:     · Patient is expected to demonstrate progress in balance, coordination and functional technique to decrease assistance required with self care and functional mobility and improve safety during self care and functional mobility. Reason for Services/Other Comments:  · Patient continues to require skilled intervention due to decreased self care and functional mobility. Use of outcome tool(s) and clinical judgement create a POC that gives a: LOW COMPLEXITY            TREATMENT:   (In addition to Assessment/Re-Assessment sessions the following treatments were rendered)     Pre-treatment Symptoms/Complaints:    Pain: Initial:   Pain Intensity 1: 0  Post Session:  0/10 rest, iceman in place     Self Care: (10): Procedure(s) (per grid) utilized to improve and/or restore self-care/home management as related to dressing and bathing. Required minimal verbal and   cueing to facilitate activities of daily living skills, compensatory activities and home safety and DME recommendations. Treatment/Session Assessment:     Response to Treatment:  Declined shower, wants to discharge today, educated on home safety and safe ADL task performance    Education:  [] Home Exercises  [x] Fall Precautions  [] Hip Precautions [] Going Home Video  [x] Knee/Hip Prosthesis Review  [x] Walker Management/Safety [x] Adaptive Equipment as Needed       Interdisciplinary Collaboration:   o Physical Therapy Assistant  o Occupational Therapist  o Registered Nurse    After treatment position/precautions:   o Up in chair  o Bed/Chair-wheels locked  o Bed in low position  o Call light within reach  o RN notified     Compliance with Program/Exercises: compliant all of the time. Recommendations/Intent for next treatment session:  Treatment next visit will focus on increasing Mr. Pedersons independence with bed mobility, transfers, self care, functional mobility, modalities for pain, and patient education. Total Treatment Duration:10 minutes  OT Patient Time In/Time Out  Time In: 1010  Time Out: [address], OT

## 2018-04-04 NOTE — PROGRESS NOTES
Patient is A&Ox4. Able to verbalize needs. Resting quietly with no distress noted. Shift assessment completed. Dressing to surgical site is dry and intact. Neurovascular and peripheral vascular checks WNL. Bush draining clear yellow urine to bag. PIV infusing without difficulty. PIV dressing intact with no s/s of infection. Iceman applied to knee. Denies needs. Bed low and locked. Call light within reach. Instructed to call for assistance. Patient verbalizes understanding. Will continue to monitor.

## 2018-04-04 NOTE — PROGRESS NOTES
2018         Post Op day: 1 Day Post-Op     Admit Date: 4/3/2018  Admit Diagnosis: Primary osteoarthritis of left knee [M17.12]        Subjective: Doing well, No complaints, No SOB, No Chest Pain, No Nausea or Vomiting     Objective:   Vital Signs are Stable, No Acute Distress, Alert and Oriented, Dressing is Dry,  Neurovascular exam is normal.     Assessment / Plan :  Patient Active Problem List   Diagnosis Code    Hypercholesteremia E78.00    Hypertension I10    Tinnitus of both ears H93.13    SNHL (sensorineural hearing loss) H90.5    High frequency hearing loss H91.90    Uncontrolled hypertension I10    Osteoarthritis M19.90    Total knee replacement status, left E35.369    Patient Vitals for the past 8 hrs:   BP Temp Pulse Resp SpO2   18 0744 121/73 97 °F (36.1 °C) 85 20 95 %   18 0356 123/77 97.4 °F (36.3 °C) 81 16 94 %    Temp (24hrs), Av.6 °F (36.4 °C), Min:97 °F (36.1 °C), Max:98 °F (36.7 °C)    Body mass index is 27.76 kg/(m^2).     Lab Results   Component Value Date/Time    HGB 11.0 (L) 2018 06:11 AM      Pt seen by and discussed with Supervising Physician   Continue PT  Home with home health today        Signed By: ZHANNA Gaston

## 2018-04-04 NOTE — DISCHARGE SUMMARY
[employer] [facility]  Total Joint Discharge Summary      Patient ID:  Merlene Hills  [id number]  40 y.o.  1945    Admit date: 4/3/2018  Discharge date and time: 4-4-18  Admitting Physician: Mario Green MD  Surgeon: Same  Admission Diagnoses: Primary osteoarthritis of left knee [M17.12]  Discharge Diagnoses: Principal Problem: Total knee replacement status, left (4/4/2018)    Active Problems:    Hypercholesteremia ()      Hypertension ()      Osteoarthritis (4/3/2018)                                Perioperative Antibiotics: Ancef 1 to 2 mg was given depending on patient's weight. If allergic to Ancef or due to other indications, patient was given Vancomycin. Hospital Medications given:   [unfilled]  [unfilled]  [unfilled]    Discharge Medications given:  Current Discharge Medication List      START taking these medications    Details   oxyCODONE IR (ROXICODONE) 5 mg immediate release tablet Take 1-2 Tabs by mouth every three (3) hours as needed. Max Daily Amount: 80 mg.  Qty: 60 Tab, Refills: 0    Associated Diagnoses: Total knee replacement status, left         CONTINUE these medications which have CHANGED    Details   aspirin delayed-release 81 mg tablet Take 1 Tab by mouth every twelve (12) hours every twelve (12) hours for 30 days. Qty: 60 Tab, Refills: 0         CONTINUE these medications which have NOT CHANGED    Details   fluticasone (FLONASE SENSIMIST) 27.5 mcg/actuation nasal spray 1 [place] by Both Nostrils route daily. Take / use AM day of surgery  per anesthesia protocols. Indications: Allergic Rhinitis      guaiFENesin (MUCINEX) 1,200 mg Ta12 ER tablet Take 1,200 mg by mouth two (2) times a day. Take / use AM day of surgery  per anesthesia protocols. levocetirizine (XYZAL) 5 mg tablet Take 5 mg by mouth daily. Take / use AM day of surgery  per anesthesia protocols. Indications:  Allergic Rhinitis      albuterol (PROAIR HFA) 90 mcg/actuation inhaler Take 1 Puff by inhalation every six (6) hours as needed for Wheezing. Qty: 1 Inhaler, Refills: 5      montelukast (SINGULAIR) 10 mg tablet TAKE 1 TABLET BY MOUTH DAILY  Qty: 30 Tab, Refills: 5      atorvastatin (LIPITOR) 20 mg tablet TAKE 1 TABLET BY MOUTH DAILY  Qty: 30 Tab, Refills: 5      losartan (COZAAR) 50 mg tablet Take 1 Tab by mouth daily. Qty: 90 Tab, Refills: 3      pantoprazole (PROTONIX) 40 mg tablet TAKE 1 TABLET BY MOUTH EVERY DAY  Qty: 30 Tab, Refills: 5         STOP taking these medications       mupirocin (BACTROBAN) 2 % ointment Comments:   Reason for Stopping:         meloxicam (MOBIC) 15 mg tablet Comments:   Reason for Stopping:         GLUC العلي/CHONDRO العلي A/VIT C/MN (GLUCOSAMINE 1500 COMPLEX PO) Comments:   Reason for Stopping:         multivitamin (ONE A DAY) tablet Comments:   Reason for Stopping:         FERROUS FUMARATE/VIT BCOMP,C (SUPER B COMPLEX PO) Comments:   Reason for Stopping:                Additional DVT Prophylaxis:  [place] Hose,Plexi-Pulse    Postoperative transfusions:   none  Post Op complications: none    Hemoglobin at discharge:   Lab Results   Component Value Date/Time    HGB 11.0 (L) 04/04/2018 06:11 AM       Wound appears to be healing without any evidence of infection. Physical Therapy started on the day following surgery and progressed to independent ambulation with the aid of a walker. At the time of discharge, able to go up and down stairs and had understanding of precautions needed following surgery.       PT/OT:            Assistive Device: Walker (comment)        LLE AROM  L Knee Flexion: 60  L Knee Extension: 15       Discharged to: home    Discharge instructions:  -Rx pain medication given  - Anticoagulate with: Ecotrin 81 mg PO BID x 4 weeks  -Resume pre hospital diet             -Resume home medications per medical continuation form     -Ambulate with walker, appropriate total joint protocol  -Follow up in office as scheduled       Signed:  Lizett Olivarez PA  4/4/2018  7:49 AM

## 2018-04-04 NOTE — PROGRESS NOTES
04/03/18 2030   Oxygen Therapy   O2 Sat (%) 96 %   Pulse via Oximetry 90 beats per minute   O2 Device Room air   O2 Flow Rate (L/min) 0 l/min   Patient placed on continuous sat monitor # 12. Monitor history deleted prior to placing on patient. Pt on room air. Alarms set per protocol.

## 2018-04-04 NOTE — DISCHARGE INSTRUCTIONS
[facility]   Patient Discharge Instructions    Jacquelyn Brower / [id number] : 1945    Admitted 4/3/2018 Discharged: 2018     IF YOU HAVE ANY PROBLEMS ONCE YOU ARE AT HOME CALL THE FOLLOWING NUMBERS:   Main office number: [phone]    Take Home Medications     · It is important that you take the medication exactly as they are prescribed. · Keep your medication in the bottles provided by the pharmacist and keep a list of the medication names, dosages, and times to be taken in your wallet. · Do not take other medications without consulting your doctor. What to do at [address] your prehospital diet. If you have excessive nausea or vomitting call your doctor's office     Home Physical Therapy is arranged. Use rolling walker when walking. Patients who have had a joint replacement should not drive until you are seen for your follow up appointment by Dr. Al aRmirez. When to Call    - Call if you have a temperature greater then 101  - Unable to keep food down  - Loose control of your bladder or bowel function  - Are unable to bear any weight   - Need a pain medication refill       DISCHARGE SUMMARY from Nurse    The following personal items collected during your admission are returned to you:   Dental Appliance: Dental Appliances: None  Vision: Visual Aid: Glasses  Hearing Aid:   na  Jewelry: Jewelry: None  Clothing: Clothing: At bedside  Other Valuables: Other Valuables: Cell Phone (Cell phone given to [place])  Valuables sent to safe:   na    PATIENT INSTRUCTIONS:    After general anesthesia or intravenous sedation, for 24 hours or while taking prescription Narcotics:  · Limit your activities  · Do not drive and operate hazardous machinery  · Do not make important personal or business decisions  · Do  not drink alcoholic beverages  · If you have not urinated within 8 hours after discharge, please contact your surgeon on call.     Report the following to your surgeon:  · Excessive pain, swelling, redness or odor of or around the surgical area  · Temperature over 101  · Nausea and vomiting lasting longer than 4 hours or if unable to take medications  · Any signs of decreased circulation or nerve impairment to extremity: change in color, persistent  numbness, tingling, coldness or increase pain  · Any questions, call office @ [phone]      Keep scheduled follow up appointment. If need to change, call office @ [phone]. *  Please give a list of your current medications to your Primary Care Provider. *  Please update this list whenever your medications are discontinued, doses are      changed, or new medications (including over-the-counter products) are added. *  Please carry medication information at all times in case of emergency situations. Total Knee Replacement: What to Expect at [address]    When you leave the hospital, you should be able to move around with a walker or crutches. But you will need someone to help you at home for the next few weeks or until you have more energy and can move around better. If there is no one to help you at home, you may go to a rehabilitation center. You will go home with a bandage and stitches or staples. Change the bandage as your doctor tells you to. Your doctor will remove your stitches or staples 10 to 21 days after your surgery. You may still have some mild pain, and the area may be swollen for 3 to 6 months after surgery. Your knee will continue to improve for 6 to 12 months. You will probably use a walker for 1 to 3 weeks and then use crutches. When you are ready, you can use a cane. You will probably be able to walk on your own in 4 to 8 weeks. You will need to do months of physical rehabilitation (rehab) after a knee replacement. Rehab will help you strengthen the muscles of the knee and help you regain movement.  After you recover, your artificial knee will allow you to do normal daily activities with less pain or no pain at all. You may be able to hike, dance, ride a bike, and play golf. Talk to your doctor about whether you can do more strenuous activities. Always tell your caregivers that you have an artificial knee. How long it will take to walk on your own, return to normal activities, and go back to work depends on your health and how well your rehabilitation (rehab) program goes. The better you do with your rehab exercises, the quicker you will get your strength and movement back. This care sheet gives you a general idea about how long it will take for you to recover. But each person recovers at a different pace. Follow the steps below to get better as quickly as possible. How can you care for yourself at home? Activity  ? · Rest when you feel tired. You may take a nap, but do not stay in bed all day. When you sit, use a chair with arms. You can use the arms to help you stand up. ? · Work with your physical therapist to find the best way to exercise. You may be able to take frequent, short walks using crutches or a walker. What you can do as your knee heals will depend on whether your new knee is cemented or uncemented. You may not be able to do certain things for a while if your new knee is uncemented. ? · After your knee has healed enough, you can do more strenuous activities with caution. ¨ You can golf, but use a golf cart, and do not wear shoes with spikes. ¨ You can bike on a flat road or on a stationary bike. Avoid biking up hills. ¨ Your doctor may suggest that you stay away from activities that put stress on your knee. These include tennis or badminton, squash or racquetball, contact sports like football, jumping (such as in basketball), jogging, or running. ¨ Avoid activities where you might fall. These include horseback riding, skiing, and mountain biking. ? · Do not sit for more than 1 hour at a time. Get up and walk around for a while before you sit again.  If you must sit for a long time, prop up your leg with a chair or footstool. This will help you avoid swelling. ? · Ask your doctor when you can shower. You may need to take sponge baths until your stitches or staples have been removed. ? · Ask your doctor when you can drive again. It may take up to 8 weeks after knee replacement surgery before it is safe for you to drive. ? · When you get into a car, sit on the edge of the seat. Then pull in your legs, and turn to face the front. ? · You should be able to do many everyday activities 3 to 6 weeks after your surgery. You will probably need to take 4 to 16 weeks off from work. When you can go back to work depends on the type of work you do and how you feel. ? · Ask your doctor when it is okay for you to have sex. ? · Do not lift anything heavier than 10 pounds and do not lift weights for 12 weeks. Diet  ? · By the time you leave the hospital, you should be eating your normal diet. If your stomach is upset, try bland, low-fat foods like plain rice, broiled chicken, toast, and yogurt. Your doctor may suggest that you take iron and vitamin supplements. ? · Drink plenty of fluids (unless your doctor tells you not to). ? · Eat healthy foods, and watch your portion sizes. Try to stay at your ideal weight. Too much weight puts more stress on your new knee. ? · You may notice that your bowel movements are not regular right after your surgery. This is common. Try to avoid constipation and straining with bowel movements. You may want to take a fiber supplement every day. If you have not had a bowel movement after a couple of days, ask your doctor about taking a mild laxative. Medicines  ? · Your doctor will tell you if and when you can restart your medicines. He or she will also give you instructions about taking any new medicines. ? · If you take blood thinners, such as warfarin (Coumadin), clopidogrel (Plavix), or aspirin, be sure to talk to your doctor.  He or she will tell you if and when to start taking those medicines again. Make sure that you understand exactly what your doctor wants you to do.   ? · Your doctor may give you a blood-thinning medicine to prevent blood clots. If you take a blood thinner, be sure you get instructions about how to take your medicine safely. Blood thinners can cause serious bleeding problems. This medicine could be in pill form or as a shot (injection). If a shot is necessary, your doctor will tell you how to do this. ? · Be safe with medicines. Take pain medicines exactly as directed. ¨ If the doctor gave you a prescription medicine for pain, take it as prescribed. ¨ If you are not taking a prescription pain medicine, ask your doctor if you can take an over-the-counter medicine. ¨ Plan to take your pain medicine 30 minutes before exercises. It is easier to prevent pain before it starts than to stop it once it has started. ? · If you think your pain medicine is making you sick to your stomach:  ¨ Take your medicine after meals (unless your doctor has told you not to). ¨ Ask your doctor for a different pain medicine. ? · If your doctor prescribed antibiotics, take them as directed. Do not stop taking them just because you feel better. You need to take the full course of antibiotics. Incision care  ? · You will have a bandage over the cut (incision) and staples or stitches. Take the bandage off when your doctor says it is okay. ? · Your doctor will remove the staples or stitches 10 days to 3 weeks after the surgery and replace them with strips of tape. Leave the tape on for a week or until it falls off. Exercise  ? · Your rehab program will give you a number of exercises to do to help you get back your knee's range of motion and strength. Always do them as your therapist tells you. Ice and elevation  ? · For pain and swelling, put ice or a cold pack on the area for 10 to 20 minutes at a time.  Put a thin cloth between the ice and your skin. Other instructions  ? · Continue to wear your support stockings as your doctor says. These help to prevent blood clots. The length of time that you will have to wear them depends on your activity level and the amount of swelling. ? · You have metal pieces in your knee. These may set off some airport metal detectors. Carry a medical alert card that says you have an artificial joint, just in case. Follow-up care is a key part of your treatment and safety. Be sure to make and go to all appointments, and call your doctor if you are having problems. It's also a good idea to know your test results and keep a list of the medicines you take. When should you call for help? Call 911 anytime you think you may need emergency care. For example, call if:  ? · You passed out (lost consciousness). ? · You have severe trouble breathing. ? · You have sudden chest pain and shortness of breath, or you cough up blood. ?Call your doctor now or seek immediate medical care if:  ? · You have signs of infection, such as:  ¨ Increased pain, swelling, warmth, or redness. ¨ Red streaks leading from the incision. ¨ Pus draining from the incision. ¨ A fever. ? · You have signs of a blood clot, such as:  ¨ Pain in your calf, back of the knee, thigh, or groin. ¨ Redness and swelling in your leg or groin. ? · Your incision comes open and begins to bleed, or the bleeding increases. ? · You have pain that does not get better after you take pain medicine. ? Watch closely for changes in your health, and be sure to contact your doctor if:  ? · You do not have a bowel movement after taking a laxative. Where can you learn more? Go to http://rosalind-isa.info/. Enter Z262 in the search box to learn more about \"Total Knee Replacement: What to Expect at Home. \"  Current as of: March 21, 2017  Content Version: 11.4  © 4480-6216 [employer].  Care instructions adapted under license by [employer] (which disclaims liability or warranty for this information). If you have questions about a medical condition or this instruction, always ask your healthcare professional. [address] any warranty or liability for your use of this information. These are general instructions for a healthy lifestyle:    No smoking/ No tobacco products/ Avoid exposure to second hand smoke    Surgeon General's Warning:  Quitting smoking now greatly reduces serious risk to your health. Obesity, smoking, and sedentary lifestyle greatly increases your risk for illness    A healthy diet, regular physical exercise & weight monitoring are important for maintaining a healthy lifestyle    You may be retaining fluid if you have a history of heart failure or if you experience any of the following symptoms:  Weight gain of 3 pounds or more overnight or 5 pounds in a week, increased swelling in our hands or feet or shortness of breath while lying flat in bed. Please call your doctor as soon as you notice any of these symptoms; do not wait until your next office visit. Recognize signs and symptoms of STROKE:    F-face looks uneven    A-arms unable to move or move even    S-speech slurred or non-existent    T-time-call 911 as soon as signs and symptoms begin-DO NOT go       Back to bed or wait to see if you get better-TIME IS BRAIN. The discharge information has been reviewed with the patient. The patient verbalized understanding. Information obtained by :  I understand that if any problems occur once I am at home I am to contact my physician. I understand and acknowledge receipt of the instructions indicated above.                                                                                                                                            Physician's or R.N.'s Signature                                                                  Date/Time Patient or Representative Signature                                                          Date/Time

## 2018-04-04 NOTE — PROGRESS NOTES
Problem: Mobility Impaired (Adult and Pediatric)  Goal: *Acute Goals and Plan of Care (Insert Text)  GOALS (1-4 days):  (1.)Mr. Spero Klinefelter will move from supine to sit and sit to supine  in bed with STAND BY ASSIST. 4/4  (2.)Mr. Spero Klinefelter will transfer from bed to chair and chair to bed with STAND BY ASSIST using the least restrictive device. (3.)Mr. Spero Klinefelter will ambulate with STAND BY ASSIST for 200 feet with the least restrictive device. (4.)Mr. Spero Klinefelter will ambulate up/down 12 steps with left railing with MINIMAL ASSIST with device as needed. (5.)Mr. Spero Klinefelter will increase left knee ROM to 5°-80°.  ________________________________________________________________________________________________      PHYSICAL THERAPY [facility] [place] tKa: Daily Note, AM 4/4/2018  INPATIENT: Hospital Day: 2  [place] / Product Type: Medicare /      NAME/AGE/GENDER: Seven Thompson is a 72 y.o. male   PRIMARY DIAGNOSIS:  Primary osteoarthritis of left knee [M17.12]   Procedure(s) and Anesthesia Type:     * LEFT KNEE ARTHROPLASTY TOTAL/DEPUY /  Latonya Gonzalez 2gm / Sheral New - Spinal (Left)  ICD-10: Treatment Diagnosis:    · Pain in Left Knee (M25.562)  · Stiffness of Left Knee, Not elsewhere classified (M25.662)  · Difficulty in walking, Not elsewhere classified (R26.2)      ASSESSMENT:     Mr. Spero Klinefelter presents with limited rom and strength of left LE as well as decreased functional mobility and gait s/p left tka. He plans on going home with HHPT. 4/4 am he is supine upon arrival.  He performs exercises in the bed without any problems. He has a good quad contraction and can do a SLR without help. He increase his distance using RW with CGA and no LOB. He will sit up for awhile and then come to group therapy and ? leaviing this afternoon. This section established at most recent assessment   PROBLEM LIST (Impairments causing functional limitations):  1. Decreased Strength  2.  Decreased ADL/Functional Activities  3. Decreased Transfer Abilities  4. Decreased Ambulation Ability/Technique  5. Decreased Balance  6. Increased Pain  7. Decreased Activity Tolerance  8. Decreased Flexibility/Joint Mobility  9. Decreased [place] with Home Exercise Program   INTERVENTIONS PLANNED: (Benefits and precautions of physical therapy have been discussed with the patient.)  1. Bed Mobility  2. Gait Training  3. Home Exercise Program (HEP)  4. Therapeutic Exercise/Strengthening  5. Transfer Training  6. Range of Motion: active/assisted/passive  7. Therapeutic Activities  8. Group Therapy     TREATMENT PLAN: Frequency/Duration: Follow patient BID for duration of hospital stay to address above goals. Rehabilitation Potential For Stated Goals: Good     RECOMMENDED REHABILITATION/EQUIPMENT: (at time of discharge pending progress): Continue Skilled Therapy and Home Health: Physical Therapy. HISTORY:   History of Present Injury/Illness (Reason for Referral):  S/p left tka  Past Medical History/Comorbidities:   Mr. Niles Estrada  has a past medical history of Stewart esophagus; Current smoker; GERD (gastroesophageal reflux disease); High frequency hearing loss; Hypercholesteremia; Hypertension; OA (osteoarthritis); Rhinitis; SNHL (sensorineural hearing loss); Tinnitus of both ears; and Wheezing. He also has no past medical history of Adverse effect of anesthesia; Aneurysm ([address] Arrhythmia; Asthma; Autoimmune disease ([address] CAD (coronary artery disease); Cancer ([address] Chronic kidney disease; Chronic obstructive pulmonary disease ([address] Chronic pain; Coagulation disorder ([address] Diabetes ([address] Difficult intubation; Endocarditis; Heart failure ([address] Ill-defined condition; Liver disease; Malignant hyperthermia due to anesthesia; Morbid obesity ([address] Nausea & vomiting; Nicotine vapor product user; Non-nicotine vapor product user; Pseudocholinesterase deficiency; Psychiatric disorder; PUD (peptic ulcer disease);  Rheumatic fever; Seizures ([facility] Sleep apnea; Stroke [facility]); Thromboembolus ([facility] or Thyroid disease. Mr. Elier Pate  has a past surgical history that includes hx colonoscopy and hx knee arthroscopy (Right, ). Social History/Living Environment:   Home Environment: Private residence  # Steps to Enter: 0  One/Two Story Residence: Two story  # of Interior Steps: 12  Interior Rails: Left  Lift Chair Available: Yes  Living Alone: No  Support Systems: Spouse/Significant Other/Partner  Patient Expects to be Discharged to[de-identified] Private residence  Current DME Used/Available at Home: None  Tub or Shower Type: Shower  Prior Level of Function/Work/Activity:  independent   Number of Personal Factors/Comorbidities that affect the Plan of Care: 1-2: MODERATE COMPLEXITY   EXAMINATION:   Most Recent Physical Functioning:                            Bed Mobility  Supine to Sit: Stand-by assistance  Sit to Supine:  (left up in chair)    Transfers  Sit to Stand: Contact guard assistance  Stand to Sit: Contact guard assistance                   Weight Bearing Status  Left Side Weight Bearing: As tolerated  Distance (ft): 80 Feet (ft)  Ambulation - Level of Assistance: Contact guard assistance  Assistive Device: Walker, rolling  Speed/Afsaneh: Delayed  Step Length: Left shortened;Right shortened  Stance: Left decreased  Gait Abnormalities: Antalgic  Interventions: Safety awareness training     Braces/Orthotics:     Left Knee Cold  Type: Cryocuff      Body Structures Involved:  1. Bones  2. Joints  3. Muscles  4. Ligaments Body Functions Affected:  1. Movement Related Activities and Participation Affected:  1. Mobility   Number of elements that affect the Plan of Care: 3: MODERATE COMPLEXITY   CLINICAL PRESENTATION:   Presentation: Stable and uncomplicated: LOW COMPLEXITY   CLINICAL DECISION MAKIN [facility] [address] [address] AM-PAC 6 Clicks   Basic Mobility Inpatient Short Form  How much difficulty does the patient currently have. ..  Unable A Lot A Little None 1.  Turning over in bed (including adjusting bedclothes, sheets and blankets)? [] 1   [] 2   [x] 3   [] 4   2. Sitting down on and standing up from a chair with arms ( e.g., wheelchair, bedside commode, etc.)   [] 1   [] 2   [x] 3   [] 4   3. Moving from lying on back to sitting on the side of the bed? [] 1   [] 2   [x] 3   [] 4   How much help from another person does the patient currently need. .. Total A Lot A Little None   4. Moving to and from a bed to a chair (including a wheelchair)? [] 1   [] 2   [x] 3   [] 4   5. Need to walk in hospital room? [] 1   [] 2   [x] 3   [] 4   6. Climbing 3-5 steps with a railing? [] 1   [x] 2   [] 3   [] 4   © 2007, Trustees of [address] [place] [address], under license to [employer]. All rights reserved        Score:  Initial: 17 Most Recent: X (Date: -- )    Interpretation of Tool:  Represents activities that are increasingly more difficult (i.e. Bed mobility, Transfers, Gait). Score 24 23 22-20 19-15 14-10 9-7 6     Modifier CH CI CJ CK CL CM CN      ? Mobility - Walking and Moving Around:     - CURRENT STATUS: CK - 40%-59% impaired, limited or restricted    - GOAL STATUS: CJ - 20%-39% impaired, limited or restricted    - D/C STATUS:  ---------------To be determined---------------  [place] / Product Type: Medicare /      Medical Necessity:     · Patient is expected to demonstrate progress in strength, range of motion and balance to decrease assistance required with theraputic exercises and functional mobility. Reason for Services/Other Comments:  · Patient continues to require present interventions due to patient's inability to perform theraputic exercises and functional mobility independently.    Use of outcome tool(s) and clinical judgement create a POC that gives a: Clear prediction of patient's progress: LOW COMPLEXITY            TREATMENT:   (In addition to Assessment/Re-Assessment sessions the following treatments were rendered)     Pre-treatment Symptoms/Complaints:  none  Pain: Initial:   Pain Intensity 1: 0 (just sore)  Pain: after tx     Gait Training (15 Minutes):  Gait training to improve and/or restore physical functioning as related to mobility. Ambulated 80 Feet (ft) with Contact guard assistance using a Walker, rolling and minimal Safety awareness training related to their knee position and motion to promote proper body alignment. Therapeutic Exercise: (15 Minutes):  Exercises per grid below to improve strength. Required minimal verbal cues to promote proper body alignment. Progressed range as indicated. Date:  4/4   Date:   Date:     ACTIVITY/EXERCISE AM PM AM PM AM PM   GROUP THERAPY  []  []  []  []  []  []   Ankle Pumps 15        Quad Sets 15        Gluteal Sets 15        Hip ABd/ADduction 15        Straight Leg Raises 15        Knee Slides 15        Short Arc Quads 15        Long Arc Quads         Chair Slides                  B = bilateral; AA = active assistive; A = active; P = passive      Treatment/Session Assessment:     Response to Treatment:  Pt tolerated session well    Education:  [x] Home Exercises  [x] Fall Precautions  [] Hip Precautions [] D/C Instruction Review  [] Knee/Hip Prosthesis Review  [x] Walker Management/Safety [] Adaptive Equipment as Needed       Interdisciplinary Collaboration:   o Registered Nurse    After treatment position/precautions:   o Up in chair  o Bed/Chair-wheels locked  o Bed in low position  o Caregiver at bedside  o Call light within reach  o Family at bedside    Compliance with Program/Exercises: Will assess as treatment progresses. No questions. Recommendations/Intent for next treatment session:  Treatment next visit will focus on increasing Mr. Dupont independence with bed mobility, transfers, gait training, strength/ROM exercises, modalities for pain, and patient education.       Total Treatment Duration:  PT Patient Time In/Time Out  Time In: 0700  Time Out: 0730    Wanda Azevedo, PTA

## 2018-04-05 ENCOUNTER — HOME CARE VISIT (OUTPATIENT)
Dept: SCHEDULING | Facility: HOME HEALTH | Age: 73
End: 2018-04-05
Payer: COMMERCIAL

## 2018-04-05 VITALS
RESPIRATION RATE: 16 BRPM | HEART RATE: 78 BPM | DIASTOLIC BLOOD PRESSURE: 80 MMHG | SYSTOLIC BLOOD PRESSURE: 135 MMHG | TEMPERATURE: 97.4 F

## 2018-04-05 PROCEDURE — 3331090001 HH PPS REVENUE CREDIT

## 2018-04-05 PROCEDURE — 400013 HH SOC

## 2018-04-05 PROCEDURE — G0151 HHCP-SERV OF PT,EA 15 MIN: HCPCS

## 2018-04-05 PROCEDURE — 3331090002 HH PPS REVENUE DEBIT

## 2018-04-05 NOTE — PROGRESS NOTES
Care Management Interventions  Mode of Transport at Discharge: Self  Transition of Care Consult (CM Consult): [address]: Yes  Physical Therapy Consult: Yes  Occupational Therapy Consult: Yes  Current Support Network: Lives with Spouse  Confirm Follow Up Transport: Family  Plan discussed with Pt/Family/Caregiver: Yes  Freedom of Choice Offered: Yes  Discharge Location  Discharge Placement: Home with home health  Patient is a 72y.o. year old male admitted for Left TKA . Patient lives with His spouse and plans to return home on discharge. Order received to arrange home health. Patient without preference towards agency. Referral sent to [facility]. Patient denies any equipment needs as he has a walker and declined need for a BSC. . Will follow until discharge.   Sepideh Merino

## 2018-04-05 NOTE — PROGRESS NOTES
[facility]  Face to Face Encounter    Patients Name: Mateo Murdock    YOB: 1945    Ordering Physician: Davida Caicedo    Primary Diagnosis: Primary osteoarthritis of left knee [M17.12]  S/p left TKA    Date of Face to Face:   4-3-18                                 Face to Face Encounter findings are related to primary reason for home care:   yes. 1. I certify that the patient needs intermittent care as follows: physical therapy: gait/stair training    2. I certify that this patient is homebound, that is: 1) patient requires the use of a walker device, special transportation, or assistance of another to leave the home; or 2) patient's condition makes leaving the home medically contraindicated; and 3) patient has a normal inability to leave the home and leaving the home requires considerable and taxing effort. Patient may leave the home for infrequent and short duration for medical reasons, and occasional absences for non-medical reasons. Homebound status is due to the following functional limitations: Patient's ambulation limited secondary to severe pain and requires the use of an assistive device and the assistance of a caregiver for safe completion. Patient with strength and ROM deficits limiting ambulation endurance requiring the use of an assistive device and the assistance of a caregiver. Patient deemed temporarily homebound secondary to increased risk for infection when leaving home and going out into the community. 3. I certify that this patient is under my care and that I, or a nurse practitioner or [facility], or clinical nurse specialist, or certified nurse midwife, working with me, had a Face-to-Face Encounter that meets the physician Face-to-Face Encounter requirements.   The following are the clinical findings from the [address] [place] [address] encounter that support the need for skilled services and is a summary of the encounter: see hospital chart        Gilberto Epstein BK  4/5/2018      THE FOLLOWING TO BE COMPLETED BY THE COMMUNITY PHYSICIAN:    I concur with the findings described above from the F2F encounter that this patient is homebound and in need of a skilled service.     Certifying Physician: _____________________________________      Printed Certifying Physician Name: _____________________________________    Date: _________________

## 2018-04-06 PROCEDURE — 3331090002 HH PPS REVENUE DEBIT

## 2018-04-06 PROCEDURE — 3331090001 HH PPS REVENUE CREDIT

## 2018-04-07 PROCEDURE — 3331090001 HH PPS REVENUE CREDIT

## 2018-04-07 PROCEDURE — 3331090002 HH PPS REVENUE DEBIT

## 2018-04-08 PROCEDURE — 3331090001 HH PPS REVENUE CREDIT

## 2018-04-08 PROCEDURE — 3331090002 HH PPS REVENUE DEBIT

## 2018-04-09 ENCOUNTER — HOME CARE VISIT (OUTPATIENT)
Dept: SCHEDULING | Facility: HOME HEALTH | Age: 73
End: 2018-04-09
Payer: COMMERCIAL

## 2018-04-09 VITALS
RESPIRATION RATE: 16 BRPM | SYSTOLIC BLOOD PRESSURE: 150 MMHG | TEMPERATURE: 96.4 F | DIASTOLIC BLOOD PRESSURE: 85 MMHG | HEART RATE: 90 BPM

## 2018-04-09 PROCEDURE — 3331090001 HH PPS REVENUE CREDIT

## 2018-04-09 PROCEDURE — G0151 HHCP-SERV OF PT,EA 15 MIN: HCPCS

## 2018-04-09 PROCEDURE — 3331090002 HH PPS REVENUE DEBIT

## 2018-04-10 PROCEDURE — 3331090001 HH PPS REVENUE CREDIT

## 2018-04-10 PROCEDURE — 3331090002 HH PPS REVENUE DEBIT

## 2018-04-11 ENCOUNTER — HOME CARE VISIT (OUTPATIENT)
Dept: SCHEDULING | Facility: HOME HEALTH | Age: 73
End: 2018-04-11
Payer: COMMERCIAL

## 2018-04-11 VITALS
RESPIRATION RATE: 16 BRPM | HEART RATE: 97 BPM | TEMPERATURE: 96 F | DIASTOLIC BLOOD PRESSURE: 90 MMHG | SYSTOLIC BLOOD PRESSURE: 135 MMHG

## 2018-04-11 PROCEDURE — 3331090001 HH PPS REVENUE CREDIT

## 2018-04-11 PROCEDURE — 3331090002 HH PPS REVENUE DEBIT

## 2018-04-11 PROCEDURE — G0151 HHCP-SERV OF PT,EA 15 MIN: HCPCS

## 2018-04-12 PROCEDURE — 3331090001 HH PPS REVENUE CREDIT

## 2018-04-12 PROCEDURE — 3331090002 HH PPS REVENUE DEBIT

## 2018-04-12 NOTE — PROGRESS NOTES
Problem: Self Care Deficits Care Plan (Adult)  Goal: *Acute Goals and Plan of Care (Insert Text)  GOALS:   DISCHARGE GOALS (in preparation for going home/rehab):  3 days  1. Mr. Alicia Smith will perform one lower body dressing activity with minimal assistance required to demonstrate improved functional mobility and safety. 2.  Mr. Alicia Smith will perform one lower body bathing activity with minimal assistance required to demonstrate improved functional mobility and safety. 3.  Mr. Alicia Smith will perform toileting/toilet transfer with contact guard assistance to demonstrate improved functional mobility and safety. 4.  Mr. Alicia Smith will perform shower transfer with contact guard assistance to demonstrate improved functional mobility and safety. JOINT CAMP OCCUPATIONAL THERAPY TKA: Initial Assessment and PM 4/3/2018  INPATIENT: Hospital Day: 1  [place] / Product Type: Medicare /      NAME/AGE/GENDER: Scott Ledbetter is a 72 y.o. male   PRIMARY DIAGNOSIS:  Primary osteoarthritis of left knee [M17.12]   Procedure(s) and Anesthesia Type:     * LEFT KNEE ARTHROPLASTY TOTAL/DEPUY /  Sam Sjogren 2gm / Marthena Bruins - Spinal (Left)  ICD-10: Treatment Diagnosis:    · Pain in Left Knee (M25.562)  · Stiffness of Left Knee, Not elsewhere classified (R16.223)  · Other lack of cordination (R27.8)      ASSESSMENT:     Mr. Alicia Smith is s/p left TKA and presents with decreased weight bearing on left LE and decreased independence with functional mobility and activities of daily living. Patient would benefit from skilled Occupational Therapy to maximize independence and safety with self-care task and functional mobility. Pt would also benefit from education on adaptive equipment and safety precautions in preparation for going home or for recommendations for post-hospital rehab program.  Patient plans for further rehab at home with home health services and good family support.   OT reviewed therapy schedule and Is This A New Presentation, Or A Follow-Up?: Skin Lesion plan of care with patient. Patient was able to transfer and preform self care skills as charted below. Patient instructed to call for assistance when needing to get up from the bed and all needs in reach. Patient verbalized understanding of call light. This section established at most recent assessment   PROBLEM LIST (Impairments causing functional limitations):  1. Decreased Strength  2. Decreased ADL/Functional Activities  3. Decreased Transfer Abilities  4. Increased Pain  5. Increased Fatigue  6. Decreased Flexibility/Joint Mobility  7. Decreased Knowledge of Precautions   INTERVENTIONS PLANNED: (Benefits and precautions of occupational therapy have been discussed with the patient.)  1. Activities of daily living training  2. Adaptive equipment training  3. Balance training  4. Clothing management  5. Donning&doffing training  6. Theraputic activity     TREATMENT PLAN: Frequency/Duration: Follow patient 1 time to address above goals. Rehabilitation Potential For Stated Goals: Good     RECOMMENDED REHABILITATION/EQUIPMENT: (at time of discharge pending progress): Continue Skilled Therapy and Home Health: Physical Therapy. OCCUPATIONAL PROFILE AND HISTORY:   History of Present Injury/Illness (Reason for Referral): Pt presents this date s/p (left) TKA. Past Medical History/Comorbidities:   Mr. Niles Estrada  has a past medical history of Stewart esophagus; Current smoker; GERD (gastroesophageal reflux disease); High frequency hearing loss; Hypercholesteremia; Hypertension; OA (osteoarthritis); Rhinitis; SNHL (sensorineural hearing loss); Tinnitus of both ears; and Wheezing. He also has no past medical history of Adverse effect of anesthesia; Aneurysm ([address] Arrhythmia; Asthma; Autoimmune disease ([address] CAD (coronary artery disease); Cancer ([address] Chronic kidney disease; Chronic obstructive pulmonary disease ([address] Chronic pain; Coagulation disorder ([address] Diabetes ([address]  Difficult intubation; Endocarditis; Heart failure ([facility] Ill-defined condition; Liver disease; Malignant hyperthermia due to anesthesia; Morbid obesity ([facility] Nausea & vomiting; Nicotine vapor product user; Non-nicotine vapor product user; Pseudocholinesterase deficiency; Psychiatric disorder; PUD (peptic ulcer disease); Rheumatic fever; Seizures ([facility] [address] Sleep apnea; Stroke [facility]); Thromboembolus ([facility] or Thyroid disease. Mr. Mark Soria  has a past surgical history that includes hx colonoscopy and hx knee arthroscopy (Right, 2005). Social History/Living Environment:   Home Environment: Private residence  # Steps to Enter: 0  One/Two Story Residence: Two story  # of Interior Steps: 12  Interior Rails: Left  Living Alone: No  Support Systems: Spouse/Significant Other/Partner  Patient Expects to be Discharged to[de-identified] Private residence  Current DME Used/Available at Home: None  Tub or Shower Type: Shower  Prior Level of Function/Work/Activity:  Mod I with ADLs no dme     Number of Personal Factors/Comorbidities that affect the Plan of Care: Brief history (0):  LOW COMPLEXITY   ASSESSMENT OF OCCUPATIONAL PERFORMANCE[de-identified]   Most Recent Physical Functioning:   Balance  Sitting: Intact  Standing: Pull to stand; With support       Gross Assessment  AROM: Within functional limits (right LE)  Strength: Within functional limits (right LE)          LLE AROM  L Knee Flexion: 60  L Knee Extension: 15 Coordination  Fine Motor Skills-Upper: Left Intact; Right Intact  Gross Motor Skills-Upper: Left Intact; Right Intact         Mental Status  Neurologic State: Alert; Appropriate for age  Orientation Level: Appropriate for age  Cognition: Appropriate decision making; Appropriate for age attention/concentration; Appropriate safety awareness; Follows commands  Perception: Appears intact  Perseveration: No perseveration noted  Safety/Judgement: Awareness of environment; Fall prevention                Basic ADLs (From Assessment) Complex ADLs (From Assessment)   Basic How Severe Is Your Skin Lesion?: mild ADL  Feeding: Setup  Oral Facial Hygiene/Grooming: Supervision  Bathing: Moderate assistance  Upper Body Dressing: Supervision  Lower Body Dressing: Moderate assistance  Toileting: Total assistance (catheter)     Grooming/Bathing/Dressing Activities of Daily Living     Cognitive Retraining  Safety/Judgement: Awareness of environment; Fall prevention                 Functional Transfers  Toilet Transfer : Minimum assistance  Shower Transfer: Minimum assistance     Bed/Mat Mobility  Supine to Sit: Contact guard assistance  Sit to Supine: Contact guard assistance  Sit to Stand: Minimum assistance; Additional time         Physical Skills Involved:  1. Range of Motion  2. Balance  3. Strength Cognitive Skills Affected (resulting in the inability to perform in a timely and safe manner): 1. none Psychosocial Skills Affected:  1. none   Number of elements that affect the Plan of Care: 1-3:  LOW COMPLEXITY   CLINICAL DECISION MAKIN[address] [place] [address] AM-PAC 6 Clicks   Daily Activity Inpatient Short Form  How much help from another person does the patient currently need. .. Total A Lot A Little None   1. Putting on and taking off regular lower body clothing? [] 1   [x] 2   [] 3   [] 4   2. Bathing (including washing, rinsing, drying)? [] 1   [x] 2   [] 3   [] 4   3. Toileting, which includes using toilet, bedpan or urinal?   [x] 1   [] 2   [] 3   [] 4   4. Putting on and taking off regular upper body clothing? [] 1   [] 2   [x] 3   [] 4   5. Taking care of personal grooming such as brushing teeth? [] 1   [] 2   [x] 3   [] 4   6. Eating meals? [] 1   [] 2   [] 3   [x] 4   © , Trustees of [address] [place] [id number], under license to [employer]. All rights reserved     Score:  Initial: 15 Most Recent: X (Date: -- )    Interpretation of Tool:  Represents activities that are increasingly more difficult (i.e. Bed mobility, Transfers, Gait).    Score 24 23 22-20 19-15 14-10 9-7 6     Modifier CH CI CJ CK CL CM CN Has Your Skin Lesion Been Treated?: not been treated ? Self Care:     - CURRENT STATUS: CK - 40%-59% impaired, limited or restricted    - GOAL STATUS: CJ - 20%-39% impaired, limited or restricted    - D/C STATUS:  ---------------To be determined---------------  [place] / Product Type: Medicare /      Medical Necessity:     · Patient is expected to demonstrate progress in balance, coordination and functional technique to decrease assistance required with self care and functional mobility and improve safety during self care and functional mobility. Reason for Services/Other Comments:  · Patient continues to require skilled intervention due to decreased self care and functional mobility. Use of outcome tool(s) and clinical judgement create a POC that gives a: LOW COMPLEXITY            TREATMENT:   (In addition to Assessment/Re-Assessment sessions the following treatments were rendered)     Pre-treatment Symptoms/Complaints:    Pain: Initial:   Pain Intensity 1: 0  Post Session:  0/10 rest, iceman in place     Assessment/Reassessment only, no treatment provided today    Treatment/Session Assessment:     Response to Treatment:  Tolerated well, plans to return home with wife to assist..    Education:  [] Home Exercises  [x] Fall Precautions  [] Hip Precautions [] Going Home Video  [x] Knee/Hip Prosthesis Review  [x] Walker Management/Safety [x] Adaptive Equipment as Needed       Interdisciplinary Collaboration:   o Physical Therapist  o Occupational Therapist  o Registered Nurse    After treatment position/precautions:   o Supine in bed  o Bed alarm/tab alert on  o Bed/Chair-wheels locked  o Bed in low position  o Call light within reach  o RN notified  o Family at bedside  o Side rails x 3     Compliance with Program/Exercises: compliant all of the time. Recommendations/Intent for next treatment session:  Treatment next visit will focus on increasing Mr. Saucedo's independence with bed mobility, transfers, self care, functional mobility, modalities for pain, and patient education.       Total Treatment Duration:  OT Patient Time In/Time Out  Time In: 1420  Time Out: [address] [facility] [address], OT

## 2018-04-13 ENCOUNTER — HOME CARE VISIT (OUTPATIENT)
Dept: SCHEDULING | Facility: HOME HEALTH | Age: 73
End: 2018-04-13
Payer: COMMERCIAL

## 2018-04-13 VITALS
RESPIRATION RATE: 16 BRPM | HEART RATE: 88 BPM | DIASTOLIC BLOOD PRESSURE: 90 MMHG | SYSTOLIC BLOOD PRESSURE: 140 MMHG | TEMPERATURE: 97 F

## 2018-04-13 PROCEDURE — G0151 HHCP-SERV OF PT,EA 15 MIN: HCPCS

## 2018-04-13 PROCEDURE — 3331090001 HH PPS REVENUE CREDIT

## 2018-04-13 PROCEDURE — 3331090002 HH PPS REVENUE DEBIT

## 2018-04-14 PROCEDURE — 3331090001 HH PPS REVENUE CREDIT

## 2018-04-14 PROCEDURE — 3331090002 HH PPS REVENUE DEBIT

## 2018-04-15 PROCEDURE — 3331090002 HH PPS REVENUE DEBIT

## 2018-04-15 PROCEDURE — 3331090001 HH PPS REVENUE CREDIT

## 2018-04-16 ENCOUNTER — HOME CARE VISIT (OUTPATIENT)
Dept: SCHEDULING | Facility: HOME HEALTH | Age: 73
End: 2018-04-16
Payer: COMMERCIAL

## 2018-04-16 VITALS
DIASTOLIC BLOOD PRESSURE: 76 MMHG | RESPIRATION RATE: 17 BRPM | SYSTOLIC BLOOD PRESSURE: 146 MMHG | HEART RATE: 100 BPM | TEMPERATURE: 97.1 F

## 2018-04-16 PROCEDURE — A4247 BETADINE/IODINE SWABS/WIPES: HCPCS

## 2018-04-16 PROCEDURE — G0157 HHC PT ASSISTANT EA 15: HCPCS

## 2018-04-16 PROCEDURE — A4649 SURGICAL SUPPLIES: HCPCS

## 2018-04-16 PROCEDURE — 3331090002 HH PPS REVENUE DEBIT

## 2018-04-16 PROCEDURE — 3331090001 HH PPS REVENUE CREDIT

## 2018-04-17 PROCEDURE — 3331090002 HH PPS REVENUE DEBIT

## 2018-04-17 PROCEDURE — 3331090001 HH PPS REVENUE CREDIT

## 2018-04-18 ENCOUNTER — HOME CARE VISIT (OUTPATIENT)
Dept: SCHEDULING | Facility: HOME HEALTH | Age: 73
End: 2018-04-18
Payer: COMMERCIAL

## 2018-04-18 ENCOUNTER — HOME CARE VISIT (OUTPATIENT)
Dept: HOME HEALTH SERVICES | Facility: HOME HEALTH | Age: 73
End: 2018-04-18
Payer: COMMERCIAL

## 2018-04-18 VITALS
DIASTOLIC BLOOD PRESSURE: 78 MMHG | SYSTOLIC BLOOD PRESSURE: 140 MMHG | TEMPERATURE: 97 F | RESPIRATION RATE: 18 BRPM | HEART RATE: 95 BPM

## 2018-04-18 PROCEDURE — G0157 HHC PT ASSISTANT EA 15: HCPCS

## 2018-04-18 PROCEDURE — 3331090002 HH PPS REVENUE DEBIT

## 2018-04-18 PROCEDURE — 3331090001 HH PPS REVENUE CREDIT

## 2018-04-19 PROCEDURE — 3331090002 HH PPS REVENUE DEBIT

## 2018-04-19 PROCEDURE — 3331090001 HH PPS REVENUE CREDIT

## 2018-04-20 ENCOUNTER — HOME CARE VISIT (OUTPATIENT)
Dept: SCHEDULING | Facility: HOME HEALTH | Age: 73
End: 2018-04-20
Payer: COMMERCIAL

## 2018-04-20 VITALS
HEART RATE: 95 BPM | RESPIRATION RATE: 16 BRPM | TEMPERATURE: 96.7 F | SYSTOLIC BLOOD PRESSURE: 130 MMHG | DIASTOLIC BLOOD PRESSURE: 89 MMHG

## 2018-04-20 PROCEDURE — G0151 HHCP-SERV OF PT,EA 15 MIN: HCPCS

## 2018-04-20 PROCEDURE — 3331090002 HH PPS REVENUE DEBIT

## 2018-04-20 PROCEDURE — 3331090001 HH PPS REVENUE CREDIT

## 2018-04-21 PROCEDURE — 3331090001 HH PPS REVENUE CREDIT

## 2018-04-21 PROCEDURE — 3331090002 HH PPS REVENUE DEBIT

## 2018-04-22 PROCEDURE — 3331090002 HH PPS REVENUE DEBIT

## 2018-04-22 PROCEDURE — 3331090001 HH PPS REVENUE CREDIT

## 2018-04-23 ENCOUNTER — HOSPITAL ENCOUNTER (OUTPATIENT)
Dept: PHYSICAL THERAPY | Age: 73
Discharge: HOME | End: 2018-04-23
Payer: COMMERCIAL

## 2018-04-23 PROCEDURE — 3331090001 HH PPS REVENUE CREDIT

## 2018-04-23 PROCEDURE — 3331090002 HH PPS REVENUE DEBIT

## 2018-04-23 PROCEDURE — G8978 MOBILITY CURRENT STATUS: HCPCS

## 2018-04-23 PROCEDURE — G8979 MOBILITY GOAL STATUS: HCPCS

## 2018-04-23 PROCEDURE — 97161 PT EVAL LOW COMPLEX 20 MIN: CPT

## 2018-04-23 NOTE — PROGRESS NOTES
Ambulatory/Rehab Services H2 Model Falls Risk Assessment    Risk Factor Pts. ·   Confusion/Disorientation/Impulsivity  []    4 ·   Symptomatic Depression  []   2 ·   Altered Elimination  []   1 ·   Dizziness/Vertigo  []   1 ·   Gender (Male)  [x]   1 ·   Any administered antiepileptics (anticonvulsants):  []   2 ·   Any administered benzodiazepines:  []   1 ·   Visual Impairment (specify):  []   1 ·   Portable Oxygen Use  []   1 ·   Orthostatic ? BP  []   1 ·   History of Recent Falls (within 3 mos.)  []   5     Ability to Rise from Chair (choose one) Pts. ·   Ability to rise in a single movement  [x]   0 ·   Pushes up, successful in one attempt  []   1 ·   Multiple attempts, but successful  []   3 ·   Unable to rise without assistance  []   4   Total: (5 or greater = High Risk) 1     Falls Prevention Plan:   []                Physical Limitations to Exercise (specify):   []                Mobility Assistance Device (type):   []                Exercise/Equipment Adaptation (specify):    ©2010 [facility] [address] Patent #[id number].  Federal Law prohibits the replication, distribution or use without written permission from [facility] [employer]

## 2018-04-23 NOTE — THERAPY EVALUATION
Shimon Bravo  : 1945  Primary: [facility] [employer] [facility]  [place] [facility] Center at [address]  [phone]   [phone]       OUTPATIENT PHYSICAL THERAPY:Initial Assessment 2018    ICD-10: Treatment Diagnosis:  Pain in joint, left M25.562, Effusion of joint, knee, left M25.462  Precautions/Allergies:   Review of patient's allergies indicates no known allergies. Fall Risk Score: 1 (? 5 = High Risk)  MD Orders: Evaluate and treat MEDICAL/REFERRING DIAGNOSIS:  Status post total left knee replacement [Z96.652]   DATE OF ONSET: 4/3/18  REFERRING PHYSICIAN: Noni Long MD  RETURN PHYSICIAN APPOINTMENT: 18     INITIAL ASSESSMENT:  Mr. Emery Sandoval presents with decreased left knee A/PROM and strength limiting all functional mobility. Pt is ambulating without any AD and is eager to return to cycling and work duties. PROBLEM LIST (Impacting functional limitations):  1. Decreased Strength  2. Decreased ADL/Functional Activities  3. Decreased Ambulation Ability/Technique  4. Decreased Balance  5. Increased Pain INTERVENTIONS PLANNED:  1. Balance Exercise  2. Gait Training  3. Home Exercise Program (HEP)  4. Manual Therapy  5. Neuromuscular Re-education/Strengthening  6. Therapeutic Exercise/Strengthening   TREATMENT PLAN:  Effective Dates: 2018 TO 2018 (90 days). Frequency/Duration: 2 times a week for 90 Days  GOALS: (Goals have been discussed and agreed upon with patient.)  Short-Term Functional Goals: Time Frame: 45 days  1. Pt to demonstrate left knee AROM 0-115 degrees without end range pain. 2. Pt to demonstrate an improvement in left knee strength to equal right. 3. Pt to report decreased disability as per LEFS with a score of 63-79. Discharge Goals: Time Frame: 90 days  1. Pt to demonstrate left knee A/PROM to equal right to allow for symmetrical gait pattern.   2. Pt to return to cycling outdoors with wife for at least 2 miles at a time without difficulty. Rehabilitation Potential For Stated Goals: Good  Regarding Mike Saucedo's therapy, I certify that the treatment plan above will be carried out by a therapist or under their direction. Thank you for this referral,  Nya Welch PT             The information in this section was collected on 4/23/18 (except where otherwise noted). HISTORY:   History of Present Injury/Illness (Reason for Referral):  Pt with total knee replacement on 4/3/18. Pt was independent with ambulation prior to surgery and was working up to 60 hours a week as a . Pt and his wife ride their bikes outdoors on a weekly basis and he would like to return to it as soon as possible. Pt reporting 0/10 pain mostly, is having posterior left knee pain tightness and having some issues staying asleep at night. He is sleeping on his side and does not like having a pillow in between his knees although he does have pain relief when his wife massages his knee and when he uses a cold pack. Pt has 12 steps with left HR inside his house and negotiates it without any problems. Pt is only using tylenol for pain. Pt does have a history of lumbar stenosis which has not been exacerbated since knee surgery. Past Medical History/Comorbidities:   Mr. Severino Richards  has a past medical history of Stewart esophagus; Current smoker; GERD (gastroesophageal reflux disease); High frequency hearing loss; Hypercholesteremia; Hypertension; OA (osteoarthritis); Rhinitis; SNHL (sensorineural hearing loss); Tinnitus of both ears; and Wheezing. He also has no past medical history of Adverse effect of anesthesia; Aneurysm ([address] Arrhythmia; Asthma; Autoimmune disease ([address] CAD (coronary artery disease); Cancer ([address] Chronic kidney disease; Chronic obstructive pulmonary disease ([address] Chronic pain; Coagulation disorder ([address] Diabetes ([address] Difficult intubation; Endocarditis;  Heart failure ([facility] Ill-defined condition; Liver disease; Malignant hyperthermia due to anesthesia; Morbid obesity ([facility] [facility] Nausea & vomiting; Nicotine vapor product user; Non-nicotine vapor product user; Pseudocholinesterase deficiency; Psychiatric disorder; PUD (peptic ulcer disease); Rheumatic fever; Seizures ([facility] [address] Sleep apnea; Stroke [facility]); [facility] or Thyroid disease. Mr. Mega Hermosillo  has a past surgical history that includes hx colonoscopy and hx knee arthroscopy (Right, 2005). Social History/Living Environment:    Pt lives with his wife in a 2 level home. Prior Level of Function/Work/Activity:  Pt is an avid cyclist and works as a . Current Medications:       Current Outpatient Prescriptions:     aspirin delayed-release 81 mg tablet, Take 1 Tab by mouth every twelve (12) hours every twelve (12) hours for 30 days. , Disp: 60 Tab, Rfl: 0    oxyCODONE IR (ROXICODONE) 5 mg immediate release tablet, Take 1-2 Tabs by mouth every three (3) hours as needed. Max Daily Amount: 80 mg., Disp: 60 Tab, Rfl: 0    fluticasone (FLONASE SENSIMIST) 27.5 mcg/actuation nasal spray, 1 [place] by Both Nostrils route daily. Take / use AM day of surgery  per anesthesia protocols. Indications: Allergic Rhinitis, Disp: , Rfl:     guaiFENesin (MUCINEX) 1,200 mg Ta12 ER tablet, Take 1,200 mg by mouth two (2) times a day. Take / use AM day of surgery  per anesthesia protocols. , Disp: , Rfl:     levocetirizine (XYZAL) 5 mg tablet, Take 5 mg by mouth daily. Take / use AM day of surgery  per anesthesia protocols. Indications:  Allergic Rhinitis, Disp: , Rfl:     albuterol (PROAIR HFA) 90 mcg/actuation inhaler, Take 1 Puff by inhalation every six (6) hours as needed for Wheezing., Disp: 1 Inhaler, Rfl: 5    montelukast (SINGULAIR) 10 mg tablet, TAKE 1 TABLET BY MOUTH DAILY, Disp: 30 Tab, Rfl: 5    atorvastatin (LIPITOR) 20 mg tablet, TAKE 1 TABLET BY MOUTH DAILY, Disp: 30 Tab, Rfl: 5    losartan (COZAAR) 50 mg tablet, Take 1 Tab by mouth daily. , Disp: 90 Tab, Rfl: 3    pantoprazole (PROTONIX) 40 mg tablet, TAKE 1 TABLET BY MOUTH EVERY DAY, Disp: 30 Tab, Rfl: 5   Date Last Reviewed:  4/23/2018   Number of Personal Factors/Comorbidities that affect the Plan of Care: 0: LOW COMPLEXITY   EXAMINATION:   Observation/Orthostatic Postural Assessment:  Mid patella measurement R 39 cm, L 44 cm        ROM:     AROM(PROM) Right Left   Knee flexion 125 92   Knee extension 0 10   Ankle dorsiflexion (DF) - knee extended 0 0   Ankle plantarflexion 45 52     Strength:     Manual Muscle Test (*/5) Right Left   Knee extension 4+ 3   Knee flexion 4+ 3   Hip flexion 4+ 4   Hip ER 4+ 4   Hip IR 4+ 4   Hip extension 4+ 4   Hip abduction 4+ 4   Hip adduction 4+ 4   Ankle DF 4+ 4   Ankle PF 4+ 4      Body Structures Involved:  1. Muscles  2. Ligaments Body Functions Affected:  1. Sensory/Pain  2. Neuromusculoskeletal  3. Movement Related Activities and Participation Affected:  1. General Tasks and Demands  2. Mobility  3. Self Care   Number of elements (examined above) that affect the Plan of Care: 3: MODERATE COMPLEXITY   CLINICAL PRESENTATION:   Presentation: Stable and uncomplicated: LOW COMPLEXITY   CLINICAL DECISION MAKING:   Outcome Measure: Tool Used: Lower Extremity Functional Scale (LEFS)  Score:  Initial: 47/80 Most Recent: X/80 (Date: -- )   Interpretation of Score: 20 questions each scored on a 5 point scale with 0 representing \"extreme difficulty or unable to perform\" and 4 representing \"no difficulty\". The lower the score, the greater the functional disability. 80/80 represents no disability. Minimal detectable change is 9 points. Score 80 79-63 62-48 47-32 31-16 15-1 0   Modifier CH CI CJ CK CL CM CN     ?  Mobility - Walking and Moving Around:     - CURRENT STATUS: CK - 40%-59% impaired, limited or restricted    - GOAL STATUS: CI - 1%-19% impaired, limited or restricted    - D/C STATUS:  ---------------To be determined---------------    Medical Necessity:   · Patient demonstrates good rehab potential due to higher previous functional level. Reason for Services/Other Comments:  · Patient continues to require modification of therapeutic interventions to increase complexity of exercises. Use of outcome tool(s) and clinical judgement create a POC that gives a: Clear prediction of patient's progress: LOW COMPLEXITY            TREATMENT:   (In addition to Assessment/Re-Assessment sessions the following treatments were rendered)  Pre-treatment Symptoms/Complaints:  Pt with reporting posterior left knee pain. Pain: Initial:   Pain Intensity 1: 0  Post Session:    0     Assessment only today, no treatment provided. Treatment/Session Assessment:    · Response to Treatment:  Pt tolerated initial evaluation today. Pt given HEP, demonstrated understanding. · Compliance with Program/Exercises: Will assess as treatment progresses. · Recommendations/Intent for next treatment session: \"Next visit will focus on advancements to more challenging activities\".   Total Treatment Duration:  60 minutes  PT Patient Time In/Time Out  Time In: 1100  Time Out: [address] Ivana Anderson PT

## 2018-04-24 PROCEDURE — 3331090002 HH PPS REVENUE DEBIT

## 2018-04-24 PROCEDURE — 3331090001 HH PPS REVENUE CREDIT

## 2018-04-25 PROCEDURE — 3331090001 HH PPS REVENUE CREDIT

## 2018-04-25 PROCEDURE — 3331090002 HH PPS REVENUE DEBIT

## 2018-04-27 ENCOUNTER — HOSPITAL ENCOUNTER (OUTPATIENT)
Dept: PHYSICAL THERAPY | Age: 73
Discharge: HOME | End: 2018-04-27
Payer: COMMERCIAL

## 2018-04-27 PROCEDURE — 97140 MANUAL THERAPY 1/> REGIONS: CPT

## 2018-04-27 PROCEDURE — 97110 THERAPEUTIC EXERCISES: CPT

## 2018-04-27 NOTE — PROGRESS NOTES
Mark Cooney  : 1945  Primary: [facility]  [place] [facility] at [address]  [phone]   [phone]       OUTPATIENT PHYSICAL THERAPY:Daily Note 2018    ICD-10: Treatment Diagnosis:  Pain in joint, left M25.562, Effusion of joint, knee, left M25.462  Precautions/Allergies:   Review of patient's allergies indicates no known allergies. Fall Risk Score: 1 (? 5 = High Risk)  MD Orders: Evaluate and treat MEDICAL/REFERRING DIAGNOSIS:  Status post total left knee replacement [Z96.652]   DATE OF ONSET: 4/3/18  REFERRING PHYSICIAN: Naig Woo MD  RETURN PHYSICIAN APPOINTMENT: 18     INITIAL ASSESSMENT:  Mr. Brendon Swanson presents with decreased left knee A/PROM and strength limiting all functional mobility. Pt is ambulating without any AD and is eager to return to cycling and work duties. PROBLEM LIST (Impacting functional limitations):  1. Decreased Strength  2. Decreased ADL/Functional Activities  3. Decreased Ambulation Ability/Technique  4. Decreased Balance  5. Increased Pain INTERVENTIONS PLANNED:  1. Balance Exercise  2. Gait Training  3. Home Exercise Program (HEP)  4. Manual Therapy  5. Neuromuscular Re-education/Strengthening  6. Therapeutic Exercise/Strengthening   TREATMENT PLAN:  Effective Dates: 2018 TO 2018 (90 days). Frequency/Duration: 2 times a week for 90 Days  GOALS: (Goals have been discussed and agreed upon with patient.)  Short-Term Functional Goals: Time Frame: 45 days  1. Pt to demonstrate left knee AROM 0-115 degrees without end range pain. 2. Pt to demonstrate an improvement in left knee strength to equal right. 3. Pt to report decreased disability as per LEFS with a score of 63-79. Discharge Goals: Time Frame: 90 days  1. Pt to demonstrate left knee A/PROM to equal right to allow for symmetrical gait pattern.   2. Pt to return to cycling outdoors with wife for at least 2 miles at a time without difficulty. Rehabilitation Potential For Stated Goals: Good  Regarding Maury Saucedo's therapy, I certify that the treatment plan above will be carried out by a therapist or under their direction. Thank you for this referral,  Priya Tong PT             The information in this section was collected on 4/23/18 (except where otherwise noted). HISTORY:   History of Present Injury/Illness (Reason for Referral):  Pt with total knee replacement on 4/3/18. Pt was independent with ambulation prior to surgery and was working up to 60 hours a week as a . Pt and his wife ride their bikes outdoors on a weekly basis and he would like to return to it as soon as possible. Pt reporting 0/10 pain mostly, is having posterior left knee pain tightness and having some issues staying asleep at night. He is sleeping on his side and does not like having a pillow in between his knees although he does have pain relief when his wife massages his knee and when he uses a cold pack. Pt has 12 steps with left HR inside his house and negotiates it without any problems. Pt is only using tylenol for pain. Pt does have a history of lumbar stenosis which has not been exacerbated since knee surgery. Past Medical History/Comorbidities:   Mr. Nain Blevins  has a past medical history of Stewart esophagus; Current smoker; GERD (gastroesophageal reflux disease); High frequency hearing loss; Hypercholesteremia; Hypertension; OA (osteoarthritis); Rhinitis; SNHL (sensorineural hearing loss); Tinnitus of both ears; and Wheezing. He also has no past medical history of Adverse effect of anesthesia; Aneurysm ([address] Arrhythmia; Asthma; Autoimmune disease ([address] CAD (coronary artery disease); Cancer ([address] Chronic kidney disease; Chronic obstructive pulmonary disease ([address] Chronic pain; Coagulation disorder ([address] Diabetes ([address] Difficult intubation; Endocarditis;  Heart failure ([facility] Ill-defined condition; Liver disease; Malignant hyperthermia due to anesthesia; Morbid obesity ([facility] Nausea & vomiting; Nicotine vapor product user; Non-nicotine vapor product user; Pseudocholinesterase deficiency; Psychiatric disorder; PUD (peptic ulcer disease); Rheumatic fever; Seizures ([facility] Sleep apnea; Stroke [facility]); [facility]embolus ([facility] or Thyroid disease. Mr. Sarah Roberts  has a past surgical history that includes hx colonoscopy and hx knee arthroscopy (Right, 2005). Social History/Living Environment:    Pt lives with his wife in a 2 level home. Prior Level of Function/Work/Activity:  Pt is an avid cyclist and works as a . Current Medications:       Current Outpatient Prescriptions:     aspirin delayed-release 81 mg tablet, Take 1 Tab by mouth every twelve (12) hours every twelve (12) hours for 30 days. , Disp: 60 Tab, Rfl: 0    oxyCODONE IR (ROXICODONE) 5 mg immediate release tablet, Take 1-2 Tabs by mouth every three (3) hours as needed. Max Daily Amount: 80 mg., Disp: 60 Tab, Rfl: 0    fluticasone (FLONASE SENSIMIST) 27.5 mcg/actuation nasal spray, 1 [place] by Both Nostrils route daily. Take / use AM day of surgery  per anesthesia protocols. Indications: Allergic Rhinitis, Disp: , Rfl:     guaiFENesin (MUCINEX) 1,200 mg Ta12 ER tablet, Take 1,200 mg by mouth two (2) times a day. Take / use AM day of surgery  per anesthesia protocols. , Disp: , Rfl:     levocetirizine (XYZAL) 5 mg tablet, Take 5 mg by mouth daily. Take / use AM day of surgery  per anesthesia protocols. Indications:  Allergic Rhinitis, Disp: , Rfl:     albuterol (PROAIR HFA) 90 mcg/actuation inhaler, Take 1 Puff by inhalation every six (6) hours as needed for Wheezing., Disp: 1 Inhaler, Rfl: 5    montelukast (SINGULAIR) 10 mg tablet, TAKE 1 TABLET BY MOUTH DAILY, Disp: 30 Tab, Rfl: 5    atorvastatin (LIPITOR) 20 mg tablet, TAKE 1 TABLET BY MOUTH DAILY, Disp: 30 Tab, Rfl: 5    losartan (COZAAR) 50 mg tablet, Take 1 Tab by mouth daily. , Disp: 90 Tab, Rfl: 3    pantoprazole (PROTONIX) 40 mg tablet, TAKE 1 TABLET BY MOUTH EVERY DAY, Disp: 30 Tab, Rfl: 5   Date Last Reviewed:  4/27/2018   Number of Personal Factors/Comorbidities that affect the Plan of Care: 0: LOW COMPLEXITY   EXAMINATION:   Observation/Orthostatic Postural Assessment:  Mid patella measurement R 39 cm, L 44 cm        ROM:     AROM(PROM) Right Left   Knee flexion 125 92   Knee extension 0 10   Ankle dorsiflexion (DF) - knee extended 0 0   Ankle plantarflexion 45 52     Strength:     Manual Muscle Test (*/5) Right Left   Knee extension 4+ 3   Knee flexion 4+ 3   Hip flexion 4+ 4   Hip ER 4+ 4   Hip IR 4+ 4   Hip extension 4+ 4   Hip abduction 4+ 4   Hip adduction 4+ 4   Ankle DF 4+ 4   Ankle PF 4+ 4      Body Structures Involved:  1. Muscles  2. Ligaments Body Functions Affected:  1. Sensory/Pain  2. Neuromusculoskeletal  3. Movement Related Activities and Participation Affected:  1. General Tasks and Demands  2. Mobility  3. Self Care   Number of elements (examined above) that affect the Plan of Care: 3: MODERATE COMPLEXITY   CLINICAL PRESENTATION:   Presentation: Stable and uncomplicated: LOW COMPLEXITY   CLINICAL DECISION MAKING:   Outcome Measure: Tool Used: Lower Extremity Functional Scale (LEFS)  Score:  Initial: 47/80 Most Recent: X/80 (Date: -- )   Interpretation of Score: 20 questions each scored on a 5 point scale with 0 representing \"extreme difficulty or unable to perform\" and 4 representing \"no difficulty\". The lower the score, the greater the functional disability. 80/80 represents no disability. Minimal detectable change is 9 points. Score 80 79-63 62-48 47-32 31-16 15-1 0   Modifier CH CI CJ CK CL CM CN     ?  Mobility - Walking and Moving Around:     - CURRENT STATUS: CK - 40%-59% impaired, limited or restricted    - GOAL STATUS: CI - 1%-19% impaired, limited or restricted    - D/C STATUS:  ---------------To be determined---------------    Medical Necessity:   · Patient demonstrates good rehab potential due to higher previous functional level. Reason for Services/Other Comments:  · Patient continues to require modification of therapeutic interventions to increase complexity of exercises. Use of outcome tool(s) and clinical judgement create a POC that gives a: Clear prediction of patient's progress: LOW COMPLEXITY            TREATMENT:   (In addition to Assessment/Re-Assessment sessions the following treatments were rendered)  Pre-treatment Symptoms/Complaints:  Pt states that he feels that his knee is stiff at times but has not had any problems with his HEP or ambulation. Pt drove to therapy today. Pain: Initial:   Pain Intensity 1: 1  Pain Location 1: Knee  Pain Orientation 1: Left  Post Session:    0/10      Therapeutic Exercise: ( 45 minutes):  Exercises per grid below to improve mobility and strength. Required minimal verbal cues to promote proper body alignment. Progressed complexity of movement as indicated. Date:  4/27/18 Date:   Date:     Activity/Exercise Parameters Parameters Parameters   Bike x10 minutes   1 mile     Rockerboard all ways 2x10     Step taps 8 inch forward 3x10     Calf stretch 3x30''     Squats 3x10     Knee flexion on strep  3x10     SLR flexion 3x10     Side lying knee curls, abduction 3x10     Hamstring stretch 3x30''       Manual Therapy (  15 minutes): Manual techniques to facilitate improved motion and decreased pain.  (Used abbreviations: MET - muscle energy technique; PNF - proprioceptive neuromuscular facilitation; NMR - neuromuscular re-education; a/p - anterior to posterior; p/a - posterior to anterior)   · Scar massage with pt in supine  · Manual knee extension in supine  · PROM into flexion with pt in side lying  · STM to posterior and anterior knee in sidelying      Treatment/Session Assessment:    · Response to Treatment: Pt with no complaints during session, pt ambulates with improved knee flexion. Right knee observed to have hyperextensions when performing squats. · Compliance with Program/Exercises: Will assess as treatment progresses. · Recommendations/Intent for next treatment session: \"Next visit will focus on advancements to more challenging activities\".   Total Treatment Duration:  55 minutes  PT Patient Time In/Time Out  Time In: 1100  Time Out: [address] [place]

## 2018-04-30 ENCOUNTER — HOSPITAL ENCOUNTER (OUTPATIENT)
Dept: PHYSICAL THERAPY | Age: 73
Discharge: HOME | End: 2018-04-30
Payer: COMMERCIAL

## 2018-04-30 PROCEDURE — 97140 MANUAL THERAPY 1/> REGIONS: CPT

## 2018-04-30 PROCEDURE — 97110 THERAPEUTIC EXERCISES: CPT

## 2018-04-30 NOTE — PROGRESS NOTES
Scott Ledbetter  : 1945  [place] [facility]  [place] [facility] at [address]  [phone]   [phone]       OUTPATIENT PHYSICAL THERAPY:Daily Note 2018    ICD-10: Treatment Diagnosis:  Pain in joint, left M25.562, Effusion of joint, knee, left M25.462  Precautions/Allergies:   Review of patient's allergies indicates no known allergies. Fall Risk Score: 1 (? 5 = High Risk)  MD Orders: Evaluate and treat MEDICAL/REFERRING DIAGNOSIS:  Status post total left knee replacement [Z96.652]   DATE OF ONSET: 4/3/18  REFERRING PHYSICIAN: Michael Villatoro MD  RETURN PHYSICIAN APPOINTMENT: 18     INITIAL ASSESSMENT:  Mr. Alicia Smith presents with decreased left knee A/PROM and strength limiting all functional mobility. Pt is ambulating without any AD and is eager to return to cycling and work duties. PROBLEM LIST (Impacting functional limitations):  1. Decreased Strength  2. Decreased ADL/Functional Activities  3. Decreased Ambulation Ability/Technique  4. Decreased Balance  5. Increased Pain INTERVENTIONS PLANNED:  1. Balance Exercise  2. Gait Training  3. Home Exercise Program (HEP)  4. Manual Therapy  5. Neuromuscular Re-education/Strengthening  6. Therapeutic Exercise/Strengthening   TREATMENT PLAN:  Effective Dates: 2018 TO 2018 (90 days). Frequency/Duration: 2 times a week for 90 Days  GOALS: (Goals have been discussed and agreed upon with patient.)  Short-Term Functional Goals: Time Frame: 45 days  1. Pt to demonstrate left knee AROM 0-115 degrees without end range pain. 2. Pt to demonstrate an improvement in left knee strength to equal right. 3. Pt to report decreased disability as per LEFS with a score of 63-79. Discharge Goals: Time Frame: 90 days  1. Pt to demonstrate left knee A/PROM to equal right to allow for symmetrical gait pattern.   2. Pt to return to cycling outdoors with wife for at least 2 miles at a time without difficulty. Rehabilitation Potential For Stated Goals: Good  Regarding Olivia Saucedo's therapy, I certify that the treatment plan above will be carried out by a therapist or under their direction. Thank you for this referral,  Dinora Tran PT             The information in this section was collected on 4/23/18 (except where otherwise noted). HISTORY:   History of Present Injury/Illness (Reason for Referral):  Pt with total knee replacement on 4/3/18. Pt was independent with ambulation prior to surgery and was working up to 60 hours a week as a . Pt and his wife ride their bikes outdoors on a weekly basis and he would like to return to it as soon as possible. Pt reporting 0/10 pain mostly, is having posterior left knee pain tightness and having some issues staying asleep at night. He is sleeping on his side and does not like having a pillow in between his knees although he does have pain relief when his wife massages his knee and when he uses a cold pack. Pt has 12 steps with left HR inside his house and negotiates it without any problems. Pt is only using tylenol for pain. Pt does have a history of lumbar stenosis which has not been exacerbated since knee surgery. Past Medical History/Comorbidities:   Mr. Shant Kirk  has a past medical history of Stewart esophagus; Current smoker; GERD (gastroesophageal reflux disease); High frequency hearing loss; Hypercholesteremia; Hypertension; OA (osteoarthritis); Rhinitis; SNHL (sensorineural hearing loss); Tinnitus of both ears; and Wheezing. He also has no past medical history of Adverse effect of anesthesia; Aneurysm ([address] Arrhythmia; Asthma; Autoimmune disease ([address] CAD (coronary artery disease); Cancer ([address] Chronic kidney disease; Chronic obstructive pulmonary disease ([address] Chronic pain; Coagulation disorder ([address] Diabetes ([address] Difficult intubation; Endocarditis;  Heart failure ([facility] Ill-defined condition; Liver disease; Malignant hyperthermia due to anesthesia; Morbid obesity ([facility] Nausea & vomiting; Nicotine vapor product user; Non-nicotine vapor product user; Pseudocholinesterase deficiency; Psychiatric disorder; PUD (peptic ulcer disease); Rheumatic fever; Seizures ([facility] Sleep apnea; Stroke [facility]); [facility]embolus ([facility] or Thyroid disease. Mr. Tejal Higgins  has a past surgical history that includes hx colonoscopy and hx knee arthroscopy (Right, 2005). Social History/Living Environment:    Pt lives with his wife in a 2 level home. Prior Level of Function/Work/Activity:  Pt is an avid cyclist and works as a . Current Medications:       Current Outpatient Prescriptions:     aspirin delayed-release 81 mg tablet, Take 1 Tab by mouth every twelve (12) hours every twelve (12) hours for 30 days. , Disp: 60 Tab, Rfl: 0    oxyCODONE IR (ROXICODONE) 5 mg immediate release tablet, Take 1-2 Tabs by mouth every three (3) hours as needed. Max Daily Amount: 80 mg., Disp: 60 Tab, Rfl: 0    fluticasone (FLONASE SENSIMIST) 27.5 mcg/actuation nasal spray, 1 [place] by Both Nostrils route daily. Take / use AM day of surgery  per anesthesia protocols. Indications: Allergic Rhinitis, Disp: , Rfl:     guaiFENesin (MUCINEX) 1,200 mg Ta12 ER tablet, Take 1,200 mg by mouth two (2) times a day. Take / use AM day of surgery  per anesthesia protocols. , Disp: , Rfl:     levocetirizine (XYZAL) 5 mg tablet, Take 5 mg by mouth daily. Take / use AM day of surgery  per anesthesia protocols. Indications:  Allergic Rhinitis, Disp: , Rfl:     albuterol (PROAIR HFA) 90 mcg/actuation inhaler, Take 1 Puff by inhalation every six (6) hours as needed for Wheezing., Disp: 1 Inhaler, Rfl: 5    montelukast (SINGULAIR) 10 mg tablet, TAKE 1 TABLET BY MOUTH DAILY, Disp: 30 Tab, Rfl: 5    atorvastatin (LIPITOR) 20 mg tablet, TAKE 1 TABLET BY MOUTH DAILY, Disp: 30 Tab, Rfl: 5    losartan (COZAAR) 50 mg tablet, Take 1 Tab by mouth daily. , Disp: 90 Tab, Rfl: 3    pantoprazole (PROTONIX) 40 mg tablet, TAKE 1 TABLET BY MOUTH EVERY DAY, Disp: 30 Tab, Rfl: 5   Date Last Reviewed:  4/30/2018   Number of Personal Factors/Comorbidities that affect the Plan of Care: 0: LOW COMPLEXITY   EXAMINATION:   Observation/Orthostatic Postural Assessment:  Mid patella measurement R 39 cm, L 44 cm        ROM:     AROM(PROM) Right Left   Knee flexion 125 92   Knee extension 0 10   Ankle dorsiflexion (DF) - knee extended 0 0   Ankle plantarflexion 45 52     Strength:     Manual Muscle Test (*/5) Right Left   Knee extension 4+ 3   Knee flexion 4+ 3   Hip flexion 4+ 4   Hip ER 4+ 4   Hip IR 4+ 4   Hip extension 4+ 4   Hip abduction 4+ 4   Hip adduction 4+ 4   Ankle DF 4+ 4   Ankle PF 4+ 4      Body Structures Involved:  1. Muscles  2. Ligaments Body Functions Affected:  1. Sensory/Pain  2. Neuromusculoskeletal  3. Movement Related Activities and Participation Affected:  1. General Tasks and Demands  2. Mobility  3. Self Care   Number of elements (examined above) that affect the Plan of Care: 3: MODERATE COMPLEXITY   CLINICAL PRESENTATION:   Presentation: Stable and uncomplicated: LOW COMPLEXITY   CLINICAL DECISION MAKING:   Outcome Measure: Tool Used: Lower Extremity Functional Scale (LEFS)  Score:  Initial: 47/80 Most Recent: X/80 (Date: -- )   Interpretation of Score: 20 questions each scored on a 5 point scale with 0 representing \"extreme difficulty or unable to perform\" and 4 representing \"no difficulty\". The lower the score, the greater the functional disability. 80/80 represents no disability. Minimal detectable change is 9 points. Score 80 79-63 62-48 47-32 31-16 15-1 0   Modifier CH CI CJ CK CL CM CN     ?  Mobility - Walking and Moving Around:     - CURRENT STATUS: CK - 40%-59% impaired, limited or restricted    - GOAL STATUS: CI - 1%-19% impaired, limited or restricted    - D/C STATUS:  ---------------To be determined---------------    Medical Necessity:   · Patient demonstrates good rehab potential due to higher previous functional level. Reason for Services/Other Comments:  · Patient continues to require modification of therapeutic interventions to increase complexity of exercises. Use of outcome tool(s) and clinical judgement create a POC that gives a: Clear prediction of patient's progress: LOW COMPLEXITY            TREATMENT:   (In addition to Assessment/Re-Assessment sessions the following treatments were rendered)  Pre-treatment Symptoms/Complaints:   \"I'm sleeping better these days\" Pt stated that his wife gives him a leg massage (posterior knee) and then puts an ice pack on the knee which has been helping. Pain: Initial:   Pain Intensity 1: 1  Pain Location 1: Knee  Pain Orientation 1: Left  Post Session:    0/10      Therapeutic Exercise: ( 45 minutes):  Exercises per grid below to improve mobility and strength. Required minimal verbal cues to promote proper body alignment. Progressed complexity of movement as indicated. Date:  4/27/18 Date:  4/30/18 Date:     Activity/Exercise Parameters Parameters Parameters   Bike x10 minutes   1 mile x12 minutes  1.6 miles    Rockerboard all ways 2x10 2x10    Step taps 8 inch forward 3x10 3x10    Calf stretch 3x30'' 3x30''    Squats 3x10 3x10    Knee flexion with strap  3x10 3x10    SLR flexion 3x10 3x10    Side lying knee curls, abduction 3x10 3x10    Hamstring stretch 3x30'' 3x10      Manual Therapy (  15 minutes): Manual techniques to facilitate improved motion and decreased pain.  (Used abbreviations: MET - muscle energy technique; PNF - proprioceptive neuromuscular facilitation; NMR - neuromuscular re-education; a/p - anterior to posterior; p/a - posterior to anterior)   · Scar massage with pt in supine  · Manual knee extension in supine  · PROM into flexion with pt in supine  · STM to posterior and anterior knee in sidelying    Treatment/Session Assessment: · Response to Treatment: Pt with significant improvement in range of motion particularly noticed when squatting. · Compliance with Program/Exercises: Will assess as treatment progresses. · Recommendations/Intent for next treatment session: \"Next visit will focus on advancements to more challenging activities\".   Total Treatment Duration:  60 minutes  PT Patient Time In/Time Out  Time In: 1100  Time Out: 1500 Rivera Garcia, KEVYN

## 2018-05-02 ENCOUNTER — HOSPITAL ENCOUNTER (OUTPATIENT)
Dept: PHYSICAL THERAPY | Age: 73
Discharge: HOME | End: 2018-05-02
Payer: COMMERCIAL

## 2018-05-02 PROCEDURE — 97110 THERAPEUTIC EXERCISES: CPT

## 2018-05-02 NOTE — PROGRESS NOTES
Tres Salinas  : 1945  Primary: [facility]  [place] [facility] at [address]  [phone]   [phone]       OUTPATIENT PHYSICAL THERAPY:Daily Note 2018    ICD-10: Treatment Diagnosis:  Pain in joint, left M25.562, Effusion of joint, knee, left M25.462  Precautions/Allergies:   Review of patient's allergies indicates no known allergies. Fall Risk Score: 1 (? 5 = High Risk)  MD Orders: Evaluate and treat MEDICAL/REFERRING DIAGNOSIS:  Status post total left knee replacement [Z96.652]   DATE OF ONSET: 4/3/18  REFERRING PHYSICIAN: Susan Castañeda MD  RETURN PHYSICIAN APPOINTMENT: 18     INITIAL ASSESSMENT:  Mr. Morin School presents with decreased left knee A/PROM and strength limiting all functional mobility. Pt is ambulating without any AD and is eager to return to cycling and work duties. PROBLEM LIST (Impacting functional limitations):  1. Decreased Strength  2. Decreased ADL/Functional Activities  3. Decreased Ambulation Ability/Technique  4. Decreased Balance  5. Increased Pain INTERVENTIONS PLANNED:  1. Balance Exercise  2. Gait Training  3. Home Exercise Program (HEP)  4. Manual Therapy  5. Neuromuscular Re-education/Strengthening  6. Therapeutic Exercise/Strengthening   TREATMENT PLAN:  Effective Dates: 2018 TO 2018 (90 days). Frequency/Duration: 2 times a week for 90 Days  GOALS: (Goals have been discussed and agreed upon with patient.)  Short-Term Functional Goals: Time Frame: 45 days  1. Pt to demonstrate left knee AROM 0-115 degrees without end range pain. 2. Pt to demonstrate an improvement in left knee strength to equal right. 3. Pt to report decreased disability as per LEFS with a score of 63-79. Discharge Goals: Time Frame: 90 days  1. Pt to demonstrate left knee A/PROM to equal right to allow for symmetrical gait pattern.   2. Pt to return to cycling outdoors with wife for at least 2 miles at a time without difficulty. Rehabilitation Potential For Stated Goals: Good  Regarding Tram Saucedo's therapy, I certify that the treatment plan above will be carried out by a therapist or under their direction. Thank you for this referral,  Tom Dalton PT             The information in this section was collected on 4/23/18 (except where otherwise noted). HISTORY:   History of Present Injury/Illness (Reason for Referral):  Pt with total knee replacement on 4/3/18. Pt was independent with ambulation prior to surgery and was working up to 60 hours a week as a . Pt and his wife ride their bikes outdoors on a weekly basis and he would like to return to it as soon as possible. Pt reporting 0/10 pain mostly, is having posterior left knee pain tightness and having some issues staying asleep at night. He is sleeping on his side and does not like having a pillow in between his knees although he does have pain relief when his wife massages his knee and when he uses a cold pack. Pt has 12 steps with left HR inside his house and negotiates it without any problems. Pt is only using tylenol for pain. Pt does have a history of lumbar stenosis which has not been exacerbated since knee surgery. Past Medical History/Comorbidities:   Mr. Cole Lewis  has a past medical history of Stewart esophagus; Current smoker; GERD (gastroesophageal reflux disease); High frequency hearing loss; Hypercholesteremia; Hypertension; OA (osteoarthritis); Rhinitis; SNHL (sensorineural hearing loss); Tinnitus of both ears; and Wheezing. He also has no past medical history of Adverse effect of anesthesia; Aneurysm ([address] Arrhythmia; Asthma; Autoimmune disease ([address] CAD (coronary artery disease); Cancer ([address] Chronic kidney disease; Chronic obstructive pulmonary disease ([address] Chronic pain; Coagulation disorder ([address] Diabetes ([address] Difficult intubation; Endocarditis;  Heart failure ([facility] Ill-defined condition; Liver disease; Malignant hyperthermia due to anesthesia; Morbid obesity ([facility] Nausea & vomiting; Nicotine vapor product user; Non-nicotine vapor product user; Pseudocholinesterase deficiency; Psychiatric disorder; PUD (peptic ulcer disease); Rheumatic fever; Seizures ([facility] Sleep apnea; Stroke [facility]); [facility] ([facility] or Thyroid disease. Mr. Tejal Higgins  has a past surgical history that includes hx colonoscopy and hx knee arthroscopy (Right, 2005). Social History/Living Environment:    Pt lives with his wife in a 2 level home. Prior Level of Function/Work/Activity:  Pt is an avid cyclist and works as a . Current Medications:       Current Outpatient Prescriptions:     aspirin delayed-release 81 mg tablet, Take 1 Tab by mouth every twelve (12) hours every twelve (12) hours for 30 days. , Disp: 60 Tab, Rfl: 0    oxyCODONE IR (ROXICODONE) 5 mg immediate release tablet, Take 1-2 Tabs by mouth every three (3) hours as needed. Max Daily Amount: 80 mg., Disp: 60 Tab, Rfl: 0    fluticasone (FLONASE SENSIMIST) 27.5 mcg/actuation nasal spray, 1 [place] by Both Nostrils route daily. Take / use AM day of surgery  per anesthesia protocols. Indications: Allergic Rhinitis, Disp: , Rfl:     guaiFENesin (MUCINEX) 1,200 mg Ta12 ER tablet, Take 1,200 mg by mouth two (2) times a day. Take / use AM day of surgery  per anesthesia protocols. , Disp: , Rfl:     levocetirizine (XYZAL) 5 mg tablet, Take 5 mg by mouth daily. Take / use AM day of surgery  per anesthesia protocols. Indications:  Allergic Rhinitis, Disp: , Rfl:     albuterol (PROAIR HFA) 90 mcg/actuation inhaler, Take 1 Puff by inhalation every six (6) hours as needed for Wheezing., Disp: 1 Inhaler, Rfl: 5    montelukast (SINGULAIR) 10 mg tablet, TAKE 1 TABLET BY MOUTH DAILY, Disp: 30 Tab, Rfl: 5    atorvastatin (LIPITOR) 20 mg tablet, TAKE 1 TABLET BY MOUTH DAILY, Disp: 30 Tab, Rfl: 5    losartan (COZAAR) 50 mg tablet, Take 1 Tab by mouth daily. , Disp: 90 Tab, Rfl: 3    pantoprazole (PROTONIX) 40 mg tablet, TAKE 1 TABLET BY MOUTH EVERY DAY, Disp: 30 Tab, Rfl: 5   Date Last Reviewed:  5/2/2018   Number of Personal Factors/Comorbidities that affect the Plan of Care: 0: LOW COMPLEXITY   EXAMINATION:   Observation/Orthostatic Postural Assessment:  Mid patella measurement R 39 cm, L 44 cm        ROM:     AROM(PROM) Right Left   Knee flexion 125 92   Knee extension 0 10   Ankle dorsiflexion (DF) - knee extended 0 0   Ankle plantarflexion 45 52     Strength:     Manual Muscle Test (*/5) Right Left   Knee extension 4+ 3   Knee flexion 4+ 3   Hip flexion 4+ 4   Hip ER 4+ 4   Hip IR 4+ 4   Hip extension 4+ 4   Hip abduction 4+ 4   Hip adduction 4+ 4   Ankle DF 4+ 4   Ankle PF 4+ 4      Body Structures Involved:  1. Muscles  2. Ligaments Body Functions Affected:  1. Sensory/Pain  2. Neuromusculoskeletal  3. Movement Related Activities and Participation Affected:  1. General Tasks and Demands  2. Mobility  3. Self Care   Number of elements (examined above) that affect the Plan of Care: 3: MODERATE COMPLEXITY   CLINICAL PRESENTATION:   Presentation: Stable and uncomplicated: LOW COMPLEXITY   CLINICAL DECISION MAKING:   Outcome Measure: Tool Used: Lower Extremity Functional Scale (LEFS)  Score:  Initial: 47/80 Most Recent: X/80 (Date: -- )   Interpretation of Score: 20 questions each scored on a 5 point scale with 0 representing \"extreme difficulty or unable to perform\" and 4 representing \"no difficulty\". The lower the score, the greater the functional disability. 80/80 represents no disability. Minimal detectable change is 9 points. Score 80 79-63 62-48 47-32 31-16 15-1 0   Modifier CH CI CJ CK CL CM CN     ?  Mobility - Walking and Moving Around:     - CURRENT STATUS: CK - 40%-59% impaired, limited or restricted    - GOAL STATUS: CI - 1%-19% impaired, limited or restricted    - D/C STATUS:  ---------------To be determined---------------    Medical Necessity:   · Patient demonstrates good rehab potential due to higher previous functional level. Reason for Services/Other Comments:  · Patient continues to require modification of therapeutic interventions to increase complexity of exercises. Use of outcome tool(s) and clinical judgement create a POC that gives a: Clear prediction of patient's progress: LOW COMPLEXITY            TREATMENT:   (In addition to Assessment/Re-Assessment sessions the following treatments were rendered)  Pre-treatment Symptoms/Complaints:   Pt with no new complaints. He stated that he has less tightness in the posterior aspect of his knee which is allowing him for restful sleep. Pain: Initial:   Pain Intensity 1: 0  Post Session:    0/10      Therapeutic Exercise: ( 60 minutes):  Exercises per grid below to improve mobility and strength. Required minimal verbal cues to promote proper body alignment. Progressed complexity of movement as indicated. Date:  4/27/18 Date:  4/30/18 Date:  5/2/18   Activity/Exercise Parameters Parameters Parameters   Bike x10 minutes   1 mile x12 minutes  1.6 miles x10 minutes  1.5 miles   Rockerboard all ways 2x10 2x10 2x10   Step taps 8 inch forward 3x10 3x10 3x10   Calf stretch 3x30'' 3x30'' 3x30''   Squats 3x10 3x10 3x10   Knee flexion with strap  3x10 3x10 10x10''   SLR flexion 3x10 3x10 3x10   Side lying knee curls, abduction 3x10 3x10 3x10   Hamstring stretch 3x30'' 3x30'' 3x30''     Treatment/Session Assessment:    · Response to Treatment: Pt continues to need verbal cues when squatting for proper body mechanics. .  · Compliance with Program/Exercises: Will assess as treatment progresses. · Recommendations/Intent for next treatment session: \"Next visit will focus on advancements to more challenging activities\".   Total Treatment Duration:  60 minutes  PT Patient Time In/Time Out  Time In: 1000  Time Out: 8350 [employer]

## 2018-05-07 ENCOUNTER — HOSPITAL ENCOUNTER (OUTPATIENT)
Dept: PHYSICAL THERAPY | Age: 73
Discharge: HOME | End: 2018-05-07
Payer: COMMERCIAL

## 2018-05-07 PROCEDURE — 97110 THERAPEUTIC EXERCISES: CPT

## 2018-05-07 PROCEDURE — 97140 MANUAL THERAPY 1/> REGIONS: CPT

## 2018-05-07 NOTE — PROGRESS NOTES
Wilma Castro  : 1945  Primary: [facility]  [place] [facility] at [address]  [phone]   [phone]       OUTPATIENT PHYSICAL THERAPY:Daily Note and Progress Report 2018    ICD-10: Treatment Diagnosis:  Pain in joint, left M25.562, Effusion of joint, knee, left M25.462  Precautions/Allergies:   Review of patient's allergies indicates no known allergies. Fall Risk Score: 1 (? 5 = High Risk)  MD Orders: Evaluate and treat MEDICAL/REFERRING DIAGNOSIS:  Status post total left knee replacement [Z96.652]   DATE OF ONSET: 4/3/18  REFERRING PHYSICIAN: Radha Burch MD  RETURN PHYSICIAN APPOINTMENT: 18:  Pt has attended 5 visits including initial evaluation and is progressing towards goals well, pt is approaching left knee AROM to WNL and continues to ambulate without use of assisted device. Pt to see surgeon on 18 for follow up. Pt looking forward to returning to work. INITIAL ASSESSMENT:  Mr. Aggie Brizuela presents with decreased left knee A/PROM and strength limiting all functional mobility. Pt is ambulating without any AD and is eager to return to cycling and work duties. PROBLEM LIST (Impacting functional limitations):  1. Decreased Strength  2. Decreased ADL/Functional Activities  3. Decreased Ambulation Ability/Technique  4. Decreased Balance  5. Increased Pain INTERVENTIONS PLANNED:  1. Balance Exercise  2. Gait Training  3. Home Exercise Program (HEP)  4. Manual Therapy  5. Neuromuscular Re-education/Strengthening  6. Therapeutic Exercise/Strengthening   TREATMENT PLAN:  Effective Dates: 2018 TO 2018 (90 days). Frequency/Duration: 2 times a week for 90 Days  GOALS: (Goals have been discussed and agreed upon with patient.)  Short-Term Functional Goals: Time Frame: 45 days  1. Pt to demonstrate left knee AROM 0-115 degrees without end range pain. GOAL MET FOR FLEXION ONGOING FOR EXTENSION 5/7/18  2. Pt to demonstrate an improvement in left knee strength to equal right. GOAL ONGOING  3. Pt to report decreased disability as per LEFS with a score of 63-79. GOAL ONGOING  Discharge Goals: Time Frame: 90 days  1. Pt to demonstrate left knee A/PROM to equal right to allow for symmetrical gait pattern. GOAL ONGOING  2. Pt to return to cycling outdoors with wife for at least 2 miles at a time without difficulty. GOAL ONGOING  Rehabilitation Potential For Stated Goals: Good  Regarding Addie Saucedo's therapy, I certify that the treatment plan above will be carried out by a therapist or under their direction. Thank you for this referral,  Eilene Najjar, PT             The information in this section was collected on 4/23/18 (except where otherwise noted). HISTORY:   History of Present Injury/Illness (Reason for Referral):  Pt with total knee replacement on 4/3/18. Pt was independent with ambulation prior to surgery and was working up to 60 hours a week as a . Pt and his wife ride their bikes outdoors on a weekly basis and he would like to return to it as soon as possible. Pt reporting 0/10 pain mostly, is having posterior left knee pain tightness and having some issues staying asleep at night. He is sleeping on his side and does not like having a pillow in between his knees although he does have pain relief when his wife massages his knee and when he uses a cold pack. Pt has 12 steps with left HR inside his house and negotiates it without any problems. Pt is only using tylenol for pain. Pt does have a history of lumbar stenosis which has not been exacerbated since knee surgery. Past Medical History/Comorbidities:   Mr. Starr Carbajal  has a past medical history of Stewart esophagus; Current smoker; GERD (gastroesophageal reflux disease); High frequency hearing loss; Hypercholesteremia; Hypertension; OA (osteoarthritis);  Rhinitis; SNHL (sensorineural hearing loss); Tinnitus of both ears; and Wheezing. He also has no past medical history of Adverse effect of anesthesia; Aneurysm ([facility] [address] Arrhythmia; Asthma; Autoimmune disease ([facility] [address] CAD (coronary artery disease); Cancer ([facility] [address] Chronic kidney disease; Chronic obstructive pulmonary disease ([facility] [address] Chronic pain; Coagulation disorder ([facility] [address] Diabetes ([facility] [address] Difficult intubation; Endocarditis; Heart failure ([facility] [address] Ill-defined condition; Liver disease; Malignant hyperthermia due to anesthesia; Morbid obesity ([facility] [address] Nausea & vomiting; Nicotine vapor product user; Non-nicotine vapor product user; Pseudocholinesterase deficiency; Psychiatric disorder; PUD (peptic ulcer disease); Rheumatic fever; Seizures ([facility] [address] Sleep apnea; Stroke [facility]); Thromboembolus ([facility] [address] or Thyroid disease. Mr. Nohemi Garcia  has a past surgical history that includes hx colonoscopy and hx knee arthroscopy (Right, 2005). Social History/Living Environment:    Pt lives with his wife in a 2 level home. Prior Level of Function/Work/Activity:  Pt is an avid cyclist and works as a . Current Medications:       Current Outpatient Prescriptions:     oxyCODONE IR (ROXICODONE) 5 mg immediate release tablet, Take 1-2 Tabs by mouth every three (3) hours as needed. Max Daily Amount: 80 mg., Disp: 60 Tab, Rfl: 0    fluticasone (FLONASE SENSIMIST) 27.5 mcg/actuation nasal spray, 1 [place] by Both Nostrils route daily. Take / use AM day of surgery  per anesthesia protocols. Indications: Allergic Rhinitis, Disp: , Rfl:     guaiFENesin (MUCINEX) 1,200 mg Ta12 ER tablet, Take 1,200 mg by mouth two (2) times a day. Take / use AM day of surgery  per anesthesia protocols. , Disp: , Rfl:     levocetirizine (XYZAL) 5 mg tablet, Take 5 mg by mouth daily. Take / use AM day of surgery  per anesthesia protocols. Indications:  Allergic Rhinitis, Disp: , Rfl:     albuterol (PROAIR HFA) 90 mcg/actuation inhaler, Take 1 Puff by inhalation every six (6) hours as needed for Wheezing., Disp: 1 Inhaler, Rfl: 5    montelukast (SINGULAIR) 10 mg tablet, TAKE 1 TABLET BY MOUTH DAILY, Disp: 30 Tab, Rfl: 5    atorvastatin (LIPITOR) 20 mg tablet, TAKE 1 TABLET BY MOUTH DAILY, Disp: 30 Tab, Rfl: 5    losartan (COZAAR) 50 mg tablet, Take 1 Tab by mouth daily. , Disp: 90 Tab, Rfl: 3    pantoprazole (PROTONIX) 40 mg tablet, TAKE 1 TABLET BY MOUTH EVERY DAY, Disp: 30 Tab, Rfl: 5   Date Last Reviewed:  5/7/2018   Number of Personal Factors/Comorbidities that affect the Plan of Care: 0: LOW COMPLEXITY   EXAMINATION:   Observation/Orthostatic Postural Assessment:  Mid patella measurement R 39 cm, L 44 cm        ROM:     AROM(PROM) Right Left   Knee flexion 125 118   Knee extension 0 3   Ankle dorsiflexion (DF) - knee extended 0 0   Ankle plantarflexion 45 52     Strength:     Manual Muscle Test (*/5) Right Left   Knee extension 4+ 3   Knee flexion 4+ 3   Hip flexion 4+ 4   Hip ER 4+ 4   Hip IR 4+ 4   Hip extension 4+ 4   Hip abduction 4+ 4   Hip adduction 4+ 4   Ankle DF 4+ 4   Ankle PF 4+ 4      Body Structures Involved:  1. Muscles  2. Ligaments Body Functions Affected:  1. Sensory/Pain  2. Neuromusculoskeletal  3. Movement Related Activities and Participation Affected:  1. General Tasks and Demands  2. Mobility  3. Self Care   Number of elements (examined above) that affect the Plan of Care: 3: MODERATE COMPLEXITY   CLINICAL PRESENTATION:   Presentation: Stable and uncomplicated: LOW COMPLEXITY   CLINICAL DECISION MAKING:   Outcome Measure: Tool Used: Lower Extremity Functional Scale (LEFS)  Score:  Initial: 47/80 Most Recent: X/80 (Date: -- )   Interpretation of Score: 20 questions each scored on a 5 point scale with 0 representing \"extreme difficulty or unable to perform\" and 4 representing \"no difficulty\". The lower the score, the greater the functional disability. 80/80 represents no disability. Minimal detectable change is 9 points.   Score 80 79-63 62-48 47-32 31-16 15-1 0 Modifier CH CI CJ CK CL CM CN     ? Mobility - Walking and Moving Around:     - CURRENT STATUS: CK - 40%-59% impaired, limited or restricted    - GOAL STATUS: CI - 1%-19% impaired, limited or restricted    - D/C STATUS:  ---------------To be determined---------------    Medical Necessity:   · Patient demonstrates good rehab potential due to higher previous functional level. Reason for Services/Other Comments:  · Patient continues to require modification of therapeutic interventions to increase complexity of exercises. Use of outcome tool(s) and clinical judgement create a POC that gives a: Clear prediction of patient's progress: LOW COMPLEXITY            TREATMENT:   (In addition to Assessment/Re-Assessment sessions the following treatments were rendered)  Pre-treatment Symptoms/Complaints: Pt continues to deny any pain in the left knee as was able to walk around a the [employer] [place] yesterday without any problems. Pt has an appointment to see the surgeon this afternoon. .   Pain: Initial:   Pain Intensity 1: 0  Post Session:    0/10      Therapeutic Exercise: ( 30 minutes):  Exercises per grid below to improve mobility and strength. Required minimal verbal cues to promote proper body alignment. Progressed complexity of movement as indicated. Date:  5/7/18   Activity/Exercise Parameters   Bike x10 minutes  1.25 miles   Rockerboard all ways 2x10   Step taps 8 inch forward 3x10   Calf stretch 3x30''   Squats 3x10   Knee flexion with strap  10x10''   SLR flexion 3x10 2#   Side lying knee curls, abduction 3x10 2#   Hamstring stretch 3x30''     Manual Therapy (   15 minutes  ): Manual techniques to facilitate improved motion and decreased pain.  (Used abbreviations: MET - muscle energy technique; PNF - proprioceptive neuromuscular facilitation; NMR - neuromuscular re-education; a/p - anterior to posterior; p/a - posterior to anterior)   · STM to posterior and anterior left knee  · Scar management and scar massage  · PROM into left knee flexion/extension in both supine and prone  · Manual hamstring stretch    Treatment/Session Assessment:    · Response to Treatment: Pt encouraged to continue with exercises at home with focus on AROM into knee flexion and extension. .  · Compliance with Program/Exercises: Will assess as treatment progresses. · Recommendations/Intent for next treatment session: \"Next visit will focus on advancements to more challenging activities\".   Total Treatment Duration:  45 minutes  PT Patient Time In/Time Out  Time In: 1100  Time Out: Eric Valerio

## 2018-05-09 ENCOUNTER — APPOINTMENT (RX ONLY)
Dept: URBAN - METROPOLITAN AREA CLINIC 349 | Facility: CLINIC | Age: 73
Setting detail: DERMATOLOGY
End: 2018-05-09

## 2018-05-09 PROBLEM — C44.329 SQUAMOUS CELL CARCINOMA OF SKIN OF OTHER PARTS OF FACE: Status: ACTIVE | Noted: 2018-05-09

## 2018-05-09 PROBLEM — K21.9 GASTRO-ESOPHAGEAL REFLUX DISEASE WITHOUT ESOPHAGITIS: Status: ACTIVE | Noted: 2018-05-09

## 2018-05-09 PROBLEM — D04.4 CARCINOMA IN SITU OF SKIN OF SCALP AND NECK: Status: ACTIVE | Noted: 2018-05-09

## 2018-05-09 PROCEDURE — ? BIOPSY BY SHAVE METHOD

## 2018-05-09 PROCEDURE — 99213 OFFICE O/P EST LOW 20 MIN: CPT | Mod: 25

## 2018-05-09 PROCEDURE — A4550 SURGICAL TRAYS: HCPCS

## 2018-05-09 PROCEDURE — ? COUNSELING

## 2018-05-09 PROCEDURE — 11100: CPT

## 2018-05-09 PROCEDURE — ? PATHOLOGY BILLING

## 2018-05-09 PROCEDURE — 88305 TISSUE EXAM BY PATHOLOGIST: CPT

## 2018-05-09 NOTE — PROCEDURE: BIOPSY BY SHAVE METHOD
Was A Bandage Applied: Yes
Anesthesia Volume In Cc (Will Not Render If 0): 0.5
Electrodesiccation Text: The wound bed was treated with electrodesiccation after the biopsy was performed.
Biopsy Method: Cheryl oneal
Additional Anesthesia Volume In Cc (Will Not Render If 0): 0
Type Of Destruction Used: Electrodesiccation
Notification Instructions: Patient will be notified of biopsy results. However, patient instructed to call the office if not contacted within 2 weeks.
Anesthesia Type: 1% lidocaine with 1:500,000 epinephrine and a 1:10 solution of 8.4% sodium bicarbonate
Billing Type: Third-Party Bill
Dressing: bandage
Biopsy Type: H and E
Hemostasis: Aluminum Chloride
Post-Care Instructions: I reviewed with the patient in detail post-care instructions. Patient is to keep the biopsy site dry overnight, and then apply bacitracin twice daily until healed. Patient may apply hydrogen peroxide soaks to remove any crusting.\\nAft the procedure, the patient was observed for 5-10 minutes and was oriented to person, place, and time.  Denied feeling dizzy, queasy, and stated that they did not feel that they were going to faint.
Accession #: [id number] [email or web address]
Bill [id number] For Specimen Handling/Conveyance To Laboratory?: no
Size Of Lesion In Cm: 0.7
Wound Care: Vaseline
Cryotherapy Text: The wound bed was treated with cryotherapy after the biopsy was performed.
Detail Level: Detailed
Consent: Written consent was obtained and risks were reviewed including but not limited to scarring, infection, bleeding, scabbing, incomplete removal, nerve damage and allergy to anesthesia.

## 2018-05-11 ENCOUNTER — HOSPITAL ENCOUNTER (OUTPATIENT)
Dept: PHYSICAL THERAPY | Age: 73
Discharge: HOME | End: 2018-05-11
Payer: COMMERCIAL

## 2018-05-11 PROCEDURE — 97140 MANUAL THERAPY 1/> REGIONS: CPT

## 2018-05-11 PROCEDURE — 97110 THERAPEUTIC EXERCISES: CPT

## 2018-05-11 NOTE — PROGRESS NOTES
Dayan Rank  : 1945  Primary: [facility]  [place] [facility] at [address]  [phone]   [phone]       OUTPATIENT PHYSICAL THERAPY:Daily Note 2018    ICD-10: Treatment Diagnosis:  Pain in joint, left M25.562, Effusion of joint, knee, left M25.462  Precautions/Allergies:   Review of patient's allergies indicates no known allergies. Fall Risk Score: 1 (? 5 = High Risk)  MD Orders: Evaluate and treat MEDICAL/REFERRING DIAGNOSIS:  Status post total left knee replacement [Z96.652]   DATE OF ONSET: 4/3/18  REFERRING PHYSICIAN: Everardo Juan MD  RETURN PHYSICIAN APPOINTMENT: 18:  Pt has attended 5 visits including initial evaluation and is progressing towards goals well, pt is approaching left knee AROM to WNL and continues to ambulate without use of assisted device. Pt to see surgeon on 18 for follow up. Pt looking forward to returning to work. INITIAL ASSESSMENT:  Mr. Carlyle Huddleston presents with decreased left knee A/PROM and strength limiting all functional mobility. Pt is ambulating without any AD and is eager to return to cycling and work duties. PROBLEM LIST (Impacting functional limitations):  1. Decreased Strength  2. Decreased ADL/Functional Activities  3. Decreased Ambulation Ability/Technique  4. Decreased Balance  5. Increased Pain INTERVENTIONS PLANNED:  1. Balance Exercise  2. Gait Training  3. Home Exercise Program (HEP)  4. Manual Therapy  5. Neuromuscular Re-education/Strengthening  6. Therapeutic Exercise/Strengthening   TREATMENT PLAN:  Effective Dates: 2018 TO 2018 (90 days). Frequency/Duration: 2 times a week for 90 Days  GOALS: (Goals have been discussed and agreed upon with patient.)  Short-Term Functional Goals: Time Frame: 45 days  1. Pt to demonstrate left knee AROM 0-115 degrees without end range pain.  GOAL MET FOR FLEXION ONGOING FOR EXTENSION 5/7/18  2. Pt to demonstrate an improvement in left knee strength to equal right. GOAL ONGOING  3. Pt to report decreased disability as per LEFS with a score of 63-79. GOAL ONGOING  Discharge Goals: Time Frame: 90 days  1. Pt to demonstrate left knee A/PROM to equal right to allow for symmetrical gait pattern. GOAL ONGOING  2. Pt to return to cycling outdoors with wife for at least 2 miles at a time without difficulty. GOAL ONGOING  Rehabilitation Potential For Stated Goals: Good  Regarding Bricenannette Saucedo's therapy, I certify that the treatment plan above will be carried out by a therapist or under their direction. Thank you for this referral,  Rahel Easley, PT             The information in this section was collected on 4/23/18 (except where otherwise noted). HISTORY:   History of Present Injury/Illness (Reason for Referral):  Pt with total knee replacement on 4/3/18. Pt was independent with ambulation prior to surgery and was working up to 60 hours a week as a . Pt and his wife ride their bikes outdoors on a weekly basis and he would like to return to it as soon as possible. Pt reporting 0/10 pain mostly, is having posterior left knee pain tightness and having some issues staying asleep at night. He is sleeping on his side and does not like having a pillow in between his knees although he does have pain relief when his wife massages his knee and when he uses a cold pack. Pt has 12 steps with left HR inside his house and negotiates it without any problems. Pt is only using tylenol for pain. Pt does have a history of lumbar stenosis which has not been exacerbated since knee surgery. Past Medical History/Comorbidities:   Mr. Mega Hermosillo  has a past medical history of Stewart esophagus; Current smoker; GERD (gastroesophageal reflux disease); High frequency hearing loss; Hypercholesteremia; Hypertension; OA (osteoarthritis); Rhinitis; SNHL (sensorineural hearing loss);  Tinnitus of both ears; and Wheezing. He also has no past medical history of Adverse effect of anesthesia; Aneurysm ([address] Arrhythmia; Asthma; Autoimmune disease ([address] CAD (coronary artery disease); Cancer ([address] Chronic kidney disease; Chronic obstructive pulmonary disease ([address] Chronic pain; Coagulation disorder ([address] Diabetes ([address] Difficult intubation; Endocarditis; Heart failure ([address] Ill-defined condition; Liver disease; Malignant hyperthermia due to anesthesia; Morbid obesity ([address] Nausea & vomiting; Nicotine vapor product user; Non-nicotine vapor product user; Pseudocholinesterase deficiency; Psychiatric disorder; PUD (peptic ulcer disease); Rheumatic fever; Seizures ([facility] [address] Sleep apnea; Stroke [facility]); Thromboembolus ([facility] [address] or Thyroid disease. Mr. Elier Pate  has a past surgical history that includes hx colonoscopy and hx knee arthroscopy (Right, 2005). Social History/Living Environment:    Pt lives with his wife in a 2 level home. Prior Level of Function/Work/Activity:  Pt is an avid cyclist and works as a . Current Medications:       Current Outpatient Prescriptions:     atorvastatin (LIPITOR) 20 mg tablet, TAKE 1 TABLET BY MOUTH DAILY, Disp: 30 Tab, Rfl: 5    montelukast (SINGULAIR) 10 mg tablet, TAKE 1 TABLET BY MOUTH DAILY, Disp: 30 Tab, Rfl: 5    oxyCODONE IR (ROXICODONE) 5 mg immediate release tablet, Take 1-2 Tabs by mouth every three (3) hours as needed. Max Daily Amount: 80 mg., Disp: 60 Tab, Rfl: 0    fluticasone (FLONASE SENSIMIST) 27.5 mcg/actuation nasal spray, 1 [place] by Both Nostrils route daily. Take / use AM day of surgery  per anesthesia protocols. Indications: Allergic Rhinitis, Disp: , Rfl:     guaiFENesin (MUCINEX) 1,200 mg Ta12 ER tablet, Take 1,200 mg by mouth two (2) times a day. Take / use AM day of surgery  per anesthesia protocols. , Disp: , Rfl:     levocetirizine (XYZAL) 5 mg tablet, Take 5 mg by mouth daily.  Take / use AM day of surgery  per anesthesia protocols. Indications: Allergic Rhinitis, Disp: , Rfl:     albuterol (PROAIR HFA) 90 mcg/actuation inhaler, Take 1 Puff by inhalation every six (6) hours as needed for Wheezing., Disp: 1 Inhaler, Rfl: 5    losartan (COZAAR) 50 mg tablet, Take 1 Tab by mouth daily. , Disp: 90 Tab, Rfl: 3    pantoprazole (PROTONIX) 40 mg tablet, TAKE 1 TABLET BY MOUTH EVERY DAY, Disp: 30 Tab, Rfl: 5   Date Last Reviewed:  5/11/2018   Number of Personal Factors/Comorbidities that affect the Plan of Care: 0: LOW COMPLEXITY   EXAMINATION:   Observation/Orthostatic Postural Assessment:  Mid patella measurement R 39 cm, L 44 cm        ROM:     AROM(PROM) Right Left   Knee flexion 125 118   Knee extension 0 3   Ankle dorsiflexion (DF) - knee extended 0 0   Ankle plantarflexion 45 52     Strength:     Manual Muscle Test (*/5) Right Left   Knee extension 4+ 3   Knee flexion 4+ 3   Hip flexion 4+ 4   Hip ER 4+ 4   Hip IR 4+ 4   Hip extension 4+ 4   Hip abduction 4+ 4   Hip adduction 4+ 4   Ankle DF 4+ 4   Ankle PF 4+ 4      Body Structures Involved:  1. Muscles  2. Ligaments Body Functions Affected:  1. Sensory/Pain  2. Neuromusculoskeletal  3. Movement Related Activities and Participation Affected:  1. General Tasks and Demands  2. Mobility  3. Self Care   Number of elements (examined above) that affect the Plan of Care: 3: MODERATE COMPLEXITY   CLINICAL PRESENTATION:   Presentation: Stable and uncomplicated: LOW COMPLEXITY   CLINICAL DECISION MAKING:   Outcome Measure: Tool Used: Lower Extremity Functional Scale (LEFS)  Score:  Initial: 47/80 Most Recent: X/80 (Date: -- )   Interpretation of Score: 20 questions each scored on a 5 point scale with 0 representing \"extreme difficulty or unable to perform\" and 4 representing \"no difficulty\". The lower the score, the greater the functional disability. 80/80 represents no disability. Minimal detectable change is 9 points.   Score 80 79-63 62-48 47-32 31-16 15-1 0   Modifier CH CI CJ CK CL CM CN     ? Mobility - Walking and Moving Around:     - CURRENT STATUS: CK - 40%-59% impaired, limited or restricted    - GOAL STATUS: CI - 1%-19% impaired, limited or restricted    - D/C STATUS:  ---------------To be determined---------------    Medical Necessity:   · Patient demonstrates good rehab potential due to higher previous functional level. Reason for Services/Other Comments:  · Patient continues to require modification of therapeutic interventions to increase complexity of exercises. Use of outcome tool(s) and clinical judgement create a POC that gives a: Clear prediction of patient's progress: LOW COMPLEXITY            TREATMENT:   (In addition to Assessment/Re-Assessment sessions the following treatments were rendered)  Pre-treatment Symptoms/Complaints: Pt stated that he has been having some numbness and tingling in his right leg after therapy sessions. Pt has returned to work on Tuesday. Pain: Initial:   Pain Intensity 1: 0  Post Session:    0/10      Therapeutic Exercise: ( 45 minutes):  Exercises per grid below to improve mobility and strength. Required minimal verbal cues to promote proper body alignment. Progressed complexity of movement as indicated. Date:  5/11/18   Activity/Exercise Parameters   Bike x10 minutes  1.25 miles   [place] all ways 2x10   Step taps 8 inch forward 3x10   Calf stretch 3x30''   Squats 3x10   Knee flexion with strap  10x10''   SLR flexion 3x10 2#   Side lying knee curls, abduction 3x10 2#   Hamstring stretch 3x30''     Manual Therapy (   15 minutes  ): Manual techniques to facilitate improved motion and decreased pain.  (Used abbreviations: MET - muscle energy technique; PNF - proprioceptive neuromuscular facilitation; NMR - neuromuscular re-education; a/p - anterior to posterior; p/a - posterior to anterior)   · STM to posterior and anterior left knee  · Scar management and scar massage  · PROM into left knee flexion/extension in both supine and prone  · Manual hamstring stretch    Treatment/Session Assessment:    · Response to Treatment: Pt tolerated session well, educated on sciatic nerve glides in sitting as well as calf and soleus stretch. · Compliance with Program/Exercises: Will assess as treatment progresses. · Recommendations/Intent for next treatment session: \"Next visit will focus on advancements to more challenging activities\".   Total Treatment Duration:  60 minutes  PT Patient Time In/Time Out  Time In: 1100  Time Out: [address] [place] Ivana Adams, KEVYN

## 2018-05-14 ENCOUNTER — HOSPITAL ENCOUNTER (OUTPATIENT)
Dept: PHYSICAL THERAPY | Age: 73
Discharge: HOME | End: 2018-05-14
Payer: COMMERCIAL

## 2018-05-14 PROCEDURE — 97110 THERAPEUTIC EXERCISES: CPT

## 2018-05-14 PROCEDURE — 97140 MANUAL THERAPY 1/> REGIONS: CPT

## 2018-05-14 NOTE — PROGRESS NOTES
Jacquelyn Brower  : 1945  Primary: [facility]  Secondary[place] [facility] at [address]  [phone]   [phone]       OUTPATIENT PHYSICAL THERAPY:Daily Note 2018    ICD-10: Treatment Diagnosis:  Pain in joint, left M25.562, Effusion of joint, knee, left M25.462  Precautions/Allergies:   Review of patient's allergies indicates no known allergies. Fall Risk Score: 1 (? 5 = High Risk)  MD Orders: Evaluate and treat MEDICAL/REFERRING DIAGNOSIS:  Status post total left knee replacement [Z96.652]   DATE OF ONSET: 4/3/18  REFERRING PHYSICIAN: Iman Harper MD  RETURN PHYSICIAN APPOINTMENT: 18:  Pt has attended 5 visits including initial evaluation and is progressing towards goals well, pt is approaching left knee AROM to WNL and continues to ambulate without use of assisted device. Pt to see surgeon on 18 for follow up. Pt looking forward to returning to work. INITIAL ASSESSMENT:  Mr. Cheli Tamez presents with decreased left knee A/PROM and strength limiting all functional mobility. Pt is ambulating without any AD and is eager to return to cycling and work duties. PROBLEM LIST (Impacting functional limitations):  1. Decreased Strength  2. Decreased ADL/Functional Activities  3. Decreased Ambulation Ability/Technique  4. Decreased Balance  5. Increased Pain INTERVENTIONS PLANNED:  1. Balance Exercise  2. Gait Training  3. Home Exercise Program (HEP)  4. Manual Therapy  5. Neuromuscular Re-education/Strengthening  6. Therapeutic Exercise/Strengthening   TREATMENT PLAN:  Effective Dates: 2018 TO 2018 (90 days). Frequency/Duration: 2 times a week for 90 Days  GOALS: (Goals have been discussed and agreed upon with patient.)  Short-Term Functional Goals: Time Frame: 45 days  1. Pt to demonstrate left knee AROM 0-115 degrees without end range pain.  GOAL MET FOR FLEXION ONGOING FOR EXTENSION 5/7/18  2. Pt to demonstrate an improvement in left knee strength to equal right. GOAL ONGOING  3. Pt to report decreased disability as per LEFS with a score of 63-79. GOAL ONGOING  Discharge Goals: Time Frame: 90 days  1. Pt to demonstrate left knee A/PROM to equal right to allow for symmetrical gait pattern. GOAL ONGOING  2. Pt to return to cycling outdoors with wife for at least 2 miles at a time without difficulty. GOAL ONGOING  Rehabilitation Potential For Stated Goals: Good  Regarding Clayton Saucedo's therapy, I certify that the treatment plan above will be carried out by a therapist or under their direction. Thank you for this referral,  Nani Vernon, PT             The information in this section was collected on 4/23/18 (except where otherwise noted). HISTORY:   History of Present Injury/Illness (Reason for Referral):  Pt with total knee replacement on 4/3/18. Pt was independent with ambulation prior to surgery and was working up to 60 hours a week as a . Pt and his wife ride their bikes outdoors on a weekly basis and he would like to return to it as soon as possible. Pt reporting 0/10 pain mostly, is having posterior left knee pain tightness and having some issues staying asleep at night. He is sleeping on his side and does not like having a pillow in between his knees although he does have pain relief when his wife massages his knee and when he uses a cold pack. Pt has 12 steps with left HR inside his house and negotiates it without any problems. Pt is only using tylenol for pain. Pt does have a history of lumbar stenosis which has not been exacerbated since knee surgery. Past Medical History/Comorbidities:   Mr. Zoraida Shine  has a past medical history of Stewart esophagus; Current smoker; GERD (gastroesophageal reflux disease); High frequency hearing loss; Hypercholesteremia; Hypertension; OA (osteoarthritis); Rhinitis; SNHL (sensorineural hearing loss);  Tinnitus of both ears; and Wheezing. He also has no past medical history of Adverse effect of anesthesia; Aneurysm ([address] Arrhythmia; Asthma; Autoimmune disease ([address] CAD (coronary artery disease); Cancer ([address] Chronic kidney disease; Chronic obstructive pulmonary disease ([address] Chronic pain; Coagulation disorder ([address] Diabetes ([address] Difficult intubation; Endocarditis; Heart failure ([address] Ill-defined condition; Liver disease; Malignant hyperthermia due to anesthesia; Morbid obesity ([address] Nausea & vomiting; Nicotine vapor product user; Non-nicotine vapor product user; Pseudocholinesterase deficiency; Psychiatric disorder; PUD (peptic ulcer disease); Rheumatic fever; Seizures ([facility] Sleep apnea; Stroke [facility]); Thromboembolus ([facility] [address] or Thyroid disease. Mr. Elier Pate  has a past surgical history that includes hx colonoscopy and hx knee arthroscopy (Right, 2005). Social History/Living Environment:    Pt lives with his wife in a 2 level home. Prior Level of Function/Work/Activity:  Pt is an avid cyclist and works as a . Current Medications:       Current Outpatient Prescriptions:     pantoprazole (PROTONIX) 40 mg tablet, TAKE 1 TABLET BY MOUTH EVERY DAY, Disp: 30 Tab, Rfl: 5    atorvastatin (LIPITOR) 20 mg tablet, TAKE 1 TABLET BY MOUTH DAILY, Disp: 30 Tab, Rfl: 5    montelukast (SINGULAIR) 10 mg tablet, TAKE 1 TABLET BY MOUTH DAILY, Disp: 30 Tab, Rfl: 5    oxyCODONE IR (ROXICODONE) 5 mg immediate release tablet, Take 1-2 Tabs by mouth every three (3) hours as needed. Max Daily Amount: 80 mg., Disp: 60 Tab, Rfl: 0    fluticasone (FLONASE SENSIMIST) 27.5 mcg/actuation nasal spray, 1 [place] by Both Nostrils route daily. Take / use AM day of surgery  per anesthesia protocols. Indications: Allergic Rhinitis, Disp: , Rfl:     guaiFENesin (MUCINEX) 1,200 mg Ta12 ER tablet, Take 1,200 mg by mouth two (2) times a day. Take / use AM day of surgery  per anesthesia protocols. , Disp: , Rfl:     levocetirizine (XYZAL) 5 mg tablet, Take 5 mg by mouth daily. Take / use AM day of surgery  per anesthesia protocols. Indications: Allergic Rhinitis, Disp: , Rfl:     albuterol (PROAIR HFA) 90 mcg/actuation inhaler, Take 1 Puff by inhalation every six (6) hours as needed for Wheezing., Disp: 1 Inhaler, Rfl: 5    losartan (COZAAR) 50 mg tablet, Take 1 Tab by mouth daily. , Disp: 90 Tab, Rfl: 3   Date Last Reviewed:  5/14/2018   Number of Personal Factors/Comorbidities that affect the Plan of Care: 0: LOW COMPLEXITY   EXAMINATION:   Observation/Orthostatic Postural Assessment:  Mid patella measurement R 39 cm, L 44 cm        ROM:     AROM(PROM) Right Left   Knee flexion 125 118   Knee extension 0 3   Ankle dorsiflexion (DF) - knee extended 0 0   Ankle plantarflexion 45 52     Strength:     Manual Muscle Test (*/5) Right Left   Knee extension 4+ 3   Knee flexion 4+ 3   Hip flexion 4+ 4   Hip ER 4+ 4   Hip IR 4+ 4   Hip extension 4+ 4   Hip abduction 4+ 4   Hip adduction 4+ 4   Ankle DF 4+ 4   Ankle PF 4+ 4      Body Structures Involved:  1. Muscles  2. Ligaments Body Functions Affected:  1. Sensory/Pain  2. Neuromusculoskeletal  3. Movement Related Activities and Participation Affected:  1. General Tasks and Demands  2. Mobility  3. Self Care   Number of elements (examined above) that affect the Plan of Care: 3: MODERATE COMPLEXITY   CLINICAL PRESENTATION:   Presentation: Stable and uncomplicated: LOW COMPLEXITY   CLINICAL DECISION MAKING:   Outcome Measure: Tool Used: Lower Extremity Functional Scale (LEFS)  Score:  Initial: 47/80 Most Recent: X/80 (Date: -- )   Interpretation of Score: 20 questions each scored on a 5 point scale with 0 representing \"extreme difficulty or unable to perform\" and 4 representing \"no difficulty\". The lower the score, the greater the functional disability. 80/80 represents no disability. Minimal detectable change is 9 points.   Score 80 79-63 62-48 47-32 31-16 15-1 0   Modifier CH CI CJ CK CL CM CN ? Mobility - Walking and Moving Around:     - CURRENT STATUS: CK - 40%-59% impaired, limited or restricted    - GOAL STATUS: CI - 1%-19% impaired, limited or restricted    - D/C STATUS:  ---------------To be determined---------------    Medical Necessity:   · Patient demonstrates good rehab potential due to higher previous functional level. Reason for Services/Other Comments:  · Patient continues to require modification of therapeutic interventions to increase complexity of exercises. Use of outcome tool(s) and clinical judgement create a POC that gives a: Clear prediction of patient's progress: LOW COMPLEXITY            TREATMENT:   (In addition to Assessment/Re-Assessment sessions the following treatments were rendered)  Pre-treatment Symptoms/Complaints: Pt with no new complaints. \"It really isn't bothering me, feels normal\"    Pain: Initial:   Pain Intensity 1: 0  Post Session:    0/10      Therapeutic Exercise: ( 15 minutes):  Exercises per grid below to improve mobility and strength. Required minimal verbal cues to promote proper body alignment. Progressed complexity of movement as indicated. Date:  5/14/18   Activity/Exercise Parameters   Bike x10 minutes  1.75 miles   Calf stretch 3x30''   Squats 2x10   Soleus stretch 3x30''     Manual Therapy (   15 minutes  ): Manual techniques to facilitate improved motion and decreased pain. (Used abbreviations: MET - muscle energy technique; PNF - proprioceptive neuromuscular facilitation; NMR - neuromuscular re-education; a/p - anterior to posterior; p/a - posterior to anterior)   · STM to posterior and anterior left knee with pt's left LE in elevation  · PROM into left knee flexion/extension in both supine and prone  · Manual hamstring stretch    Treatment/Session Assessment:    · Response to Treatment: Pt's range of motion approaching WNL, swelling observed into ankle and lower leg. · Compliance with Program/Exercises:  Will assess as treatment progresses. · Recommendations/Intent for next treatment session: \"Next visit will focus on advancements to more challenging activities\".   Total Treatment Duration:  30 minutes  PT Patient Time In/Time Out  Time In: 1100  Time Out: Angel Santiago

## 2018-05-18 ENCOUNTER — APPOINTMENT (OUTPATIENT)
Dept: PHYSICAL THERAPY | Age: 73
End: 2018-05-18
Payer: COMMERCIAL

## 2018-05-21 ENCOUNTER — HOSPITAL ENCOUNTER (OUTPATIENT)
Dept: PHYSICAL THERAPY | Age: 73
Discharge: HOME | End: 2018-05-21
Payer: COMMERCIAL

## 2018-05-21 PROCEDURE — 97110 THERAPEUTIC EXERCISES: CPT

## 2018-05-21 PROCEDURE — 97140 MANUAL THERAPY 1/> REGIONS: CPT

## 2018-05-21 NOTE — THERAPY DISCHARGE
Shiloh Beltran  : 1945  [place]: [place] [facility]  [place] [facility] at [address]  [phone]   [phone]       OUTPATIENT PHYSICAL THERAPY:Daily Note and Discharge 2018    ICD-10: Treatment Diagnosis:  Pain in joint, left M25.562, Effusion of joint, knee, left M25.462  Precautions/Allergies:   Review of patient's allergies indicates no known allergies. Fall Risk Score: 1 (? 5 = High Risk)  MD Orders: Evaluate and treat MEDICAL/REFERRING DIAGNOSIS:  Status post total left knee replacement [Z96.652]   DATE OF ONSET: 4/3/18  REFERRING PHYSICIAN: Rahul Park MD  RETURN PHYSICIAN APPOINTMENT: 18:  Pt has attended 5 visits including initial evaluation and is progressing towards goals well, pt is approaching left knee AROM to WNL and continues to ambulate without use of assisted device. Pt to see surgeon on 18 for follow up. Pt looking forward to returning to work. INITIAL ASSESSMENT:  Mr. Mitchell Roque presents with decreased left knee A/PROM and strength limiting all functional mobility. Pt is ambulating without any AD and is eager to return to cycling and work duties. PROBLEM LIST (Impacting functional limitations):  1. Decreased Strength  2. Decreased ADL/Functional Activities  3. Decreased Ambulation Ability/Technique  4. Decreased Balance  5. Increased Pain INTERVENTIONS PLANNED:  1. Balance Exercise  2. Gait Training  3. Home Exercise Program (HEP)  4. Manual Therapy  5. Neuromuscular Re-education/Strengthening  6. Therapeutic Exercise/Strengthening   TREATMENT PLAN:  Effective Dates: 2018 TO    Frequency/Duration: 2 times a week for 90 Days  GOALS: (Goals have been discussed and agreed upon with patient.)  Short-Term Functional Goals: Time Frame: 45 days  1. Pt to demonstrate left knee AROM 0-115 degrees without end range pain.  GOAL MET 5/21/18  2. Pt to demonstrate an improvement in left knee strength to equal right. GOAL ONGOING  3. Pt to report decreased disability as per LEFS with a score of 63-79. GOAL MET 5/21/18  Discharge Goals: Time Frame: 90 days  1. Pt to demonstrate left knee A/PROM to equal right to allow for symmetrical gait pattern. GOAL ONGOING  2. Pt to return to cycling outdoors with wife for at least 2 miles at a time without difficulty. GOAL ONGOING  Rehabilitation Potential For Stated Goals: Good  Regarding Cecillia Seip Massey's therapy, I certify that the treatment plan above will be carried out by a therapist or under their direction. Thank you for this referral,  Jodi Duke, PT             The information in this section was collected on 4/23/18 (except where otherwise noted). HISTORY:   History of Present Injury/Illness (Reason for Referral):  Pt with total knee replacement on 4/3/18. Pt was independent with ambulation prior to surgery and was working up to 60 hours a week as a . Pt and his wife ride their bikes outdoors on a weekly basis and he would like to return to it as soon as possible. Pt reporting 0/10 pain mostly, is having posterior left knee pain tightness and having some issues staying asleep at night. He is sleeping on his side and does not like having a pillow in between his knees although he does have pain relief when his wife massages his knee and when he uses a cold pack. Pt has 12 steps with left HR inside his house and negotiates it without any problems. Pt is only using tylenol for pain. Pt does have a history of lumbar stenosis which has not been exacerbated since knee surgery. Past Medical History/Comorbidities:   Mr. Jason Hester  has a past medical history of Stewart esophagus; Current smoker; GERD (gastroesophageal reflux disease); High frequency hearing loss; Hypercholesteremia; Hypertension; OA (osteoarthritis); Rhinitis; SNHL (sensorineural hearing loss);  Tinnitus of both ears; and Wheezing. He also has no past medical history of Adverse effect of anesthesia; Aneurysm ([facility] Arrhythmia; Asthma; Autoimmune disease ([facility] CAD (coronary artery disease); Cancer ([facility] Chronic kidney disease; Chronic obstructive pulmonary disease ([address] Chronic pain; Coagulation disorder ([address] Diabetes ([facility] Difficult intubation; Endocarditis; Heart failure ([facility] Ill-defined condition; Liver disease; Malignant hyperthermia due to anesthesia; Morbid obesity ([facility] Nausea & vomiting; Nicotine vapor product user; Non-nicotine vapor product user; Pseudocholinesterase deficiency; Psychiatric disorder; PUD (peptic ulcer disease); Rheumatic fever; Seizures ([facility] Sleep apnea; Stroke [facility]); Thromboembolus ([facility] or Thyroid disease. Mr. Cirilo Alarcon  has a past surgical history that includes hx colonoscopy and hx knee arthroscopy (Right, 2005). Social History/Living Environment:    Pt lives with his wife in a 2 level home. Prior Level of Function/Work/Activity:  Pt is an avid cyclist and works as a . Current Medications:       Current Outpatient Prescriptions:     pantoprazole (PROTONIX) 40 mg tablet, TAKE 1 TABLET BY MOUTH EVERY DAY, Disp: 30 Tab, Rfl: 5    atorvastatin (LIPITOR) 20 mg tablet, TAKE 1 TABLET BY MOUTH DAILY, Disp: 30 Tab, Rfl: 5    montelukast (SINGULAIR) 10 mg tablet, TAKE 1 TABLET BY MOUTH DAILY, Disp: 30 Tab, Rfl: 5    oxyCODONE IR (ROXICODONE) 5 mg immediate release tablet, Take 1-2 Tabs by mouth every three (3) hours as needed. Max Daily Amount: 80 mg., Disp: 60 Tab, Rfl: 0    fluticasone (FLONASE SENSIMIST) 27.5 mcg/actuation nasal spray, 1 [place] by Both Nostrils route daily. Take / use AM day of surgery  per anesthesia protocols. Indications: Allergic Rhinitis, Disp: , Rfl:     guaiFENesin (MUCINEX) 1,200 mg Ta12 ER tablet, Take 1,200 mg by mouth two (2) times a day. Take / use AM day of surgery  per anesthesia protocols. , Disp: , Rfl:     levocetirizine (XYZAL) 5 mg tablet, Take 5 mg by mouth daily. Take / use AM day of surgery  per anesthesia protocols. Indications: Allergic Rhinitis, Disp: , Rfl:     albuterol (PROAIR HFA) 90 mcg/actuation inhaler, Take 1 Puff by inhalation every six (6) hours as needed for Wheezing., Disp: 1 Inhaler, Rfl: 5    losartan (COZAAR) 50 mg tablet, Take 1 Tab by mouth daily. , Disp: 90 Tab, Rfl: 3   Date Last Reviewed:  5/21/2018   Number of Personal Factors/Comorbidities that affect the Plan of Care: 0: LOW COMPLEXITY   EXAMINATION:   Observation/Orthostatic Postural Assessment:  Mid patella measurement R 39 cm, L 44 cm        ROM:     AROM(PROM) Right Left   Knee flexion 125 115/120   Knee extension 0 0   Ankle dorsiflexion (DF) - knee extended 0 0   Ankle plantarflexion 45 52     Strength:     Manual Muscle Test (*/5) Right Left   Knee extension 4+ 4   Knee flexion 4+ 4   Hip flexion 4+ 4   Hip ER 4+ 4   Hip IR 4+ 4   Hip extension 4+ 4   Hip abduction 4+ 4   Hip adduction 4+ 4   Ankle DF 4+ 4   Ankle PF 4+ 4      Body Structures Involved:  1. Muscles  2. Ligaments Body Functions Affected:  1. Sensory/Pain  2. Neuromusculoskeletal  3. Movement Related Activities and Participation Affected:  1. General Tasks and Demands  2. Mobility  3. Self Care   Number of elements (examined above) that affect the Plan of Care: 3: MODERATE COMPLEXITY   CLINICAL PRESENTATION:   Presentation: Stable and uncomplicated: LOW COMPLEXITY   CLINICAL DECISION MAKING:   Outcome Measure: Tool Used: Lower Extremity Functional Scale (LEFS)  Score:  Initial: 47/80 Most Recent: 71/80 (Date: 5/21/18 )   Interpretation of Score: 20 questions each scored on a 5 point scale with 0 representing \"extreme difficulty or unable to perform\" and 4 representing \"no difficulty\". The lower the score, the greater the functional disability. 80/80 represents no disability. Minimal detectable change is 9 points.   Score 80 79-63 62-48 47-32 31-16 15-1 0   Modifier CH CI CJ CK CL CM CN ? Mobility - Walking and Moving Around:     - CURRENT STATUS: CI - 1%-19% impaired, limited or restricted    - GOAL STATUS: CI - 1%-19% impaired, limited or restricted    - D/C STATUS:  CI - 1%-19% impaired, limited or restricted    Medical Necessity:   · Discharge from therapy at this time. Reason for Services/Other Comments:  · Pt has achieved goals indicated above, discharge at this time. Use of outcome tool(s) and clinical judgement create a POC that gives a: Clear prediction of patient's progress: LOW COMPLEXITY            TREATMENT:   (In addition to Assessment/Re-Assessment sessions the following treatments were rendered)  Pre-treatment Symptoms/Complaints: No complaints, \"I think I'm good with today being my last day\". Pt denies any night time discomfort or any questions or concerns at this time. Pain: Initial:     0/10 Post Session:    0/10      Therapeutic Exercise: ( 15 minutes):  Exercises per grid below to improve mobility and strength. Required minimal verbal cues to promote proper body alignment. Progressed complexity of movement as indicated. Date:  5/21/18   Activity/Exercise Parameters   Bike x10 minutes  1.65 miles   Calf stretch 3x30''   Squats 2x10   Soleus stretch 3x30''     Manual Therapy (   15 minutes  ): Manual techniques to facilitate improved motion and decreased pain. (Used abbreviations: MET - muscle energy technique; PNF - proprioceptive neuromuscular facilitation; NMR - neuromuscular re-education; a/p - anterior to posterior; p/a - posterior to anterior)   · STM to posterior and anterior left knee with pt's left LE in elevation  · PROM into left knee flexion/extension in both supine and prone  · Manual hamstring stretch    Treatment/Session Assessment:    · Response to Treatment: Pt tolerated session, discharge from therapy at this time as pt indicates that he is doing well and will continue with HEP.    Total Treatment Duration:  30 minutes  PT Patient Time In/Time Out  Time In: 1200  Time Out: [id number]

## 2018-05-25 ENCOUNTER — APPOINTMENT (OUTPATIENT)
Dept: PHYSICAL THERAPY | Age: 73
End: 2018-05-25
Payer: COMMERCIAL

## 2018-05-30 ENCOUNTER — APPOINTMENT (OUTPATIENT)
Dept: PHYSICAL THERAPY | Age: 73
End: 2018-05-30
Payer: COMMERCIAL

## 2018-05-31 ENCOUNTER — APPOINTMENT (RX ONLY)
Dept: URBAN - METROPOLITAN AREA CLINIC 349 | Facility: CLINIC | Age: 73
Setting detail: DERMATOLOGY
End: 2018-05-31

## 2018-05-31 PROBLEM — C44.329 SQUAMOUS CELL CARCINOMA OF SKIN OF OTHER PARTS OF FACE: Status: ACTIVE | Noted: 2018-05-31

## 2018-05-31 PROCEDURE — A4550 SURGICAL TRAYS: HCPCS

## 2018-05-31 PROCEDURE — 17281 DSTR MAL LS F/E/E/N/L/M .6-1: CPT

## 2018-05-31 PROCEDURE — ? CURETTAGE AND DESTRUCTION

## 2018-05-31 PROCEDURE — ? COUNSELING

## 2018-05-31 NOTE — PROCEDURE: CURETTAGE AND DESTRUCTION
Bill For Surgical Tray: yes
Total Volume (Ccs): 1
Anesthesia Type: 1% lidocaine with 1:100,000 epinephrine and a 1:10 solution of 8.4% sodium bicarbonate
Size Of Lesion In Cm: 0.8
Size Of Lesion After Curettage: 0.9
Post-Care Instructions: I reviewed with the patient in detail post-care instructions. Patient is to keep the area dry for 48 hours, and not to engage in any swimming until the area is healed. Should the patient develop any fevers, chills, bleeding, severe pain patient will contact the office immediately.
Number Of Curettages: 2
Additional Information: (Optional): The wound was cleaned, and a pressure dressing was applied.  The patient received detailed post-op details in length. Aft the procedure, the patient was observed for 5-10 minutes and was oriented to person, place, and time.  Denied feeling dizzy, queasy, and stated that they did not feel that they were going to faint.
Add Ability To Document Additional Intralesional Injection: No
Detail Level: Detailed
Anesthesia Volume In Cc: 0.5
Bill As A Line Item Or As Units: Line Item
What Was Performed First?: Destruction
Cautery Type: electrodesiccation
Consent was obtained from the patient. The risks, benefits and alternatives to therapy were discussed in detail. Specifically, the risks of infection, scarring, bleeding, prolonged wound healing, nerve injury, incomplete removal, allergy to anesthesia and recurrence were addressed. Alternatives to ED&C, such as: surgical removal and XRT were also discussed.  Prior to the procedure, the treatment site was clearly identified and confirmed by the patient. All components of Universal Protocol/PAUSE Rule completed.

## 2018-06-01 ENCOUNTER — APPOINTMENT (OUTPATIENT)
Dept: PHYSICAL THERAPY | Age: 73
End: 2018-06-01

## 2018-07-26 ENCOUNTER — APPOINTMENT (RX ONLY)
Dept: URBAN - METROPOLITAN AREA CLINIC 349 | Facility: CLINIC | Age: 73
Setting detail: DERMATOLOGY
End: 2018-07-26

## 2018-07-26 DIAGNOSIS — L57.0 ACTINIC KERATOSIS: ICD-10-CM

## 2018-07-26 PROBLEM — E78.5 HYPERLIPIDEMIA, UNSPECIFIED: Status: ACTIVE | Noted: 2018-07-26

## 2018-07-26 PROBLEM — C44.42 SQUAMOUS CELL CARCINOMA OF SKIN OF SCALP AND NECK: Status: ACTIVE | Noted: 2018-07-26

## 2018-07-26 PROBLEM — C44.329 SQUAMOUS CELL CARCINOMA OF SKIN OF OTHER PARTS OF FACE: Status: ACTIVE | Noted: 2018-07-26

## 2018-07-26 PROCEDURE — ? COUNSELING

## 2018-07-26 PROCEDURE — 99213 OFFICE O/P EST LOW 20 MIN: CPT | Mod: 25

## 2018-07-26 PROCEDURE — 17003 DESTRUCT PREMALG LES 2-14: CPT

## 2018-07-26 PROCEDURE — ? LIQUID NITROGEN

## 2018-07-26 PROCEDURE — 17000 DESTRUCT PREMALG LESION: CPT

## 2018-07-26 ASSESSMENT — LOCATION DETAILED DESCRIPTION DERM
LOCATION DETAILED: RIGHT FOREHEAD
LOCATION DETAILED: NASAL ROOT

## 2018-07-26 ASSESSMENT — LOCATION ZONE DERM
LOCATION ZONE: NOSE
LOCATION ZONE: FACE

## 2018-07-26 ASSESSMENT — LOCATION SIMPLE DESCRIPTION DERM
LOCATION SIMPLE: RIGHT FOREHEAD
LOCATION SIMPLE: NOSE

## 2018-07-26 ASSESSMENT — PAIN INTENSITY VAS: HOW INTENSE IS YOUR PAIN 0 BEING NO PAIN, 10 BEING THE MOST SEVERE PAIN POSSIBLE?: 1/10 PAIN

## 2018-07-26 NOTE — PROCEDURE: LIQUID NITROGEN
Consent: The patient's consent was obtained including but not limited to risks of crusting, scabbing, blistering, scarring, darker or lighter pigmentary change, recurrence, incomplete removal and infection.
Post-Care Instructions: I reviewed with the patient in detail post-care instructions. Patient is to wear sunprotection, and avoid picking at any of the treated lesions. Pt may apply Vaseline to crusted or scabbing areas.
Detail Level: Detailed
Duration Of Freeze Thaw-Cycle (Seconds): 2
Render Post-Care Instructions In Note?: no
Number Of Freeze-Thaw Cycles: 2 freeze-thaw cycles

## 2018-08-23 ENCOUNTER — APPOINTMENT (RX ONLY)
Dept: URBAN - METROPOLITAN AREA CLINIC 349 | Facility: CLINIC | Age: 73
Setting detail: DERMATOLOGY
End: 2018-08-23

## 2018-08-23 DIAGNOSIS — L81.4 OTHER MELANIN HYPERPIGMENTATION: ICD-10-CM

## 2018-08-23 DIAGNOSIS — L57.0 ACTINIC KERATOSIS: ICD-10-CM | Status: RESOLVING

## 2018-08-23 PROBLEM — K21.9 GASTRO-ESOPHAGEAL REFLUX DISEASE WITHOUT ESOPHAGITIS: Status: ACTIVE | Noted: 2018-08-23

## 2018-08-23 PROCEDURE — ? LIQUID NITROGEN

## 2018-08-23 PROCEDURE — ? COUNSELING

## 2018-08-23 PROCEDURE — 17000 DESTRUCT PREMALG LESION: CPT

## 2018-08-23 PROCEDURE — 99213 OFFICE O/P EST LOW 20 MIN: CPT | Mod: 25

## 2018-08-23 PROCEDURE — 17003 DESTRUCT PREMALG LES 2-14: CPT

## 2018-08-23 ASSESSMENT — LOCATION SIMPLE DESCRIPTION DERM
LOCATION SIMPLE: SCALP
LOCATION SIMPLE: FRONTAL SCALP
LOCATION SIMPLE: RIGHT TEMPLE
LOCATION SIMPLE: RIGHT CHEEK
LOCATION SIMPLE: LEFT CHEEK

## 2018-08-23 ASSESSMENT — LOCATION DETAILED DESCRIPTION DERM
LOCATION DETAILED: LEFT INFERIOR LATERAL MALAR CHEEK
LOCATION DETAILED: RIGHT INFERIOR TEMPLE
LOCATION DETAILED: LEFT SUPERIOR PARIETAL SCALP
LOCATION DETAILED: MEDIAL FRONTAL SCALP
LOCATION DETAILED: RIGHT SUPERIOR LATERAL MALAR CHEEK

## 2018-08-23 ASSESSMENT — LOCATION ZONE DERM
LOCATION ZONE: FACE
LOCATION ZONE: SCALP

## 2018-08-23 NOTE — PROCEDURE: LIQUID NITROGEN
Detail Level: Zone
Consent: The patient's consent was obtained including but not limited to risks of crusting, scabbing, blistering, scarring, darker or lighter pigmentary change, recurrence, incomplete removal and infection.
Render Post-Care Instructions In Note?: no
Post-Care Instructions: I reviewed with the patient in detail post-care instructions. Patient is to wear sunprotection, and avoid picking at any of the treated lesions. Pt may apply Vaseline to crusted or scabbing areas.
Duration Of Freeze Thaw-Cycle (Seconds): 2
Number Of Freeze-Thaw Cycles: 2 freeze-thaw cycles

## 2018-11-15 ENCOUNTER — APPOINTMENT (RX ONLY)
Dept: URBAN - METROPOLITAN AREA CLINIC 349 | Facility: CLINIC | Age: 73
Setting detail: DERMATOLOGY
End: 2018-11-15

## 2018-11-15 DIAGNOSIS — L57.0 ACTINIC KERATOSIS: ICD-10-CM

## 2018-11-15 PROBLEM — C44.329 SQUAMOUS CELL CARCINOMA OF SKIN OF OTHER PARTS OF FACE: Status: ACTIVE | Noted: 2018-11-15

## 2018-11-15 PROBLEM — D04.4 CARCINOMA IN SITU OF SKIN OF SCALP AND NECK: Status: ACTIVE | Noted: 2018-11-15

## 2018-11-15 PROBLEM — C44.42 SQUAMOUS CELL CARCINOMA OF SKIN OF SCALP AND NECK: Status: ACTIVE | Noted: 2018-11-15

## 2018-11-15 PROBLEM — E78.5 HYPERLIPIDEMIA, UNSPECIFIED: Status: ACTIVE | Noted: 2018-11-15

## 2018-11-15 PROCEDURE — ? PATHOLOGY BILLING

## 2018-11-15 PROCEDURE — 88305 TISSUE EXAM BY PATHOLOGIST: CPT

## 2018-11-15 PROCEDURE — 17003 DESTRUCT PREMALG LES 2-14: CPT

## 2018-11-15 PROCEDURE — ? BIOPSY BY SHAVE METHOD

## 2018-11-15 PROCEDURE — ? LIQUID NITROGEN

## 2018-11-15 PROCEDURE — 17000 DESTRUCT PREMALG LESION: CPT

## 2018-11-15 PROCEDURE — A4550 SURGICAL TRAYS: HCPCS

## 2018-11-15 PROCEDURE — 99213 OFFICE O/P EST LOW 20 MIN: CPT | Mod: 25

## 2018-11-15 PROCEDURE — ? COUNSELING

## 2018-11-15 PROCEDURE — 11100: CPT | Mod: 59

## 2018-11-15 ASSESSMENT — LOCATION ZONE DERM: LOCATION ZONE: FACE

## 2018-11-15 ASSESSMENT — LOCATION DETAILED DESCRIPTION DERM
LOCATION DETAILED: RIGHT MID TEMPLE
LOCATION DETAILED: RIGHT FOREHEAD
LOCATION DETAILED: RIGHT CENTRAL MALAR CHEEK

## 2018-11-15 ASSESSMENT — LOCATION SIMPLE DESCRIPTION DERM
LOCATION SIMPLE: RIGHT TEMPLE
LOCATION SIMPLE: RIGHT FOREHEAD
LOCATION SIMPLE: RIGHT CHEEK

## 2018-11-15 NOTE — PROCEDURE: PATHOLOGY BILLING
Immunohistochemistry ([id number] and [id number]) billing is not performed here. Please use the Immunohistochemistry Stain Billing plan to accomplish this. Immunohistochemistry ([id number] and [id number]) billing is not performed here. Please use the Immunohistochemistry Stain Billing plan to accomplish this.

## 2018-11-15 NOTE — PROCEDURE: BIOPSY BY SHAVE METHOD
Hemostasis: Aluminum Chloride
Was A Bandage Applied: Yes
Wound Care: Vaseline
Destruction After The Procedure: No
Size Of Lesion In Cm: 0
Anesthesia Type: 1% lidocaine with 1:500,000 epinephrine and a 1:10 solution of 8.4% sodium bicarbonate
Billing Type: Third-Party Bill
Accession #: [email or web address]
Notification Instructions: Patient will be notified of biopsy results. However, patient instructed to call the office if not contacted within 2 weeks.
Post-Care Instructions: I reviewed with the patient in detail post-care instructions. Patient is to keep the biopsy site dry overnight, and then apply bacitracin twice daily until healed. Patient may apply hydrogen peroxide soaks to remove any crusting.\\nAft the procedure, the patient was observed for 5-10 minutes and was oriented to person, place, and time.  Denied feeling dizzy, queasy, and stated that they did not feel that they were going to faint.
Type Of Destruction Used: Electrodesiccation
X Size Of Lesion In Cm: 0.7
Anesthesia Volume In Cc (Will Not Render If 0): 0.5
Electrodesiccation Text: The wound bed was treated with electrodesiccation after the biopsy was performed.
Biopsy Method: Cheryl oneal
Cryotherapy Text: The wound bed was treated with cryotherapy after the biopsy was performed.
Consent: Written consent was obtained and risks were reviewed including but not limited to scarring, infection, bleeding, scabbing, incomplete removal, nerve damage and allergy to anesthesia.
Depth Of Biopsy: dermis
Detail Level: Detailed
Dressing: bandage
Biopsy Type: H and E

## 2018-11-15 NOTE — PROCEDURE: LIQUID NITROGEN
Post-Care Instructions: I reviewed with the patient in detail post-care instructions. Patient is to wear sunprotection, and avoid picking at any of the treated lesions. Pt may apply Vaseline to crusted or scabbing areas.
Duration Of Freeze Thaw-Cycle (Seconds): 2
Render Post-Care Instructions In Note?: no
Number Of Freeze-Thaw Cycles: 2 freeze-thaw cycles
Consent: The patient's consent was obtained including but not limited to risks of crusting, scabbing, blistering, scarring, darker or lighter pigmentary change, recurrence, incomplete removal and infection.
Detail Level: Zone

## 2018-12-04 ENCOUNTER — APPOINTMENT (RX ONLY)
Dept: URBAN - METROPOLITAN AREA CLINIC 349 | Facility: CLINIC | Age: 73
Setting detail: DERMATOLOGY
End: 2018-12-04

## 2018-12-04 DIAGNOSIS — L57.0 ACTINIC KERATOSIS: ICD-10-CM

## 2018-12-04 PROBLEM — D04.4 CARCINOMA IN SITU OF SKIN OF SCALP AND NECK: Status: ACTIVE | Noted: 2018-12-04

## 2018-12-04 PROCEDURE — A4550 SURGICAL TRAYS: HCPCS

## 2018-12-04 PROCEDURE — 17003 DESTRUCT PREMALG LES 2-14: CPT

## 2018-12-04 PROCEDURE — ? CURETTAGE AND DESTRUCTION

## 2018-12-04 PROCEDURE — 17271 DSTR MAL LES S/N/H/F/G 0.6-1: CPT | Mod: 59

## 2018-12-04 PROCEDURE — ? LIQUID NITROGEN

## 2018-12-04 PROCEDURE — ? COUNSELING

## 2018-12-04 PROCEDURE — 17000 DESTRUCT PREMALG LESION: CPT

## 2018-12-04 ASSESSMENT — LOCATION ZONE DERM
LOCATION ZONE: FACE
LOCATION ZONE: SCALP

## 2018-12-04 ASSESSMENT — LOCATION SIMPLE DESCRIPTION DERM
LOCATION SIMPLE: RIGHT FOREHEAD
LOCATION SIMPLE: POSTERIOR SCALP
LOCATION SIMPLE: LEFT FOREHEAD
LOCATION SIMPLE: SCALP

## 2018-12-04 ASSESSMENT — LOCATION DETAILED DESCRIPTION DERM
LOCATION DETAILED: LEFT INFERIOR LATERAL FOREHEAD
LOCATION DETAILED: RIGHT SUPERIOR PARIETAL SCALP
LOCATION DETAILED: RIGHT LATERAL FOREHEAD
LOCATION DETAILED: MID-OCCIPITAL SCALP

## 2018-12-04 NOTE — PROCEDURE: LIQUID NITROGEN
Duration Of Freeze Thaw-Cycle (Seconds): 2
Post-Care Instructions: I reviewed with the patient in detail post-care instructions. Patient is to wear sunprotection, and avoid picking at any of the treated lesions. Pt may apply Vaseline to crusted or scabbing areas.
Consent: The patient's consent was obtained including but not limited to risks of crusting, scabbing, blistering, scarring, darker or lighter pigmentary change, recurrence, incomplete removal and infection.
Detail Level: Zone
Number Of Freeze-Thaw Cycles: 2 freeze-thaw cycles
Render Post-Care Instructions In Note?: no

## 2018-12-04 NOTE — PROCEDURE: CURETTAGE AND DESTRUCTION
Cautery Type: electrodesiccation
What Was Performed First?: Destruction
Total Volume (Ccs): 1
Anesthesia Type: 1% lidocaine with 1:100,000 epinephrine and a 1:10 solution of 8.4% sodium bicarbonate
Size Of Lesion In Cm: 0.6
Bill For Surgical Tray: yes
Additional Information: (Optional): The wound was cleaned, and a pressure dressing was applied.  The patient received detailed post-op details in length. Aft the procedure, the patient was observed for 5-10 minutes and was oriented to person, place, and time.  Denied feeling dizzy, queasy, and stated that they did not feel that they were going to faint.
Anesthesia Volume In Cc: 0.5
Hide Accession Number?: No
Post-Care Instructions: I reviewed with the patient in detail post-care instructions. Patient is to keep the area dry for 48 hours, and not to engage in any swimming until the area is healed. Should the patient develop any fevers, chills, bleeding, severe pain patient will contact the office immediately.
Bill As A Line Item Or As Units: Line Item
Detail Level: Detailed
Size Of Lesion After Curettage: 0.8
Number Of Curettages: 2
Consent was obtained from the patient. The risks, benefits and alternatives to therapy were discussed in detail. Specifically, the risks of infection, scarring, bleeding, prolonged wound healing, nerve injury, incomplete removal, allergy to anesthesia and recurrence were addressed. Alternatives to ED&C, such as: surgical removal and XRT were also discussed.  Prior to the procedure, the treatment site was clearly identified and confirmed by the patient. All components of Universal Protocol/PAUSE Rule completed.

## 2019-02-27 ENCOUNTER — APPOINTMENT (RX ONLY)
Dept: URBAN - METROPOLITAN AREA CLINIC 349 | Facility: CLINIC | Age: 74
Setting detail: DERMATOLOGY
End: 2019-02-27

## 2019-02-27 ENCOUNTER — HOSPITAL ENCOUNTER (OUTPATIENT)
Dept: ULTRASOUND IMAGING | Age: 74
Discharge: HOME | End: 2019-02-27
Attending: FAMILY MEDICINE
Payer: MEDICARE

## 2019-02-27 DIAGNOSIS — L57.0 ACTINIC KERATOSIS: ICD-10-CM

## 2019-02-27 DIAGNOSIS — Z87.891 HISTORY OF TOBACCO ABUSE: ICD-10-CM

## 2019-02-27 DIAGNOSIS — L90.5 SCAR CONDITIONS AND FIBROSIS OF SKIN: ICD-10-CM

## 2019-02-27 PROBLEM — D04.4 CARCINOMA IN SITU OF SKIN OF SCALP AND NECK: Status: ACTIVE | Noted: 2019-02-27

## 2019-02-27 PROCEDURE — 17000 DESTRUCT PREMALG LESION: CPT

## 2019-02-27 PROCEDURE — ? RECOMMENDATIONS

## 2019-02-27 PROCEDURE — ? LIQUID NITROGEN

## 2019-02-27 PROCEDURE — ? COUNSELING

## 2019-02-27 PROCEDURE — 17003 DESTRUCT PREMALG LES 2-14: CPT

## 2019-02-27 PROCEDURE — 99213 OFFICE O/P EST LOW 20 MIN: CPT | Mod: 25

## 2019-02-27 PROCEDURE — 93978 VASCULAR STUDY: CPT

## 2019-02-27 ASSESSMENT — LOCATION SIMPLE DESCRIPTION DERM
LOCATION SIMPLE: RIGHT CHEEK
LOCATION SIMPLE: SCALP
LOCATION SIMPLE: ANTERIOR SCALP
LOCATION SIMPLE: LEFT TEMPLE
LOCATION SIMPLE: POSTERIOR SCALP
LOCATION SIMPLE: LEFT CHEEK

## 2019-02-27 ASSESSMENT — LOCATION DETAILED DESCRIPTION DERM
LOCATION DETAILED: RIGHT CENTRAL PARIETAL SCALP
LOCATION DETAILED: LEFT SUPERIOR PREAURICULAR CHEEK
LOCATION DETAILED: LEFT SUPERIOR PARIETAL SCALP
LOCATION DETAILED: LEFT LATERAL TEMPLE
LOCATION DETAILED: RIGHT OCCIPITAL SCALP
LOCATION DETAILED: RIGHT SUPERIOR MEDIAL MALAR CHEEK
LOCATION DETAILED: MID-FRONTAL SCALP

## 2019-02-27 ASSESSMENT — LOCATION ZONE DERM
LOCATION ZONE: SCALP
LOCATION ZONE: FACE

## 2019-03-14 ENCOUNTER — APPOINTMENT (RX ONLY)
Dept: URBAN - METROPOLITAN AREA CLINIC 349 | Facility: CLINIC | Age: 74
Setting detail: DERMATOLOGY
End: 2019-03-14

## 2019-04-23 ENCOUNTER — HOSPITAL ENCOUNTER (OUTPATIENT)
Dept: PHYSICAL THERAPY | Age: 74
Discharge: HOME | End: 2019-04-23
Payer: MEDICARE

## 2019-04-23 PROCEDURE — 97161 PT EVAL LOW COMPLEX 20 MIN: CPT

## 2019-04-23 NOTE — THERAPY EVALUATION
Morro Toney  : 1945  [place]  Secondary: Bshsi Generic [address] [facility]  at [facility] [address], [address] [facility]  [phone]   [phone]       OUTPATIENT PHYSICAL THERAPY:Initial Assessment 2019   ICD-10: Treatment Diagnosis: Pain in joint, shoulder, left M25.512, Cervicalgia M54.2  Precautions/Allergies:   Patient has no known allergies. MD Orders: Evaluate and treat MEDICAL/REFERRING DIAGNOSIS:  Pain in left shoulder [M25.512]   DATE OF ONSET:  2019  REFERRING PHYSICIAN: Caryle Plover, MD  RETURN PHYSICIAN APPOINTMENT: None scheduled     INITIAL ASSESSMENT:  Mr. Tri Freeman presents with decreased left shoulder A/PROM and decreased strength affecting all functional mobility and activities. Pt also with decreased cervical spine AROM and will benefit from therapy at this time to achieve goals established below. PROBLEM LIST (Impacting functional limitations):  1. Decreased Strength  2. Increased Pain  3. Decreased Flexibility/Joint Mobility INTERVENTIONS PLANNED: (Treatment may consist of any combination of the following)  1. Cold  2. Electrical Stimulation  3. Manual Therapy  4. Neuromuscular Re-education/Strengthening  5. Therapeutic Exercise/Strengthening   TREATMENT PLAN:  Effective Dates: 2019 TO 2019 (90 days). Frequency/Duration: 2 times a week for 90 Day(s)  GOALS: (Goals have been discussed and agreed upon with patient.)  Discharge Goals: Time Frame: 2019  1. Pt to demonstrate decreased disability as per DASH with a score +5 points. 2. Pt to demonstrate left shoulder A/PROM to equal right to allow for symmetrical movement with activity. 3. Pt to achieve independence with HEP to maintain goals. OUTCOME MEASURE:   Tool Used: Disabilities of the Arm, Shoulder and Hand (DASH) Questionnaire - Quick Version  Score:  Initial:  Most Recent:  (Date: -- )   Interpretation of Score:  The DASH is designed to measure the activities of daily living in person's with upper extremity dysfunction or pain. Each section is scored on a 1-5 scale, 5 representing the greatest disability. The scores of each section are added together for a total score of 55. MEDICAL NECESSITY:   · Patient demonstrates good rehab potential due to higher previous functional level. REASON FOR SERVICES/OTHER COMMENTS:  · Patient will benefit from therapy to achieve goals established above. Total Duration: 60 minutes     Rehabilitation Potential For Stated Goals: Good  Regarding Ac Saucedo's therapy, I certify that the treatment plan above will be carried out by a therapist or under their direction. Thank you for this referral,  Leonides Yi, PT     Referring Physician Signature: Nay Napoles MD _______________________________ Date _____________     PAIN/SUBJECTIVE:   Initial: Pain Intensity 1: 1  Pain Location 1: Shoulder, Neck  Pain Orientation 1: Left  Post Session:  1/10   HISTORY:   History of Injury/Illness (Reason for Referral): Pt is a 69year old male with left shoulder and cervical spine pain affecting daily activities. Pt states that the inability to move his arm above 90 degrees started in March and has progressively worsened with time. Pt has had an xray and MRI as per his report. Pt is to have a nerve conduction test in the next few weeks, he is awaiting an appointment for it. Pt reports pain at worst 2/10 described as intermittent. He states that there is nothing that clearly makes the pain better or worse. Pt denies any headaches, falls, and issues with restful sleep. He would like his PT goal to regain range of motion met.      Past Medical History/Comorbidities:   Mr. Aviva Chakraborty  has a past medical history of Stewart esophagus, Current smoker, GERD (gastroesophageal reflux disease), High frequency hearing loss, Hypercholesteremia, Hypertension, OA (osteoarthritis), Rhinitis, SNHL (sensorineural hearing loss), Tinnitus of both ears, and Wheezing. Mr. Arvind Duke  has a past surgical history that includes hx colonoscopy; hx knee arthroscopy (Right, 2005); and hx knee replacement (Left, 04/03/2018). Social History/Living Environment: Pt lives with his wife     Prior Level of Function/Work/Activity: Pt is retired from [employer]. He rides his bike outdoors with his wife a few times a week up to 12.5 miles at a time. Dominant Side:         LEFT   Ambulatory/Rehab Services H2 Model Falls Risk Assessment   Risk Factors:       (1)  Gender [Male] Ability to Rise from Chair:       (0)  Ability to rise in a single movement   Falls Prevention Plan:       No modifications necessary   Total: (5 or greater = High Risk): 1   ©2010 [facility] of [employer]. All Rights Reserved. [facility] Patent #[id number]. Federal Law prohibits the replication, distribution or use without written permission from [facility] [employer]   Current Medications:       Current Outpatient Medications:     cyclobenzaprine (FLEXERIL) 10 mg tablet, Take 1 Tab by mouth nightly., Disp: 10 Tab, Rfl: 0    montelukast (SINGULAIR) 10 mg tablet, TAKE 1 TAB BY MOUTH DAILY. , Disp: 90 Tab, Rfl: 1    atorvastatin (LIPITOR) 20 mg tablet, TAKE 1 TAB BY MOUTH DAILY. , Disp: 90 Tab, Rfl: 1    pantoprazole (PROTONIX) 40 mg tablet, TAKE 1 TABLET BY MOUTH EVERY DAY, Disp: 90 Tab, Rfl: 1    albuterol (VENTOLIN HFA) 90 mcg/actuation inhaler, Take 1 Puff by inhalation every six (6) hours as needed for Wheezing., Disp: 3 Inhaler, Rfl: 3    losartan (COZAAR) 50 mg tablet, Take 1 Tab by mouth daily. , Disp: 90 Tab, Rfl: 1    ferrous fumarate/vit Bcomp,C (SUPER B COMPLEX PO), Take  by mouth., Disp: , Rfl:     aspirin delayed-release 81 mg tablet, Take  by mouth two (2) times a day., Disp: , Rfl:     multivitamin (ONE A DAY) tablet, Take 1 Tab by mouth daily. , Disp: , Rfl:     fluticasone (FLONASE SENSIMIST) 27.5 mcg/actuation nasal spray, 1 Spray by Both Nostrils route daily. Take / use AM day of surgery  per anesthesia protocols. Indications: Allergic Rhinitis, Disp: , Rfl:     guaiFENesin (MUCINEX) 1,200 mg Ta12 ER tablet, Take 1,200 mg by mouth two (2) times a day. Take / use AM day of surgery  per anesthesia protocols. , Disp: , Rfl:     levocetirizine (XYZAL) 5 mg tablet, Take 5 mg by mouth daily. Take / use AM day of surgery  per anesthesia protocols. Indications: Allergic Rhinitis, Disp: , Rfl:    Date Last Reviewed:  4/23/2019   Number of Personal Factors/Comorbidities that affect the Plan of Care: 0: LOW COMPLEXITY   EXAMINATION:   Observation/Orthostatic Postural Assessment:  Moderate forward head, tenderness to medial border of right scapula  ROM: Gross active cervical spine rotation is 100% available flexion, 25% available for extension, 50% available for bilateral rotation and left lateral flexion and 25% available for right lateral flexion. AROM(PROM) in degrees Right Left   Shoulder flexion 165 160   Shoulder abduction (palm down) 155 120   Shoulder internal rotation (IR)  (measured at 90 degrees of abduction) 35 50   ER (measured at 90 degrees of abduction) 55 70     Strength:   Manual Muscle Test (*/5) Right Left   Shoulder flexion 5 3+   Shoulder extension 5 3+   Shoulder abduction 5 4   Shoulder adduction 5 4   Shoulder ER 5 4-   Shoulder IR 5 4-   Upper trapezius 5 4   Middle trapezius 5 4   Lower trapezius 5 4   Elbow flexion 5 4   Elbow extension 5 4   Special Tests:    Drop arm test positive for right shoulder  Impingement tests performed on the bilateral shoulder:  Price-John test: negative. Neer test: negative. Stability tests performed on the bilateral shoulder:  Sulcus sign: negative. Apprehension test: negative. AC Compression/sheer test: not tested. Glenoid labrum integrity tests performed bilaterally:  Crank test: negative. [place] active compression test: not tested. Clunk test: not tested.   Grind test: not tested. Body Structures Involved:  1. Muscles  2. Ligaments Body Functions Affected:  1. Sensory/Pain  2. Neuromusculoskeletal  3. Movement Related Activities and Participation Affected:  1. Mobility  2. Self Care  3.  Domestic Life   Number of elements (examined above) that affect the Plan of Care: 4+: HIGH COMPLEXITY   CLINICAL PRESENTATION:   Presentation: Stable and uncomplicated: LOW COMPLEXITY   CLINICAL DECISION MAKING:   Use of outcome tool(s) and clinical judgement create a POC that gives a: Clear prediction of patient's progress: LOW COMPLEXITY

## 2019-04-30 ENCOUNTER — HOSPITAL ENCOUNTER (OUTPATIENT)
Dept: PHYSICAL THERAPY | Age: 74
Discharge: HOME | End: 2019-04-30
Payer: MEDICARE

## 2019-04-30 PROCEDURE — 97140 MANUAL THERAPY 1/> REGIONS: CPT

## 2019-04-30 PROCEDURE — 97110 THERAPEUTIC EXERCISES: CPT

## 2019-04-30 NOTE — PROGRESS NOTES
Rosita Luna  : 1945  [place]  Secondary: Bshsi Generic [address] [facility]  at [address] [place] [address]  [phone]   [phone]    OUTPATIENT PHYSICAL THERAPY: Daily Treatment Note 2019  Pre-treatment Symptoms/Complaints:  \"I walked four and a half miles and my neck is hurting, left side\" Pt to go for a nerve conduction test tomorrow. Pain: Initial: Pain Intensity 1: 1  Pain Location 1: Neck  Pain Orientation 1: Left  Post Session:  1/10   Medications Last Reviewed:  2019  Updated Objective Findings:  None Today   TREATMENT:     THERAPEUTIC EXERCISE: (45 minutes):  Exercises per grid below to improve mobility and strength. Required minimal verbal cues to promote proper body posture. Progressed complexity of movement as indicated. Date:  19   Activity/Exercise Parameters   UBE Level 2 Forward/backward 3' each    Standing flexion, abduction 3x10   Prone flexion,abduction,extension,rows 3# 3x10 except for flexion   Upper trapezius stretch 3x30''   Levator scapulae stretch 3x30''   Supine scapular retraction 3x10   Supine wand flexion 3x10   Seated shoulder flexion  AAROM 3x10   Shoulder IR , flexion with manual resistance 3x10     Manual Therapy (  15 minutes): Manual techniques to facilitate improved motion and decreased pain. (Used abbreviations: MET - muscle energy technique; PNF - proprioceptive neuromuscular facilitation; NMR - neuromuscular re-education; a/p - anterior to posterior; p/a - posterior to anterior)   · STM to left cervical spine region, upper trapezius, posterior shoulder,levator scapulae  · Trigger point release left upper trapezius  · Manual stretching to levator scapulae and upper trapezius      Treatment/Session Summary:    · Response to Treatment:  Pt continues to have difficulty raising left arm past 90 degrees in seated position, no difficulty in supine.  Pt brought in his home TENS unit, settings reviewed with patient. Pt only to be seen once this week due to conflicting schedules, pt is aware of his HEP. · Communication/Consultation:  None today  · Equipment provided today:  None today  Recommendations/Intent for next treatment session: Next visit will focus on progression of exercises and manual therapy. Kinesio taping to left shoulder next visit.   Treatment Plan of Care Effective Dates:  4/23/19 to 7/22/19  Total Treatment Billable Duration:  60 minutes  PT Patient Time In/Time Out  Time In: 1000  Time Out: [address], PT    Future Appointments   Date Time Provider Silverio Knott   5/1/2019  1:00 PM Charlie Jeffery MD [facility] MARY MARY MRMD   5/6/2019  8:00 AM [facility] Jimenez, [facility]   5/10/2019  8:00 AM Gricelda Jimenez, PT [facility]   5/13/2019  8:00 AM Gricelda Jimenez, PT [facility]   5/15/2019  8:00 AM Gricelda Jimenez, PT [facility]   9/5/2019 10:50 AM PST LAB [facility] PST PST   9/19/2019  9:10 AM Irasema Astorga MD [facility] PST PST

## 2019-05-01 ENCOUNTER — APPOINTMENT (RX ONLY)
Dept: URBAN - METROPOLITAN AREA CLINIC 349 | Facility: CLINIC | Age: 74
Setting detail: DERMATOLOGY
End: 2019-05-01

## 2019-05-01 DIAGNOSIS — Z71.89 OTHER SPECIFIED COUNSELING: ICD-10-CM

## 2019-05-01 DIAGNOSIS — T07XXXA INSECT BITE, NONVENOMOUS, OF OTHER, MULTIPLE, AND UNSPECIFIED SITES, WITHOUT MENTION OF INFECTION: ICD-10-CM

## 2019-05-01 DIAGNOSIS — D18.0 HEMANGIOMA: ICD-10-CM

## 2019-05-01 DIAGNOSIS — L57.0 ACTINIC KERATOSIS: ICD-10-CM

## 2019-05-01 DIAGNOSIS — L81.4 OTHER MELANIN HYPERPIGMENTATION: ICD-10-CM

## 2019-05-01 DIAGNOSIS — D22 MELANOCYTIC NEVI: ICD-10-CM

## 2019-05-01 PROBLEM — D04.39 CARCINOMA IN SITU OF SKIN OF OTHER PARTS OF FACE: Status: ACTIVE | Noted: 2019-05-01

## 2019-05-01 PROBLEM — S50.861A INSECT BITE (NONVENOMOUS) OF RIGHT FOREARM, INITIAL ENCOUNTER: Status: ACTIVE | Noted: 2019-05-01

## 2019-05-01 PROBLEM — D18.01 HEMANGIOMA OF SKIN AND SUBCUTANEOUS TISSUE: Status: ACTIVE | Noted: 2019-05-01

## 2019-05-01 PROBLEM — D22.5 MELANOCYTIC NEVI OF TRUNK: Status: ACTIVE | Noted: 2019-05-01

## 2019-05-01 PROCEDURE — ? PATHOLOGY BILLING

## 2019-05-01 PROCEDURE — ? COUNSELING

## 2019-05-01 PROCEDURE — ? OTHER

## 2019-05-01 PROCEDURE — 11102 TANGNTL BX SKIN SINGLE LES: CPT

## 2019-05-01 PROCEDURE — 99214 OFFICE O/P EST MOD 30 MIN: CPT | Mod: 25

## 2019-05-01 PROCEDURE — A4550 SURGICAL TRAYS: HCPCS

## 2019-05-01 PROCEDURE — 17000 DESTRUCT PREMALG LESION: CPT | Mod: 59

## 2019-05-01 PROCEDURE — ? TREATMENT REGIMEN

## 2019-05-01 PROCEDURE — ? LIQUID NITROGEN

## 2019-05-01 PROCEDURE — 88305 TISSUE EXAM BY PATHOLOGIST: CPT

## 2019-05-01 PROCEDURE — ? BIOPSY BY SHAVE METHOD

## 2019-05-01 ASSESSMENT — LOCATION SIMPLE DESCRIPTION DERM
LOCATION SIMPLE: UPPER BACK
LOCATION SIMPLE: LEFT UPPER BACK
LOCATION SIMPLE: ABDOMEN
LOCATION SIMPLE: CHEST
LOCATION SIMPLE: RIGHT EAR
LOCATION SIMPLE: LEFT THIGH
LOCATION SIMPLE: LEFT SHOULDER
LOCATION SIMPLE: RIGHT LOWER BACK
LOCATION SIMPLE: RIGHT FOREARM
LOCATION SIMPLE: RIGHT FOREARM
LOCATION SIMPLE: LOWER BACK
LOCATION SIMPLE: RIGHT EAR
LOCATION SIMPLE: LEFT LOWER BACK
LOCATION SIMPLE: LEFT UPPER BACK

## 2019-05-01 ASSESSMENT — LOCATION DETAILED DESCRIPTION DERM
LOCATION DETAILED: LEFT INFERIOR UPPER BACK
LOCATION DETAILED: SUPERIOR THORACIC SPINE
LOCATION DETAILED: RIGHT SUPERIOR HELIX
LOCATION DETAILED: INFERIOR LUMBAR SPINE
LOCATION DETAILED: RIGHT INFERIOR MEDIAL MIDBACK
LOCATION DETAILED: LEFT SUPERIOR UPPER BACK
LOCATION DETAILED: LEFT SUPERIOR MEDIAL LOWER BACK
LOCATION DETAILED: LEFT ANTERIOR PROXIMAL THIGH
LOCATION DETAILED: INFERIOR THORACIC SPINE
LOCATION DETAILED: EPIGASTRIC SKIN
LOCATION DETAILED: XIPHOID
LOCATION DETAILED: RIGHT SCAPHA
LOCATION DETAILED: RIGHT DISTAL DORSAL FOREARM
LOCATION DETAILED: RIGHT DISTAL DORSAL FOREARM
LOCATION DETAILED: LEFT POSTERIOR SHOULDER
LOCATION DETAILED: LEFT MEDIAL SUPERIOR CHEST
LOCATION DETAILED: RIGHT SUPERIOR MEDIAL LOWER BACK
LOCATION DETAILED: LEFT INFERIOR LATERAL LOWER BACK
LOCATION DETAILED: LEFT LATERAL ABDOMEN

## 2019-05-01 ASSESSMENT — LOCATION ZONE DERM
LOCATION ZONE: ARM
LOCATION ZONE: EAR
LOCATION ZONE: ARM
LOCATION ZONE: TRUNK
LOCATION ZONE: LEG
LOCATION ZONE: EAR
LOCATION ZONE: TRUNK

## 2019-05-01 NOTE — PROCEDURE: PATHOLOGY BILLING
Immunohistochemistry ([id number] and [id number]) billing is not performed here. Please use the Immunohistochemistry Stain Billing plan to accomplish this. Immunohistochemistry ([id number] and [id number]) billing is not performed here. Please use the Immunohistochemistry Stain Billing plan to accomplish this.

## 2019-05-01 NOTE — PROCEDURE: OTHER
Detail Level: Detailed
Note Text (......Xxx Chief Complaint.): This diagnosis correlates with the
Other (Free Text): Patient informed that if Pathology shows Well differentiated squamous cell, we would referral him out for MOHs surgery

## 2019-05-01 NOTE — PROCEDURE: BIOPSY BY SHAVE METHOD
Detail Level: Detailed
Hemostasis: Aluminum Chloride
Type Of Destruction Used: Electrodesiccation
Wound Care: Vaseline
Post-Care Instructions: I reviewed with the patient in detail post-care instructions. Patient is to keep the biopsy site dry overnight, and then apply bacitracin twice daily until healed. Patient may apply hydrogen peroxide soaks to remove any crusting.\\nAft the procedure, the patient was observed for 5-10 minutes and was oriented to person, place, and time.  Denied feeling dizzy, queasy, and stated that they did not feel that they were going to faint.
Electrodesiccation Text: The wound bed was treated with electrodesiccation after the biopsy was performed.
Bill For Surgical Tray: yes
Anesthesia Volume In Cc (Will Not Render If 0): 0.5
Notification Instructions: Patient will be notified of biopsy results. However, patient instructed to call the office if not contacted within 2 weeks.
Depth Of Biopsy: dermis
Additional Anesthesia Volume In Cc (Will Not Render If 0): 0
Body Location Override (Optional - Billing Will Still Be Based On Selected Body Map Location If Applicable): right nasal
Size Of Lesion In Cm: 0.6
Anesthesia Type: 1% lidocaine with 1:500,000 epinephrine and a 1:10 solution of 8.4% sodium bicarbonate
Bill 74525 For Specimen Handling/Conveyance To Laboratory?: no
Consent: Written consent was obtained and risks were reviewed including but not limited to scarring, infection, bleeding, scabbing, incomplete removal, nerve damage and allergy to anesthesia.
Billing Type: Third-Party Bill
Biopsy Method: Cheryl oneal
Accession #: [email or web address]
Biopsy Type: H and E
Cryotherapy Text: The wound bed was treated with cryotherapy after the biopsy was performed.
Dressing: bandage

## 2019-05-01 NOTE — PROCEDURE: LIQUID NITROGEN
Render Note In Bullet Format When Appropriate: No
Number Of Freeze-Thaw Cycles: 2 freeze-thaw cycles
Duration Of Freeze Thaw-Cycle (Seconds): 2
Detail Level: Zone
Consent: The patient's consent was obtained including but not limited to risks of crusting, scabbing, blistering, scarring, darker or lighter pigmentary change, recurrence, incomplete removal and infection.
Post-Care Instructions: I reviewed with the patient in detail post-care instructions. Patient is to wear sunprotection, and avoid picking at any of the treated lesions. Pt may apply Vaseline to crusted or scabbing areas.

## 2019-05-06 ENCOUNTER — HOSPITAL ENCOUNTER (OUTPATIENT)
Dept: PHYSICAL THERAPY | Age: 74
Discharge: HOME | End: 2019-05-06
Payer: MEDICARE

## 2019-05-06 PROCEDURE — 97014 ELECTRIC STIMULATION THERAPY: CPT

## 2019-05-06 PROCEDURE — 97110 THERAPEUTIC EXERCISES: CPT

## 2019-05-06 PROCEDURE — 97140 MANUAL THERAPY 1/> REGIONS: CPT

## 2019-05-06 NOTE — PROGRESS NOTES
Dilma Blevins  : 1945  [place]  Secondary: Bshsi Generic [address] [facility]  at [facility], [address] [facility]  [phone]   [phone]    OUTPATIENT PHYSICAL THERAPY: Daily Treatment Note 2019  Pre-treatment Symptoms/Complaints:  \"I had my nerve conduction test done\" Pt stated that it was quite painful and that it was seen that his upper trap was not functioning properly therefore he is to go for an MRI next week for his neck. Pain: Initial: Pain Intensity 1: 1  Pain Location 1: Neck  Pain Orientation 1: Left  Post Session:  1/10   Medications Last Reviewed:  2019  Updated Objective Findings:  Cervical spine right rotation 25%, left rotation 50%, right lateral flexion 25%, left lateral flexion 50%   TREATMENT:     THERAPEUTIC EXERCISE: (15 minutes):  Exercises per grid below to improve mobility and strength. Required minimal verbal cues to promote proper body posture. Progressed complexity of movement as indicated. Date:  19   Activity/Exercise Parameters   UBE Level 2 Forward/backward 3' each   Supine shoulder flexion, chest press 3x10   Shoulder IR , flexion with manual resistance 3x10     Manual Therapy (  30 minutes): Manual techniques to facilitate improved motion and decreased pain.  (Used abbreviations: MET - muscle energy technique; PNF - proprioceptive neuromuscular facilitation; NMR - neuromuscular re-education; a/p - anterior to posterior; p/a - posterior to anterior)   · STM to left cervical spine region, upper trapezius, posterior shoulder,levator scapulae in both supine and seated  · Trigger point release left upper trapezius  · Manual stretching to levator scapulae and upper trapezius in supine and seated  · PA mobilizations throughout cervical spine grade II  · Thoracic spine rotation with overpressure both directions    Electrical stimulation  hz to tolerance to left upper trapezius with moist heat, skin clear upon completion. Treatment/Session Summary:    · Response to Treatment:  Pt tolerated session well, encouraged to continue with stretching and HEP. · Communication/Consultation:  None today  · Equipment provided today:  None today  Recommendations/Intent for next treatment session: .   Treatment Plan of Care Effective Dates:  4/23/19 to 7/22/19  Total Treatment Billable Duration:  60 minutes  PT Patient Time In/Time Out  Time In: 0800  Time Out: 0900  Pepe Jimenez PT    Future Appointments   Date Time Provider Silverio Knott   5/10/2019  8:00 AM Navi Norris, PT OFF [facility]   5/13/2019  8:00 AM Navi Norris PT OFF [facility]   5/15/2019  8:00 AM Navi Norris PT OFF [facility]   9/5/2019 10:50 AM PST LAB [facility] PST PST   9/19/2019  9:10 AM Leodan Venegas MD [facility] PST

## 2019-05-10 ENCOUNTER — HOSPITAL ENCOUNTER (OUTPATIENT)
Dept: PHYSICAL THERAPY | Age: 74
Discharge: HOME | End: 2019-05-10
Payer: MEDICARE

## 2019-05-10 PROCEDURE — 97140 MANUAL THERAPY 1/> REGIONS: CPT

## 2019-05-10 PROCEDURE — 97110 THERAPEUTIC EXERCISES: CPT

## 2019-05-10 NOTE — PROGRESS NOTES
Mari Castaneda  : 1945  [place]  Secondary: Bshsi Generic [address] [facility]  at [facility] [address], [address] [facility]  [phone]   [phone] [id number]    OUTPATIENT PHYSICAL THERAPY: Daily Treatment Note 5/10/2019  Pre-treatment Symptoms/Complaints:  Pt to have an MRI next Tuesday. He has not received word from his nerve conduction test. Pt stated that the right and left side of his neck feels tight this morning. Pain: Initial: Pain Intensity 1: 1  Pain Location 1: Neck  Pain Orientation 1: Right, Left  Post Session:  1/10   Medications Last Reviewed:  5/10/2019  Updated Objective Findings:  Cervical spine right rotation 25%, left rotation 50%, right lateral flexion 25%, left lateral flexion 50%   TREATMENT:     THERAPEUTIC EXERCISE: (30 minutes):  Exercises per grid below to improve mobility and strength. Required minimal verbal cues to promote proper body posture. Progressed complexity of movement as indicated. Date:  5/10/19   Activity/Exercise Parameters   UBE Level 3 Forward/backward 3' each   Supine shoulder flexion, chest press 3x10   Shoulder IR , flexion with manual resistance 3x10   Supine biceps/tricep  4# 3x10   PNF left shoulder no weight 3x10     Manual Therapy (  30 minutes): Manual techniques to facilitate improved motion and decreased pain.  (Used abbreviations: MET - muscle energy technique; PNF - proprioceptive neuromuscular facilitation; NMR - neuromuscular re-education; a/p - anterior to posterior; p/a - posterior to anterior)   · STM to right and left cervical spine region, upper trapezius, posterior shoulder,levator scapulae in both supine and seated  · Trigger point release left upper trapezius and right  · Manual stretching to levator scapulae and upper trapezius bilaterally in supine and seated  · PA mobilizations throughout cervical spine grade II  · Thoracic spine rotation with overpressure both directions      Treatment/Session Summary:    · Response to Treatment:  Pt with some tightness on the right side of his neck today, decreased ROM persists in the cervical spine. Decreased strength noticed into left shoulder flexion/extension. · Communication/Consultation:  None today  · Equipment provided today:  None today  Recommendations/Intent for next treatment session: .   Treatment Plan of Care Effective Dates:  4/23/19 to 7/22/19  Total Treatment Billable Duration:  55 minutes  PT Patient Time In/Time Out  Time In: 0800  Time Out: 0855  Melyssa Lab, PT    Future Appointments   Date Time Provider Silverio Knott   5/13/2019  8:00 AM Mortimer Crest, PT [facility]   5/15/2019  8:00 AM Mortimer Crest, PT [facility]   5/20/2019  8:00 AM Mortimer Crest, PT [facility]   5/22/2019  8:00 AM Mortimer Crest, PT [facility]   9/5/2019 10:50 AM PST LAB [facility] PST PST   9/19/2019  9:10 AM Fredis Naranjo MD [facility] PST PST

## 2019-05-13 ENCOUNTER — HOSPITAL ENCOUNTER (OUTPATIENT)
Dept: PHYSICAL THERAPY | Age: 74
Discharge: HOME | End: 2019-05-13
Payer: MEDICARE

## 2019-05-13 PROCEDURE — 97140 MANUAL THERAPY 1/> REGIONS: CPT

## 2019-05-13 PROCEDURE — 97110 THERAPEUTIC EXERCISES: CPT

## 2019-05-13 NOTE — PROGRESS NOTES
Shan Das  : 1945  [place]  Secondary: Bshsi Generic [address] [facility]  at [place] [address], [address] [facility]  [phone]   [phone]    OUTPATIENT PHYSICAL THERAPY: Daily Treatment Note 2019  Pre-treatment Symptoms/Complaints:    \"I had a spasm in my low back yesterday, left side but then went away\" Pt stated that the left side of his neck has been bothering him more these days. He is to go for an MRI tomorrow. Pain: Initial: Pain Intensity 1: 4  Pain Location 1: Neck  Pain Orientation 1: Left  Post Session:  1/10   Medications Last Reviewed:  2019  Updated Objective Findings:  Cervical spine right rotation 75%, left rotation 25%, right lateral flexion 50%, left lateral flexion 75%. Tenderness C4 left transverse process. TREATMENT:     THERAPEUTIC EXERCISE: (30 minutes):  Exercises per grid below to improve mobility and strength. Required minimal verbal cues to promote proper body posture. Progressed complexity of movement as indicated. Date:  19   Activity/Exercise Parameters   UBE Level 3 Forward/backward 3' each   Supine shoulder flexion, chest press, D2 flexion pattern 3x10   Shoulder IR , flexion,adduction,abduction with manual resistance 3x10   Seated shoulder flexion, scaption 0# 2x10   Seated biceps/tricep  3# 3x10   Standing t-band green ; flexion,extension, ER, D2 flexion x10     Manual Therapy (  30 minutes): Manual techniques to facilitate improved motion and decreased pain.  (Used abbreviations: MET - muscle energy technique; PNF - proprioceptive neuromuscular facilitation; NMR - neuromuscular re-education; a/p - anterior to posterior; p/a - posterior to anterior)   · STM to right and left cervical spine region, upper trapezius, posterior shoulder,levator scapulae in both supine and seated  · Trigger point release left upper trapezius and right  · Manual stretching to levator scapulae and upper trapezius bilaterally in supine and seated  · PA mobilizations throughout cervical spine grade II    Treatment/Session Summary:    · Response to Treatment:  Decreased left UT tightness following manual therapy. · Communication/Consultation:  None today  · Equipment provided today:  Pt given green t-band. Recommendations/Intent for next treatment session: .   Treatment Plan of Care Effective Dates:  4/23/19 to 7/22/19  Total Treatment Billable Duration:  55 minutes  PT Patient Time In/Time Out  Time In: 0800  Time Out: 0855  Mari Bond PT    Future Appointments   Date Time Provider Silverio Knott   5/15/2019  8:00 AM Quigley Confer, PT [facility]   5/20/2019  8:00 AM Quigley Confer, PT [facility]   5/22/2019  8:00 AM Quigley Confer, PT [facility]   9/5/2019 10:50 AM PST LAB [facility] PST PST   9/19/2019  9:10 AM Cyndi Pereira MD [facility] PST PST

## 2019-05-15 ENCOUNTER — APPOINTMENT (RX ONLY)
Dept: URBAN - METROPOLITAN AREA CLINIC 349 | Facility: CLINIC | Age: 74
Setting detail: DERMATOLOGY
End: 2019-05-15

## 2019-05-15 ENCOUNTER — HOSPITAL ENCOUNTER (OUTPATIENT)
Dept: PHYSICAL THERAPY | Age: 74
Discharge: HOME | End: 2019-05-15
Payer: MEDICARE

## 2019-05-15 PROBLEM — D04.9 CARCINOMA IN SITU OF SKIN, UNSPECIFIED: Status: ACTIVE | Noted: 2019-05-15

## 2019-05-15 PROCEDURE — 97140 MANUAL THERAPY 1/> REGIONS: CPT

## 2019-05-15 PROCEDURE — ? REFERRAL

## 2019-05-15 PROCEDURE — ? OTHER

## 2019-05-15 PROCEDURE — ? RECOMMENDATIONS

## 2019-05-15 PROCEDURE — 97110 THERAPEUTIC EXERCISES: CPT

## 2019-05-15 NOTE — PROGRESS NOTES
Rajendra Perera  : 1945  [place]  Secondary: Bshsi Generic [address] [facility]  at [facility] 74, [address] [facility]  [phone]   [phone]    OUTPATIENT PHYSICAL THERAPY: Daily Treatment Note 5/15/2019  OUTPATIENT PHYSICAL THERAPY:Progress Report 5/15/2019   ICD-10: Treatment Diagnosis: Pain in joint, shoulder, left M25.512, Cervicalgia M54.2  Precautions/Allergies:   Patient has no known allergies. MEDICAL/REFERRING DIAGNOSIS:  Pain in left shoulder [M25.512]   DATE OF ONSET:  2019  REFERRING PHYSICIAN: Corey Carias MD MD Orders: Evaluate and treat  RETURN PHYSICIAN APPOINTMENT: None scheduled   Pre-treatment Symptoms/Complaints:   Pt had an MRI yesterday and will be seeing the doctor this morning for a follow up. Pt stated that overall his neck is feeling better and he has seen improvements in his left shoulder since starting therapy. Pt states that he still cannot comb his hair. Pain: Initial: Pain Intensity 1: 1  Pain Location 1: Neck  Pain Orientation 1: Left  Post Session:  1/10   Medications Last Reviewed:  5/15/2019  Updated Objective Findings:  Refer to progress note dated 5/15/19 that was sent to doctor. TREATMENT:     THERAPEUTIC EXERCISE: (30 minutes):  Exercises per grid below to improve mobility and strength. Required minimal verbal cues to promote proper body posture. Progressed complexity of movement as indicated. Date:  5/15/19   Activity/Exercise Parameters   UBE Level 3 Forward/backward 3' each   Supine shoulder flexion, chest press, D2 flexion pattern 3x10   Shoulder IR , flexion,adduction,abduction with manual resistance 3x10   Seated shoulder flexion, scaption 0# 2x10   Seated biceps/tricep  3# 3x10   Standing t-band green ; flexion,extension, ER, D2 flexion x10     Manual Therapy (  25 minutes): Manual techniques to facilitate improved motion and decreased pain.  (Used abbreviations: MET - muscle energy technique; PNF - proprioceptive neuromuscular facilitation; NMR - neuromuscular re-education; a/p - anterior to posterior; p/a - posterior to anterior)   · STM to right and left cervical spine region, upper trapezius, posterior shoulder,levator scapulae in seated  · Manual stretching to levator scapulae and upper trapezius bilaterally in supine and seated  · PA mobilizations throughout cervical spine grade II with pt in prone    Treatment/Session Summary:    · Response to Treatment:  Improved left shoulder A/PROM since initial evaluation with decreased symptoms into left UT. Pt continues to have difficulty with seated shoulder flexion and abduction. · Communication/Consultation:  None today  · Equipment provided today:  None today  Recommendations/Intent for next treatment session: Pt to see referring physician today however recommended to continue therapy as left shoulder is demonstrating progress in ROM.    Treatment Plan of Care Effective Dates:  4/23/19 to 7/22/19  Total Treatment Billable Duration:  55 minutes  PT Patient Time In/Time Out  Time In: 0800  Time Out: 0900  Ivone Gutierrez, PT    Future Appointments   Date Time Provider Silverio Knott   5/20/2019  8:00 AM Zeinab Rodriguez PT [facility]   5/22/2019  8:00 AM Zeinab Rodriguez, PT [facility]   6/3/2019  8:00 AM Zeinab Rodriguez PT [facility]   6/5/2019  8:00 AM Zeinab Rodriguez PT [facility]   9/5/2019 10:50 AM PST LAB [facility] PST PST   9/19/2019  9:10 AM Evonne Bee MD [facility] PST

## 2019-05-15 NOTE — PROGRESS NOTES
aaron Sainz  : 1945  [place]  Secondary: Bshsi Generic [address] [facility]  at [facility] 66, [address] [facility]  [phone]   [phone]       OUTPATIENT PHYSICAL THERAPY:Progress Report 5/15/2019   ICD-10: Treatment Diagnosis: Pain in joint, shoulder, left M25.512, Cervicalgia M54.2  Precautions/Allergies:   Patient has no known allergies. MD Orders: Evaluate and treat MEDICAL/REFERRING DIAGNOSIS:  Pain in left shoulder [M25.512]   DATE OF ONSET:  2019  REFERRING PHYSICIAN: Arthur Lozano MD  RETURN PHYSICIAN APPOINTMENT: None scheduled     As of 5/15/2019, Scott Sainz has attended 6 out of 6 scheduled visits, with 0 cancellation(s) and 0 no shows. PROBLEM LIST (Impacting functional limitations):  1. Decreased Strength  2. Increased Pain  3. Decreased Flexibility/Joint Mobility INTERVENTIONS PLANNED: (Treatment may consist of any combination of the following)  1. Cold  2. Electrical Stimulation  3. Manual Therapy  4. Neuromuscular Re-education/Strengthening  5. Therapeutic Exercise/Strengthening   TREATMENT PLAN:  Effective Dates: 2019 TO 2019 (90 days). Frequency/Duration: 2 times a week for 90 Day(s)  GOALS: (Goals have been discussed and agreed upon with patient.)  Discharge Goals: Time Frame: 2019  1. Pt to demonstrate decreased disability as per DASH with a score +5 points. ONGOING 5/15/19  2. Pt to demonstrate left shoulder A/PROM to equal right to allow for symmetrical movement with activity. ONGOING 5/15/19  3. Pt to achieve independence with HEP to maintain goals. MET 5/15/19    OUTCOME MEASURE:   Tool Used: Disabilities of the Arm, Shoulder and Hand (DASH) Questionnaire - Quick Version  Score:  Initial:  Most Recent:  (Date: 5/15/19 )   Interpretation of Score: The DASH is designed to measure the activities of daily living in person's with upper extremity dysfunction or pain. Each section is scored on a 1-5 scale, 5 representing the greatest disability. The scores of each section are added together for a total score of 55. UPDATED OBJECTIVE FINDINGS:    bservation/Orthostatic Postural Assessment:  Moderate forward head, tenderness to medial border of left scapula  ROM: Gross active cervical spine rotation is 100% available flexion, 50% available for extension, 75% available for bilateral rotation and left lateral flexion and 25% available for right lateral flexion. Decreased AROM seated for left shoulder: Flexion 85 degrees, abduction 50 degrees   AROM(PROM) in degrees Right Left   Shoulder flexion 165 165   Shoulder abduction (palm down) 155 170   Shoulder internal rotation (IR)  (measured at 90 degrees of abduction) 35 85   ER (measured at 90 degrees of abduction) 55 80     Strength:   Manual Muscle Test (*/5) Right Left   Shoulder flexion 5 3+   Shoulder extension 5 3+   Shoulder abduction 5 4   Shoulder adduction 5 4   Shoulder ER 5 4-   Shoulder IR 5 4-   Upper trapezius 5 4   Middle trapezius 5 4   Lower trapezius 5 4   Elbow flexion 5 4   Elbow extension 5 4   Special Tests:    Drop arm test positive for right shoulder  Impingement tests performed on the bilateral shoulder:  Price-[place] test: negative. Neer test: negative. Stability tests performed on the bilateral shoulder:  Sulcus sign: negative. Apprehension test: negative. AC Compression/sheer test: not tested. Glenoid labrum integrity tests performed bilaterally:  Crank test: negative. [place] active compression test: not tested. Clunk test: not tested. Grind test: not tested. Ambulatory/Rehab Services H2 Model Falls Risk Assessment   Risk Factors:       (1)  Gender [Male] Ability to Rise from Chair:       (0)  Ability to rise in a single movement   Falls Prevention Plan:       No modifications necessary   Total: (5 or greater = High Risk): 1   ©2010 [facility]. All Rights Reserved. [place] Patent #0,361,117. Federal Law prohibits the replication, distribution or use without written permission from [facility] of [facility]:   · Pt will benefit from continued therapy to work on goals established above, limitations in A/PROM and strength persist. Improvement in cervical discomfort noted by therapist and patient. REASON FOR SERVICES/OTHER COMMENTS:  · Pt still having difficulties doing activities that involve his left arm such as combing his hair as he continues to lack full flexion and abduction due to inability to do so against gravity, however gravity eliminated he has more motion. Total Duration: 60 minutes     Rehabilitation Potential For Stated Goals: Good  Regarding Leila Saucedo's therapy, I certify that the treatment plan above will be carried out by a therapist or under their direction.   Thank you for this referral,  Heraclio Granados, PT

## 2019-05-20 ENCOUNTER — HOSPITAL ENCOUNTER (OUTPATIENT)
Dept: PHYSICAL THERAPY | Age: 74
Discharge: HOME | End: 2019-05-20
Payer: MEDICARE

## 2019-05-20 PROCEDURE — 97110 THERAPEUTIC EXERCISES: CPT

## 2019-05-20 PROCEDURE — 97140 MANUAL THERAPY 1/> REGIONS: CPT

## 2019-05-20 NOTE — PROGRESS NOTES
Oren Lange  : 1945  [place]  Secondary: Bshsi Generic [address] [facility]  at [address] [place] [address]  [phone]   [phone]    OUTPATIENT PHYSICAL THERAPY: Daily Treatment Note 2019  Pre-treatment Symptoms/Complaints:   Pt saw the referring physician who has referred him to a spine specialist. Pt will be seeing Dr. Jose Painting in two weeks and agreeable for discharge from therapy as he knows his HEP. Pain: Initial: Pain Intensity 1: 1  Pain Location 1: Neck  Pain Orientation 1: Left  Post Session:  1/10   Medications Last Reviewed:  2019  Updated Objective Findings:  Refer to discharge note dated 19   TREATMENT:     THERAPEUTIC EXERCISE: (30 minutes):  Exercises per grid below to improve mobility and strength. Required minimal verbal cues to promote proper body posture. Progressed complexity of movement as indicated. Date:  19   Activity/Exercise Parameters   UBE Level 3 Forward/backward 3' each   Supine shoulder flexion, chest press, D2 flexion pattern 3x10   Shoulder IR , flexion,adduction,abduction with manual resistance 3x10   Seated shoulder flexion, scaption 0# 2x10   Seated biceps/tricep  4# 3x10   Bent over flexion,extension, abduction 2x10     Manual Therapy (  10 minutes): Manual techniques to facilitate improved motion and decreased pain.  (Used abbreviations: MET - muscle energy technique; PNF - proprioceptive neuromuscular facilitation; NMR - neuromuscular re-education; a/p - anterior to posterior; p/a - posterior to anterior)   · STM to right and left cervical spine region, upper trapezius, posterior shoulder,levator scapulae in both supine and seated  · Trigger point release left upper trapezius and right  · Manual stretching to levator scapulae and upper trapezius bilaterally in supine and seated  · PA mobilizations throughout cervical spine grade II    Treatment/Session Summary:    · Response to Treatment:  Decreased tenderness and pain in left upper trap reported and observed. · Communication/Consultation:  Discharge sent to doctor today. Recommendations/Intent for next treatment session: Discharge from therapy at this time.    Treatment Plan of Care Effective Dates:  4/23/19 to 5/20/19  Total Treatment Billable Duration:  40minutes  PT Patient Time In/Time Out  Time In: 0800  Time Out: 0840  Ken Bradley PT    Future Appointments   Date Time Provider Silverio Knott   9/5/2019 10:50 AM PST LAB [facility] PST PST   9/19/2019  9:10 AM Thuan Eastman MD [facility] PST PST

## 2019-05-20 NOTE — THERAPY DISCHARGE
aaron Tran  : 1945  [place]  Secondary: Bshsi Generic [address] [facility]  at [address] [place] [address]  [phone]   [phone]       OUTPATIENT PHYSICAL THERAPY:Discharge Summary 2019   ICD-10: Treatment Diagnosis: Pain in joint, shoulder, left M25.512, Cervicalgia M54.2  Precautions/Allergies:   Patient has no known allergies. MD Orders: Evaluate and treat MEDICAL/REFERRING DIAGNOSIS:  Pain in left shoulder [M25.512]   DATE OF ONSET:  2019  REFERRING PHYSICIAN: Harsha Downs MD  RETURN PHYSICIAN APPOINTMENT: None scheduled     As of 2019, Fiona Tran has attended 7 out of 7 scheduled visits, with 0 cancellation(s) and 0 no shows. PROBLEM LIST (Impacting functional limitations):  1. Decreased Strength  2. Increased Pain  3. Decreased Flexibility/Joint Mobility INTERVENTIONS PLANNED: (Treatment may consist of any combination of the following)  1. Cold  2. Electrical Stimulation  3. Manual Therapy  4. Neuromuscular Re-education/Strengthening  5. Therapeutic Exercise/Strengthening   TREATMENT PLAN:  Effective Dates: 2019 TO 2019 (90 days). Frequency/Duration: 2 times a week for 90 Day(s)  GOALS: (Goals have been discussed and agreed upon with patient.)  Discharge Goals: Time Frame: 2019  1. Pt to demonstrate decreased disability as per DASH with a score +5 points. NOT MET 19  2. Pt to demonstrate left shoulder A/PROM to equal right to allow for symmetrical movement with activity. NOT MET 19  3. Pt to achieve independence with HEP to maintain goals. MET 5/15/19    OUTCOME MEASURE:   Tool Used: Disabilities of the Arm, Shoulder and Hand (DASH) Questionnaire - Quick Version  Score:  Initial:  Most Recent:  (Date: 19 )   Interpretation of Score:  The DASH is designed to measure the activities of daily living in person's with upper extremity dysfunction or pain.  Each section is scored on a 1-5 scale, 5 representing the greatest disability. The scores of each section are added together for a total score of 55. UPDATED OBJECTIVE FINDINGS:    bservation/Orthostatic Postural Assessment:  Moderate forward head, tenderness to medial border of left scapula  ROM: Gross active cervical spine rotation is 100% available flexion, 50% available for extension, 75% available for bilateral rotation and left lateral flexion and 25% available for right lateral flexion. Decreased AROM seated for left shoulder: Flexion 85 degrees, abduction 50 degrees   AROM(PROM) in degrees Right Left   Shoulder flexion 165 165   Shoulder abduction (palm down) 155 170   Shoulder internal rotation (IR)  (measured at 90 degrees of abduction) 35 85   ER (measured at 90 degrees of abduction) 55 80     Strength:   Manual Muscle Test (*/5) Right Left   Shoulder flexion 5 3+   Shoulder extension 5 3+   Shoulder abduction 5 4   Shoulder adduction 5 4   Shoulder ER 5 4-   Shoulder IR 5 4-   Upper trapezius 5 4   Middle trapezius 5 4   Lower trapezius 5 4   Elbow flexion 5 4   Elbow extension 5 4   Special Tests:    Drop arm test positive for right shoulder  Impingement tests performed on the bilateral shoulder:  Price[place] test: negative. Neer test: negative. Stability tests performed on the bilateral shoulder:  Sulcus sign: negative. Apprehension test: negative. AC Compression/sheer test: not tested. Glenoid labrum integrity tests performed bilaterally:  Crank test: negative. [place] active compression test: not tested. Clunk test: not tested. Grind test: not tested.        Ambulatory/Rehab Services H2 Model Falls Risk Assessment   Risk Factors:       (1)  Gender [Male] Ability to Rise from Chair:       (0)  Ability to rise in a single movement   Falls Prevention Plan:       No modifications necessary   Total: (5 or greater = High Risk): 1   ©2010 [facility]HI of [facility]. All Rights Reserved. [facility] Patent #[id number]. Federal Law prohibits the replication, distribution or use without written permission from [facility] of [address] [place] [address] Avenue:  · Pt to be discharged from therapy as he is independent with his HEP and understands that it will require commitment to regular exercise for return of left arm ROM and strength. Pt also aware of stretches and ways to decrease tightness and stiffness in his neck. Pt to see spine specialist in two weeks.    Total Duration: 40 minutes       Thank you for this referral,  Esme Reyna, PT

## 2019-05-22 ENCOUNTER — APPOINTMENT (OUTPATIENT)
Dept: PHYSICAL THERAPY | Age: 74
End: 2019-05-22
Payer: MEDICARE

## 2019-06-03 ENCOUNTER — APPOINTMENT (OUTPATIENT)
Dept: PHYSICAL THERAPY | Age: 74
End: 2019-06-03

## 2019-06-05 ENCOUNTER — APPOINTMENT (OUTPATIENT)
Dept: PHYSICAL THERAPY | Age: 74
End: 2019-06-05

## 2019-06-13 NOTE — PROCEDURE: LIQUID NITROGEN
Render Post-Care Instructions In Note?: no
Duration Of Freeze Thaw-Cycle (Seconds): 2
Detail Level: Detailed
Consent: The patient's consent was obtained including but not limited to risks of crusting, scabbing, blistering, scarring, darker or lighter pigmentary change, recurrence, incomplete removal and infection.
Post-Care Instructions: I reviewed with the patient in detail post-care instructions. Patient is to wear sunprotection, and avoid picking at any of the treated lesions. Pt may apply Vaseline to crusted or scabbing areas.
Number Of Freeze-Thaw Cycles: 2 freeze-thaw cycles
toothache

## 2019-11-01 ENCOUNTER — APPOINTMENT (RX ONLY)
Dept: URBAN - METROPOLITAN AREA CLINIC 349 | Facility: CLINIC | Age: 74
Setting detail: DERMATOLOGY
End: 2019-11-01

## 2019-11-01 DIAGNOSIS — L57.0 ACTINIC KERATOSIS: ICD-10-CM

## 2019-11-01 DIAGNOSIS — L81.4 OTHER MELANIN HYPERPIGMENTATION: ICD-10-CM

## 2019-11-01 PROCEDURE — ? COUNSELING

## 2019-11-01 PROCEDURE — ? LIQUID NITROGEN

## 2019-11-01 PROCEDURE — 17000 DESTRUCT PREMALG LESION: CPT

## 2019-11-01 PROCEDURE — 99213 OFFICE O/P EST LOW 20 MIN: CPT | Mod: 25

## 2019-11-01 ASSESSMENT — LOCATION SIMPLE DESCRIPTION DERM
LOCATION SIMPLE: LEFT FOREHEAD
LOCATION SIMPLE: RIGHT EAR

## 2019-11-01 ASSESSMENT — LOCATION DETAILED DESCRIPTION DERM
LOCATION DETAILED: RIGHT SUPERIOR HELIX
LOCATION DETAILED: LEFT SUPERIOR FOREHEAD

## 2019-11-01 ASSESSMENT — LOCATION ZONE DERM
LOCATION ZONE: EAR
LOCATION ZONE: FACE

## 2019-11-01 NOTE — PROCEDURE: LIQUID NITROGEN
Render Post-Care Instructions In Note?: no
Duration Of Freeze Thaw-Cycle (Seconds): 2
Number Of Freeze-Thaw Cycles: 2 freeze-thaw cycles
Consent: The patient's consent was obtained including but not limited to risks of crusting, scabbing, blistering, scarring, darker or lighter pigmentary change, recurrence, incomplete removal and infection.
Post-Care Instructions: I reviewed with the patient in detail post-care instructions. Patient is to wear sunprotection, and avoid picking at any of the treated lesions. Pt may apply Vaseline to crusted or scabbing areas.
Detail Level: Zone

## 2019-12-11 ENCOUNTER — HOSPITAL ENCOUNTER (OUTPATIENT)
Dept: LAB | Age: 74
Discharge: HOME | End: 2019-12-11

## 2019-12-11 PROCEDURE — 88305 TISSUE EXAM BY PATHOLOGIST: CPT

## 2020-09-23 PROBLEM — I10 UNCONTROLLED HYPERTENSION: Status: RESOLVED | Noted: 2018-03-13 | Resolved: 2020-09-23

## 2022-01-01 NOTE — PERIOP NOTES
Teach back method performed with patient and family regarding pain management goals, incentive spirometry use, TB testing, and Hibiclens bathing.   Green home care needs sheet given to patient and family  Geovanna Zapien (Wife( [phone]

## 2022-03-19 PROBLEM — M19.90 OSTEOARTHRITIS: Status: ACTIVE | Noted: 2018-04-03

## 2022-03-19 PROBLEM — Z96.652 TOTAL KNEE REPLACEMENT STATUS, LEFT: Status: ACTIVE | Noted: 2018-04-04

## 2022-04-27 DIAGNOSIS — E78.00 HYPERCHOLESTEREMIA: Primary | ICD-10-CM

## 2022-06-05 ENCOUNTER — HOSPITAL ENCOUNTER (OUTPATIENT)
Dept: SLEEP MEDICINE | Age: 77
Discharge: HOME | End: 2022-06-08
Payer: MEDICARE

## 2022-06-05 PROCEDURE — 95810 POLYSOM 6/> YRS 4/> PARAM: CPT

## 2022-06-17 ENCOUNTER — OFFICE VISIT (OUTPATIENT)
Dept: FAMILY MEDICINE CLINIC | Facility: CLINIC | Age: 77
End: 2022-06-17
Payer: MEDICARE

## 2022-06-17 VITALS
HEIGHT: 70 IN | DIASTOLIC BLOOD PRESSURE: 102 MMHG | BODY MASS INDEX: 31.07 KG/M2 | WEIGHT: 217 LBS | SYSTOLIC BLOOD PRESSURE: 166 MMHG

## 2022-06-17 DIAGNOSIS — J30.9 ALLERGIC RHINITIS, UNSPECIFIED SEASONALITY, UNSPECIFIED TRIGGER: ICD-10-CM

## 2022-06-17 DIAGNOSIS — H69.81 ACUTE DYSFUNCTION OF RIGHT EUSTACHIAN TUBE: ICD-10-CM

## 2022-06-17 DIAGNOSIS — J01.90 ACUTE SINUSITIS, RECURRENCE NOT SPECIFIED, UNSPECIFIED LOCATION: ICD-10-CM

## 2022-06-17 DIAGNOSIS — M54.42 ACUTE LEFT-SIDED LOW BACK PAIN WITH LEFT-SIDED SCIATICA: Primary | ICD-10-CM

## 2022-06-17 DIAGNOSIS — H92.01 ACUTE OTALGIA, RIGHT: ICD-10-CM

## 2022-06-17 DIAGNOSIS — I10 PRIMARY HYPERTENSION: ICD-10-CM

## 2022-06-17 PROCEDURE — 96372 THER/PROPH/DIAG INJ SC/IM: CPT | Performed by: FAMILY MEDICINE

## 2022-06-17 PROCEDURE — 99214 OFFICE O/P EST MOD 30 MIN: CPT | Performed by: FAMILY MEDICINE

## 2022-06-17 PROCEDURE — 1123F ACP DISCUSS/DSCN MKR DOCD: CPT | Performed by: FAMILY MEDICINE

## 2022-06-17 RX ORDER — CYCLOBENZAPRINE HCL 10 MG
TABLET ORAL
Qty: 7 TABLET | Refills: 0 | Status: SHIPPED | OUTPATIENT
Start: 2022-06-17 | End: 2022-07-08

## 2022-06-17 RX ORDER — METHYLPREDNISOLONE 4 MG/1
TABLET ORAL
Qty: 21 TABLET | Refills: 0 | Status: SHIPPED | OUTPATIENT
Start: 2022-06-17 | End: 2022-07-07 | Stop reason: ALTCHOICE

## 2022-06-17 RX ORDER — CEFDINIR 300 MG/1
300 CAPSULE ORAL 2 TIMES DAILY
Qty: 20 CAPSULE | Refills: 0 | Status: SHIPPED | OUTPATIENT
Start: 2022-06-17 | End: 2022-06-27

## 2022-06-17 RX ORDER — TRIAMCINOLONE ACETONIDE 40 MG/ML
40 INJECTION, SUSPENSION INTRA-ARTICULAR; INTRAMUSCULAR ONCE
Status: COMPLETED | OUTPATIENT
Start: 2022-06-17 | End: 2022-06-17

## 2022-06-17 RX ADMIN — TRIAMCINOLONE ACETONIDE 40 MG: 40 INJECTION, SUSPENSION INTRA-ARTICULAR; INTRAMUSCULAR at 12:53

## 2022-06-17 ASSESSMENT — PATIENT HEALTH QUESTIONNAIRE - PHQ9
SUM OF ALL RESPONSES TO PHQ QUESTIONS 1-9: 0
SUM OF ALL RESPONSES TO PHQ QUESTIONS 1-9: 0
1. LITTLE INTEREST OR PLEASURE IN DOING THINGS: 0
SUM OF ALL RESPONSES TO PHQ QUESTIONS 1-9: 0
2. FEELING DOWN, DEPRESSED OR HOPELESS: 0
SUM OF ALL RESPONSES TO PHQ QUESTIONS 1-9: 0
SUM OF ALL RESPONSES TO PHQ9 QUESTIONS 1 & 2: 0

## 2022-06-17 NOTE — PROGRESS NOTES
SUBJECTIVE:   Vishal Perez is a 78 y.o. male who has a past medical history significant for hypertension, high cholesterol and reflux with Larson's esophagus. Patient presents today reporting that he has been experiencing low back pain for 24 hours. This back pain started abruptly with no known trigger. He does have a history of spinal stenosis and is followed by orthopedics but has not seen them in a couple of years. He reports pain radiating down his left leg. No bowel or bladder dysfunction. No muscular weakness. In addition he reports right ear pain, facial pain, teeth hurting on his right side and yellow-green nasal drainage. No fever, cough, chest pain or shortness of breath. Current medicines are listed in the EMR and reviewed today. HPI  See above    Past Medical History, Past Surgical History, Family history, Social History, and Medications were all reviewed with the patient today and updated as necessary.        Current Outpatient Medications   Medication Sig Dispense Refill    albuterol sulfate  (90 Base) MCG/ACT inhaler INHALE 1 PUFF BY MOUTH EVERY 6 HOURS AS NEEDED FOR WHEEZING      aspirin 81 MG EC tablet Take by mouth daily      atorvastatin (LIPITOR) 20 MG tablet TAKE 1 TABLET BY MOUTH EVERY DAY      fluticasone (VERAMYST) 27.5 MCG/SPRAY nasal spray 1 spray by Nasal route daily      guaiFENesin (MUCINEX MAXIMUM STRENGTH) 1200 MG TB12 Take 1,200 mg by mouth daily as needed      levocetirizine (XYZAL) 5 MG tablet Take 5 mg by mouth daily      losartan (COZAAR) 50 MG tablet TAKE 1 TABLET BY MOUTH EVERY DAY      mometasone (ELOCON) 0.1 % cream Apply topically daily      montelukast (SINGULAIR) 10 MG tablet TAKE 1 TABLET BY MOUTH EVERY DAY      nystatin (MYCOSTATIN) 019509 UNIT/GM cream Apply topically 2 times daily      pantoprazole (PROTONIX) 40 MG tablet TAKE 1 TABLET BY MOUTH EVERY DAY      B Complex-C (SUPER B COMPLEX PO) Take by mouth      Multiple Vitamin (MULTIVITAMIN ADULT PO) Take 1 tablet by mouth daily      cyanocobalamin ([employer] VITAMIN B12) 1000 MCG tablet Take 1,000 mg by mouth       No current facility-administered medications for this visit. No Known Allergies  Patient Active Problem List   Diagnosis    Hypertension    Osteoarthritis    Total knee replacement status, left    Hypercholesteremia     Past Medical History:   Diagnosis Date    Larson esophagus     Current smoker     0.5 pack/day    GERD (gastroesophageal reflux disease)     on med for control     High frequency hearing loss     Hypercholesteremia     on med    Hypertension     on med for control     OA (osteoarthritis)     Rhinitis     SNHL (sensorineural hearing loss)     Tinnitus of both ears     Wheezing     pro-air inhaler prn; [facility]      Past Surgical History:   Procedure Laterality Date    COLONOSCOPY      KNEE ARTHROSCOPY Right 2005    muscle repaired    TOTAL KNEE ARTHROPLASTY Left 04/03/2018     Family History   Problem Relation Age of Onset    Hypertension Brother     Depression Brother     Depression Father     Hypertension Father     High Cholesterol Mother     Hypertension Mother     Ovarian Cancer Sister     Heart Attack Maternal Grandmother     Heart Attack Maternal Grandfather     Heart Attack Paternal Grandmother      Social History     Tobacco Use    Smoking status: Former Smoker     Packs/day: 0.50    Smokeless tobacco: Never Used    Tobacco comment: Quit smoking: smokes <1 pack of cigarettes per day   Substance Use Topics    Alcohol use: Yes         Review of Systems  See above    OBJECTIVE:  BP (!) 166/102   Ht 5' 10\" (1.778 m)   Wt 217 lb (98.4 kg)   BMI 31.14 kg/m²      Physical Exam  Constitutional:       General: He is not in acute distress. Appearance: Normal appearance. He is not ill-appearing or toxic-appearing. HENT:      Head: Normocephalic and atraumatic. Comments: Patient has frontal and maxillary sinus tenderness bilaterally. Ears:      Comments: TM's are clear bilaterally with dull effusions. Nose: Congestion and rhinorrhea present. Mouth/Throat:      Mouth: Mucous membranes are moist.      Pharynx: Oropharynx is clear. No oropharyngeal exudate or posterior oropharyngeal erythema. Cardiovascular:      Rate and Rhythm: Normal rate and regular rhythm. Heart sounds: Normal heart sounds. No murmur heard. Pulmonary:      Effort: Pulmonary effort is normal.      Breath sounds: Normal breath sounds. No wheezing or rhonchi. Musculoskeletal:         General: Normal range of motion. Cervical back: Normal range of motion and neck supple. No rigidity or tenderness. Right lower leg: No edema. Left lower leg: No edema. Comments: Lower extremity strength is 5/5 equal and symmetric. Patellar reflexes absent bilaterally status post knee replacement bilaterally. Achilles reflexes normal bilaterally. Lymphadenopathy:      Cervical: No cervical adenopathy. Skin:     General: Skin is warm. Findings: No rash. Neurological:      Mental Status: He is alert and oriented to person, place, and time. Psychiatric:         Mood and Affect: Mood normal.         Behavior: Behavior normal.         Thought Content: Thought content normal.         Judgment: Judgment normal.         Medical problems and test results were reviewed with the patient today. ASSESSMENT and PLAN    1. Low back pain. Left radicular complaints. Medrol Dosepak as directed. Flexeril 10 mg at bedtime for a week. Warned of sedation. Return if symptoms persist or worsen. 2.  Right otalgia. Underlying eustachian tube dysfunction, allergies and sinus infection. Treat with over-the-counter plain Mucinex and Nasacort. 3.  Right eustachian tube dysfunction. As above. 4.  Allergic rhinitis. 1 cc of Kenalog IM. As above. 5.  Sinusitis. Omnicef 300 mg twice a day for 10 days. As above. 5.  Hypertension. Blood pressure 166/102. Previously well maintained. Reevaluate after acute illness is resolved. Elements of this note have been dictated using speech recognition software. As a result, errors of speech recognition may have occurred.

## 2022-07-07 ENCOUNTER — OFFICE VISIT (OUTPATIENT)
Dept: ORTHOPEDIC SURGERY | Age: 77
End: 2022-07-07
Payer: MEDICARE

## 2022-07-07 VITALS — WEIGHT: 216.8 LBS | HEIGHT: 67 IN | BODY MASS INDEX: 34.03 KG/M2

## 2022-07-07 DIAGNOSIS — M54.50 LOW BACK PAIN, UNSPECIFIED BACK PAIN LATERALITY, UNSPECIFIED CHRONICITY, UNSPECIFIED WHETHER SCIATICA PRESENT: Primary | ICD-10-CM

## 2022-07-07 DIAGNOSIS — M48.061 SPINAL STENOSIS OF LUMBAR REGION, UNSPECIFIED WHETHER NEUROGENIC CLAUDICATION PRESENT: ICD-10-CM

## 2022-07-07 DIAGNOSIS — M43.16 SPONDYLOLISTHESIS, LUMBAR REGION: ICD-10-CM

## 2022-07-07 PROCEDURE — 1123F ACP DISCUSS/DSCN MKR DOCD: CPT | Performed by: NURSE PRACTITIONER

## 2022-07-07 PROCEDURE — 99204 OFFICE O/P NEW MOD 45 MIN: CPT | Performed by: NURSE PRACTITIONER

## 2022-07-07 RX ORDER — METHYLPREDNISOLONE 4 MG/1
TABLET ORAL
Qty: 1 KIT | Refills: 0 | Status: SHIPPED | OUTPATIENT
Start: 2022-07-07

## 2022-07-07 NOTE — PATIENT INSTRUCTIONS
Adult Spondylolisthesis in the Low Back    In spondylolisthesis, one of the bones in your spine -- called a vertebra -- slips forward and out of place. This may occur anywhere along the spine, but is most common in the lower back (lumbar spine). In some people, this causes no symptoms at all. Others may have back and leg pain that ranges from mild to severe. Understanding how your spine works can help you better understand spondylolisthesis. Types of Spondylolisthesis  Many types of spondylolisthesis can affect adults. The two most common types are degenerative and spondylolytic. There are other less common types of spondylolisthesis, such as slippage caused by a recent, severe fracture or a tumor. Degenerative Spondylolisthesis  As we age, general wear and tear causes changes in the spine. Intervertebral disks begin to dry out and weaken. They lose height, become stiff, and begin to bulge. This disk degeneration is the start to both arthritis and degenerative spondylolisthesis (DS). Degenerative spondylolisthesis  As arthritis develops, it weakens the joints and ligaments that hold your vertebrae in the proper position. The ligament along the back of your spine (ligamentum flavum) may begin to buckle. One of the vertebrae on either side of a worn, flattened disk can loosen and move forward over the vertebra below it. This slippage can narrow the spinal canal and put pressure on the spinal cord. This narrowing of the spinal canal is called spinal stenosis and is a common problem in patients with DS. Women are more likely than men to have DS, and it is more common in patients who are older than 50. A higher incidence has been noted in the -American population. In this x-ray taken from the side, vertebrae in the low back have slipped out of place due to degenerative spondylolisthesis.       Spondylolytic Spondylolisthesis  One of the bones in your lower back can break and this can cause a vertebra to slip forward. The break most often occurs in the area of your lumbar spine called the pars interarticularis. In most cases of spondylolytic spondylolisthesis, the pars fracture occurs during adolescence and goes unnoticed until adulthood. The normal disk degeneration that occurs in adulthood can then stress the pars fracture and cause the vertebra to slip forward. This type of spondylolisthesis is most often seen in middle-aged men. (Left) In spondylolysis, a fracture often occurs at the pars interarticularis. (Right) Because of the pars fracture, only the front part of the bone slips forward. Because a pars fracture causes the front (vertebra) and back (lamina) parts of the spinal bone to disconnect, only the front part slips forward. This means that narrowing of the spinal canal is less likely than in other kinds of spondylolisthesis, such as DS in which the entire spinal bone slips forward. This x-ray taken from the side shows a pars fracture (arrow) and the resulting spondylolisthesis. Symptoms  About 4% to 6% of the [place] population has spondylolysis and spondylolisthesis. Most of these people live with the condition for many years without any pain or other symptoms. Symptoms of Degenerative Spondylolisthesis  Patients with DS often visit the doctor's office once the slippage has begun to put pressure on the spinal nerves. Although the doctor may find arthritis in the spine, the symptoms of DS are typically the same as symptoms of spinal stenosis. For example, DS patients often develop leg and/or lower back pain. The most common symptoms in the legs include a feeling of vague weakness associated with prolonged standing or walking. Leg symptoms may be accompanied by numbness, tingling, and/or pain that is often affected by posture. Forward bending or sitting often relieves the symptoms because it opens up space in the spinal canal. Standing or walking often increases symptoms.     Symptoms of Spondylolytic Spondylolisthesis  Most patients with spondylolytic spondylolisthesis do not have pain and are often surprised to find they have the slippage when they see it in x-rays. They typically visit a doctor with low back pain related to activities. The back pain is sometimes accompanied by leg pain. To Top     Treatment    Nonsurgical Treatment  Although nonsurgical treatments will not repair the slippage, many patients report that these methods do help relieve symptoms. Physical therapy and exercise. Specific exercises can strengthen and stretch your lower back and abdominal muscles. Medication. Analgesics and non-steroidal anti-inflammatory medicines may relieve pain. Steroid injections. Cortisone is a powerful anti-inflammatory. Cortisone injections around the nerves or in the \"epidural space\" can decrease swelling, as well as pain. It is not recommended to receive these, however, more than three times per year. These injections are more likely to decrease pain and numbness, but not weakness of the legs. Surgical Treatment  Surgical candidates with DS. Surgery for degenerative spondylolisthesis is generally reserved for the patient who does not improve after a trial of nonsurgical treatment for at least 3 to 6 months. In making a decision about surgery, your doctor will also take into account the extent of arthritis in your spine, as well as whether your spine has excessive movement. DS patients who are candidates for surgery often are unable to walk or stand, and have a poor quality of life due to the pain and weakness. Surgical candidates with spondylolytic spondylolisthesis. Patients with symptoms that have not responded to nonsurgical treatment for at least 6 to12 months may be candidates for surgery.   If the slippage is getting worse or the patient has progressive neurologic symptoms, such as weakness, numbness, or falling, and/or symptoms of cauda equina syndrome, surgery may help.  Surgical procedures. Surgery for both DS and spondylolytic spondylolisthesis includes removing the pressure from the nerves and spinal fusion. Removing the pressure involves opening up the spinal canal. This procedure is called a laminectomy. Spinal fusion is essentially a \"welding\" process. The basic idea is to fuse together the painful vertebrae so that they heal into a single, solid bone. In spinal fusion, screws are often used to help stabilize the spine. Surgical recovery. The fusion process takes time. It may be several months before the bone is solid, although your comfort level will often improve much faster.

## 2022-07-07 NOTE — PROGRESS NOTES
Name: Harsha Anderson  YOB: 1945  Gender: male  MRN: [id number]    CC: Acute severe low back pain, posterior leg pain    HPI: This is a 78y.o. year old male who has seen Dr. Antoni Hackett and Augusto Brownlee in the past.  He has known listhesis and spinal stenosis. Along with a slight scoliosis and multilevel severe facet arthropathy. He has had a 6-week history of acute onset of lower back pain rating in the posterior legs to his knees. He is unable to walk any long distance due to his pain. He is having to utilize a cane. He was treated by his primary doctor at 1 June with steroids and Flexeril which found to be somewhat helpful and has been doing a home exercise program daily under his primary doctor's care. Continues to have significant amount of pain. The patient denies any change in bowel or bladder function since the onset of the symptoms. he  has not had lumbar surgery in the past.      Thus far, the patient has tried muscle relaxers, oral steroids, spine injections  and physician directed home exercise program    Current pain level: Activities limited by pain:        AMB PAIN ASSESSMENT 7/7/2022   Location of Pain Back   Location Modifiers Right;Left   Severity of Pain 5   Frequency of Pain Intermittent   Limiting Behavior Yes   Result of Injury No            ROS/Meds/PSH/PMH/FH/SH: I personally reviewed the patient's collected intake data. Below are the pertinents:    No Known Allergies      Current Outpatient Medications:     methylPREDNISolone (MEDROL DOSEPACK) 4 MG tablet, Take by mouth as directed.  Start in morning, Disp: 1 kit, Rfl: 0    B Complex-C (SUPER B COMPLEX PO), Take by mouth, Disp: , Rfl:     Multiple Vitamin (MULTIVITAMIN ADULT PO), Take 1 tablet by mouth daily, Disp: , Rfl:     cyanocobalamin ([employer] VITAMIN B12) 1000 MCG tablet, Take 1,000 mg by mouth, Disp: , Rfl:     albuterol sulfate  (90 Base) MCG/ACT inhaler, INHALE 1 PUFF BY MOUTH EVERY 6 HOURS AS NEEDED FOR WHEEZING, Disp: , Rfl:     aspirin 81 MG EC tablet, Take by mouth daily, Disp: , Rfl:     atorvastatin (LIPITOR) 20 MG tablet, TAKE 1 TABLET BY MOUTH EVERY DAY, Disp: , Rfl:     fluticasone (VERAMYST) 27.5 MCG/SPRAY nasal spray, 1 spray by Nasal route daily, Disp: , Rfl:     guaiFENesin (MUCINEX MAXIMUM STRENGTH) 1200 MG TB12, Take 1,200 mg by mouth daily as needed, Disp: , Rfl:     levocetirizine (XYZAL) 5 MG tablet, Take 5 mg by mouth daily, Disp: , Rfl:     losartan (COZAAR) 50 MG tablet, TAKE 1 TABLET BY MOUTH EVERY DAY, Disp: , Rfl:     mometasone (ELOCON) 0.1 % cream, Apply topically daily, Disp: , Rfl:     montelukast (SINGULAIR) 10 MG tablet, TAKE 1 TABLET BY MOUTH EVERY DAY, Disp: , Rfl:     nystatin (MYCOSTATIN) 477437 UNIT/GM cream, Apply topically 2 times daily, Disp: , Rfl:     pantoprazole (PROTONIX) 40 MG tablet, TAKE 1 TABLET BY MOUTH EVERY DAY, Disp: , Rfl:     cyclobenzaprine (FLEXERIL) 10 MG tablet, 1 tablet at bedtime as directed (Patient not taking: Reported on 7/7/2022), Disp: 7 tablet, Rfl: 0    Past Surgical History:   Procedure Laterality Date    COLONOSCOPY      KNEE ARTHROSCOPY Right 2005    muscle repaired    TOTAL KNEE ARTHROPLASTY Left 04/03/2018       Patient Active Problem List   Diagnosis    Hypertension    Osteoarthritis    Total knee replacement status, left    Hypercholesteremia     Tobacco:  reports that he has quit smoking. He smoked 0.50 packs per day. He has never used smokeless tobacco.  Alcohol:   Social History     Substance and Sexual Activity   Alcohol Use Yes          Physical Exam:   BMI: Body mass index is 33.96 kg/m². GENERAL:  Adult in no acute distress, well developed, well nourished Patient is appropriately conversant  MSK:  Examination of the lumbar spine reveals paraspinal tenderness    There is moderate tenderness to palpation along the spinous processes and paraspinal musculature.    The patient ambulates with a unsteady, forward flexed and assisted with a cane gait. ROM of bilateral hip(s) reveals no irritability. NEURO:  Cranial nerves grossly intact. No motor deficits. Straight leg testing is positive bilateral  Sensory testing reveals intact sensation to light touch and in the distribution of the L3-S1 dermatomes bilaterally  Ankle jerk is negative for clonus    Reflexes   Right Left   Quadriceps (L4) 2 2   Achilles (S1) 2 2     Strength testing in the lower extremity reveals the following based on the 5 point grading scale:     HF (L2) H Ab (L5) KE (L3/4) ADF (L4) EHL (L5) A Ev (S1) APF (S1)   Right 5 5 5 5 5 5 5   Left 5 5 5 5 5 5 5     PSYCH:  Alert and oriented X 3. Appropriate affect. Intact judgment and insight. Radiographic Studies:     AP, lateral and spot views of the lumbar spine: There is slight listing to the left. Multilevel severe facet arthropathy and spondylosis on the AP view. There is on the lateral view a spondylolisthesis of L4 and L5. He has worsening disc degeneration and spondylosis in the upper lumbar spine. Impression: Worsening lumbar spondylosis at multiple levels with spondylolisthesis of L4 on L5        Assessment/Plan:       Diagnosis Orders   1. Low back pain, unspecified back pain laterality, unspecified chronicity, unspecified whether sciatica present  XR LUMBAR SPINE (2-3 VIEWS)   2. Spondylolisthesis, lumbar region  MRI LUMBAR SPINE WO CONTRAST   3. Spinal stenosis of lumbar region, unspecified whether neurogenic claudication present  MRI LUMBAR SPINE WO CONTRAST       This patient's clinical history and physical exam is consistent with a bilateral   L-5 and S-1 lumbar radiculopathy. Patient has spondylolisthesis with known spinal stenosis at L4-5 with worsening degeneration. I suspect his symptoms are consistent with neurogenic claudication. He is exhausted conservative treatment. I would recommend obtaining a new MRI of the lumbar spine. We discussed injection management.   I will repeat his steroids for acute inflammation and pain. We discussed the natural history of lumbar radiculopathy in that many of these patients have near complete resolution of their symptoms within eight to twelve weeks with conservative care. We discussed that conservative treatments typically start with activity modification, and medication followed by physical therapy as symptoms allow. Oral and/or epidural steroids are other options. I also discussed potential surgical options if the symptoms fail to improve or there is a progressive neurologic deficit and conservative management has been exhausted. We discussed that surgery is not typically a reliable treatment for isolated back pain, but is usually very reliable in relieving buttock and leg symptoms.        - Oral Steroids: A short course of oral steroids was prescribed in an attempt to bring the acute radicular symptoms under control. The patient understands the risks and side effects of oral steroids including immunosuppression, hypertension, mood swings, increased blood sugar, including glaucoma. The steroid taper can be followed by NSAIDs once completed. - A MRI was ordered to delineate anatomy, confirm the diagnosis and assess the severity. 4 This is a undiagnosed new problem with uncertain prognosis    Orders Placed This Encounter   Medications    methylPREDNISolone (MEDROL DOSEPACK) 4 MG tablet     Sig: Take by mouth as directed. Start in morning     Dispense:  1 kit     Refill:  0        Orders Placed This Encounter   Procedures    XR LUMBAR SPINE (2-3 VIEWS)    MRI LUMBAR SPINE WO CONTRAST            Return for MRI results. GEOVANI Newberry - CNP  07/07/22      Elements of this note were created using speech recognition software. As such, errors of speech recognition may be present.

## 2022-07-08 ENCOUNTER — OFFICE VISIT (OUTPATIENT)
Dept: FAMILY MEDICINE CLINIC | Facility: CLINIC | Age: 77
End: 2022-07-08
Payer: MEDICARE

## 2022-07-08 VITALS
OXYGEN SATURATION: 97 % | WEIGHT: 218 LBS | BODY MASS INDEX: 34.14 KG/M2 | DIASTOLIC BLOOD PRESSURE: 80 MMHG | SYSTOLIC BLOOD PRESSURE: 124 MMHG | HEART RATE: 108 BPM

## 2022-07-08 DIAGNOSIS — I10 PRIMARY HYPERTENSION: ICD-10-CM

## 2022-07-08 DIAGNOSIS — H69.81 DYSFUNCTION OF RIGHT EUSTACHIAN TUBE: ICD-10-CM

## 2022-07-08 DIAGNOSIS — J30.9 ALLERGIC RHINITIS, UNSPECIFIED SEASONALITY, UNSPECIFIED TRIGGER: ICD-10-CM

## 2022-07-08 DIAGNOSIS — H92.01 ACUTE OTALGIA, RIGHT: Primary | ICD-10-CM

## 2022-07-08 PROCEDURE — 1123F ACP DISCUSS/DSCN MKR DOCD: CPT | Performed by: FAMILY MEDICINE

## 2022-07-08 PROCEDURE — 99213 OFFICE O/P EST LOW 20 MIN: CPT | Performed by: FAMILY MEDICINE

## 2022-07-08 RX ORDER — AZELASTINE 1 MG/ML
2 SPRAY, METERED NASAL 2 TIMES DAILY
Qty: 60 ML | Refills: 5 | Status: SHIPPED | OUTPATIENT
Start: 2022-07-08

## 2022-07-08 ASSESSMENT — PATIENT HEALTH QUESTIONNAIRE - PHQ9
SUM OF ALL RESPONSES TO PHQ QUESTIONS 1-9: 0
SUM OF ALL RESPONSES TO PHQ QUESTIONS 1-9: 0
2. FEELING DOWN, DEPRESSED OR HOPELESS: 0
SUM OF ALL RESPONSES TO PHQ9 QUESTIONS 1 & 2: 0
SUM OF ALL RESPONSES TO PHQ QUESTIONS 1-9: 0
1. LITTLE INTEREST OR PLEASURE IN DOING THINGS: 0
SUM OF ALL RESPONSES TO PHQ QUESTIONS 1-9: 0

## 2022-07-08 NOTE — PROGRESS NOTES
SUBJECTIVE:   Damon Hughes is a 78 y.o. male who has a past medical history significant for hypertension, high cholesterol and reflux with Larson's esophagus. Patient presents today reporting that he has been experiencing pressure and discomfort persistently in his right ear and behind his right. He got some better at last visit with treatment with a Medrol Dosepak, Kenalog shot and Omnicef. Please refer to prior notes for details. He has a history of allergies. He has been using Nasacort, Mucinex and Singulair. He reports no fever. He denies visual loss or disturbance. There are no neurological issues or complaints. HPI  See above    Past Medical History, Past Surgical History, Family history, Social History, and Medications were all reviewed with the patient today and updated as necessary. Current Outpatient Medications   Medication Sig Dispense Refill    azelastine (ASTELIN) 0.1 % nasal spray 2 sprays by Nasal route 2 times daily Use in each nostril as directed 60 mL 5    methylPREDNISolone (MEDROL DOSEPACK) 4 MG tablet Take by mouth as directed.  Start in morning 1 kit 0    B Complex-C (SUPER B COMPLEX PO) Take by mouth      Multiple Vitamin (MULTIVITAMIN ADULT PO) Take 1 tablet by mouth daily      cyanocobalamin (CVS VITAMIN B12) 1000 MCG tablet Take 1,000 mg by mouth      albuterol sulfate  (90 Base) MCG/ACT inhaler INHALE 1 PUFF BY MOUTH EVERY 6 HOURS AS NEEDED FOR WHEEZING      aspirin 81 MG EC tablet Take by mouth daily      atorvastatin (LIPITOR) 20 MG tablet TAKE 1 TABLET BY MOUTH EVERY DAY      fluticasone (VERAMYST) 27.5 MCG/SPRAY nasal spray 1 spray by Nasal route daily      guaiFENesin (MUCINEX MAXIMUM STRENGTH) 1200 MG TB12 Take 1,200 mg by mouth daily as needed      levocetirizine (XYZAL) 5 MG tablet Take 5 mg by mouth daily      losartan (COZAAR) 50 MG tablet TAKE 1 TABLET BY MOUTH EVERY DAY      mometasone (ELOCON) 0.1 % cream Apply topically daily      montelukast (SINGULAIR) 10 MG tablet TAKE 1 TABLET BY MOUTH EVERY DAY      nystatin (MYCOSTATIN) 987817 UNIT/GM cream Apply topically 2 times daily      pantoprazole (PROTONIX) 40 MG tablet TAKE 1 TABLET BY MOUTH EVERY DAY       No current facility-administered medications for this visit. No Known Allergies  Patient Active Problem List   Diagnosis    Hypertension    Osteoarthritis    Total knee replacement status, left    Hypercholesteremia     Past Medical History:   Diagnosis Date    Larson esophagus     Current smoker     0.5 pack/day    GERD (gastroesophageal reflux disease)     on med for control     High frequency hearing loss     Hypercholesteremia     on med    Hypertension     on med for control     OA (osteoarthritis)     Rhinitis     SNHL (sensorineural hearing loss)     Tinnitus of both ears     Wheezing     pro-air inhaler prn; last used 3/12/18; [facility] was negative for asthma      Past Surgical History:   Procedure Laterality Date    COLONOSCOPY      KNEE ARTHROSCOPY Right 2005    muscle repaired    TOTAL KNEE ARTHROPLASTY Left 04/03/2018     Family History   Problem Relation Age of Onset    Hypertension Brother     Depression Brother     Depression Father     Hypertension Father     High Cholesterol Mother     Hypertension Mother     Ovarian Cancer Sister     Heart Attack Maternal Grandmother     Heart Attack Maternal Grandfather     Heart Attack Paternal Grandmother      Social History     Tobacco Use    Smoking status: Former Smoker     Packs/day: 0.50    Smokeless tobacco: Never Used    Tobacco comment: Quit smoking: smokes <1 pack of cigarettes per day   Substance Use Topics    Alcohol use: Yes         Review of Systems  See above    OBJECTIVE:  /80   Pulse (!) 108   Wt 218 lb (98.9 kg)   SpO2 97%   BMI 34.14 kg/m²      Physical Exam  Constitutional:       General: He is not in acute distress. Appearance: Normal appearance. He is not ill-appearing. HENT:      Head: Normocephalic and atraumatic. Ears:      Comments: Right TM is dull with clear effusion. Left TM is clear. There is no maxillary or frontal sinus tenderness to palpation. Cardiovascular:      Rate and Rhythm: Normal rate and regular rhythm. Heart sounds: Normal heart sounds. No murmur heard. Pulmonary:      Effort: Pulmonary effort is normal.      Breath sounds: Normal breath sounds. No wheezing or rhonchi. Musculoskeletal:         General: Normal range of motion. Cervical back: Normal range of motion and neck supple. Right lower leg: No edema. Left lower leg: No edema. Skin:     General: Skin is warm. Findings: No rash. Neurological:      Mental Status: He is alert and oriented to person, place, and time. Psychiatric:         Mood and Affect: Mood normal.         Behavior: Behavior normal.         Thought Content: Thought content normal.         Judgment: Judgment normal.         Medical problems and test results were reviewed with the patient today. ASSESSMENT and PLAN    1. Right otalgia. Underlying eustachian tube dysfunction. Persistent. Change Nasacort to Astelin nasal spray. Continue Singulair and Mucinex. If symptoms persist refer to ENT. 2.  Right eustachian tube dysfunction. As above. 3.  Allergic rhinitis. As above. 4.  Hypertension. Blood pressure improved over previous visit. Currently 124/80. Continue current therapy. Elements of this note have been dictated using speech recognition software. As a result, errors of speech recognition may have occurred.

## 2022-07-28 ENCOUNTER — OFFICE VISIT (OUTPATIENT)
Dept: ORTHOPEDIC SURGERY | Age: 77
End: 2022-07-28
Payer: MEDICARE

## 2022-07-28 DIAGNOSIS — M48.062 SPINAL STENOSIS, LUMBAR REGION WITH NEUROGENIC CLAUDICATION: Primary | ICD-10-CM

## 2022-07-28 DIAGNOSIS — M43.16 SPONDYLOLISTHESIS OF LUMBAR REGION: ICD-10-CM

## 2022-07-28 DIAGNOSIS — M51.16 LUMBAR DISC HERNIATION WITH RADICULOPATHY: ICD-10-CM

## 2022-07-28 PROCEDURE — 1123F ACP DISCUSS/DSCN MKR DOCD: CPT | Performed by: NURSE PRACTITIONER

## 2022-07-28 PROCEDURE — 99214 OFFICE O/P EST MOD 30 MIN: CPT | Performed by: NURSE PRACTITIONER

## 2022-07-28 RX ORDER — TRAMADOL HYDROCHLORIDE 50 MG/1
50-100 TABLET ORAL EVERY 8 HOURS PRN
Qty: 28 TABLET | Refills: 0 | Status: SHIPPED | OUTPATIENT
Start: 2022-07-28 | End: 2022-08-02

## 2022-07-28 NOTE — PATIENT INSTRUCTIONS
Lumbar Stenosis    What is lumbar stenosis? Stenosis is defined as the narrowing of the spinal canal. The term lumbar simply refers to the lowest 5 vertebrae in the spine. Externally this corresponds to the small of the back just above your buttocks. Each lumbar vertebra has 3 tunnels which can be affected by stenosis. The large tunnel in the middle of the vertebra is where the spinal cord and all of the spinal nerves are contained. Also, a much smaller tunnel is on each side of the vertebra. This is where the individual nerves exit the spine. Narrowing of any of these tunnels can result in pressure on the spinal cord or spinal nerves. Spinal stenosis may involve multiple levels of the spine or may be localized to a single level. What causes spinal stenosis? Typically the tunnels in vertebrae are quite spacious and much larger than the spinal cord and nerves that pass through them. However, some patients are genetically programmed to have smaller size tunnels in the spine, predisposing them to develop symptoms from spinal stenosis. There are several other potential causes of spinal stenosis. A single event, such as a disc herniation or a fracture can cause symptoms of stenosis. But more often, it is caused by arthritic changes, such as bone spurs, thickened ligaments, joint laxity, and disc bulges. This results in pinched spinal nerves which gives symptoms of buttock and leg pain and weakness. What are the symptoms of spinal stenosis? Patients will typically have low back pain along with pain in the buttocks and legs. This usually affects patients more when they are standing or walking, and their pain is often relieved with sitting or lying. Many patients report that the decrease in their ability to walk is the most bothersome part of the condition.   And, some have found that leaning forward (such as using a cart while shopping) has enabled them to go [facility]    MD Jan Melgar, MD Dorothy Faulkner, PADreC  Kathryn Gross, ELAYNE    Main Office  [address] [facility],[address], [address] [address] [address]        For Appointments at either location, please call [phone]

## 2022-07-28 NOTE — PROGRESS NOTES
Name: Indu Mae  YOB: 1945  Gender: male  MRN: [id number]    CC: Follow-up severe acute lower back pain    HPI: This is a 78y.o. year old male who is known to Dr. Ramon Johnson and Radha Tatum. He has a known spondylolisthesis and spinal stenosis at L4-5. He also has multilevel facet arthropathy. About 8 weeks ago patient developed acute onset of lower back pain. It radiates into the posterior legs primarily more on the right. He is unable to walk any long distance. He stopped and utilize a cane. He has completed since June 1, steroids, Flexeril and home exercise program under his primary care provider. Unfortunately this is not been beneficial.  X-rays revealed worsening disc degeneration at the L4-5 level. At last visit I referred him for an MRI of the lumbar spine. He rates his pain a 7 to an 8 out of 10. He does take a baby aspirin a day. AMB PAIN ASSESSMENT 7/7/2022   Location of Pain Back   Location Modifiers Right;Left   Severity of Pain 5   Frequency of Pain Intermittent   Limiting Behavior Yes   Result of Injury No            No Known Allergies    Current Outpatient Medications:     traMADol (ULTRAM) 50 MG tablet, Take 1-2 tablets by mouth every 8 hours as needed for Pain for up to 5 days. Intended supply: 7 days. Take lowest dose possible to manage pain, Disp: 28 tablet, Rfl: 0    azelastine (ASTELIN) 0.1 % nasal spray, 2 sprays by Nasal route 2 times daily Use in each nostril as directed, Disp: 60 mL, Rfl: 5    methylPREDNISolone (MEDROL DOSEPACK) 4 MG tablet, Take by mouth as directed.  Start in morning, Disp: 1 kit, Rfl: 0    B Complex-C (SUPER B COMPLEX PO), Take by mouth, Disp: , Rfl:     Multiple Vitamin (MULTIVITAMIN ADULT PO), Take 1 tablet by mouth daily, Disp: , Rfl:     cyanocobalamin (CVS VITAMIN B12) 1000 MCG tablet, Take 1,000 mg by mouth, Disp: , Rfl:     albuterol sulfate  (90 Base) MCG/ACT inhaler, INHALE 1 PUFF BY MOUTH EVERY 6 HOURS AS NEEDED FOR WHEEZING, Disp: , Rfl:     aspirin 81 MG EC tablet, Take by mouth daily, Disp: , Rfl:     atorvastatin (LIPITOR) 20 MG tablet, TAKE 1 TABLET BY MOUTH EVERY DAY, Disp: , Rfl:     fluticasone (VERAMYST) 27.5 MCG/SPRAY nasal spray, 1 spray by Nasal route daily, Disp: , Rfl:     guaiFENesin (MUCINEX MAXIMUM STRENGTH) 1200 MG TB12, Take 1,200 mg by mouth daily as needed, Disp: , Rfl:     levocetirizine (XYZAL) 5 MG tablet, Take 5 mg by mouth daily, Disp: , Rfl:     losartan (COZAAR) 50 MG tablet, TAKE 1 TABLET BY MOUTH EVERY DAY, Disp: , Rfl:     mometasone (ELOCON) 0.1 % cream, Apply topically daily, Disp: , Rfl:     montelukast (SINGULAIR) 10 MG tablet, TAKE 1 TABLET BY MOUTH EVERY DAY, Disp: , Rfl:     nystatin (MYCOSTATIN) 403087 UNIT/GM cream, Apply topically 2 times daily, Disp: , Rfl:     pantoprazole (PROTONIX) 40 MG tablet, TAKE 1 TABLET BY MOUTH EVERY DAY, Disp: , Rfl:   Past Medical History:   Diagnosis Date    Larson esophagus     Current smoker     0.5 pack/day    GERD (gastroesophageal reflux disease)     on med for control     High frequency hearing loss     Hypercholesteremia     on med    Hypertension     on med for control     OA (osteoarthritis)     Rhinitis     SNHL (sensorineural hearing loss)     Tinnitus of both ears     Wheezing     pro-air inhaler prn; last used 3/12/18; [facility] work[facility] was negative for asthma      Tobacco:  reports that he has quit smoking. He smoked an average of .5 packs per day. He has never used smokeless tobacco.  Alcohol:   Social History     Substance and Sexual Activity   Alcohol Use Yes          Radiographic Studies:       MRI Results (most recent):  MRI of the spine independently reviewed: There is multilevel disc degeneration and varying degrees of stenosis. There is a spondylolisthesis and widened facet joints at the L4-5 level with fluid within the facets.   However there is a very large L4-5 disc herniation that has migrated superiorly behind the L4 vertebral body. Its causing severe spinal stenosis. Its more so on the left but is causing overall significant stenosis. Findings:        Degenerative disc signal present at all levels of the lumbar spine with   subarticular reactive endplate change present L1-L2 through L3-4. The conus is   normal in configuration and signal intensity throughout. Axial imaging   demonstrates no abnormal retroperitoneal lesions. T12-L1: There is a disc bulge with bilateral facet arthropathy. There is no   spinal stenosis or neural foraminal narrowing. L1-L2: There is a disc bulge with bilateral facet arthropathy. In addition,   there is a probable perineural cyst on the left which is prominent but unchanged   compared to the prior exam. There is severe right neural foraminal stenosis. L2-3: There is a disc bulge with bilateral facet arthropathy. There is moderate   central stenosis, new. There is moderate bilateral neural foraminal narrowing,   also new. L3-L4: There is a disc bulge with bilateral facet arthropathy. There is moderate   central stenosis. There is moderate left and mild right neural foraminal   narrowing. L4-L5: There is a left paracentral disc extrusion with superior disc migration,   new when compared with the prior exam. There is also advanced facet arthropathy   with redundancy of ligamentum flavum. There is severe central stenosis at L4   extending to L4-5, severe left lateral recess stenosis at L4, and severe left   and moderate right neural foraminal narrowing at L4-5. L5-S1: There is a disc bulge with bilateral facet arthropathy. There is mild   bilateral neural foraminal narrowing, stable. Impression   Impression:    1.  New left paracentral disc extrusion with superior disc migration at L4-5   resulting in severe central stenosis at L4 extending to L4-5, severe left   lateral recess stenosis at L4 and severe left and moderate right neural   foraminal narrowing at L4-5.   2. Interval progression of lumbar spondylosis at L2-3 when compared with the   prior exam.   3. Multilevel lumbar spondylosis at additional levels, stable when compared with   the prior study. Assessment/Plan:        ICD-10-CM    1. Spinal stenosis, lumbar region with neurogenic claudication  M48.062       2. Spondylolisthesis of lumbar region  M43.16       3. Lumbar disc herniation with radiculopathy  M51.16 traMADol (ULTRAM) 50 MG tablet           Reviewed patient's MRI images findings and pathophysiology with him. We discussed options. He is got a very large acute disc herniation. If he were to surgically remove this we would have to do a lumbar fusion surgery at this level. He would like to trial injections first.  I would recommend an L4-5 MARIAA. He is on a baby aspirin. I will also give him tramadol for pain. He denies any bladder or bowel issues. He denies any significant leg weakness but does have leg fatigue with neurogenic claudication symptoms.    - Injection: The patient will be referred for a lumbar steroid injection to help reduce the symptoms. The patient understands the risks including hyperglycemia, immunosuppression, meningitis, cerebrospinal fluid leak, epidural hematoma, and reaction to medication. The patient may benefit from additional injections depending on the response. The injection should be a L4 5 MARIAA      Orders Placed This Encounter   Medications    traMADol (ULTRAM) 50 MG tablet     Sig: Take 1-2 tablets by mouth every 8 hours as needed for Pain for up to 5 days. Intended supply: 7 days. Take lowest dose possible to manage pain     Dispense:  28 tablet     Refill:  0     Reduce doses taken as pain becomes manageable        No orders of the defined types were placed in this encounter. 4 This is a chronic illness/condition with exacerbation and progression      Return for Injection follow up.      GEOVANI Olmstead - ZOIE  07/28/22      Elements of this note were created using speech recognition software. As such, errors of speech recognition may be present.

## 2022-08-01 ENCOUNTER — CLINICAL DOCUMENTATION (OUTPATIENT)
Dept: ORTHOPEDIC SURGERY | Age: 77
End: 2022-08-01

## 2022-08-03 ENCOUNTER — OFFICE VISIT (OUTPATIENT)
Dept: ORTHOPEDIC SURGERY | Age: 77
End: 2022-08-03
Payer: MEDICARE

## 2022-08-03 DIAGNOSIS — M54.17 LUMBOSACRAL RADICULOPATHY: Primary | ICD-10-CM

## 2022-08-03 PROCEDURE — 62323 NJX INTERLAMINAR LMBR/SAC: CPT | Performed by: PHYSICAL MEDICINE & REHABILITATION

## 2022-08-03 RX ORDER — TRIAMCINOLONE ACETONIDE 40 MG/ML
100 INJECTION, SUSPENSION INTRA-ARTICULAR; INTRAMUSCULAR ONCE
Status: COMPLETED | OUTPATIENT
Start: 2022-08-03 | End: 2022-08-03

## 2022-08-03 RX ADMIN — TRIAMCINOLONE ACETONIDE 100 MG: 40 INJECTION, SUSPENSION INTRA-ARTICULAR; INTRAMUSCULAR at 09:01

## 2022-08-03 NOTE — PROGRESS NOTES
Date: 08/03/22   Name: Shae Romano    Pre-Procedural Diagnosis:    Diagnosis Orders   1. Lumbosacral radiculopathy  XR INJ SPINE THER SUBST LUM/SAC W IMG    triamcinolone acetonide (KENALOG-40) injection 100 mg          Procedure: Lumbar Epidural Steroid Injection (MARIAA)    Precautions: [facility] Precautions spine injections: None. Patient denies any prior sensitivity to steroid, local anesthetic, contrast dye, iodine or shellfish. The procedure was discussed at length with the patient and informed consent was signed. The patient was placed in a prone position on the fluoroscopy table and the skin was prepped and draped in a routine sterile fashion. The area to be injected was anesthetized with approximately 5 cc of 1% Lidocaine. Initially a 22-gauge 3.5 inch inch spinal needle was carefully advanced under fluoroscopic guidance to the right L4-L5 epidural space. At this time 0.25 cc of omnipaque administered. . Once proper placement was confirmed, 3 cc of sterile water and 100 mg of Kenalog were injected through the spinal needle. Fluoroscopic guidance was used intermittently over a 10-minute period to insure proper needle placement and patient safety. A hard copy of the fluoroscopic  images has been placed in the patient's chart. The patient was monitored after the procedure and discharged home without complication.      Resume Meds:  N/A    Susana Melgoza MD  08/03/22

## 2022-08-28 ENCOUNTER — HOSPITAL ENCOUNTER (OUTPATIENT)
Dept: SLEEP MEDICINE | Age: 77
Discharge: HOME | End: 2022-08-31
Payer: MEDICARE

## 2022-08-28 PROCEDURE — 95811 POLYSOM 6/>YRS CPAP 4/> PARM: CPT

## 2022-08-31 RX ORDER — LOSARTAN POTASSIUM 50 MG/1
TABLET ORAL
Qty: 90 TABLET | Refills: 3 | Status: SHIPPED | OUTPATIENT
Start: 2022-08-31

## 2022-09-01 ENCOUNTER — OFFICE VISIT (OUTPATIENT)
Dept: ORTHOPEDIC SURGERY | Age: 77
End: 2022-09-01
Payer: MEDICARE

## 2022-09-01 DIAGNOSIS — M48.062 SPINAL STENOSIS OF LUMBAR REGION WITH NEUROGENIC CLAUDICATION: Primary | ICD-10-CM

## 2022-09-01 DIAGNOSIS — M51.26 HERNIATION OF LUMBAR INTERVERTEBRAL DISC: ICD-10-CM

## 2022-09-01 PROCEDURE — 1123F ACP DISCUSS/DSCN MKR DOCD: CPT | Performed by: NURSE PRACTITIONER

## 2022-09-01 PROCEDURE — 99214 OFFICE O/P EST MOD 30 MIN: CPT | Performed by: NURSE PRACTITIONER

## 2022-09-01 NOTE — PROGRESS NOTES
Name: Stacy Johnston  YOB: 1945  Gender: male  MRN: [id number]    CC: Follow-up severe back and hip pain. ,  Neurogenic claudication symptoms. HPI: This is a 78y.o. year old male who returns today after lumbar injections. She has had complaints of acute severe low back pain that has been ongoing for greater than 10 weeks now. He had symptoms consistent with neurogenic claudication. MRI revealed an acute disc herniation at L4-5 causing severe spinal stenosis in addition to a spondylolisthesis. Patient underwent an L4-5 MARIAA which she obtained about 60 or 70% relief. This lasted 2 weeks. Unfortunately patient contracted COVID which has elevated his pain complaints as well. But he feels that the injection is wearing off. He still feels like he has improvement but his pain is increasing. Today he rates his pain a 6 out of 10 with ambulation. He is not able to walk any great distance still. He denies any bladder or bowel changes. Conservative treatment has included home exercise program under my care and his primary doctor since June 1, 2022, steroids, Flexeril, previous epidural injection. He continues to do these exercises. Percentage of pain relief: 60-70%      Current pain level: 6       AMB PAIN ASSESSMENT 7/7/2022   Location of Pain Back   Location Modifiers Right;Left   Severity of Pain 5   Frequency of Pain Intermittent   Limiting Behavior Yes   Result of Injury No           No Known Allergies    Current Outpatient Medications:     losartan (COZAAR) 50 MG tablet, TAKE 1 TABLET BY MOUTH EVERY DAY, Disp: 90 tablet, Rfl: 3    azelastine (ASTELIN) 0.1 % nasal spray, 2 sprays by Nasal route 2 times daily Use in each nostril as directed, Disp: 60 mL, Rfl: 5    methylPREDNISolone (MEDROL DOSEPACK) 4 MG tablet, Take by mouth as directed.  Start in morning, Disp: 1 kit, Rfl: 0    B Complex-C (SUPER B COMPLEX PO), Take by mouth, Disp: , Rfl:     Multiple Vitamin (MULTIVITAMIN ADULT PO), Take 1 tablet by mouth daily, Disp: , Rfl:     cyanocobalamin ([employer]S VITAMIN B12) 1000 MCG tablet, Take 1,000 mg by mouth, Disp: , Rfl:     albuterol sulfate  (90 Base) MCG/ACT inhaler, INHALE 1 PUFF BY MOUTH EVERY 6 HOURS AS NEEDED FOR WHEEZING, Disp: , Rfl:     aspirin 81 MG EC tablet, Take by mouth daily, Disp: , Rfl:     atorvastatin (LIPITOR) 20 MG tablet, TAKE 1 TABLET BY MOUTH EVERY DAY, Disp: , Rfl:     fluticasone (VERAMYST) 27.5 MCG/SPRAY nasal spray, 1 spray by Nasal route daily, Disp: , Rfl:     guaiFENesin (MUCINEX MAXIMUM STRENGTH) 1200 MG TB12, Take 1,200 mg by mouth daily as needed, Disp: , Rfl:     levocetirizine (XYZAL) 5 MG tablet, Take 5 mg by mouth daily, Disp: , Rfl:     mometasone (ELOCON) 0.1 % cream, Apply topically daily, Disp: , Rfl:     montelukast (SINGULAIR) 10 MG tablet, TAKE 1 TABLET BY MOUTH EVERY DAY, Disp: , Rfl:     nystatin (MYCOSTATIN) [id number] UNIT/GM cream, Apply topically 2 times daily, Disp: , Rfl:     pantoprazole (PROTONIX) 40 MG tablet, TAKE 1 TABLET BY MOUTH EVERY DAY, Disp: , Rfl:   Past Medical History:   Diagnosis Date    Larson esophagus     Current smoker     0.5 pack/day    GERD (gastroesophageal reflux disease)     on med for control     High frequency hearing loss     Hypercholesteremia     on med    Hypertension     on med for control     OA (osteoarthritis)     Rhinitis     SNHL (sensorineural hearing loss)     Tinnitus of both ears     Wheezing     pro-air inhaler prn; last used 3/12/18; [facility] was negative for asthma      Tobacco:  reports that he has quit smoking. He smoked an average of .5 packs per day. He has never used smokeless tobacco.  Alcohol:   Social History     Substance and Sexual Activity   Alcohol Use Yes          Radiographic Studies:   MRI revealed severe spinal stenosis due to an acute L4-5 disc herniation along with spondylolisthesis. Assessment/Plan:        ICD-10-CM    1.  Spinal stenosis of lumbar region with neurogenic claudication  M48.062 Injection Authorization - Spine      2. Herniation of lumbar intervertebral disc  M51.26 Injection Authorization - Spine           Patient received improvement with the epidural.  He still is not interested in surgical intervention if he can prevent it. He would need a lumbar fusion surgery. We discussed about continuing with some injections. I would recommend 1 more injection and see if we get any better improvement. This time I would like to refer patient for bilateral L5 selective nerve root blocks. Hopefully we can get a little bit better relief of his neurogenic claudication and hip pain with this. He is on a baby aspirin. He will follow-up after the injection    - Injection: The patient will be referred for a lumbar steroid injection to help reduce the symptoms. The patient understands the risks including hyperglycemia, immunosuppression, meningitis, cerebrospinal fluid leak, epidural hematoma, and reaction to medication. The patient may benefit from additional injections depending on the response. The injection should be a bilateral L5 selective nerve root blocks. No orders of the defined types were placed in this encounter. Orders Placed This Encounter   Procedures    Injection Authorization - Spine        4 This is a chronic illness/condition with exacerbation and progression      Return for Injection follow up. GEOVANI Pantoja - CNP  09/01/22      Elements of this note were created using speech recognition software. As such, errors of speech recognition may be present.

## 2022-09-06 ENCOUNTER — TELEPHONE (OUTPATIENT)
Dept: SLEEP MEDICINE | Age: 77
End: 2022-09-06

## 2022-09-09 ENCOUNTER — OFFICE VISIT (OUTPATIENT)
Dept: ORTHOPEDIC SURGERY | Age: 77
End: 2022-09-09
Payer: MEDICARE

## 2022-09-09 DIAGNOSIS — M54.17 LUMBOSACRAL RADICULOPATHY: Primary | ICD-10-CM

## 2022-09-09 PROCEDURE — 64483 NJX AA&/STRD TFRM EPI L/S 1: CPT | Performed by: PHYSICAL MEDICINE & REHABILITATION

## 2022-09-09 RX ORDER — TRIAMCINOLONE ACETONIDE 40 MG/ML
160 INJECTION, SUSPENSION INTRA-ARTICULAR; INTRAMUSCULAR ONCE
Status: COMPLETED | OUTPATIENT
Start: 2022-09-09 | End: 2022-09-09

## 2022-09-09 RX ORDER — TRIAMCINOLONE ACETONIDE 40 MG/ML
80 INJECTION, SUSPENSION INTRA-ARTICULAR; INTRAMUSCULAR ONCE
Status: SHIPPED | OUTPATIENT
Start: 2022-09-09

## 2022-09-09 RX ADMIN — TRIAMCINOLONE ACETONIDE 160 MG: 40 INJECTION, SUSPENSION INTRA-ARTICULAR; INTRAMUSCULAR at 14:00

## 2022-09-09 NOTE — PROGRESS NOTES
Date: 09/09/22   Name: Yadira Gallagher    Pre-Procedural Diagnosis:    Diagnosis Orders   1. Lumbosacral radiculopathy  FL NERVE BLOCK LUMBOSACRAL 1ST    triamcinolone acetonide (KENALOG-40) injection 160 mg    triamcinolone acetonide (KENALOG-40) injection 80 mg          Procedure: Bilateral Selective Nerve Root Blocks (Transforaminal) - Single Level    Precautions: [facility] Precautions spine injections: None. Patient denies any prior sensitivity to steroid, local anesthetic, contrast dye, iodine or shellfish. The procedure was discussed at length with the patient and informed consent was signed. The patient was placed in a prone position on the fluoroscopy table and the skin was prepped and draped in a routine sterile fashion. The areas to be injected was/were anesthetized with approximately 5 cc of 1% Lidocaine. A 22-gauge 3.5 inch inch spinal needle was carefully advanced under fluoroscopic guidance to the bilateral L5 transforaminal spaces. At this time 0.25 cc of omnipaque administered. Once proper placement was confirmed, 2 cc of 0.25% Marcaine and 80 mg of Kenalog were injected through the spinal needle at each site. Fluoroscopic guidance was used intermittently over a 10-minute period to insure proper needle placement and patient safety. A hard copy of the fluoroscopic  images has been placed in the patient's chart. The patient was monitored after the procedure and discharged home in stable fashion.      Resume Meds:   N/A    Nicolette Moncada MD  09/09/22

## 2022-10-20 ENCOUNTER — OFFICE VISIT (OUTPATIENT)
Dept: ORTHOPEDIC SURGERY | Age: 77
End: 2022-10-20
Payer: COMMERCIAL

## 2022-10-20 DIAGNOSIS — M48.062 SPINAL STENOSIS, LUMBAR REGION WITH NEUROGENIC CLAUDICATION: Primary | ICD-10-CM

## 2022-10-20 DIAGNOSIS — M43.16 SPONDYLOLISTHESIS OF LUMBAR REGION: ICD-10-CM

## 2022-10-20 PROCEDURE — 1123F ACP DISCUSS/DSCN MKR DOCD: CPT | Performed by: NURSE PRACTITIONER

## 2022-10-20 PROCEDURE — 99214 OFFICE O/P EST MOD 30 MIN: CPT | Performed by: NURSE PRACTITIONER

## 2022-10-20 RX ORDER — GABAPENTIN 100 MG/1
100-300 CAPSULE ORAL NIGHTLY
Qty: 90 CAPSULE | Refills: 0 | Status: SHIPPED | OUTPATIENT
Start: 2022-10-20 | End: 2022-11-19

## 2022-10-20 NOTE — PROGRESS NOTES
Name: Juanis Aguilar  YOB: 1945  Gender: male  MRN: [id number]    CC: Follow-up low back, bilateral leg pain and cramping    HPI: This is a 78y.o. year old male who has known severe spinal stenosis at L4-5 due to a disc herniation and spondylolisthesis. Patient previously underwent L4-5 MARIAA with 60% relief which lasted approximately 2 weeks. We then tried a bilateral L5 selective nerve root blocks which he states he did not obtain as much relief as he did with the MARIAA injection. He still has pain with ambulation. He has neurogenic claudication symptoms especially when laying down. He gets cramping in the posterior legs. He is unable to walk greater than 200 feet. He has pain that is a 6 out of 10. He denies any bladder or bowel changes. Conservative treatment thus far has included home exercises under my care and his primary doctor since June 1, 2022, steroids, Flexeril, previous epidurals. Thus far, the patient has tried muscle relaxers, oral steroids, spine injections , and physician directed home exercise program.    Current pain level: Activities limited by pain:          AMB PAIN ASSESSMENT 7/7/2022   Location of Pain Back   Location Modifiers Right;Left   Severity of Pain 5   Frequency of Pain Intermittent   Limiting Behavior Yes   Result of Injury No            No Known Allergies    Current Outpatient Medications:     gabapentin (NEURONTIN) 100 MG capsule, Take 1-3 capsules by mouth nightly for 30 days. , Disp: 90 capsule, Rfl: 0    losartan (COZAAR) 50 MG tablet, TAKE 1 TABLET BY MOUTH EVERY DAY, Disp: 90 tablet, Rfl: 3    azelastine (ASTELIN) 0.1 % nasal spray, 2 sprays by Nasal route 2 times daily Use in each nostril as directed, Disp: 60 mL, Rfl: 5    methylPREDNISolone (MEDROL DOSEPACK) 4 MG tablet, Take by mouth as directed.  Start in morning, Disp: 1 kit, Rfl: 0    B Complex-C (SUPER B COMPLEX PO), Take by mouth, Disp: , Rfl:     Multiple Vitamin (MULTIVITAMIN ADULT PO), Take 1 tablet by mouth daily, Disp: , Rfl:     cyanocobalamin ([employer] VITAMIN B12) 1000 MCG tablet, Take 1,000 mg by mouth, Disp: , Rfl:     albuterol sulfate  (90 Base) MCG/ACT inhaler, INHALE 1 PUFF BY MOUTH EVERY 6 HOURS AS NEEDED FOR WHEEZING, Disp: , Rfl:     aspirin 81 MG EC tablet, Take by mouth daily, Disp: , Rfl:     atorvastatin (LIPITOR) 20 MG tablet, TAKE 1 TABLET BY MOUTH EVERY DAY, Disp: , Rfl:     fluticasone (VERAMYST) 27.5 MCG/SPRAY nasal spray, 1 spray by Nasal route daily, Disp: , Rfl:     guaiFENesin (MUCINEX MAXIMUM STRENGTH) 1200 MG TB12, Take 1,200 mg by mouth daily as needed, Disp: , Rfl:     levocetirizine (XYZAL) 5 MG tablet, Take 5 mg by mouth daily, Disp: , Rfl:     mometasone (ELOCON) 0.1 % cream, Apply topically daily, Disp: , Rfl:     montelukast (SINGULAIR) 10 MG tablet, TAKE 1 TABLET BY MOUTH EVERY DAY, Disp: , Rfl:     nystatin (MYCOSTATIN) 581918 UNIT/GM cream, Apply topically 2 times daily, Disp: , Rfl:     pantoprazole (PROTONIX) 40 MG tablet, TAKE 1 TABLET BY MOUTH EVERY DAY, Disp: , Rfl:   Past Medical History:   Diagnosis Date    Larson esophagus     Current smoker     0.5 pack/day    GERD (gastroesophageal reflux disease)     on med for control     High frequency hearing loss     Hypercholesteremia     on med    Hypertension     on med for control     OA (osteoarthritis)     Rhinitis     SNHL (sensorineural hearing loss)     Tinnitus of both ears     Wheezing     pro-air inhaler prn; last used 3/12/18; [facility] was negative for asthma      Tobacco:  reports that he has quit smoking. He smoked an average of .5 packs per day. He has never used smokeless tobacco.  Alcohol:   Social History     Substance and Sexual Activity   Alcohol Use Yes          Radiographic Studies:       MRI Results (most recent):      Assessment/Plan:        ICD-10-CM    1. Spinal stenosis, lumbar region with neurogenic claudication  M48.062       2.  Spondylolisthesis of lumbar region  M43.16            Unfortunately the bilateral L5 nerve blocks did not give as much relief as the MARIAA. I told him we can go ahead and try for 1 more injection. Patient will be referred for an L4-5 MARIAA. I will also start him on gabapentin. Hopefully this will help with some of the neuropathic pain. We briefly discussed expected outcomes of gabapentin and expected side effects.    - Neuropathic pain treatment: For neuropathic pain relief the patient was given a prescription and counseled regarding the possibility of risks and complications from this medication.  - Injection: The patient will be referred for a lumbar steroid injection to help reduce the symptoms. The patient understands the risks including hyperglycemia, immunosuppression, meningitis, cerebrospinal fluid leak, epidural hematoma, and reaction to medication. The patient may benefit from additional injections depending on the response. The injection should be a L4-5 MARIAA      Orders Placed This Encounter   Medications    gabapentin (NEURONTIN) 100 MG capsule     Sig: Take 1-3 capsules by mouth nightly for 30 days. Dispense:  90 capsule     Refill:  0        No orders of the defined types were placed in this encounter. 4 This is a chronic illness/condition with exacerbation and progression      Return for Injection follow up. GEOVANI Kingsley - CNP  10/20/22      Elements of this note were created using speech recognition software. As such, errors of speech recognition may be present.

## 2022-10-21 NOTE — PROGRESS NOTES
[address] [place] [address] Court, [address] [facility]  [phone]    Patient Name:  Everitt Mortimer  YOB: 1945      Office Visit 10/24/2022    CHIEF COMPLAINT:    Chief Complaint   Patient presents with    New Patient     Sleep study results; snoring and possible sleep apnea       HISTORY OF PRESENT ILLNESS:      The patient presents in outpatient consultation at the request of Dr. Estella Reed for management of obstructive sleep apnea. Significant PMH of GERD, hypertension, hypercholesterolemia, osteoarthritis, and Larson esophagus. The diagnostic polysomnography was notable for an apnea hypopnea index of 11.3 including 14 obstructive apneas and 35 hypopneas. Oxygen desaturations are low as 72% were noted with SpO2 less than 89% for a total of 68.2 minutes of the test.  Significant cardiac arrhythmias were not evident. The patient was noted to have 27.3 limb movements with a limb movement arousal index being about 0.7. A subsequent CPAP titration study was conducted. CPAP levels as high as 10 cm were performed. CPAP was significantly effective in eliminating disordered breathing. CPAP was tolerated well by the patient. The patient reports feeling good the day following. He reports a long history of snoring as reported from his wife. States she has also told him that he will stop breathing while asleep. States he had COVID in August and is still not feeling well recovering from that. 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 States that pain in his back will often awaken him during the night and he will have to try to reposition to get comfortable. He denies any excessive daytime fatigue or sleepiness. Cosmos score is 3/24. Reports that he typically never takes a nap during the day but due to back pain last night he could take a nap today if he had a chance.   He denies ever having any headaches in the morning. States he usually sleeps from 11 PM until 6-7 AM and feels rested when he awakens. States that he is up 1-3 times per night to use the bathroom and recently can remember the night where he was up 4 times to go to the bathroom. He denies any excessive movement of his legs while sleeping. States that he does have leg pain but it has been persistent ever since he started having back issues. He denies any alcohol use, states he used to smoke, denies any illicit drug use. He does drink 3 cups of coffee in the mornings but his last cup is usually around 10 AM and he denies any caffeine for the rest of the day. Does not take any pain for sleep aids. States his weight has consistently been around 215-220 pounds over the last year. Denies any swelling in his ankles or feet. His blood pressure is controlled today.       SLEEP STUDY PSG-6/5/22        CPAP TITRATION STUDY-8/28/22      Sleep Medicine 10/24/2022   Sitting and reading 1   Watching TV 1   Sitting, inactive in a public place (e.g. a theatre or a meeting) 0   As a passenger in a car for an hour without a break 0   Lying down to rest in the afternoon when circumstances permit 1   Sitting and talking to someone 0   Sitting quietly after a lunch without alcohol 0   In a car, while stopped for a few minutes in traffic 0   Cope Sleepiness Score 3            Past Medical History:   Diagnosis Date    Larson esophagus     Current smoker     0.5 pack/day    GERD (gastroesophageal reflux disease)     on med for control     High frequency hearing loss     Hypercholesteremia     on med    Hypertension     on med for control     OA (osteoarthritis)     Rhinitis     SNHL (sensorineural hearing loss)     Tinnitus of both ears     Wheezing     pro-air inhaler prn; last used 3/12/18; [facility] was negative for asthma          Patient Active Problem List   Diagnosis    Hypertension    Osteoarthritis    Total knee replacement status, left    Hypercholesteremia           Past Surgical History:   Procedure Laterality Date    COLONOSCOPY      KNEE ARTHROSCOPY Right 2005    muscle repaired    TOTAL KNEE ARTHROPLASTY Left 04/03/2018         Social History     Socioeconomic History    Marital status:      Spouse name: Not on file    Number of children: Not on file    Years of education: Not on file    Highest education level: Not on file   Occupational History    Not on file   Tobacco Use    Smoking status: Former     Packs/day: 0.50     Types: Cigarettes    Smokeless tobacco: Never    Tobacco comments:     Quit smoking: smokes <1 pack of cigarettes per day   Substance and Sexual Activity    Alcohol use: Yes    Drug use: No    Sexual activity: Not on file   Other Topics Concern    Not on file   Social History Narrative    Not on file     Social Determinants of Health     Financial Resource Strain: Not on file   Food Insecurity: Not on file   Transportation Needs: Not on file   Physical Activity: Not on file   Stress: Not on file   Social Connections: Not on file   Intimate Partner Violence: Not on file   Housing Stability: Not on file         Family History   Problem Relation Age of Onset    Hypertension Brother     Depression Brother     Depression Father     Hypertension Father     High Cholesterol Mother     Hypertension Mother     Ovarian Cancer Sister     Heart Attack Maternal Grandmother     Heart Attack Maternal Grandfather     Heart Attack Paternal Grandmother          No Known Allergies      Current Outpatient Medications   Medication Sig    Misc Natural Products (GLUCOSAMINE CHOND COMPLEX/MSM PO) Take 2 tablets by mouth    gabapentin (NEURONTIN) 100 MG capsule Take 1-3 capsules by mouth nightly for 30 days.     losartan (COZAAR) 50 MG tablet TAKE 1 TABLET BY MOUTH EVERY DAY    azelastine (ASTELIN) 0.1 % nasal spray 2 sprays by Nasal route 2 times daily Use in each nostril as directed    B Complex-C (SUPER B COMPLEX PO) Take by mouth    Multiple Vitamin (MULTIVITAMIN ADULT PO) Take 1 tablet by mouth daily    cyanocobalamin 1000 MCG tablet Take 1,000 mg by mouth    albuterol sulfate  (90 Base) MCG/ACT inhaler INHALE 1 PUFF BY MOUTH EVERY 6 HOURS AS NEEDED FOR WHEEZING    aspirin 81 MG EC tablet Take by mouth daily    atorvastatin (LIPITOR) 20 MG tablet TAKE 1 TABLET BY MOUTH EVERY DAY    guaiFENesin (MUCINEX MAXIMUM STRENGTH) 1200 MG TB12 Take 1,200 mg by mouth daily as needed    levocetirizine (XYZAL) 5 MG tablet Take 5 mg by mouth daily    montelukast (SINGULAIR) 10 MG tablet TAKE 1 TABLET BY MOUTH EVERY DAY    pantoprazole (PROTONIX) 40 MG tablet TAKE 1 TABLET BY MOUTH EVERY DAY    methylPREDNISolone (MEDROL DOSEPACK) 4 MG tablet Take by mouth as directed. Start in morning (Patient not taking: No sig reported)     Current Facility-Administered Medications   Medication Dose Route Frequency    triamcinolone acetonide (KENALOG-40) injection 80 mg  80 mg IntraMUSCular Once           REVIEW OF SYSTEMS:   CONSTITUTIONAL:   There is no history of fever, chills, night sweats, weight loss, weight gain, persistent fatigue, or lethargy/hypersomnolence. EYES:   Denies problems with eye pain, erythema, blurred vision, or visual field loss. ENTM:   Denies history of tinnitus, epistaxis, sore throat, hoarseness, or dysphonia. LYMPH:   Denies swollen glands. CARDIAC:   No chest pain, pressure, discomfort, palpitations, orthopnea, murmurs, or edema. GI:   No dysphagia, heartburn reflux, nausea/vomiting, diarrhea, abdominal pain, or bleeding. :   Denies history of dysuria, hematuria, polyuria, or decreased urine output. MS:   No history of myalgias, arthralgias, bone pain, or muscle cramps. SKIN:   No history of rashes, jaundice, cyanosis, nodules, or ulcers. ENDO:   Negative for heat or cold intolerance. No history of DM. PSYCH:   Negative for anxiety, depression, insomnia, hallucinations.    NEURO:   There is no history of AMS, persistent headache, decreased level of consciousness, seizures, or motor or sensory deficits. PHYSICAL EXAM:    Vitals:    10/24/22 1437   BP: 124/78   Pulse: (!) 108   Resp: 16   Temp: 97.1 °F (36.2 °C)   SpO2: 96%   Weight: 216 lb 3.2 oz (98.1 kg)   Height: 5' 7\" (1.702 m)        GENERAL APPEARANCE:   The patient is over weight and in no respiratory distress. HEENT:   PERRL. Conjunctivae unremarkable. Nasal mucosa is without epistaxis, exudate, or polyps. Nares patent bilateral.  Gums and dentition are unremarkable. There is oropharyngeal narrowing. Teague score 3. NECK/LYMPHATIC:   Symmetrical with no elevation of jugular venous pulsation. Trachea midline. No thyroid enlargement. No cervical adenopathy. LUNGS:   Normal respiratory effort with symmetrical lung expansion. Breath sounds clear. HEART:   There is a regular rate and rhythm. No murmur, rub, or gallop. There is no edema in the lower extremities. ABDOMEN:   Soft and non-tender. No hepatosplenomegaly. Bowel sounds are normal.     SKIN:   There are no rashes, cyanosis, jaundice, or ecchymosis present. EXTREMITIES:   The extremities are unremarkable without clubbing, cyanosis, joint inflammation, degenerative, or ischemic change. MUSCULOSKELETAL:   There is no abnormal tone, muscle atrophy, or abnormal movement present. NEURO:   The patient is alert and oriented to person, place, and time. Memory appears intact and mood is normal.  No gross sensorimotor deficits are present. ASSESSMENT:  (Medical Decision Making)         ICD-10-CM    1. LEOBARDO (obstructive sleep apnea)  G47.33 DME - DURABLE MEDICAL EQUIPMENT -mild sleep apnea with AHI of 11.3 and lowest oxygen saturation of 72%. The pathophysiology of obstructive sleep apnea was reviewed with the patient. It's potential to promote severe neurologic, cardiac, pulmonary, and gastrointestinal problems was discussed.  Specifically, the increased incidence of hypertension, coronary artery disease, congestive heart failure, pulmonary hypertension, gastroesophageal reflux, pathologic hypersomnolence, memory loss, and glucose intolerance was related to the consequences of hypoxemia, hypercapnia, airway obstruction, and sympathetic overdrive. We also discussed the ability of nasal CPAP to correct these abnormalities through maintenance of a patent airway. Therapeutic options including surgery, oral appliances, and weight loss were also reviewed. Patient is in agreement with starting CPAP therapy as treatment plan. Medicare compliance discussed with patient. Minimally 4 hours nightly, every night, 70% of the time, which is 21/30 days over 4 hours. Our goal is for you to wear the CPAP the entire night's sleep. 2. Snoring  R06.83 Start CPAP therapy. Positional therapy to assure sleeping in the side-lying position and avoid sleeping supine. 3. Nocturnal polyuria  R35.81 Uncontrolled sleep apnea may be contributing to this. Reevaluate after starting CPAP. 4. Nocturnal hypoxemia  G47.34 Hypoxemia controlled with CPAP therapy during titration. We will plan to check overnight oximetry after becoming compliant with CPAP. PLAN:  Start CPAP 10 cm H2O with nightly compliance  New CPAP set up and supplies ordered  Order overnight oximetry at follow-up if tolerating and compliant with CPAP. Recommendations as above  Appropriate handouts were given to patient including information on sleep apnea, sleep hygiene and PAP therapy.   Follow up with the Sleep Center will be 4 months or sooner if needed          Orders Placed This Encounter   Procedures    DME - [address] [facility]  Phone: [phone] [employer] [address] [address]  Dept: [phone]      Patient Name: Maribell Gardner  : 1945  Gender: male  Address: [address] [place] [address]  [id number]  Patient phone number: [phone] (home)         Primary Insurance: Payor: Danny Yogi / Plan: Danny Calix / Product Type: *No Product type* /   Subscriber ID: [id number] - (Commercial)      AMB Supply Order  Order Details     DME Location:   Order Date: 10/24/2022   The primary encounter diagnosis was LEOBARDO (obstructive sleep apnea). Diagnoses of Snoring and Nocturnal polyuria were also pertinent to this visit.           (  X   )New Set-Up     CPAP machine   (  x   ) CPAP Unit  (     ) Auto CPAP Unit  (     ) BiLevel Unit  (     ) Auto BiLevel Unit  (     ) ASV         Length of need: 12 months    Pressure: 10  cmH20  EPR: 2  Ramp Time:   min    Starting Ramp Pressure:    cm H20    Patient had a diagnostic Apnea Hypopnea Index (AHI) of :  11.3    *SUPPLIES* Replace all as needed, or per coverage guidelines     Masks Type: full face    ( x  ) -Full Face Mask (1 per 3 mon)   <<< F&P vitera, size medium >>>  ( x   ) -Full Mask (1 per month) Interface/Cushion      (    ) -Nasal Mask (1 per 3 mon)  (    ) - Nasal Mask (1 per month) Interface/Cushion  (    ) -Pillow (2 per mon)  (    ) -Mftnvjmbp (1 per 6 mon)      _________________________________________________________________          Other Supplies:    (  X   )-Dnmsjtsh (1 per 6 mon)  ( X    )-Kwzxxz Tubing (1 per 3 mon)  (  X   )- Disposable Filter (2 per mon)  (   X  )-Khpqzr Humidifier (1 per year)     (     )-Pxrqjfqxk (sometimes used with Full Face Mask) (1 per 6 mos)  (     )-Tubing-without heat (1 per 3 mos)  (     )-Non-Disposable Filter (1 per 6 mos)  (     )-Water Chamber (1 per 6 mos)  (     )-Humidifier non-heated (1 per 5 yrs)      Signed Date: 10/24/2022  Electronically Signed By: [address], APRN - ZOIE         Collaborating Physician: Dr. Sonia Perry     Over 50% of today's office visit was spent in face to face time reviewing test results, prognosis, importance of compliance, education about disease process, benefits of medications, instructions for management of acute flare-ups, and follow up plans. Total face to face time spent with patient was 40 minutes.     [address], APRN - CNP  Electronically signed

## 2022-10-24 ENCOUNTER — OFFICE VISIT (OUTPATIENT)
Dept: SLEEP MEDICINE | Age: 77
End: 2022-10-24
Payer: COMMERCIAL

## 2022-10-24 ENCOUNTER — OFFICE VISIT (OUTPATIENT)
Dept: FAMILY MEDICINE CLINIC | Facility: CLINIC | Age: 77
End: 2022-10-24
Payer: MEDICARE

## 2022-10-24 VITALS
TEMPERATURE: 97.1 F | WEIGHT: 216.2 LBS | HEART RATE: 108 BPM | SYSTOLIC BLOOD PRESSURE: 124 MMHG | RESPIRATION RATE: 16 BRPM | OXYGEN SATURATION: 96 % | HEIGHT: 67 IN | DIASTOLIC BLOOD PRESSURE: 78 MMHG | BODY MASS INDEX: 33.93 KG/M2

## 2022-10-24 VITALS
SYSTOLIC BLOOD PRESSURE: 120 MMHG | BODY MASS INDEX: 33.78 KG/M2 | WEIGHT: 215.2 LBS | DIASTOLIC BLOOD PRESSURE: 72 MMHG | HEIGHT: 67 IN

## 2022-10-24 DIAGNOSIS — G47.33 OSA (OBSTRUCTIVE SLEEP APNEA): Primary | ICD-10-CM

## 2022-10-24 DIAGNOSIS — E78.00 PURE HYPERCHOLESTEROLEMIA: ICD-10-CM

## 2022-10-24 DIAGNOSIS — I10 PRIMARY HYPERTENSION: ICD-10-CM

## 2022-10-24 DIAGNOSIS — M48.07 SPINAL STENOSIS OF LUMBOSACRAL REGION: ICD-10-CM

## 2022-10-24 DIAGNOSIS — R35.81 NOCTURNAL POLYURIA: ICD-10-CM

## 2022-10-24 DIAGNOSIS — G47.34 NOCTURNAL HYPOXEMIA: ICD-10-CM

## 2022-10-24 DIAGNOSIS — G47.33 OSA (OBSTRUCTIVE SLEEP APNEA): ICD-10-CM

## 2022-10-24 DIAGNOSIS — R06.83 SNORING: ICD-10-CM

## 2022-10-24 DIAGNOSIS — R53.83 FATIGUE, UNSPECIFIED TYPE: Primary | ICD-10-CM

## 2022-10-24 PROCEDURE — 36415 COLL VENOUS BLD VENIPUNCTURE: CPT | Performed by: FAMILY MEDICINE

## 2022-10-24 PROCEDURE — 99214 OFFICE O/P EST MOD 30 MIN: CPT | Performed by: FAMILY MEDICINE

## 2022-10-24 PROCEDURE — 99213 OFFICE O/P EST LOW 20 MIN: CPT | Performed by: NURSE PRACTITIONER

## 2022-10-24 PROCEDURE — 1123F ACP DISCUSS/DSCN MKR DOCD: CPT | Performed by: NURSE PRACTITIONER

## 2022-10-24 PROCEDURE — 1123F ACP DISCUSS/DSCN MKR DOCD: CPT | Performed by: FAMILY MEDICINE

## 2022-10-24 ASSESSMENT — PATIENT HEALTH QUESTIONNAIRE - PHQ9
1. LITTLE INTEREST OR PLEASURE IN DOING THINGS: 0
SUM OF ALL RESPONSES TO PHQ QUESTIONS 1-9: 0
SUM OF ALL RESPONSES TO PHQ9 QUESTIONS 1 & 2: 0
SUM OF ALL RESPONSES TO PHQ QUESTIONS 1-9: 0
2. FEELING DOWN, DEPRESSED OR HOPELESS: 0

## 2022-10-24 ASSESSMENT — SLEEP AND FATIGUE QUESTIONNAIRES
HOW LIKELY ARE YOU TO NOD OFF OR FALL ASLEEP IN A CAR, WHILE STOPPED FOR A FEW MINUTES IN TRAFFIC: 0
HOW LIKELY ARE YOU TO NOD OFF OR FALL ASLEEP WHEN YOU ARE A PASSENGER IN A CAR FOR AN HOUR WITHOUT A BREAK: 0
HOW LIKELY ARE YOU TO NOD OFF OR FALL ASLEEP WHILE WATCHING TV: 1
HOW LIKELY ARE YOU TO NOD OFF OR FALL ASLEEP WHILE SITTING INACTIVE IN A PUBLIC PLACE: 0
HOW LIKELY ARE YOU TO NOD OFF OR FALL ASLEEP WHILE LYING DOWN TO REST IN THE AFTERNOON WHEN CIRCUMSTANCES PERMIT: 1
HOW LIKELY ARE YOU TO NOD OFF OR FALL ASLEEP WHILE SITTING QUIETLY AFTER LUNCH WITHOUT ALCOHOL: 0
HOW LIKELY ARE YOU TO NOD OFF OR FALL ASLEEP WHILE SITTING AND READING: 1
ESS TOTAL SCORE: 3
HOW LIKELY ARE YOU TO NOD OFF OR FALL ASLEEP WHILE SITTING AND TALKING TO SOMEONE: 0

## 2022-10-24 NOTE — PROGRESS NOTES
SUBJECTIVE:   Cynthia Borges is a 78 y.o. male who has a past medical history significant for hypertension, high cholesterol and reflux with Larson's esophagus. Patient presents today reporting that he had COVID in August.  Since then he feels like his fatigue and energy level has diminished significantly. Patient reports having some pre-COVID low energy but it is definitively worsened since having COVID in August.  He reports no ongoing cough, chest pain or shortness of breath. He reports that he feels like he sleeps well and very rarely is disrupted. However he has had 2 sleep studies. Review of the medical record reveals what appears to be a diagnosis of sleep apnea that is being addressed later today by the pulmonologist that he is seeing. His current medicines are listed in the EMR and reviewed today. He reports no orthopnea or PND. No active chest pain. HPI  See above    Past Medical History, Past Surgical History, Family history, Social History, and Medications were all reviewed with the patient today and updated as necessary. Current Outpatient Medications   Medication Sig Dispense Refill    gabapentin (NEURONTIN) 100 MG capsule Take 1-3 capsules by mouth nightly for 30 days.  90 capsule 0    losartan (COZAAR) 50 MG tablet TAKE 1 TABLET BY MOUTH EVERY DAY 90 tablet 3    azelastine (ASTELIN) 0.1 % nasal spray 2 sprays by Nasal route 2 times daily Use in each nostril as directed 60 mL 5    B Complex-C (SUPER B COMPLEX PO) Take by mouth      Multiple Vitamin (MULTIVITAMIN ADULT PO) Take 1 tablet by mouth daily      cyanocobalamin 1000 MCG tablet Take 1,000 mg by mouth      albuterol sulfate  (90 Base) MCG/ACT inhaler INHALE 1 PUFF BY MOUTH EVERY 6 HOURS AS NEEDED FOR WHEEZING      aspirin 81 MG EC tablet Take by mouth daily      atorvastatin (LIPITOR) 20 MG tablet TAKE 1 TABLET BY MOUTH EVERY DAY      guaiFENesin (MUCINEX MAXIMUM STRENGTH) 1200 MG TB12 Take 1,200 mg by mouth daily as a active diagnosis treatment would likely help. 2.  Hypertension. /72. 3.  High cholesterol. Check lipid panel. With holding statin temporarily. 4.  Lumbosacral spinal stenosis. Neurontin is used to treat this condition which also may be contributing to his fatigue. 5.  Possible sleep apnea. Follow-up with pulmonary as scheduled. Elements of this note have been dictated using speech recognition software. As a result, errors of speech recognition may have occurred.

## 2022-10-24 NOTE — PATIENT INSTRUCTIONS
Start CPAP 10 cm H2O with nightly compliance  New CPAP set up and supplies ordered  Order overnight oximetry at follow-up if tolerating and compliant with CPAP. Recommendations as above  Appropriate handouts were given to patient including information on sleep apnea, sleep hygiene and PAP therapy.   Follow up with the Sleep Center will be 4 months or sooner if needed

## 2022-10-25 LAB
ALBUMIN SERPL-MCNC: 3.7 G/DL (ref 3.2–4.6)
ALBUMIN/GLOB SERPL: 1.3 {RATIO} (ref 0.4–1.6)
ALP SERPL-CCNC: 69 U/L (ref 50–136)
ALT SERPL-CCNC: 26 U/L (ref 12–65)
ANION GAP SERPL CALC-SCNC: 4 MMOL/L (ref 2–11)
AST SERPL-CCNC: 13 U/L (ref 15–37)
BASOPHILS # BLD: 0 K/UL (ref 0–0.2)
BASOPHILS NFR BLD: 0 % (ref 0–2)
BILIRUB SERPL-MCNC: 0.5 MG/DL (ref 0.2–1.1)
BUN SERPL-MCNC: 8 MG/DL (ref 8–23)
CALCIUM SERPL-MCNC: 9.5 MG/DL (ref 8.3–10.4)
CHLORIDE SERPL-SCNC: 105 MMOL/L (ref 101–110)
CHOLEST SERPL-MCNC: 149 MG/DL
CO2 SERPL-SCNC: 30 MMOL/L (ref 21–32)
CREAT SERPL-MCNC: 0.8 MG/DL (ref 0.8–1.5)
DIFFERENTIAL METHOD BLD: ABNORMAL
EOSINOPHIL # BLD: 0 K/UL (ref 0–0.8)
EOSINOPHIL NFR BLD: 0 % (ref 0.5–7.8)
ERYTHROCYTE [DISTWIDTH] IN BLOOD BY AUTOMATED COUNT: 13.8 % (ref 11.9–14.6)
GLOBULIN SER CALC-MCNC: 2.9 G/DL (ref 2.8–4.5)
GLUCOSE SERPL-MCNC: 112 MG/DL (ref 65–100)
HCT VFR BLD AUTO: 39.7 % (ref 41.1–50.3)
HDLC SERPL-MCNC: 55 MG/DL (ref 40–60)
HDLC SERPL: 2.7 {RATIO}
HGB BLD-MCNC: 12.9 G/DL (ref 13.6–17.2)
IMM GRANULOCYTES # BLD AUTO: 0 K/UL (ref 0–0.5)
IMM GRANULOCYTES NFR BLD AUTO: 0 % (ref 0–5)
LDLC SERPL CALC-MCNC: 73.6 MG/DL
LYMPHOCYTES # BLD: 2.2 K/UL (ref 0.5–4.6)
LYMPHOCYTES NFR BLD: 19 % (ref 13–44)
MCH RBC QN AUTO: 34.3 PG (ref 26.1–32.9)
MCHC RBC AUTO-ENTMCNC: 32.5 G/DL (ref 31.4–35)
MCV RBC AUTO: 105.6 FL (ref 82–102)
MONOCYTES # BLD: 1 K/UL (ref 0.1–1.3)
MONOCYTES NFR BLD: 8 % (ref 4–12)
NEUTS SEG # BLD: 8.2 K/UL (ref 1.7–8.2)
NEUTS SEG NFR BLD: 73 % (ref 43–78)
NRBC # BLD: 0 K/UL (ref 0–0.2)
PLATELET # BLD AUTO: 343 K/UL (ref 150–450)
PMV BLD AUTO: 10.1 FL (ref 9.4–12.3)
POTASSIUM SERPL-SCNC: 4 MMOL/L (ref 3.5–5.1)
PROT SERPL-MCNC: 6.6 G/DL (ref 6.3–8.2)
RBC # BLD AUTO: 3.76 M/UL (ref 4.23–5.6)
SODIUM SERPL-SCNC: 139 MMOL/L (ref 133–143)
TRIGL SERPL-MCNC: 102 MG/DL (ref 35–150)
TSH, 3RD GENERATION: 0.72 UIU/ML (ref 0.36–3.74)
VLDLC SERPL CALC-MCNC: 20.4 MG/DL (ref 6–23)
WBC # BLD AUTO: 11.4 K/UL (ref 4.3–11.1)

## 2022-10-27 ENCOUNTER — OFFICE VISIT (OUTPATIENT)
Dept: ORTHOPEDIC SURGERY | Age: 77
End: 2022-10-27
Payer: COMMERCIAL

## 2022-10-27 DIAGNOSIS — M54.17 LUMBOSACRAL RADICULOPATHY: Primary | ICD-10-CM

## 2022-10-27 PROCEDURE — 62323 NJX INTERLAMINAR LMBR/SAC: CPT | Performed by: PHYSICAL MEDICINE & REHABILITATION

## 2022-10-27 RX ORDER — TRIAMCINOLONE ACETONIDE 40 MG/ML
100 INJECTION, SUSPENSION INTRA-ARTICULAR; INTRAMUSCULAR ONCE
Status: COMPLETED | OUTPATIENT
Start: 2022-10-27 | End: 2022-10-27

## 2022-10-27 RX ADMIN — TRIAMCINOLONE ACETONIDE 100 MG: 40 INJECTION, SUSPENSION INTRA-ARTICULAR; INTRAMUSCULAR at 16:09

## 2022-10-27 NOTE — PROGRESS NOTES
Date: 10/27/22   Name: Carlyle Reynolds    Pre-Procedural Diagnosis:    Diagnosis Orders   1. Lumbosacral radiculopathy  XR INJ SPINE THER SUBST LUM/SAC W IMG          Procedure: Lumbar Epidural Steroid Injection (MARIAA)    Precautions: [facility] Precautions spine injections: None. Patient denies any prior sensitivity to steroid, local anesthetic, contrast dye, iodine or shellfish. The procedure was discussed at length with the patient and informed consent was signed. The patient was placed in a prone position on the fluoroscopy table and the skin was prepped and draped in a routine sterile fashion. The area to be injected was anesthetized with approximately 5 cc of 1% Lidocaine. Initially a 22-gauge 3.5 inch inch spinal needle was carefully advanced under fluoroscopic guidance to the right L4-L5 epidural space. At this time 0.25 cc of omnipaque administered. . Once proper placement was confirmed, 1.5 cc of sterile water and 100 mg of Kenalog were injected through the spinal needle. Fluoroscopic guidance was used intermittently over a 10-minute period to insure proper needle placement and patient safety. A hard copy of the fluoroscopic  images has been placed in the patient's chart. The patient was monitored after the procedure and discharged home without complication.      Resume Meds:  N/A    Venkat Massey MD  10/27/22

## 2022-11-07 RX ORDER — PANTOPRAZOLE SODIUM 40 MG/1
TABLET, DELAYED RELEASE ORAL
Qty: 90 TABLET | Refills: 3 | Status: SHIPPED | OUTPATIENT
Start: 2022-11-07

## 2022-11-07 RX ORDER — MONTELUKAST SODIUM 10 MG/1
TABLET ORAL
Qty: 90 TABLET | Refills: 3 | Status: SHIPPED | OUTPATIENT
Start: 2022-11-07

## 2022-11-07 RX ORDER — ATORVASTATIN CALCIUM 20 MG/1
TABLET, FILM COATED ORAL
Qty: 90 TABLET | Refills: 3 | Status: SHIPPED | OUTPATIENT
Start: 2022-11-07 | End: 2022-11-14 | Stop reason: SDUPTHER

## 2022-11-10 ENCOUNTER — OFFICE VISIT (OUTPATIENT)
Dept: ORTHOPEDIC SURGERY | Age: 77
End: 2022-11-10
Payer: COMMERCIAL

## 2022-11-10 DIAGNOSIS — M43.16 SPONDYLOLISTHESIS OF LUMBAR REGION: Primary | ICD-10-CM

## 2022-11-10 DIAGNOSIS — M48.062 SPINAL STENOSIS, LUMBAR REGION WITH NEUROGENIC CLAUDICATION: ICD-10-CM

## 2022-11-10 PROCEDURE — 99214 OFFICE O/P EST MOD 30 MIN: CPT | Performed by: NURSE PRACTITIONER

## 2022-11-10 PROCEDURE — 1123F ACP DISCUSS/DSCN MKR DOCD: CPT | Performed by: NURSE PRACTITIONER

## 2022-11-10 RX ORDER — GABAPENTIN 100 MG/1
100-300 CAPSULE ORAL 3 TIMES DAILY
Qty: 270 CAPSULE | Refills: 0 | Status: SHIPPED | OUTPATIENT
Start: 2022-11-10 | End: 2022-12-10

## 2022-11-10 NOTE — PROGRESS NOTES
Name: Layne Cotton  YOB: 1945  Gender: male  MRN: [id number]    CC: Follow-up low back and bilateral leg pain    HPI: This is a 78y.o. year old male who returns today after lumbar injections. She has known severe spinal stenosis at L4-5 and spondylolisthesis. He recently underwent his third epidural injection. This was an L4-5 MARIAA. He got about 60% relief. Greatest relief was a for about a week and a half and then his pain is started to increase. He still continues to have low back and bilateral pain with some neurogenic claudication symptoms. He currently is taking 200 mg of gabapentin at night. He is tolerating this without side effects. He is limited by his neurogenic claudication symptoms. AMB PAIN ASSESSMENT 7/7/2022   Location of Pain Back   Location Modifiers Right;Left   Severity of Pain 5   Frequency of Pain Intermittent   Limiting Behavior Yes   Result of Injury No           No Known Allergies    Current Outpatient Medications:     gabapentin (NEURONTIN) 100 MG capsule, Take 1-3 capsules by mouth 3 times daily for 30 days. , Disp: 270 capsule, Rfl: 0    pantoprazole (PROTONIX) 40 MG tablet, TAKE 1 TABLET BY MOUTH EVERY DAY, Disp: 90 tablet, Rfl: 3    atorvastatin (LIPITOR) 20 MG tablet, TAKE 1 TABLET BY MOUTH EVERY DAY, Disp: 90 tablet, Rfl: 3    montelukast (SINGULAIR) 10 MG tablet, TAKE 1 TABLET BY MOUTH EVERY DAY, Disp: 90 tablet, Rfl: 3    Misc Natural Products (GLUCOSAMINE CHOND COMPLEX/MSM PO), Take 2 tablets by mouth, Disp: , Rfl:     losartan (COZAAR) 50 MG tablet, TAKE 1 TABLET BY MOUTH EVERY DAY, Disp: 90 tablet, Rfl: 3    azelastine (ASTELIN) 0.1 % nasal spray, 2 sprays by Nasal route 2 times daily Use in each nostril as directed, Disp: 60 mL, Rfl: 5    methylPREDNISolone (MEDROL DOSEPACK) 4 MG tablet, Take by mouth as directed.  Start in morning (Patient not taking: No sig reported), Disp: 1 kit, Rfl: 0    B Complex-C (SUPER B COMPLEX PO), Take by mouth, Disp: , Rfl:     Multiple Vitamin (MULTIVITAMIN ADULT PO), Take 1 tablet by mouth daily, Disp: , Rfl:     cyanocobalamin 1000 MCG tablet, Take 1,000 mg by mouth, Disp: , Rfl:     albuterol sulfate  (90 Base) MCG/ACT inhaler, INHALE 1 PUFF BY MOUTH EVERY 6 HOURS AS NEEDED FOR WHEEZING, Disp: , Rfl:     aspirin 81 MG EC tablet, Take by mouth daily, Disp: , Rfl:     guaiFENesin (MUCINEX MAXIMUM STRENGTH) 1200 MG TB12, Take 1,200 mg by mouth daily as needed, Disp: , Rfl:     levocetirizine (XYZAL) 5 MG tablet, Take 5 mg by mouth daily, Disp: , Rfl:   Past Medical History:   Diagnosis Date    Larson esophagus     Current smoker     0.5 pack/day    GERD (gastroesophageal reflux disease)     on med for control     High frequency hearing loss     Hypercholesteremia     on med    Hypertension     on med for control     OA (osteoarthritis)     Rhinitis     SNHL (sensorineural hearing loss)     Tinnitus of both ears     Wheezing     pro-air inhaler prn; last used 3/12/18; [facility] was negative for asthma      Tobacco:  reports that he has quit smoking. His smoking use included cigarettes. He smoked an average of .5 packs per day. He has never used smokeless tobacco.  Alcohol:   Social History     Substance and Sexual Activity   Alcohol Use Yes          Radiographic Studies:       Assessment/Plan:        ICD-10-CM    1. Spondylolisthesis of lumbar region  M43.16       2. Spinal stenosis, lumbar region with neurogenic claudication  M48.062            Patient has severe spinal stenosis and spondylolisthesis. We discussed surgical intervention but he really is not interested in this. He has had 3 injections. I do not recommend proceeding with another one because they are not giving the greatest lasting relief. We can save this for any emergency situation if he develops severe pain. I would recommend increasing his gabapentin. We talked about titrating from 100 to 300 mg 3 times a day.   We discussed how to increase this. He will follow-up as needed. - Neuropathic pain treatment: For neuropathic pain relief the patient was given a prescription and counseled regarding the possibility of risks and complications from this medication. Orders Placed This Encounter   Medications    gabapentin (NEURONTIN) 100 MG capsule     Sig: Take 1-3 capsules by mouth 3 times daily for 30 days. Dispense:  270 capsule     Refill:  0        No orders of the defined types were placed in this encounter. 4 This is a chronic illness/condition with exacerbation and progression      Return if symptoms worsen or fail to improve. GEOVANI Monge CNP  11/11/22      Elements of this note were created using speech recognition software. As such, errors of speech recognition may be present.

## 2022-11-14 ENCOUNTER — OFFICE VISIT (OUTPATIENT)
Dept: FAMILY MEDICINE CLINIC | Facility: CLINIC | Age: 77
End: 2022-11-14
Payer: COMMERCIAL

## 2022-11-14 VITALS
WEIGHT: 222 LBS | BODY MASS INDEX: 34.84 KG/M2 | HEIGHT: 67 IN | DIASTOLIC BLOOD PRESSURE: 70 MMHG | SYSTOLIC BLOOD PRESSURE: 110 MMHG

## 2022-11-14 DIAGNOSIS — M48.07 SPINAL STENOSIS OF LUMBOSACRAL REGION: ICD-10-CM

## 2022-11-14 DIAGNOSIS — R53.82 CHRONIC FATIGUE: ICD-10-CM

## 2022-11-14 DIAGNOSIS — E78.00 PURE HYPERCHOLESTEROLEMIA: ICD-10-CM

## 2022-11-14 DIAGNOSIS — G47.33 OSA (OBSTRUCTIVE SLEEP APNEA): Primary | ICD-10-CM

## 2022-11-14 PROCEDURE — 1123F ACP DISCUSS/DSCN MKR DOCD: CPT | Performed by: FAMILY MEDICINE

## 2022-11-14 PROCEDURE — 99214 OFFICE O/P EST MOD 30 MIN: CPT | Performed by: FAMILY MEDICINE

## 2022-11-14 PROCEDURE — 3074F SYST BP LT 130 MM HG: CPT | Performed by: FAMILY MEDICINE

## 2022-11-14 PROCEDURE — 3078F DIAST BP <80 MM HG: CPT | Performed by: FAMILY MEDICINE

## 2022-11-14 RX ORDER — ATORVASTATIN CALCIUM 20 MG/1
TABLET, FILM COATED ORAL
Qty: 90 TABLET | Refills: 3
Start: 2022-11-14

## 2022-11-14 ASSESSMENT — PATIENT HEALTH QUESTIONNAIRE - PHQ9
SUM OF ALL RESPONSES TO PHQ QUESTIONS 1-9: 0
1. LITTLE INTEREST OR PLEASURE IN DOING THINGS: 0
SUM OF ALL RESPONSES TO PHQ9 QUESTIONS 1 & 2: 0
SUM OF ALL RESPONSES TO PHQ QUESTIONS 1-9: 0
2. FEELING DOWN, DEPRESSED OR HOPELESS: 0

## 2022-11-14 NOTE — PROGRESS NOTES
SUBJECTIVE:   Bimal Macedo is a 78 y.o. male who has a past medical history significant for hypertension, high cholesterol and reflux with Larson's esophagus. At previous visit the patient had presented with ongoing struggles with fatigue and low energy. I had him withhold his Lipitor and check TSH, CBC and metabolic panel. During this timeframe he was also being worked up for sleep apnea. This apparently was a accurate diagnosis and the patient is now on CPAP. He states that he is feeling significantly better and is noticing after only 10 days of CPAP a big improvement in his energy and sleep pattern. He reports no active chest pain, shortness of breath, orthopnea or PND. HPI  See above    Past Medical History, Past Surgical History, Family history, Social History, and Medications were all reviewed with the patient today and updated as necessary. Current Outpatient Medications   Medication Sig Dispense Refill    atorvastatin (LIPITOR) 20 MG tablet TAKE 1 TABLET BY MOUTH EVERY DAY 90 tablet 3    gabapentin (NEURONTIN) 100 MG capsule Take 1-3 capsules by mouth 3 times daily for 30 days.  270 capsule 0    pantoprazole (PROTONIX) 40 MG tablet TAKE 1 TABLET BY MOUTH EVERY DAY 90 tablet 3    montelukast (SINGULAIR) 10 MG tablet TAKE 1 TABLET BY MOUTH EVERY DAY 90 tablet 3    Misc Natural Products (GLUCOSAMINE CHOND COMPLEX/MSM PO) Take 2 tablets by mouth      losartan (COZAAR) 50 MG tablet TAKE 1 TABLET BY MOUTH EVERY DAY 90 tablet 3    B Complex-C (SUPER B COMPLEX PO) Take by mouth      Multiple Vitamin (MULTIVITAMIN ADULT PO) Take 1 tablet by mouth daily      cyanocobalamin 1000 MCG tablet Take 1,000 mg by mouth      albuterol sulfate  (90 Base) MCG/ACT inhaler INHALE 1 PUFF BY MOUTH EVERY 6 HOURS AS NEEDED FOR WHEEZING      aspirin 81 MG EC tablet Take by mouth daily      guaiFENesin (MUCINEX MAXIMUM STRENGTH) 1200 MG TB12 Take 1,200 mg by mouth daily as needed      levocetirizine (XYZAL) 5 MG tablet Take 5 mg by mouth daily       Current Facility-Administered Medications   Medication Dose Route Frequency Provider Last Rate Last Admin    triamcinolone acetonide (KENALOG-40) injection 80 mg  80 mg IntraMUSCular Once Erick Giraldo MD         No Known Allergies  Patient Active Problem List   Diagnosis    Hypertension    Osteoarthritis    Total knee replacement status, left    Hypercholesteremia    Spinal stenosis of lumbosacral region     Past Medical History:   Diagnosis Date    Larson esophagus     Current smoker     0.5 pack/day    GERD (gastroesophageal reflux disease)     on med for control     High frequency hearing loss     Hypercholesteremia     on med    Hypertension     on med for control     OA (osteoarthritis)     Rhinitis     SNHL (sensorineural hearing loss)     Tinnitus of both ears     Wheezing     pro-air inhaler prn; last used 3/12/18; [facility] work-[facility] was negative for asthma      Past Surgical History:   Procedure Laterality Date    COLONOSCOPY      KNEE ARTHROSCOPY Right 2005    muscle repaired    TOTAL KNEE ARTHROPLASTY Left 04/03/2018     Family History   Problem Relation Age of Onset    Hypertension Brother     Depression Brother     Depression Father     Hypertension Father     High Cholesterol Mother     Hypertension Mother     Ovarian Cancer Sister     Heart Attack Maternal Grandmother     Heart Attack Maternal Grandfather     Heart Attack Paternal Grandmother      Social History     Tobacco Use    Smoking status: Former     Packs/day: 0.50     Types: Cigarettes    Smokeless tobacco: Never    Tobacco comments:     Quit smoking: smokes <1 pack of cigarettes per day   Substance Use Topics    Alcohol use: Yes         Review of Systems  See above    OBJECTIVE:  /70 (Site: Left Upper Arm, Position: Sitting)   Ht 5' 7\" (1.702 m)   Wt 222 lb (100.7 kg)   BMI 34.77 kg/m²      Physical Exam  Constitutional:       General: He is not in acute distress. Appearance: Normal appearance. He is not ill-appearing. HENT:      Head: Normocephalic and atraumatic. Cardiovascular:      Rate and Rhythm: Normal rate and regular rhythm. Heart sounds: Normal heart sounds. No murmur heard. Pulmonary:      Effort: Pulmonary effort is normal.      Breath sounds: Normal breath sounds. No wheezing or rhonchi. Musculoskeletal:         General: Normal range of motion. Cervical back: Normal range of motion and neck supple. Right lower leg: No edema. Left lower leg: No edema. Skin:     General: Skin is warm. Findings: No rash. Neurological:      Mental Status: He is alert and oriented to person, place, and time. Psychiatric:         Mood and Affect: Mood normal.         Behavior: Behavior normal.         Thought Content: Thought content normal.         Judgment: Judgment normal.       Medical problems and test results were reviewed with the patient today. ASSESSMENT and PLAN    1. Sleep apnea. Continue CPAP. 2.  Fatigue. Labs negative. Much improved with treatment of newly diagnosed sleep apnea. 3.  High cholesterol. LDL 73. Previously 80. Restart statin. 4.  Lumbar sacral stenosis. Per orthopedics. Recent epidural injection. Elements of this note have been dictated using speech recognition software. As a result, errors of speech recognition may have occurred.

## 2023-01-09 ENCOUNTER — TELEPHONE (OUTPATIENT)
Dept: ORTHOPEDIC SURGERY | Age: 78
End: 2023-01-09

## 2023-01-09 DIAGNOSIS — M48.062 SPINAL STENOSIS OF LUMBAR REGION WITH NEUROGENIC CLAUDICATION: ICD-10-CM

## 2023-01-09 DIAGNOSIS — M43.16 SPONDYLOLISTHESIS OF LUMBAR REGION: Primary | ICD-10-CM

## 2023-02-22 NOTE — PROGRESS NOTES
[address] [place] [address] Court, [address] [facility]  [phone]    Patient Name:  Mercedez Rodriguez  YOB: 1945      Office Visit 2/24/2023    CHIEF COMPLAINT:    Chief Complaint   Patient presents with    CPAP/BiPAP    Sleep Apnea         HISTORY OF PRESENT ILLNESS: Patient is a 72-year-old male who presents today for follow-up of LEOBARDO. Diagnostic sleep study on 06/05/2022 with an AHI of 11.3 and lowest oxygen saturation of 72%. He is prescribed cpap therapy with a humidifier set at 10 cm with a full face mask. Most recent download reveals AHI on PAP therapy is 1.9, leak is 8.7 and the hourly usage is 7 hours 35 minutes nightly. The overall use is 675 hours with days greater than four hours at 88/90. The patient is compliant with the Pap therapy and is feeling better as a result. He reports that he has not really noticed a big difference in his sleep or how he feels in the morning after starting CPAP but he does note that it is better for his overall health and is happy with how well it is working. We did discuss previous things that were bothering him prior to starting CPAP to include having to get up and go to the restroom 3-4 times per night. He reports that he no longer gets up at all to go to the bathroom anymore. This is one big change that he did not realize until we started discussing it. He denies any excessive daytime sleepiness or fatigue and states he awakens most mornings fully rested. Barnet score 6/24. He denies any major medical changes over the last 4 months. Reports that his weight has consistently been around 220-230 pounds. We did discuss decreasing carbohydrates in his diet and trying to increase his activity by walking. His blood pressure is slightly elevated today at 152/80. He states he was rushing to get to his appointment and the traffic was bad.   His heart rate is also elevated at 119 upon check-in but he is asymptomatic with this elevated heart rate and attributes this to stress as well. Advised him to monitor his blood pressure and heart rate and if it remains elevated to follow-up with his primary care provider                Sleep Medicine 2/24/2023 10/24/2022   Sitting and reading 1 1   Watching TV 1 1   Sitting, inactive in a public place (e.g. a theatre or a meeting) 1 0   As a passenger in a car for an hour without a break 0 0   Lying down to rest in the afternoon when circumstances permit 2 1   Sitting and talking to someone 0 0   Sitting quietly after a lunch without alcohol 1 0   In a car, while stopped for a few minutes in traffic 0 0   [place] Sleepiness Score 6 3              Past Medical History:   Diagnosis Date    Larson esophagus     Current smoker     0.5 pack/day    GERD (gastroesophageal reflux disease)     on med for control     High frequency hearing loss     Hypercholesteremia     on med    Hypertension     on med for control     OA (osteoarthritis)     LEOBARDO (obstructive sleep apnea) 2/24/2023    Rhinitis     SNHL (sensorineural hearing loss)     Tinnitus of both ears     Wheezing     pro-air inhaler prn; last used 3/12/18; [facility] work-[facility] was negative for asthma          Patient Active Problem List   Diagnosis    Hypertension    Osteoarthritis    Total knee replacement status, left    Hypercholesteremia    Spinal stenosis of lumbosacral region    LEOBARDO (obstructive sleep apnea)    Nocturnal hypoxemia    Nocturnal polyuria    Severe obesity (BMI 35.0-39. 9) with comorbidity [facility])           Past Surgical History:   Procedure Laterality Date    COLONOSCOPY      KNEE ARTHROSCOPY Right 2005    muscle repaired    TOTAL KNEE ARTHROPLASTY Left 04/03/2018         Social History     Socioeconomic History    Marital status:      Spouse name: Not on file    Number of children: Not on file    Years of education: Not on file    Highest education level: Not on file   Occupational History    Not on file   Tobacco Use    Smoking status: Former     Packs/day: 0.50     Types: Cigarettes    Smokeless tobacco: Never    Tobacco comments:     Quit smoking: smokes <1 pack of cigarettes per day   Substance and Sexual Activity    Alcohol use: Yes    Drug use: No    Sexual activity: Not on file   Other Topics Concern    Not on file   Social History Narrative    Not on file     Social Determinants of Health     Financial Resource Strain: Not on file   Food Insecurity: Not on file   Transportation Needs: Not on file   Physical Activity: Not on file   Stress: Not on file   Social Connections: Not on file   Intimate Partner Violence: Not on file   Housing Stability: Not on file         Family History   Problem Relation Age of Onset    Hypertension Brother     Depression Brother     Depression Father     Hypertension Father     High Cholesterol Mother     Hypertension Mother     Ovarian Cancer Sister     Heart Attack Maternal Grandmother     Heart Attack Maternal Grandfather     Heart Attack Paternal Grandmother          No Known Allergies      Current Outpatient Medications   Medication Sig    atorvastatin (LIPITOR) 20 MG tablet TAKE 1 TABLET BY MOUTH EVERY DAY    pantoprazole (PROTONIX) 40 MG tablet TAKE 1 TABLET BY MOUTH EVERY DAY    montelukast (SINGULAIR) 10 MG tablet TAKE 1 TABLET BY MOUTH EVERY DAY    Misc Natural Products (GLUCOSAMINE CHOND COMPLEX/MSM PO) Take 2 tablets by mouth    losartan (COZAAR) 50 MG tablet TAKE 1 TABLET BY MOUTH EVERY DAY    B Complex-C (SUPER B COMPLEX PO) Take by mouth    Multiple Vitamin (MULTIVITAMIN ADULT PO) Take 1 tablet by mouth daily    cyanocobalamin 1000 MCG tablet Take 1,000 mg by mouth    albuterol sulfate  (90 Base) MCG/ACT inhaler INHALE 1 PUFF BY MOUTH EVERY 6 HOURS AS NEEDED FOR WHEEZING    aspirin 81 MG EC tablet Take by mouth daily    guaiFENesin (MUCINEX MAXIMUM STRENGTH) 1200 MG TB12 Take 1,200 mg by mouth daily as needed    levocetirizine (XYZAL) 5 MG tablet Take 5 mg by mouth daily    gabapentin (NEURONTIN) 100 MG capsule Take 1-3 capsules by mouth 3 times daily for 30 days. Current Facility-Administered Medications   Medication Dose Route Frequency    triamcinolone acetonide (KENALOG-40) injection 80 mg  80 mg IntraMUSCular Once           REVIEW OF SYSTEMS:   CONSTITUTIONAL:   There is no history of fever, chills, night sweats, weight loss, weight gain, persistent fatigue, or lethargy/hypersomnolence. EYES:   Denies problems with eye pain, erythema, blurred vision, or visual field loss. ENTM:   Denies history of tinnitus, epistaxis, sore throat, hoarseness, or dysphonia. LYMPH:   Denies swollen glands. CARDIAC:   No chest pain, pressure, discomfort, palpitations, orthopnea, murmurs, or edema. GI:   No dysphagia, heartburn reflux, nausea/vomiting, diarrhea, abdominal pain, or bleeding. :   Denies history of dysuria, hematuria, polyuria, or decreased urine output. MS:   No history of myalgias, arthralgias, bone pain, or muscle cramps. SKIN:   No history of rashes, jaundice, cyanosis, nodules, or ulcers. ENDO:   Negative for heat or cold intolerance. No history of DM. PSYCH:   Negative for anxiety, depression, insomnia, hallucinations. NEURO:   There is no history of AMS, persistent headache, decreased level of consciousness, seizures, or motor or sensory deficits. PHYSICAL EXAM:    Vitals:    02/24/23 1457   BP: (!) 152/80   Pulse: (!) 119   Resp: 16   Temp: 98.1 °F (36.7 °C)   SpO2: 98%   Weight: 224 lb 8 oz (101.8 kg)   Height: 5' 7\" (1.702 m)          GENERAL APPEARANCE:   The patient is obese and in no respiratory distress. HEENT:   PERRL. Conjunctivae unremarkable. Nasal mucosa is without epistaxis, exudate, or polyps. Nares patent bilateral.  Gums and dentition are unremarkable. There is oropharyngeal narrowing. Teague score 3. NECK/LYMPHATIC:   Symmetrical with no elevation of jugular venous pulsation. Trachea midline.  No thyroid enlargement. No cervical adenopathy. LUNGS:   Normal respiratory effort with symmetrical lung expansion. Breath sounds clear. HEART:   There is a regular rate and rhythm. No murmur, rub, or gallop. There is no edema in the lower extremities. ABDOMEN:   Soft and non-tender. No hepatosplenomegaly. Bowel sounds are normal.     SKIN:   There are no rashes, cyanosis, jaundice, or ecchymosis present. EXTREMITIES:   The extremities are unremarkable without clubbing, cyanosis, joint inflammation, degenerative, or ischemic change. MUSCULOSKELETAL:   There is no abnormal tone, muscle atrophy, or abnormal movement present. NEURO:   The patient is alert and oriented to person, place, and time. Memory appears intact and mood is normal.  No gross sensorimotor deficits are present. ASSESSMENT:  (Medical Decision Making)         ICD-10-CM    1. LEOBARDO (obstructive sleep apnea)  G47.33 DME - DURABLE MEDICAL EQUIPMENT - Patient is using, compliant and benefiting from Pap therapy. Continue current settings as AHI is down to 1.9 events per hour. 2. Nocturnal hypoxemia  G47.34 Pulse oximetry, overnight. We will check overnight oximetry now that patient is compliant with CPAP to assure hypoxemia is corrected      3. Nocturnal polyuria  R35.81 Improved with CPAP and control of sleep apnea. 4. Severe obesity (BMI 35.0-39. 9) with comorbidity ([address]  E66.01 Patient can lose weight including ambulating 8-10,000 steps. Can Build Up Slowly as tolerated to this goal.    Also modifying dietary intake with decrease carbohydrates and sweets. Told to use avoid the P's --> Pizza, Pasta, Pastry, etc.  Increase fruits, vegetables, and lean meats e.g. [place] chicken or game meat. Patient is aware the goal is to consume less than 2000 javier/day, since patient is a nondiabetic. We discussed using the [employer] LOSE IT to count calories. Patient can police themselves.      If the future, if still having issues can use a more regimented diet e.g. [place], [address] etc. Clinical correlation suggested. PLAN:  Continue CPAP 10 cm H2O with nightly compliance  New supplies ordered  Overnight oximetry ordered   Recommendations as above  Follow up with the Sleep Center will be 6 months or sooner if needed          Orders Placed This Encounter   Procedures    Pulse oximetry, overnight     CHANTAL on CPAP therapy     Scheduling Instructions:      Please send out Overnight Oximetry on Room [employer]      Please Fax results to: [phone]            Please use [address] DME - [address] [facility]  Phone: 577[employer] [address] [place] [address] [address]  Dept: [phone]      Patient Name: Uli Rojas  : 1945  Gender: male  Address: [address] [place] [address]  [id number]  Patient phone: [phone] (home)       Primary Insurance: Payor: [employer] / Plan: [employer] PPO / Product Type: Medicare /   Subscriber ID: [id number] - (Medicare Managed)      AMB Supply Order  Order Details     DME Location: [facility]   Order Date: 2023   The primary encounter diagnosis was LEOBARDO (obstructive sleep apnea). Diagnoses of Nocturnal hypoxemia, Nocturnal polyuria, and Severe obesity (BMI 35.0-39. 9) with comorbidity ([address] were also pertinent to this visit.              (  X   )Supplies Needed       CPAP Machine   ( x    ) CPAP Unit  (     ) Auto CPAP Unit  (     ) BiLevel Unit  (     ) Auto BiLevel Unit  (     ) ASV   (     ) Bilevel ST      Length of need: 12 months    Pressure:  10 cmH20  EPR: 2     Starting Ramp Pressure:   cm H20  Ramp Time: min      Patient had a diagnostic Apnea Hypopnea Index (AHI) of :  11.3  *SUPPLIES* Replace all as needed, or per coverage guidelines     Masks Type:  ( x   ) -Full Face Mask (1 per 3 mon)  (  x  ) -Full Mask (1 per month) Interface/Cushion      (  ) -UKEVW Mask (1 per 3 mon)  (  ) - Nasal Mask (1 per month) Interface/Cushion  (     ) -Pillow (2 per mon)  (     ) -Bhqwpvioi (1 per 6 mon)            Other Supplies:    (   X  )-Gnkgusag (1 per 6 mon)  (   X  )-Yxgtei Tubing (1 per 3 mon)  (   X  )- Disposable Filter (2 per mon)  (   x  )-Rkaxen Humidifier (1 per year)     ( x    )-Jdsgpwidv (sometimes used with Full Face Mask) (1 per 6 mos)  (    )-Tubing-without heat (1 per 3 mos)  (     )-Non-Disposable Filter (1 per 6 mos)  (  x   )-Water Chamber (1 per 6 mos)  (     )-Humidifier non-heated (1 per 5 yrs)      Signed Date: 2/24/2023  Electronically Signed By: GEOVANI Reyes CNP  Electronically Dated:  2/24/2023           Collaborating Physician: Dr. Easton Sandoval     Over 50% of today's office visit was spent in face to face time reviewing test results, prognosis, importance of compliance, education about disease process, benefits of medications, instructions for management of acute flare-ups, and follow up plans. Total face to face time spent with patient was 40 minutes.     GEOVANI Reyes CNP  Electronically signed

## 2023-02-24 ENCOUNTER — OFFICE VISIT (OUTPATIENT)
Dept: SLEEP MEDICINE | Age: 78
End: 2023-02-24
Payer: MEDICARE

## 2023-02-24 VITALS
HEART RATE: 119 BPM | SYSTOLIC BLOOD PRESSURE: 152 MMHG | BODY MASS INDEX: 35.24 KG/M2 | TEMPERATURE: 98.1 F | DIASTOLIC BLOOD PRESSURE: 80 MMHG | RESPIRATION RATE: 16 BRPM | OXYGEN SATURATION: 98 % | HEIGHT: 67 IN | WEIGHT: 224.5 LBS

## 2023-02-24 DIAGNOSIS — R35.81 NOCTURNAL POLYURIA: ICD-10-CM

## 2023-02-24 DIAGNOSIS — G47.34 NOCTURNAL HYPOXEMIA: ICD-10-CM

## 2023-02-24 DIAGNOSIS — E66.01 SEVERE OBESITY (BMI 35.0-39.9) WITH COMORBIDITY (HCC): ICD-10-CM

## 2023-02-24 DIAGNOSIS — G47.33 OSA (OBSTRUCTIVE SLEEP APNEA): Primary | ICD-10-CM

## 2023-02-24 PROCEDURE — 1123F ACP DISCUSS/DSCN MKR DOCD: CPT | Performed by: NURSE PRACTITIONER

## 2023-02-24 PROCEDURE — 99213 OFFICE O/P EST LOW 20 MIN: CPT | Performed by: NURSE PRACTITIONER

## 2023-02-24 PROCEDURE — 3077F SYST BP >= 140 MM HG: CPT | Performed by: NURSE PRACTITIONER

## 2023-02-24 PROCEDURE — 3079F DIAST BP 80-89 MM HG: CPT | Performed by: NURSE PRACTITIONER

## 2023-02-24 ASSESSMENT — SLEEP AND FATIGUE QUESTIONNAIRES
HOW LIKELY ARE YOU TO NOD OFF OR FALL ASLEEP WHILE SITTING INACTIVE IN A PUBLIC PLACE: 1
HOW LIKELY ARE YOU TO NOD OFF OR FALL ASLEEP WHILE SITTING AND READING: 1
ESS TOTAL SCORE: 6
HOW LIKELY ARE YOU TO NOD OFF OR FALL ASLEEP WHILE SITTING AND TALKING TO SOMEONE: 0
HOW LIKELY ARE YOU TO NOD OFF OR FALL ASLEEP IN A CAR, WHILE STOPPED FOR A FEW MINUTES IN TRAFFIC: 0
HOW LIKELY ARE YOU TO NOD OFF OR FALL ASLEEP WHEN YOU ARE A PASSENGER IN A CAR FOR AN HOUR WITHOUT A BREAK: 0
HOW LIKELY ARE YOU TO NOD OFF OR FALL ASLEEP WHILE WATCHING TV: 1
HOW LIKELY ARE YOU TO NOD OFF OR FALL ASLEEP WHILE LYING DOWN TO REST IN THE AFTERNOON WHEN CIRCUMSTANCES PERMIT: 2
HOW LIKELY ARE YOU TO NOD OFF OR FALL ASLEEP WHILE SITTING QUIETLY AFTER LUNCH WITHOUT ALCOHOL: 1

## 2023-02-24 NOTE — PATIENT INSTRUCTIONS
Continue CPAP 10 cm H2O with nightly compliance  New supplies ordered  Overnight oximetry ordered   Recommendations as above  Follow up with the Sleep Center will be 6 months or sooner if needed

## 2023-02-27 RX ORDER — GABAPENTIN 100 MG/1
CAPSULE ORAL
Qty: 270 CAPSULE | Refills: 2 | Status: SHIPPED | OUTPATIENT
Start: 2023-02-27 | End: 2023-03-29

## 2023-03-21 ENCOUNTER — OFFICE VISIT (OUTPATIENT)
Dept: FAMILY MEDICINE CLINIC | Facility: CLINIC | Age: 78
End: 2023-03-21
Payer: MEDICARE

## 2023-03-21 VITALS
HEART RATE: 110 BPM | WEIGHT: 227.8 LBS | DIASTOLIC BLOOD PRESSURE: 80 MMHG | HEIGHT: 67 IN | OXYGEN SATURATION: 95 % | SYSTOLIC BLOOD PRESSURE: 128 MMHG | BODY MASS INDEX: 35.75 KG/M2

## 2023-03-21 DIAGNOSIS — R53.83 FATIGUE, UNSPECIFIED TYPE: ICD-10-CM

## 2023-03-21 DIAGNOSIS — I10 PRIMARY HYPERTENSION: ICD-10-CM

## 2023-03-21 DIAGNOSIS — G47.33 OSA (OBSTRUCTIVE SLEEP APNEA): ICD-10-CM

## 2023-03-21 DIAGNOSIS — E78.00 PURE HYPERCHOLESTEROLEMIA: ICD-10-CM

## 2023-03-21 DIAGNOSIS — G89.29 CHRONIC MIDLINE LOW BACK PAIN WITHOUT SCIATICA: ICD-10-CM

## 2023-03-21 DIAGNOSIS — K21.9 GERD WITHOUT ESOPHAGITIS: ICD-10-CM

## 2023-03-21 DIAGNOSIS — G89.4 PAIN SYNDROME, CHRONIC: ICD-10-CM

## 2023-03-21 DIAGNOSIS — M48.07 SPINAL STENOSIS OF LUMBOSACRAL REGION: ICD-10-CM

## 2023-03-21 DIAGNOSIS — M54.50 CHRONIC MIDLINE LOW BACK PAIN WITHOUT SCIATICA: ICD-10-CM

## 2023-03-21 DIAGNOSIS — Z00.00 MEDICARE ANNUAL WELLNESS VISIT, SUBSEQUENT: Primary | ICD-10-CM

## 2023-03-21 LAB
BASOPHILS # BLD: 0.1 K/UL (ref 0–0.2)
BASOPHILS NFR BLD: 0 % (ref 0–2)
DIFFERENTIAL METHOD BLD: ABNORMAL
EOSINOPHIL # BLD: 0.1 K/UL (ref 0–0.8)
EOSINOPHIL NFR BLD: 0 % (ref 0.5–7.8)
ERYTHROCYTE [DISTWIDTH] IN BLOOD BY AUTOMATED COUNT: 13.2 % (ref 11.9–14.6)
HCT VFR BLD AUTO: 39.6 % (ref 41.1–50.3)
HGB BLD-MCNC: 13.1 G/DL (ref 13.6–17.2)
IMM GRANULOCYTES # BLD AUTO: 0.1 K/UL (ref 0–0.5)
IMM GRANULOCYTES NFR BLD AUTO: 0 % (ref 0–5)
LYMPHOCYTES # BLD: 2.6 K/UL (ref 0.5–4.6)
LYMPHOCYTES NFR BLD: 16 % (ref 13–44)
MCH RBC QN AUTO: 32.4 PG (ref 26.1–32.9)
MCHC RBC AUTO-ENTMCNC: 33.1 G/DL (ref 31.4–35)
MCV RBC AUTO: 98 FL (ref 82–102)
MONOCYTES # BLD: 1.3 K/UL (ref 0.1–1.3)
MONOCYTES NFR BLD: 8 % (ref 4–12)
NEUTS SEG # BLD: 11.6 K/UL (ref 1.7–8.2)
NEUTS SEG NFR BLD: 76 % (ref 43–78)
NRBC # BLD: 0 K/UL (ref 0–0.2)
PLATELET # BLD AUTO: 314 K/UL (ref 150–450)
PMV BLD AUTO: 10.2 FL (ref 9.4–12.3)
RBC # BLD AUTO: 4.04 M/UL (ref 4.23–5.6)
WBC # BLD AUTO: 15.6 K/UL (ref 4.3–11.1)

## 2023-03-21 PROCEDURE — 3074F SYST BP LT 130 MM HG: CPT | Performed by: FAMILY MEDICINE

## 2023-03-21 PROCEDURE — 1123F ACP DISCUSS/DSCN MKR DOCD: CPT | Performed by: FAMILY MEDICINE

## 2023-03-21 PROCEDURE — 99213 OFFICE O/P EST LOW 20 MIN: CPT | Performed by: FAMILY MEDICINE

## 2023-03-21 PROCEDURE — G0439 PPPS, SUBSEQ VISIT: HCPCS | Performed by: FAMILY MEDICINE

## 2023-03-21 PROCEDURE — 3079F DIAST BP 80-89 MM HG: CPT | Performed by: FAMILY MEDICINE

## 2023-03-21 RX ORDER — DULOXETIN HYDROCHLORIDE 60 MG/1
60 CAPSULE, DELAYED RELEASE ORAL DAILY
Qty: 30 CAPSULE | Refills: 5 | Status: SHIPPED | OUTPATIENT
Start: 2023-03-21

## 2023-03-21 SDOH — ECONOMIC STABILITY: HOUSING INSECURITY
IN THE LAST 12 MONTHS, WAS THERE A TIME WHEN YOU DID NOT HAVE A STEADY PLACE TO SLEEP OR SLEPT IN A SHELTER (INCLUDING NOW)?: NO

## 2023-03-21 SDOH — ECONOMIC STABILITY: FOOD INSECURITY: WITHIN THE PAST 12 MONTHS, YOU WORRIED THAT YOUR FOOD WOULD RUN OUT BEFORE YOU GOT MONEY TO BUY MORE.: NEVER TRUE

## 2023-03-21 SDOH — ECONOMIC STABILITY: FOOD INSECURITY: WITHIN THE PAST 12 MONTHS, THE FOOD YOU BOUGHT JUST DIDN'T LAST AND YOU DIDN'T HAVE MONEY TO GET MORE.: NEVER TRUE

## 2023-03-21 SDOH — ECONOMIC STABILITY: INCOME INSECURITY: HOW HARD IS IT FOR YOU TO PAY FOR THE VERY BASICS LIKE FOOD, HOUSING, MEDICAL CARE, AND HEATING?: NOT HARD AT ALL

## 2023-03-21 ASSESSMENT — PATIENT HEALTH QUESTIONNAIRE - PHQ9
1. LITTLE INTEREST OR PLEASURE IN DOING THINGS: 0
SUM OF ALL RESPONSES TO PHQ QUESTIONS 1-9: 0
2. FEELING DOWN, DEPRESSED OR HOPELESS: 0
SUM OF ALL RESPONSES TO PHQ QUESTIONS 1-9: 0
SUM OF ALL RESPONSES TO PHQ9 QUESTIONS 1 & 2: 0
SUM OF ALL RESPONSES TO PHQ QUESTIONS 1-9: 0
SUM OF ALL RESPONSES TO PHQ QUESTIONS 1-9: 0

## 2023-03-21 ASSESSMENT — LIFESTYLE VARIABLES
HOW OFTEN DO YOU HAVE A DRINK CONTAINING ALCOHOL: 2-4 TIMES A MONTH
HOW MANY STANDARD DRINKS CONTAINING ALCOHOL DO YOU HAVE ON A TYPICAL DAY: 1 OR 2

## 2023-03-21 NOTE — PROGRESS NOTES
levocetirizine (XYZAL) 5 MG tablet Take 5 mg by mouth daily       Current Facility-Administered Medications   Medication Dose Route Frequency Provider Last Rate Last Admin    triamcinolone acetonide (KENALOG-40) injection 80 mg  80 mg IntraMUSCular Once L Nichelle Bae MD         No Known Allergies  Patient Active Problem List   Diagnosis    Hypertension    Osteoarthritis    Total knee replacement status, left    Hypercholesteremia    Spinal stenosis of lumbosacral region    LEOBARDO (obstructive sleep apnea)    Nocturnal hypoxemia    Nocturnal polyuria    Severe obesity (BMI 35.0-39. 9) with comorbidity ([address]     Past Medical History:   Diagnosis Date    Larson esophagus     Current smoker     0.5 pack/day    GERD (gastroesophageal reflux disease)     on med for control     High frequency hearing loss     Hypercholesteremia     on med    Hypertension     on med for control     OA (osteoarthritis)     LEOBARDO (obstructive sleep apnea) 2/24/2023    Rhinitis     SNHL (sensorineural hearing loss)     Tinnitus of both ears     Wheezing     pro-air inhaler prn; last used 3/12/18; [facility] work-[facility] was negative for asthma      Past Surgical History:   Procedure Laterality Date    COLONOSCOPY      KNEE ARTHROSCOPY Right 2005    muscle repaired    TOTAL KNEE ARTHROPLASTY Left 04/03/2018     Family History   Problem Relation Age of Onset    Hypertension Brother     Depression Brother     Depression Father     Hypertension Father     High Cholesterol Mother     Hypertension Mother     Ovarian Cancer Sister     Heart Attack Maternal Grandmother     Heart Attack Maternal Grandfather     Heart Attack Paternal Grandmother      Social History     Tobacco Use    Smoking status: Former     Packs/day: 0.50     Types: Cigarettes    Smokeless tobacco: Never    Tobacco comments:     Quit smoking: smokes <1 pack of cigarettes per day   Substance Use Topics    Alcohol use: Yes         Review of Systems  See above    OBJECTIVE:  BP

## 2023-03-21 NOTE — PROGRESS NOTES
person, place and time, well developed and well- nourished, in no acute distress  Skin: warm and dry, no rash or erythema  Head: normocephalic and atraumatic  Eyes: pupils equal, round, and reactive to light, extraocular eye movements intact, conjunctivae normal  ENT: tympanic membrane, external ear and ear canal normal bilaterally, nose without deformity, nasal mucosa and turbinates normal without polyps  Neck: supple and non-tender without mass, no thyromegaly or thyroid nodules, no cervical lymphadenopathy  Pulmonary/Chest: clear to auscultation bilaterally- no wheezes, rales or rhonchi, normal air movement, no respiratory distress  Cardiovascular: normal rate, regular rhythm, normal S1 and S2, no murmurs, rubs, clicks, or gallops, distal pulses intact, no carotid bruits  Abdomen: soft, non-tender, non-distended, normal bowel sounds, no masses or organomegaly  Extremities: no cyanosis, clubbing or edema  Musculoskeletal: normal range of motion, no joint swelling, deformity or tenderness  Neurologic: reflexes normal and symmetric, no cranial nerve deficit, gait, coordination and speech normal       No Known Allergies  Prior to Visit Medications    Medication Sig Taking?  Authorizing Provider   gabapentin (NEURONTIN) 100 MG capsule TAKE 1-3 CAPSULES BY MOUTH 3 TIMES DAILY AS DIRECTED Yes Joni Mensah, GEOVANI - CNP   atorvastatin (LIPITOR) 20 MG tablet TAKE 1 TABLET BY MOUTH EVERY DAY Yes Carley Chavez MD   pantoprazole (PROTONIX) 40 MG tablet TAKE 1 TABLET BY MOUTH EVERY DAY Yes Carley Chavez MD   montelukast (SINGULAIR) 10 MG tablet TAKE 1 TABLET BY MOUTH EVERY DAY Yes Carley Chavez MD   Misc Natural Products (GLUCOSAMINE CHOND COMPLEX/MSM PO) Take 2 tablets by mouth Yes Historical Provider, MD   losartan (COZAAR) 50 MG tablet TAKE 1 TABLET BY MOUTH EVERY DAY Yes Carley Chavez MD   B Complex-C (SUPER B COMPLEX PO) Take by mouth Yes Historical Provider, MD   Multiple Vitamin (MULTIVITAMIN ADULT PO)

## 2023-03-21 NOTE — PATIENT INSTRUCTIONS
any warranty or liability for your use of this information. Starting a Weight Loss Plan: Care Instructions  Overview     If you're thinking about losing weight, it can be hard to know where to start. Your doctor can help you set up a weight loss plan that best meets your needs. You may want to take a class on nutrition or exercise, or you could join a weight loss support group. If you have questions about how to make changes to your eating or exercise habits, ask your doctor about seeing a registered dietitian or an exercise specialist.  It can be a big challenge to lose weight. But you don't have to make huge changes at once. Make small changes, and stick with them. When those changes become habit, add a few more changes. If you don't think you're ready to make changes right now, try to pick a date in the future. Make an appointment to see your doctor to discuss whether the time is right for you to start a plan. Follow-up care is a key part of your treatment and safety. Be sure to make and go to all appointments, and call your doctor if you are having problems. It's also a good idea to know your test results and keep a list of the medicines you take. How can you care for yourself at home? Set realistic goals. Many people expect to lose much more weight than is likely. A weight loss of 5% to 10% of your body weight may be enough to improve your health. Get family and friends involved to provide support. Talk to them about why you are trying to lose weight, and ask them to help. They can help by participating in exercise and having meals with you, even if they may be eating something different. Find what works best for you. If you do not have time or do not like to cook, a program that offers meal replacement bars or shakes may be better for you.  Or if you like to prepare meals, finding a plan that includes daily menus and recipes may be best.  Ask your doctor about other health professionals who can

## 2023-03-22 LAB
ALBUMIN SERPL-MCNC: 4.1 G/DL (ref 3.2–4.6)
ALBUMIN/GLOB SERPL: 1.3 (ref 0.4–1.6)
ALP SERPL-CCNC: 72 U/L (ref 50–136)
ALT SERPL-CCNC: 34 U/L (ref 12–65)
ANION GAP SERPL CALC-SCNC: 5 MMOL/L (ref 2–11)
AST SERPL-CCNC: 27 U/L (ref 15–37)
BILIRUB SERPL-MCNC: 0.4 MG/DL (ref 0.2–1.1)
BUN SERPL-MCNC: 7 MG/DL (ref 8–23)
CALCIUM SERPL-MCNC: 9.5 MG/DL (ref 8.3–10.4)
CHLORIDE SERPL-SCNC: 103 MMOL/L (ref 101–110)
CHOLEST SERPL-MCNC: 167 MG/DL
CO2 SERPL-SCNC: 30 MMOL/L (ref 21–32)
CREAT SERPL-MCNC: 0.9 MG/DL (ref 0.8–1.5)
GLOBULIN SER CALC-MCNC: 3.1 G/DL (ref 2.8–4.5)
GLUCOSE SERPL-MCNC: 106 MG/DL (ref 65–100)
HDLC SERPL-MCNC: 61 MG/DL (ref 40–60)
HDLC SERPL: 2.7
LDLC SERPL CALC-MCNC: 81.2 MG/DL
POTASSIUM SERPL-SCNC: 4 MMOL/L (ref 3.5–5.1)
PROT SERPL-MCNC: 7.2 G/DL (ref 6.3–8.2)
SODIUM SERPL-SCNC: 138 MMOL/L (ref 133–143)
TRIGL SERPL-MCNC: 124 MG/DL (ref 35–150)
TSH, 3RD GENERATION: 1.31 UIU/ML (ref 0.36–3.74)
VLDLC SERPL CALC-MCNC: 24.8 MG/DL (ref 6–23)

## 2023-03-24 ENCOUNTER — TELEPHONE (OUTPATIENT)
Dept: FAMILY MEDICINE CLINIC | Facility: CLINIC | Age: 78
End: 2023-03-24

## 2023-03-24 DIAGNOSIS — D72.829 LEUKOCYTOSIS, UNSPECIFIED TYPE: Primary | ICD-10-CM

## 2023-03-24 NOTE — TELEPHONE ENCOUNTER
----- Message from She Rubio MD sent at 3/24/2023  3:21 PM EDT -----  Please inform patient that his white cell count was elevated.   I would like to recheck to confirm validity.  ----- Message -----  From: Tim Glover Incoming [employer]  Sent: 3/21/2023  10:22 PM EDT  To: She Rubio MD

## 2023-03-27 ENCOUNTER — NURSE ONLY (OUTPATIENT)
Dept: FAMILY MEDICINE CLINIC | Facility: CLINIC | Age: 78
End: 2023-03-27
Payer: MEDICARE

## 2023-03-27 DIAGNOSIS — D72.829 LEUKOCYTOSIS, UNSPECIFIED TYPE: ICD-10-CM

## 2023-03-27 LAB
GRANS ABSOLUTE, POC: 7.8 K/UL
GRANULOCYTES %, POC: 72.4 %
HEMATOCRIT, POC: 43.8 %
HEMOGLOBIN, POC: 13.5 G/DL
LYMPHOCYTE %, POC: 21.2 %
LYMPHS ABSOLUTE, POC: 2.3 K/UL
MCH, POC: ABNORMAL PG (ref 20–?)
MCHC, POC: ABNORMAL
MCV, POC: ABNORMAL
MONOCYTE %, POC: 6.4 %
MONOCYTE, ABSOLUTE POC: 0.7 K/UL
MPV, POC: 7.2 FL
PLATELET COUNT, POC: 375 K/UL
RBC, POC: ABNORMAL M/UL
RDW, POC: 14.3 %
WBC, POC: 10.9 K/UL

## 2023-03-27 PROCEDURE — 36415 COLL VENOUS BLD VENIPUNCTURE: CPT | Performed by: FAMILY MEDICINE

## 2023-03-27 PROCEDURE — 85025 COMPLETE CBC W/AUTO DIFF WBC: CPT | Performed by: FAMILY MEDICINE

## 2023-04-04 ENCOUNTER — TELEPHONE (OUTPATIENT)
Dept: SLEEP MEDICINE | Age: 78
End: 2023-04-04

## 2023-04-04 NOTE — TELEPHONE ENCOUNTER
Called pt made him aware of his results.  Voiced understanding.   ----- Message from GEOVANI Paul CNP sent at 3/23/2023  3:47 PM EDT -----  Please let patient know that overnight oximetry results were normal.  Thanks  ----- Message -----  From: Joanne Vega  Sent: 3/23/2023   2:19 PM EDT  To: GEOVANI Paul CNP

## 2023-05-02 ENCOUNTER — TELEMEDICINE (OUTPATIENT)
Dept: FAMILY MEDICINE CLINIC | Facility: CLINIC | Age: 78
End: 2023-05-02
Payer: MEDICARE

## 2023-05-02 DIAGNOSIS — E78.00 PURE HYPERCHOLESTEROLEMIA: Primary | ICD-10-CM

## 2023-05-02 DIAGNOSIS — I10 PRIMARY HYPERTENSION: ICD-10-CM

## 2023-05-02 DIAGNOSIS — R53.83 FATIGUE, UNSPECIFIED TYPE: ICD-10-CM

## 2023-05-02 DIAGNOSIS — K21.9 GERD WITHOUT ESOPHAGITIS: ICD-10-CM

## 2023-05-02 DIAGNOSIS — M48.07 SPINAL STENOSIS OF LUMBOSACRAL REGION: ICD-10-CM

## 2023-05-02 PROCEDURE — 99442 PR PHYS/QHP TELEPHONE EVALUATION 11-20 MIN: CPT | Performed by: FAMILY MEDICINE

## 2023-05-02 SDOH — ECONOMIC STABILITY: INCOME INSECURITY: HOW HARD IS IT FOR YOU TO PAY FOR THE VERY BASICS LIKE FOOD, HOUSING, MEDICAL CARE, AND HEATING?: NOT HARD AT ALL

## 2023-05-02 SDOH — ECONOMIC STABILITY: FOOD INSECURITY: WITHIN THE PAST 12 MONTHS, YOU WORRIED THAT YOUR FOOD WOULD RUN OUT BEFORE YOU GOT MONEY TO BUY MORE.: NEVER TRUE

## 2023-05-02 SDOH — ECONOMIC STABILITY: FOOD INSECURITY: WITHIN THE PAST 12 MONTHS, THE FOOD YOU BOUGHT JUST DIDN'T LAST AND YOU DIDN'T HAVE MONEY TO GET MORE.: NEVER TRUE

## 2023-05-02 ASSESSMENT — PATIENT HEALTH QUESTIONNAIRE - PHQ9
SUM OF ALL RESPONSES TO PHQ QUESTIONS 1-9: 0
2. FEELING DOWN, DEPRESSED OR HOPELESS: 0
SUM OF ALL RESPONSES TO PHQ9 QUESTIONS 1 & 2: 0
SUM OF ALL RESPONSES TO PHQ QUESTIONS 1-9: 0
SUM OF ALL RESPONSES TO PHQ QUESTIONS 1-9: 0
1. LITTLE INTEREST OR PLEASURE IN DOING THINGS: 0
SUM OF ALL RESPONSES TO PHQ QUESTIONS 1-9: 0

## 2023-05-02 NOTE — PROGRESS NOTES
gabapentin (NEURONTIN) 100 MG capsule TAKE 1-3 CAPSULES BY MOUTH 3 TIMES DAILY AS DIRECTED 270 capsule 2     Current Facility-Administered Medications   Medication Dose Route Frequency Provider Last Rate Last Admin    triamcinolone acetonide (KENALOG-40) injection 80 mg  80 mg IntraMUSCular Once L Dorian Vital MD         No Known Allergies  Patient Active Problem List   Diagnosis    Hypertension    Osteoarthritis    Total knee replacement status, left    Hypercholesteremia    Spinal stenosis of lumbosacral region    LEOBARDO (obstructive sleep apnea)    Nocturnal hypoxemia    Nocturnal polyuria    Severe obesity (BMI 35.0-39. 9) with comorbidity ([address]     Past Medical History:   Diagnosis Date    Larson esophagus     Current smoker     0.5 pack/day    GERD (gastroesophageal reflux disease)     on med for control     High frequency hearing loss     Hypercholesteremia     on med    Hypertension     on med for control     OA (osteoarthritis)     LEOBARDO (obstructive sleep apnea) 2/24/2023    Rhinitis     SNHL (sensorineural hearing loss)     Tinnitus of both ears     Wheezing     pro-air inhaler prn; last used 3/12/18; [facility] work-[facility] was negative for asthma      Past Surgical History:   Procedure Laterality Date    COLONOSCOPY      KNEE ARTHROSCOPY Right 2005    muscle repaired    TOTAL KNEE ARTHROPLASTY Left 04/03/2018     Family History   Problem Relation Age of Onset    Hypertension Brother     Depression Brother     Depression Father     Hypertension Father     High Cholesterol Mother     Hypertension Mother     Ovarian Cancer Sister     Heart Attack Maternal Grandmother     Heart Attack Maternal Grandfather     Heart Attack Paternal Grandmother      Social History     Tobacco Use    Smoking status: Former     Packs/day: 0.50     Types: Cigarettes    Smokeless tobacco: Never    Tobacco comments:     Quit smoking: smokes <1 pack of cigarettes per day   Substance Use Topics    Alcohol use:  Yes

## 2023-06-09 RX ORDER — DULOXETIN HYDROCHLORIDE 60 MG/1
60 CAPSULE, DELAYED RELEASE ORAL DAILY
Qty: 90 CAPSULE | Refills: 1 | Status: SHIPPED | OUTPATIENT
Start: 2023-06-09

## 2023-07-28 ENCOUNTER — TELEPHONE (OUTPATIENT)
Dept: ORTHOPEDIC SURGERY | Age: 78
End: 2023-07-28

## 2023-07-28 DIAGNOSIS — M43.16 SPONDYLOLISTHESIS, LUMBAR REGION: Primary | ICD-10-CM

## 2023-07-28 DIAGNOSIS — M48.062 SPINAL STENOSIS OF LUMBAR REGION WITH NEUROGENIC CLAUDICATION: ICD-10-CM

## 2023-07-28 NOTE — TELEPHONE ENCOUNTER
He needs to get a refill of his Gabapentin 100mg #270 sent to the [address] [place], [place] [address].

## 2023-07-31 RX ORDER — GABAPENTIN 100 MG/1
CAPSULE ORAL
Qty: 270 CAPSULE | Refills: 1 | Status: SHIPPED | OUTPATIENT
Start: 2023-07-31 | End: 2023-10-31

## 2023-08-21 RX ORDER — LOSARTAN POTASSIUM 50 MG/1
50 TABLET ORAL DAILY
Qty: 90 TABLET | Refills: 3 | Status: SHIPPED | OUTPATIENT
Start: 2023-08-21

## 2023-08-22 RX ORDER — LOSARTAN POTASSIUM 50 MG/1
TABLET ORAL
Qty: 90 TABLET | Refills: 3 | OUTPATIENT
Start: 2023-08-22

## 2023-08-31 NOTE — PROGRESS NOTES
[address] [place] [address]  [phone]    Patient Name:  Roberta Marmolejo  YOB: 1945      Office Visit 9/1/2023    CHIEF COMPLAINT:    Chief Complaint   Patient presents with    Sleep Apnea         HISTORY OF PRESENT ILLNESS: Patient is a 82-year-old male who presents today for follow-up of LEOBARDO. Diagnostic sleep study on 06/05/2022 with an AHI of 11.3 and lowest oxygen saturation of 72%. He is prescribed cpap therapy with a humidifier set at 10 cm with a full face mask. Most recent download reveals AHI on PAP therapy is 1.9, leak is median 2.6 and 11.2 at 95th percentile and the hourly usage is 7 hours 42 minutes nightly. The overall use is 1447 hours with days greater than four hours at 187/190. The patient is compliant with the Pap therapy and is feeling better as a result. He reports that he is doing very well with using CPAP every night and awakens in the morning feeling fully refreshed. Denies any excessive daytime sleepiness or fatigue. Clopton score is 5/24. He denies any major medical changes since his last visit. Reports that he has lost about 10 pounds since his last visit. We did discuss continuing to decrease carbohydrates in his diet and increasing his activity by walking to help with further weight reduction. His blood pressure is controlled today.                 Sleep Medicine 9/1/2023 2/24/2023 10/24/2022   Sitting and reading 1 1 1   Watching TV 1 1 1   Sitting, inactive in a public place (e.g. a theatre or a meeting) 1 1 0   As a passenger in a car for an hour without a break 0 0 0   Lying down to rest in the afternoon when circumstances permit 1 2 1   Sitting and talking to someone 0 0 0   Sitting quietly after a lunch without alcohol 1 1 0   In a car, while stopped for a few minutes in traffic 0 0 0   Clop[place] Sleepiness Score 5 6 3            Past Medical History:   Diagnosis Date    Larson esophagus     Current

## 2023-09-01 ENCOUNTER — OFFICE VISIT (OUTPATIENT)
Dept: SLEEP MEDICINE | Age: 78
End: 2023-09-01
Payer: MEDICARE

## 2023-09-01 ENCOUNTER — TELEPHONE (OUTPATIENT)
Dept: SLEEP MEDICINE | Age: 78
End: 2023-09-01

## 2023-09-01 VITALS
DIASTOLIC BLOOD PRESSURE: 78 MMHG | TEMPERATURE: 97.2 F | OXYGEN SATURATION: 97 % | HEIGHT: 70 IN | HEART RATE: 118 BPM | WEIGHT: 214 LBS | BODY MASS INDEX: 30.64 KG/M2 | SYSTOLIC BLOOD PRESSURE: 136 MMHG

## 2023-09-01 DIAGNOSIS — E66.9 OBESITY (BMI 35.0-39.9 WITHOUT COMORBIDITY): ICD-10-CM

## 2023-09-01 DIAGNOSIS — G47.33 OSA (OBSTRUCTIVE SLEEP APNEA): Primary | ICD-10-CM

## 2023-09-01 DIAGNOSIS — G47.34 NOCTURNAL HYPOXEMIA: ICD-10-CM

## 2023-09-01 PROCEDURE — 3075F SYST BP GE 130 - 139MM HG: CPT | Performed by: NURSE PRACTITIONER

## 2023-09-01 PROCEDURE — 99213 OFFICE O/P EST LOW 20 MIN: CPT | Performed by: NURSE PRACTITIONER

## 2023-09-01 PROCEDURE — 3078F DIAST BP <80 MM HG: CPT | Performed by: NURSE PRACTITIONER

## 2023-09-01 PROCEDURE — 1123F ACP DISCUSS/DSCN MKR DOCD: CPT | Performed by: NURSE PRACTITIONER

## 2023-09-01 ASSESSMENT — SLEEP AND FATIGUE QUESTIONNAIRES
HOW LIKELY ARE YOU TO NOD OFF OR FALL ASLEEP IN A CAR, WHILE STOPPED FOR A FEW MINUTES IN TRAFFIC: 0
HOW LIKELY ARE YOU TO NOD OFF OR FALL ASLEEP WHILE SITTING INACTIVE IN A PUBLIC PLACE: 1
HOW LIKELY ARE YOU TO NOD OFF OR FALL ASLEEP WHILE SITTING AND TALKING TO SOMEONE: 0
HOW LIKELY ARE YOU TO NOD OFF OR FALL ASLEEP WHEN YOU ARE A PASSENGER IN A CAR FOR AN HOUR WITHOUT A BREAK: 0
ESS TOTAL SCORE: 5
HOW LIKELY ARE YOU TO NOD OFF OR FALL ASLEEP WHILE SITTING AND READING: 1
HOW LIKELY ARE YOU TO NOD OFF OR FALL ASLEEP WHILE SITTING QUIETLY AFTER LUNCH WITHOUT ALCOHOL: 1
HOW LIKELY ARE YOU TO NOD OFF OR FALL ASLEEP WHILE LYING DOWN TO REST IN THE AFTERNOON WHEN CIRCUMSTANCES PERMIT: 1
HOW LIKELY ARE YOU TO NOD OFF OR FALL ASLEEP WHILE WATCHING TV: 1

## 2023-09-01 NOTE — PATIENT INSTRUCTIONS
Continue CPAP 10 cm H2O with nightly compliance  New supplies ordered  Recommendations as above  Follow up with the [facility] will be 1 year or sooner if needed

## 2023-09-25 DIAGNOSIS — M43.16 SPONDYLOLISTHESIS, LUMBAR REGION: ICD-10-CM

## 2023-09-25 DIAGNOSIS — M48.062 SPINAL STENOSIS OF LUMBAR REGION WITH NEUROGENIC CLAUDICATION: ICD-10-CM

## 2023-09-25 RX ORDER — GABAPENTIN 100 MG/1
CAPSULE ORAL
Qty: 270 CAPSULE | Refills: 0 | OUTPATIENT
Start: 2023-09-25

## 2023-11-01 RX ORDER — MONTELUKAST SODIUM 10 MG/1
TABLET ORAL
Qty: 90 TABLET | Refills: 3 | Status: SHIPPED | OUTPATIENT
Start: 2023-11-01

## 2023-11-01 RX ORDER — PANTOPRAZOLE SODIUM 40 MG/1
TABLET, DELAYED RELEASE ORAL
Qty: 90 TABLET | Refills: 3 | Status: SHIPPED | OUTPATIENT
Start: 2023-11-01

## 2023-11-01 RX ORDER — ATORVASTATIN CALCIUM 20 MG/1
TABLET, FILM COATED ORAL
Qty: 90 TABLET | Refills: 3 | Status: SHIPPED | OUTPATIENT
Start: 2023-11-01

## 2023-11-07 ENCOUNTER — NURSE ONLY (OUTPATIENT)
Dept: FAMILY MEDICINE CLINIC | Facility: CLINIC | Age: 78
End: 2023-11-07
Payer: MEDICARE

## 2023-11-07 DIAGNOSIS — I10 PRIMARY HYPERTENSION: ICD-10-CM

## 2023-11-07 DIAGNOSIS — E78.00 PURE HYPERCHOLESTEROLEMIA: ICD-10-CM

## 2023-11-07 LAB
ALBUMIN SERPL-MCNC: 4.1 G/DL (ref 3.2–4.6)
ALBUMIN/GLOB SERPL: 1.5 (ref 0.4–1.6)
ALP SERPL-CCNC: 85 U/L (ref 50–136)
ALT SERPL-CCNC: 34 U/L (ref 12–65)
ANION GAP SERPL CALC-SCNC: 8 MMOL/L (ref 2–11)
AST SERPL-CCNC: 29 U/L (ref 15–37)
BILIRUB SERPL-MCNC: 0.5 MG/DL (ref 0.2–1.1)
BUN SERPL-MCNC: 10 MG/DL (ref 8–23)
CALCIUM SERPL-MCNC: 9.5 MG/DL (ref 8.3–10.4)
CHLORIDE SERPL-SCNC: 103 MMOL/L (ref 101–110)
CHOLEST SERPL-MCNC: 151 MG/DL
CO2 SERPL-SCNC: 27 MMOL/L (ref 21–32)
CREAT SERPL-MCNC: 0.9 MG/DL (ref 0.8–1.5)
GLOBULIN SER CALC-MCNC: 2.8 G/DL (ref 2.8–4.5)
GLUCOSE SERPL-MCNC: 88 MG/DL (ref 65–100)
HDLC SERPL-MCNC: 55 MG/DL (ref 40–60)
HDLC SERPL: 2.7
LDLC SERPL CALC-MCNC: 81 MG/DL
POTASSIUM SERPL-SCNC: 3.8 MMOL/L (ref 3.5–5.1)
PROT SERPL-MCNC: 6.9 G/DL (ref 6.3–8.2)
SODIUM SERPL-SCNC: 138 MMOL/L (ref 133–143)
TRIGL SERPL-MCNC: 75 MG/DL (ref 35–150)
VLDLC SERPL CALC-MCNC: 15 MG/DL (ref 6–23)

## 2023-11-07 PROCEDURE — 36415 COLL VENOUS BLD VENIPUNCTURE: CPT | Performed by: FAMILY MEDICINE

## 2023-11-27 ENCOUNTER — OFFICE VISIT (OUTPATIENT)
Dept: FAMILY MEDICINE CLINIC | Facility: CLINIC | Age: 78
End: 2023-11-27
Payer: MEDICARE

## 2023-11-27 VITALS
HEIGHT: 67 IN | OXYGEN SATURATION: 97 % | DIASTOLIC BLOOD PRESSURE: 82 MMHG | BODY MASS INDEX: 33.9 KG/M2 | WEIGHT: 216 LBS | SYSTOLIC BLOOD PRESSURE: 126 MMHG

## 2023-11-27 DIAGNOSIS — I10 PRIMARY HYPERTENSION: Primary | ICD-10-CM

## 2023-11-27 DIAGNOSIS — E78.00 PURE HYPERCHOLESTEROLEMIA: ICD-10-CM

## 2023-11-27 DIAGNOSIS — M19.90 OSTEOARTHRITIS, UNSPECIFIED OSTEOARTHRITIS TYPE, UNSPECIFIED SITE: ICD-10-CM

## 2023-11-27 PROCEDURE — 1123F ACP DISCUSS/DSCN MKR DOCD: CPT | Performed by: FAMILY MEDICINE

## 2023-11-27 PROCEDURE — 99213 OFFICE O/P EST LOW 20 MIN: CPT | Performed by: FAMILY MEDICINE

## 2023-11-27 PROCEDURE — 3079F DIAST BP 80-89 MM HG: CPT | Performed by: FAMILY MEDICINE

## 2023-11-27 PROCEDURE — 3074F SYST BP LT 130 MM HG: CPT | Performed by: FAMILY MEDICINE

## 2023-11-27 RX ORDER — CYANOCOBALAMIN (VITAMIN B-12) 1000 MCG
TABLET ORAL
COMMUNITY
Start: 2022-09-01

## 2023-11-27 ASSESSMENT — PATIENT HEALTH QUESTIONNAIRE - PHQ9
1. LITTLE INTEREST OR PLEASURE IN DOING THINGS: 0
SUM OF ALL RESPONSES TO PHQ9 QUESTIONS 1 & 2: 0
SUM OF ALL RESPONSES TO PHQ QUESTIONS 1-9: 0
2. FEELING DOWN, DEPRESSED OR HOPELESS: 0

## 2023-11-27 NOTE — PROGRESS NOTES
SUBJECTIVE:   Rojas Alberts is a 70 y.o. male who has a past medical history significant for sleep apnea, hypertension, high cholesterol, reflux with Larson's esophagus and lumbar spinal stenosis. Review of systems reveals no complaints of chest pain, shortness of breath, orthopnea or PND. GI and  review of systems is unremarkable. Current medications are listed in the EMR and reviewed today. HPI  See above    Past Medical History, Past Surgical History, Family history, Social History, and Medications were all reviewed with the patient today and updated as necessary.        Current Outpatient Medications   Medication Sig Dispense Refill    MAGNESIUM PO Take 400 mg by mouth      Cyanocobalamin (B-12) 1000 MCG TABS       Probiotic Product (PROBIOTIC PO) Take by mouth      atorvastatin (LIPITOR) 20 MG tablet TAKE 1 TABLET BY MOUTH EVERY DAY 90 tablet 3    pantoprazole (PROTONIX) 40 MG tablet TAKE 1 TABLET BY MOUTH EVERY DAY 90 tablet 3    montelukast (SINGULAIR) 10 MG tablet TAKE 1 TABLET BY MOUTH EVERY DAY 90 tablet 3    losartan (COZAAR) 50 MG tablet Take 1 tablet by mouth daily 90 tablet 3    Handicap Placard MISC by Does not apply route 1 each 0    B Complex-C (SUPER B COMPLEX PO) Take by mouth      Multiple Vitamin (MULTIVITAMIN ADULT PO) Take 1 tablet by mouth daily      cyanocobalamin 1000 MCG tablet Take 1,000 tablets by mouth      albuterol sulfate  (90 Base) MCG/ACT inhaler INHALE 1 PUFF BY MOUTH EVERY 6 HOURS AS NEEDED FOR WHEEZING      aspirin 81 MG EC tablet Take by mouth daily      guaiFENesin (MUCINEX MAXIMUM STRENGTH) 1200 MG TB12 Take 1,200 mg by mouth daily as needed      DULoxetine (CYMBALTA) 60 MG extended release capsule Take 1 capsule by mouth daily 90 capsule 1    gabapentin (NEURONTIN) 100 MG capsule TAKE 1-3 CAPSULES BY MOUTH 3 TIMES DAILY AS DIRECTED 270 capsule 2     Current Facility-Administered Medications   Medication Dose Route Frequency Provider Last Rate Last Admin

## 2023-12-08 RX ORDER — DULOXETIN HYDROCHLORIDE 60 MG/1
CAPSULE, DELAYED RELEASE ORAL DAILY
Qty: 90 CAPSULE | Refills: 3 | Status: SHIPPED | OUTPATIENT
Start: 2023-12-08

## 2024-02-01 ENCOUNTER — TELEPHONE (OUTPATIENT)
Dept: ORTHOPEDIC SURGERY | Age: 79
End: 2024-02-01

## 2024-02-01 RX ORDER — GABAPENTIN 300 MG/1
300 CAPSULE ORAL 3 TIMES DAILY
Qty: 90 CAPSULE | Refills: 0 | Status: SHIPPED | OUTPATIENT
Start: 2024-02-01 | End: 2024-03-02

## 2024-02-01 NOTE — TELEPHONE ENCOUNTER
He needs to get a refill of his Gabapentin 300mg tid. He is wondering if he can get a 3 month supply so he doesn't have to keep calling every month.

## 2024-02-02 NOTE — TELEPHONE ENCOUNTER
Patient made aware that he needs to discuss medication management with PCP before it runs out or make an appt to see LNIETTE . Has not been seen in 2 years

## 2024-04-06 SDOH — HEALTH STABILITY: PHYSICAL HEALTH: ON AVERAGE, HOW MANY DAYS PER WEEK DO YOU ENGAGE IN MODERATE TO STRENUOUS EXERCISE (LIKE A BRISK WALK)?: 3 DAYS

## 2024-04-06 SDOH — HEALTH STABILITY: PHYSICAL HEALTH: ON AVERAGE, HOW MANY MINUTES DO YOU ENGAGE IN EXERCISE AT THIS LEVEL?: 60 MIN

## 2024-04-06 ASSESSMENT — PATIENT HEALTH QUESTIONNAIRE - PHQ9
2. FEELING DOWN, DEPRESSED OR HOPELESS: NOT AT ALL
SUM OF ALL RESPONSES TO PHQ9 QUESTIONS 1 & 2: 0
SUM OF ALL RESPONSES TO PHQ QUESTIONS 1-9: 0
1. LITTLE INTEREST OR PLEASURE IN DOING THINGS: NOT AT ALL

## 2024-04-06 ASSESSMENT — LIFESTYLE VARIABLES
HOW OFTEN DO YOU HAVE A DRINK CONTAINING ALCOHOL: 3
HOW MANY STANDARD DRINKS CONTAINING ALCOHOL DO YOU HAVE ON A TYPICAL DAY: 1 OR 2
HOW OFTEN DO YOU HAVE A DRINK CONTAINING ALCOHOL: 2-4 TIMES A MONTH
HOW MANY STANDARD DRINKS CONTAINING ALCOHOL DO YOU HAVE ON A TYPICAL DAY: 1
HOW OFTEN DO YOU HAVE SIX OR MORE DRINKS ON ONE OCCASION: 1

## 2024-04-08 ENCOUNTER — OFFICE VISIT (OUTPATIENT)
Dept: FAMILY MEDICINE CLINIC | Facility: CLINIC | Age: 79
End: 2024-04-08
Payer: MEDICARE

## 2024-04-08 VITALS
DIASTOLIC BLOOD PRESSURE: 70 MMHG | HEIGHT: 67 IN | WEIGHT: 214.6 LBS | BODY MASS INDEX: 33.68 KG/M2 | SYSTOLIC BLOOD PRESSURE: 120 MMHG

## 2024-04-08 DIAGNOSIS — R39.11 BENIGN PROSTATIC HYPERPLASIA WITH URINARY HESITANCY: ICD-10-CM

## 2024-04-08 DIAGNOSIS — N40.1 BENIGN PROSTATIC HYPERPLASIA WITH URINARY HESITANCY: ICD-10-CM

## 2024-04-08 DIAGNOSIS — Z00.00 MEDICARE ANNUAL WELLNESS VISIT, SUBSEQUENT: Primary | ICD-10-CM

## 2024-04-08 DIAGNOSIS — K21.9 GERD WITHOUT ESOPHAGITIS: ICD-10-CM

## 2024-04-08 DIAGNOSIS — M48.07 SPINAL STENOSIS OF LUMBOSACRAL REGION: ICD-10-CM

## 2024-04-08 DIAGNOSIS — I10 PRIMARY HYPERTENSION: ICD-10-CM

## 2024-04-08 LAB
ALBUMIN SERPL-MCNC: 4.1 G/DL (ref 3.2–4.6)
ALBUMIN/GLOB SERPL: 1.4 (ref 0.4–1.6)
ALP SERPL-CCNC: 89 U/L (ref 50–136)
ALT SERPL-CCNC: 47 U/L (ref 12–65)
ANION GAP SERPL CALC-SCNC: 6 MMOL/L (ref 2–11)
AST SERPL-CCNC: 37 U/L (ref 15–37)
BASOPHILS # BLD: 0 K/UL (ref 0–0.2)
BASOPHILS NFR BLD: 0 % (ref 0–2)
BILIRUB SERPL-MCNC: 0.5 MG/DL (ref 0.2–1.1)
BUN SERPL-MCNC: 9 MG/DL (ref 8–23)
CALCIUM SERPL-MCNC: 9.9 MG/DL (ref 8.3–10.4)
CHLORIDE SERPL-SCNC: 102 MMOL/L (ref 103–113)
CHOLEST SERPL-MCNC: 147 MG/DL
CO2 SERPL-SCNC: 28 MMOL/L (ref 21–32)
CREAT SERPL-MCNC: 1 MG/DL (ref 0.8–1.5)
DIFFERENTIAL METHOD BLD: ABNORMAL
EOSINOPHIL # BLD: 0.1 K/UL (ref 0–0.8)
EOSINOPHIL NFR BLD: 1 % (ref 0.5–7.8)
ERYTHROCYTE [DISTWIDTH] IN BLOOD BY AUTOMATED COUNT: 12.9 % (ref 11.9–14.6)
GLOBULIN SER CALC-MCNC: 3 G/DL (ref 2.8–4.5)
GLUCOSE SERPL-MCNC: 115 MG/DL (ref 65–100)
HCT VFR BLD AUTO: 38.4 % (ref 41.1–50.3)
HDLC SERPL-MCNC: 53 MG/DL (ref 40–60)
HDLC SERPL: 2.8
HGB BLD-MCNC: 12.6 G/DL (ref 13.6–17.2)
IMM GRANULOCYTES # BLD AUTO: 0.1 K/UL (ref 0–0.5)
IMM GRANULOCYTES NFR BLD AUTO: 0 % (ref 0–5)
LDLC SERPL CALC-MCNC: 58 MG/DL
LYMPHOCYTES # BLD: 2.6 K/UL (ref 0.5–4.6)
LYMPHOCYTES NFR BLD: 19 % (ref 13–44)
MCH RBC QN AUTO: 32.4 PG (ref 26.1–32.9)
MCHC RBC AUTO-ENTMCNC: 32.8 G/DL (ref 31.4–35)
MCV RBC AUTO: 98.7 FL (ref 82–102)
MONOCYTES # BLD: 1.1 K/UL (ref 0.1–1.3)
MONOCYTES NFR BLD: 8 % (ref 4–12)
NEUTS SEG # BLD: 9.8 K/UL (ref 1.7–8.2)
NEUTS SEG NFR BLD: 72 % (ref 43–78)
NRBC # BLD: 0 K/UL (ref 0–0.2)
PLATELET # BLD AUTO: 314 K/UL (ref 150–450)
PMV BLD AUTO: 9.9 FL (ref 9.4–12.3)
POTASSIUM SERPL-SCNC: 4 MMOL/L (ref 3.5–5.1)
PROT SERPL-MCNC: 7.1 G/DL (ref 6.3–8.2)
RBC # BLD AUTO: 3.89 M/UL (ref 4.23–5.6)
SODIUM SERPL-SCNC: 136 MMOL/L (ref 136–146)
TRIGL SERPL-MCNC: 180 MG/DL (ref 35–150)
TSH, 3RD GENERATION: 1.5 UIU/ML (ref 0.36–3.74)
VLDLC SERPL CALC-MCNC: 36 MG/DL (ref 6–23)
WBC # BLD AUTO: 13.7 K/UL (ref 4.3–11.1)

## 2024-04-08 PROCEDURE — 99213 OFFICE O/P EST LOW 20 MIN: CPT | Performed by: FAMILY MEDICINE

## 2024-04-08 PROCEDURE — 3074F SYST BP LT 130 MM HG: CPT | Performed by: FAMILY MEDICINE

## 2024-04-08 PROCEDURE — G0439 PPPS, SUBSEQ VISIT: HCPCS | Performed by: FAMILY MEDICINE

## 2024-04-08 PROCEDURE — 3078F DIAST BP <80 MM HG: CPT | Performed by: FAMILY MEDICINE

## 2024-04-08 PROCEDURE — 1123F ACP DISCUSS/DSCN MKR DOCD: CPT | Performed by: FAMILY MEDICINE

## 2024-04-08 RX ORDER — TAMSULOSIN HYDROCHLORIDE 0.4 MG/1
0.4 CAPSULE ORAL DAILY
Qty: 30 CAPSULE | Refills: 3 | Status: SHIPPED | OUTPATIENT
Start: 2024-04-08 | End: 2024-05-08

## 2024-04-08 NOTE — PROGRESS NOTES
Medicare Annual Wellness Visit    Demarcus Gaxiola is here for Medicare AWV (subs)    Assessment & Plan   Medicare annual wellness visit, subsequent  Recommendations for Preventive Services Due: see orders and patient instructions/AVS.  Recommended screening schedule for the next 5-10 years is provided to the patient in written form: see Patient Instructions/AVS.     No follow-ups on file.     Subjective   who has a past medical history significant for sleep apnea, hypertension, high cholesterol, reflux with Larson's esophagus and lumbar spinal stenosis.     Patient's complete Health Risk Assessment and screening values have been reviewed and are found in Flowsheets. The following problems were reviewed today and where indicated follow up appointments were made and/or referrals ordered.    Positive Risk Factor Screenings with Interventions:                Activity, Diet, and Weight:  On average, how many days per week do you engage in moderate to strenuous exercise (like a brisk walk)?: 3 days  On average, how many minutes do you engage in exercise at this level?: 60 min    Do you eat balanced/healthy meals regularly?: Yes    Body mass index is 33.91 kg/m². (!) Abnormal    Obesity Interventions:  Patient declines any further evaluation or treatment              Vision Screen:  Do you have difficulty driving, watching TV, or doing any of your daily activities because of your eyesight?: No  Have you had an eye exam within the past year?: (!) No  No results found.    Interventions:   Patient encouraged to make appointment with their eye specialist                    Objective   Vitals:    04/08/24 1439   BP: 120/70   Weight: 97.3 kg (214 lb 9.6 oz)   Height: 1.694 m (5' 6.7\")      Body mass index is 33.91 kg/m².        General Appearance: alert and oriented to person, place and time, well developed and well- nourished, in no acute distress  Skin: warm and dry, no rash or erythema  Head: normocephalic and 
General: Skin is warm.      Findings: No rash.   Neurological:      General: No focal deficit present.      Mental Status: He is alert and oriented to person, place, and time.      Cranial Nerves: No cranial nerve deficit.      Motor: No weakness.      Deep Tendon Reflexes: Reflexes normal.   Psychiatric:         Mood and Affect: Mood normal.         Behavior: Behavior normal.         Thought Content: Thought content normal.         Judgment: Judgment normal.         Medical problems and test results were reviewed with the patient today.         ASSESSMENT and PLAN    1.  Lumbar spinal stenosis.  Will wean off of his Neurontin.  If he has worsening pain he will let me know.    2.  BPH.  Trial of Flomax 0.4 mg.  Patient understands risk, benefits and potential side effects to include syncope and orthostasis with this medication.  Avoid dehydration.  If symptomatically not improving within 2 to 3 weeks he will stop the medicine and let me know.    3.  Hypertension.  /70.  Check metabolic panel, TSH, CBC and lipid panel.    4.  Reflux.  No active complaints.    Elements of this note have been dictated using speech recognition software. As a result, errors of speech recognition may have occurred.

## 2024-04-22 ENCOUNTER — TELEPHONE (OUTPATIENT)
Dept: FAMILY MEDICINE CLINIC | Facility: CLINIC | Age: 79
End: 2024-04-22

## 2024-04-22 NOTE — TELEPHONE ENCOUNTER
Pt called stating that he has been weaning off of Neurontin since 4/8, and he is a lot more pain, and it's very painful to walk. Pt is down to 100 mg a day. Please advise.

## 2024-04-23 RX ORDER — GABAPENTIN 300 MG/1
300 CAPSULE ORAL 3 TIMES DAILY
Qty: 270 CAPSULE | Refills: 1 | Status: SHIPPED | OUTPATIENT
Start: 2024-04-23 | End: 2024-10-20

## 2024-04-28 ENCOUNTER — APPOINTMENT (OUTPATIENT)
Dept: CT IMAGING | Age: 79
End: 2024-04-28
Payer: MEDICARE

## 2024-04-28 ENCOUNTER — HOSPITAL ENCOUNTER (EMERGENCY)
Age: 79
Discharge: HOME | End: 2024-04-28
Payer: MEDICARE

## 2024-04-28 VITALS
DIASTOLIC BLOOD PRESSURE: 95 MMHG | OXYGEN SATURATION: 100 % | WEIGHT: 214 LBS | BODY MASS INDEX: 33.59 KG/M2 | RESPIRATION RATE: 18 BRPM | SYSTOLIC BLOOD PRESSURE: 143 MMHG | HEART RATE: 98 BPM | HEIGHT: 67 IN | TEMPERATURE: 98.4 F

## 2024-04-28 DIAGNOSIS — M48.061 SPINAL STENOSIS OF LUMBAR REGION, UNSPECIFIED WHETHER NEUROGENIC CLAUDICATION PRESENT: Primary | ICD-10-CM

## 2024-04-28 DIAGNOSIS — R26.81 GAIT INSTABILITY: ICD-10-CM

## 2024-04-28 PROCEDURE — 99284 EMERGENCY DEPT VISIT MOD MDM: CPT

## 2024-04-28 PROCEDURE — 72131 CT LUMBAR SPINE W/O DYE: CPT

## 2024-04-28 ASSESSMENT — PAIN SCALES - GENERAL: PAINLEVEL_OUTOF10: 7

## 2024-04-28 ASSESSMENT — PAIN DESCRIPTION - DESCRIPTORS: DESCRIPTORS: ACHING

## 2024-04-28 ASSESSMENT — PAIN DESCRIPTION - ONSET: ONSET: PROGRESSIVE

## 2024-04-28 ASSESSMENT — PAIN - FUNCTIONAL ASSESSMENT: PAIN_FUNCTIONAL_ASSESSMENT: 0-10

## 2024-04-28 ASSESSMENT — PAIN DESCRIPTION - FREQUENCY: FREQUENCY: CONTINUOUS

## 2024-04-28 ASSESSMENT — PAIN DESCRIPTION - LOCATION: LOCATION: LEG;HIP;BACK

## 2024-04-28 ASSESSMENT — PAIN DESCRIPTION - PAIN TYPE: TYPE: CHRONIC PAIN

## 2024-04-28 NOTE — ED PROVIDER NOTES
Exercise per Session: 60 min   Housing Stability: Unknown (5/2/2023)    Housing Stability Vital Sign     Unstable Housing in the Last Year: No        Discharge Medication List as of 4/28/2024  2:47 PM        CONTINUE these medications which have NOT CHANGED    Details   gabapentin (NEURONTIN) 300 MG capsule Take 1 capsule by mouth 3 times daily for 180 days., Disp-270 capsule, R-1Normal      tamsulosin (FLOMAX) 0.4 MG capsule Take 1 capsule by mouth daily, Disp-30 capsule, R-3Normal      DULoxetine (CYMBALTA) 60 MG extended release capsule TAKE 1 CAPSULE BY MOUTH EVERY DAY, Disp-90 capsule, R-3Normal      MAGNESIUM PO Take 400 mg by mouthHistorical Med      !! Cyanocobalamin (B-12) 1000 MCG TABS Historical Med      Probiotic Product (PROBIOTIC PO) Take by mouthHistorical Med      atorvastatin (LIPITOR) 20 MG tablet TAKE 1 TABLET BY MOUTH EVERY DAY, Disp-90 tablet, R-3Normal      pantoprazole (PROTONIX) 40 MG tablet TAKE 1 TABLET BY MOUTH EVERY DAY, Disp-90 tablet, R-3Normal      montelukast (SINGULAIR) 10 MG tablet TAKE 1 TABLET BY MOUTH EVERY DAY, Disp-90 tablet, R-3Normal      losartan (COZAAR) 50 MG tablet Take 1 tablet by mouth daily, Disp-90 tablet, R-3Normal      Handicap Placard MISC Starting Tue 3/21/2023, Disp-1 each, R-0, Print      B Complex-C (SUPER B COMPLEX PO) Take by mouthHistorical Med      Multiple Vitamin (MULTIVITAMIN ADULT PO) Take 1 tablet by mouth dailyHistorical Med      !! cyanocobalamin 1000 MCG tablet Take 1,000 tablets by mouthHistorical Med      albuterol sulfate  (90 Base) MCG/ACT inhaler INHALE 1 PUFF BY MOUTH EVERY 6 HOURS AS NEEDED FOR WHEEZINGHistorical Med      aspirin 81 MG EC tablet Take by mouth dailyHistorical Med      guaiFENesin (MUCINEX MAXIMUM STRENGTH) 1200 MG TB12 Take 1,200 mg by mouth daily as neededHistorical Med       !! - Potential duplicate medications found. Please discuss with provider.           Results for orders placed or performed during the hospital

## 2024-04-28 NOTE — DISCHARGE INSTRUCTIONS
Your CT today does show the stenosis in your spine but is otherwise unremarkable.  As we discussed, I suspect you are going to need another MRI.  I would recommend to discuss this with orthopedics and your primary care provider.  Continue gabapentin as prescribed.  Continue Tylenol 650 mg every 4-6 hours.  Return to the emergency department for any new, worsening, or concerning symptoms.

## 2024-04-28 NOTE — ED TRIAGE NOTES
Pt to the ED from home with c/o of impaired gait issues for the past week. Pt states that in the beginning of April his PCP  his Gabapentin and this is when his pain increased and started to have weakness in his bilateral legs. Pt states that he has spinal stenosis. Pt states that he does not walk normally with a cane.

## 2024-05-14 ENCOUNTER — OFFICE VISIT (OUTPATIENT)
Age: 79
End: 2024-05-14
Payer: MEDICARE

## 2024-05-14 VITALS — BODY MASS INDEX: 33.68 KG/M2 | HEIGHT: 67 IN | WEIGHT: 214.6 LBS

## 2024-05-14 DIAGNOSIS — M48.062 SPINAL STENOSIS OF LUMBAR REGION WITH NEUROGENIC CLAUDICATION: ICD-10-CM

## 2024-05-14 DIAGNOSIS — M51.26 HERNIATION OF LUMBAR INTERVERTEBRAL DISC: ICD-10-CM

## 2024-05-14 DIAGNOSIS — M21.372 LEFT FOOT DROP: ICD-10-CM

## 2024-05-14 DIAGNOSIS — M43.16 SPONDYLOLISTHESIS, LUMBAR REGION: Primary | ICD-10-CM

## 2024-05-14 PROCEDURE — 99214 OFFICE O/P EST MOD 30 MIN: CPT | Performed by: NURSE PRACTITIONER

## 2024-05-14 PROCEDURE — 1123F ACP DISCUSS/DSCN MKR DOCD: CPT | Performed by: NURSE PRACTITIONER

## 2024-05-14 NOTE — PROGRESS NOTES
recess stenosis at L4 and severe left and moderate right neural  foraminal narrowing at L4-5.  2. Interval progression of lumbar spondylosis at L2-3 when compared with the  prior exam.  3. Multilevel lumbar spondylosis at additional levels, stable when compared with  the prior study.          Assessment/Plan:         Diagnosis Orders   1. Spondylolisthesis, lumbar region        2. Spinal stenosis of lumbar region with neurogenic claudication        3. Herniation of lumbar intervertebral disc        4. Left foot drop              This patient's clinical history and physical exam is consistent with neurogenic claudication which is likely due to lumbar stenosis.  No doubt the patient's stenosis is probably progressed.  He did have a large disc herniation that he was tolerating but I assume this has either migrated with a new acute foot drop.  I would suggest we get new imaging.  We may discuss minimal decompressive surgery depending upon what is seen on the MRI.  He has progressively increased his symptoms of neurogenic claudication.  Foot drop is new.  He is back on his usual gabapentin dosage.     I discussed the natural history of lumbar stenosis in that it is a degenerative condition that is usually slowly progressive resulting in gradual loss of mobility.  I reassured the patient that this is not a condition that typically predisposes him  to an acute paraplegia; however, the more neurologic deficits acquired and the longer they go untreated, the less likely he is to have neurological improvements after an operation. He understands that conservative treatments typically include physical therapy, oral and/or epidural steroids, pain management, and simple observation. And, that these treatments do not address the anatomical pinching of the spinal nerves, but rather help patients cope with the resulting symptoms.  I also discussed potential surgical intervention if the symptoms fail to improve or there is a

## 2024-05-21 ENCOUNTER — TELEMEDICINE (OUTPATIENT)
Dept: FAMILY MEDICINE CLINIC | Facility: CLINIC | Age: 79
End: 2024-05-21
Payer: MEDICARE

## 2024-05-21 DIAGNOSIS — N40.0 BENIGN PROSTATIC HYPERPLASIA WITHOUT LOWER URINARY TRACT SYMPTOMS: Primary | ICD-10-CM

## 2024-05-21 DIAGNOSIS — M48.07 SPINAL STENOSIS OF LUMBOSACRAL REGION: ICD-10-CM

## 2024-05-21 DIAGNOSIS — D72.829 LEUKOCYTOSIS, UNSPECIFIED TYPE: ICD-10-CM

## 2024-05-21 DIAGNOSIS — N62 GYNECOMASTIA: ICD-10-CM

## 2024-05-21 DIAGNOSIS — E78.1 PURE HYPERGLYCERIDEMIA: ICD-10-CM

## 2024-05-21 DIAGNOSIS — E78.00 PURE HYPERCHOLESTEROLEMIA: ICD-10-CM

## 2024-05-21 PROCEDURE — 99443 PR PHYS/QHP TELEPHONE EVALUATION 21-30 MIN: CPT | Performed by: FAMILY MEDICINE

## 2024-05-21 ASSESSMENT — PATIENT HEALTH QUESTIONNAIRE - PHQ9
SUM OF ALL RESPONSES TO PHQ QUESTIONS 1-9: 0
2. FEELING DOWN, DEPRESSED OR HOPELESS: NOT AT ALL
1. LITTLE INTEREST OR PLEASURE IN DOING THINGS: NOT AT ALL
SUM OF ALL RESPONSES TO PHQ QUESTIONS 1-9: 0
SUM OF ALL RESPONSES TO PHQ9 QUESTIONS 1 & 2: 0

## 2024-05-21 NOTE — PROGRESS NOTES
SUBJECTIVE:   Demarcus Gaxiola is a 79 y.o. male  who has a past medical history significant for sleep apnea, hypertension, high cholesterol, reflux with Larson's esophagus and lumbar spinal stenosis.  Patient presents today for virtual visit via telephone encounter.  Since I last seen him he attempted to wean himself off of his gabapentin unsuccessfully.  He had a flareup of his spinal stenosis pain and ended up in the emergency room and back at his orthopedist.  He is back on his gabapentin and his pain is much better.  However apparently he has developed some foot drop and there is some concern about potential need for surgical intervention.  He reports that he has upcoming appointments with and follow-up with orthopedics regarding this.    In addition at last visit the patient had complained of some obstructive urinary symptoms.  I placed him on Flomax.  He reports significant improvement in his symptoms and he is \"urinating great\".  However since starting Flomax he has developed a small breast bud underneath his left nipple that is somewhat tender and uncomfortable.    Patient reports no active chest pain, shortness of breath, orthopnea or PND.    Patient's vital signs were not performed as encounter was performed virtually.  Location of virtual visit was patient's home.      HPI  See above    Past Medical History, Past Surgical History, Family history, Social History, and Medications were all reviewed with the patient today and updated as necessary.       Current Outpatient Medications   Medication Sig Dispense Refill    gabapentin (NEURONTIN) 300 MG capsule Take 1 capsule by mouth 3 times daily for 180 days. 270 capsule 1    tamsulosin (FLOMAX) 0.4 MG capsule Take 1 capsule by mouth daily 30 capsule 3    DULoxetine (CYMBALTA) 60 MG extended release capsule TAKE 1 CAPSULE BY MOUTH EVERY DAY 90 capsule 3    MAGNESIUM PO Take 400 mg by mouth      Cyanocobalamin (B-12) 1000 MCG TABS       Probiotic Product

## 2024-05-28 SDOH — ECONOMIC STABILITY: FOOD INSECURITY: WITHIN THE PAST 12 MONTHS, THE FOOD YOU BOUGHT JUST DIDN'T LAST AND YOU DIDN'T HAVE MONEY TO GET MORE.: NEVER TRUE

## 2024-05-28 SDOH — ECONOMIC STABILITY: FOOD INSECURITY: WITHIN THE PAST 12 MONTHS, YOU WORRIED THAT YOUR FOOD WOULD RUN OUT BEFORE YOU GOT MONEY TO BUY MORE.: NEVER TRUE

## 2024-05-28 SDOH — ECONOMIC STABILITY: INCOME INSECURITY: HOW HARD IS IT FOR YOU TO PAY FOR THE VERY BASICS LIKE FOOD, HOUSING, MEDICAL CARE, AND HEATING?: NOT HARD AT ALL

## 2024-05-28 SDOH — ECONOMIC STABILITY: TRANSPORTATION INSECURITY
IN THE PAST 12 MONTHS, HAS LACK OF TRANSPORTATION KEPT YOU FROM MEETINGS, WORK, OR FROM GETTING THINGS NEEDED FOR DAILY LIVING?: NO

## 2024-05-29 ENCOUNTER — OFFICE VISIT (OUTPATIENT)
Dept: FAMILY MEDICINE CLINIC | Facility: CLINIC | Age: 79
End: 2024-05-29
Payer: MEDICARE

## 2024-05-29 VITALS
BODY MASS INDEX: 34.18 KG/M2 | HEIGHT: 67 IN | SYSTOLIC BLOOD PRESSURE: 120 MMHG | DIASTOLIC BLOOD PRESSURE: 62 MMHG | WEIGHT: 217.8 LBS

## 2024-05-29 DIAGNOSIS — N40.1 BENIGN PROSTATIC HYPERPLASIA WITH URINARY FREQUENCY: ICD-10-CM

## 2024-05-29 DIAGNOSIS — E78.00 PURE HYPERCHOLESTEROLEMIA: ICD-10-CM

## 2024-05-29 DIAGNOSIS — I10 PRIMARY HYPERTENSION: ICD-10-CM

## 2024-05-29 DIAGNOSIS — N62 GYNECOMASTIA: Primary | ICD-10-CM

## 2024-05-29 DIAGNOSIS — R35.0 BENIGN PROSTATIC HYPERPLASIA WITH URINARY FREQUENCY: ICD-10-CM

## 2024-05-29 DIAGNOSIS — D72.829 LEUKOCYTOSIS, UNSPECIFIED TYPE: ICD-10-CM

## 2024-05-29 LAB
GRANS ABSOLUTE, POC: 6.9 K/UL
GRANULOCYTES %, POC: 66.2 %
HEMATOCRIT, POC: 43.3 %
HEMOGLOBIN, POC: 13.8 G/DL
LYMPHOCYTE %, POC: 26.1 %
LYMPHS ABSOLUTE, POC: 2.7 K/UL
MCH, POC: ABNORMAL PG (ref 20–?)
MCHC, POC: 31.9
MCV, POC: ABNORMAL
MONOCYTE %, POC: 7.7 %
MONOCYTE, ABSOLUTE POC: 0.8 K/UL
MPV, POC: 7.4 FL
PLATELET COUNT, POC: 338 K/UL
RBC, POC: ABNORMAL M/UL
RDW, POC: 13.7 %
WBC, POC: 10.4 K/UL

## 2024-05-29 PROCEDURE — 99213 OFFICE O/P EST LOW 20 MIN: CPT | Performed by: FAMILY MEDICINE

## 2024-05-29 PROCEDURE — 85025 COMPLETE CBC W/AUTO DIFF WBC: CPT | Performed by: FAMILY MEDICINE

## 2024-05-29 PROCEDURE — 1123F ACP DISCUSS/DSCN MKR DOCD: CPT | Performed by: FAMILY MEDICINE

## 2024-05-29 PROCEDURE — 3078F DIAST BP <80 MM HG: CPT | Performed by: FAMILY MEDICINE

## 2024-05-29 PROCEDURE — 3074F SYST BP LT 130 MM HG: CPT | Performed by: FAMILY MEDICINE

## 2024-05-29 PROCEDURE — 36415 COLL VENOUS BLD VENIPUNCTURE: CPT | Performed by: FAMILY MEDICINE

## 2024-05-29 ASSESSMENT — PATIENT HEALTH QUESTIONNAIRE - PHQ9
SUM OF ALL RESPONSES TO PHQ QUESTIONS 1-9: 0
1. LITTLE INTEREST OR PLEASURE IN DOING THINGS: NOT AT ALL
SUM OF ALL RESPONSES TO PHQ9 QUESTIONS 1 & 2: 0
SUM OF ALL RESPONSES TO PHQ QUESTIONS 1-9: 0
2. FEELING DOWN, DEPRESSED OR HOPELESS: NOT AT ALL
SUM OF ALL RESPONSES TO PHQ QUESTIONS 1-9: 0
SUM OF ALL RESPONSES TO PHQ QUESTIONS 1-9: 0

## 2024-05-29 NOTE — PROGRESS NOTES
SUBJECTIVE:   Demarcus Gaxiola is a 79 y.o. male who has a past medical history significant for sleep apnea, hypertension, high cholesterol, reflux with Larson's esophagus and lumbar spinal stenosis.  In April when the patient was here for his Medicare wellness exam he had complained of symptoms consistent with symptomatic BPH.  Please refer to prior notes for details.  As a consequence he was placed on Flomax 0.4 mg a day.  He reports significant improvement in symptomatology since this has been done.  However he reports that since being on Flomax he has noticed some tenderness in his left breast with some slight enlargement compared to the right.  He is also noticed a small mildly tender knot underneath the left nipple.  He is now beginning to think he might be feeling 1 end of the right nipple as well.  The discomfort is minimal but he wanted to bring it to my attention.  He recently also had a CBC with blood work showed a slight increase in white count.  He is also had an issue in the past with some mild asymptomatic leukocytosis that resolved spontaneously with fluid resuscitation.  Please refer to prior notes for details.    Patient reports no active chest pain, shortness of breath, orthopnea or PND.    HPI  See above    Past Medical History, Past Surgical History, Family history, Social History, and Medications were all reviewed with the patient today and updated as necessary.       Current Outpatient Medications   Medication Sig Dispense Refill    gabapentin (NEURONTIN) 300 MG capsule Take 1 capsule by mouth 3 times daily for 180 days. 270 capsule 1    tamsulosin (FLOMAX) 0.4 MG capsule Take 1 capsule by mouth daily 30 capsule 3    DULoxetine (CYMBALTA) 60 MG extended release capsule TAKE 1 CAPSULE BY MOUTH EVERY DAY 90 capsule 3    MAGNESIUM PO Take 400 mg by mouth      Cyanocobalamin (B-12) 1000 MCG TABS       Probiotic Product (PROBIOTIC PO) Take by mouth      atorvastatin (LIPITOR) 20 MG tablet TAKE 1

## 2024-06-10 ENCOUNTER — OFFICE VISIT (OUTPATIENT)
Age: 79
End: 2024-06-10
Payer: MEDICARE

## 2024-06-10 DIAGNOSIS — M48.062 LUMBAR STENOSIS WITH NEUROGENIC CLAUDICATION: ICD-10-CM

## 2024-06-10 DIAGNOSIS — M54.50 LOW BACK PAIN, UNSPECIFIED BACK PAIN LATERALITY, UNSPECIFIED CHRONICITY, UNSPECIFIED WHETHER SCIATICA PRESENT: Primary | ICD-10-CM

## 2024-06-10 DIAGNOSIS — M43.16 SPONDYLOLISTHESIS OF LUMBAR REGION: ICD-10-CM

## 2024-06-10 PROCEDURE — 99214 OFFICE O/P EST MOD 30 MIN: CPT | Performed by: ORTHOPAEDIC SURGERY

## 2024-06-10 PROCEDURE — 1123F ACP DISCUSS/DSCN MKR DOCD: CPT | Performed by: ORTHOPAEDIC SURGERY

## 2024-06-10 NOTE — PROGRESS NOTES
Name: Demarcus Gaxiola  YOB: 1945  Gender: male  MRN: [id number]  Age: 79 y.o.      Chief Complaint:  Back and hip pain     History of Present Illness:      This is a very pleasant 79 y.o. male who had been seen by Dr. Stewart in the past.  He has known multilevel lumbar stenosis.  He has been coping with it symptomatically until the last several months at which point he has developed a foot drop on the left side.  As a result, he has required the use of a cane.  Despite the assistive device he has fallen numerous times.  He has been tried on regimens of gabapentin, Cymbalta, oral steroids, several epidural steroid injections, physical therapy, and a home exercise program.  Despite these attempts at conservative treatment, he spends 90% of his time in a chair and is not able to do anything that requires prolonged standing or walking including activities of daily living or travel, or other recreational activities.  Essentially precluding general enjoyment of life at this point.      Medications:       Current Outpatient Medications:     gabapentin (NEURONTIN) 300 MG capsule, Take 1 capsule by mouth 3 times daily for 180 days., Disp: 270 capsule, Rfl: 1    DULoxetine (CYMBALTA) 60 MG extended release capsule, TAKE 1 CAPSULE BY MOUTH EVERY DAY, Disp: 90 capsule, Rfl: 3    MAGNESIUM PO, Take 400 mg by mouth, Disp: , Rfl:     Cyanocobalamin (B-12) 1000 MCG TABS, , Disp: , Rfl:     Probiotic Product (PROBIOTIC PO), Take by mouth, Disp: , Rfl:     atorvastatin (LIPITOR) 20 MG tablet, TAKE 1 TABLET BY MOUTH EVERY DAY, Disp: 90 tablet, Rfl: 3    pantoprazole (PROTONIX) 40 MG tablet, TAKE 1 TABLET BY MOUTH EVERY DAY, Disp: 90 tablet, Rfl: 3    montelukast (SINGULAIR) 10 MG tablet, TAKE 1 TABLET BY MOUTH EVERY DAY, Disp: 90 tablet, Rfl: 3    losartan (COZAAR) 50 MG tablet, Take 1 tablet by mouth daily, Disp: 90 tablet, Rfl: 3    Handicap Placard MISC, by Does not apply route, Disp: 1 each, Rfl: 0    B Complex-C

## 2024-06-12 ENCOUNTER — PREP FOR PROCEDURE (OUTPATIENT)
Dept: ORTHOPEDIC SURGERY | Age: 79
End: 2024-06-12

## 2024-06-12 DIAGNOSIS — M43.16 SPONDYLOLISTHESIS OF LUMBAR REGION: Primary | ICD-10-CM

## 2024-06-12 DIAGNOSIS — M51.26 LUMBAR DISC HERNIATION: ICD-10-CM

## 2024-06-12 DIAGNOSIS — M71.38 CYST OF LUMBAR FACET JOINT: ICD-10-CM

## 2024-06-12 DIAGNOSIS — M48.062 LUMBAR STENOSIS WITH NEUROGENIC CLAUDICATION: ICD-10-CM

## 2024-06-12 DIAGNOSIS — M48.062 SPINAL STENOSIS, LUMBAR REGION, WITH NEUROGENIC CLAUDICATION: ICD-10-CM

## 2024-06-13 RX ORDER — ACETAMINOPHEN 325 MG/1
1000 TABLET ORAL ONCE
Status: CANCELLED | OUTPATIENT
Start: 2024-06-13 | End: 2024-06-13

## 2024-06-21 RX ORDER — LOSARTAN POTASSIUM 50 MG/1
50 TABLET ORAL DAILY
Qty: 90 TABLET | Refills: 3 | Status: SHIPPED | OUTPATIENT
Start: 2024-06-21

## 2024-07-08 RX ORDER — TAMSULOSIN HYDROCHLORIDE 0.4 MG/1
CAPSULE ORAL DAILY
Qty: 90 CAPSULE | Refills: 1 | OUTPATIENT
Start: 2024-07-08

## 2024-07-09 ENCOUNTER — HOSPITAL ENCOUNTER (OUTPATIENT)
Dept: SURGERY | Age: 79
Discharge: HOME | End: 2024-07-12
Payer: MEDICARE

## 2024-07-09 VITALS
HEIGHT: 68 IN | HEART RATE: 106 BPM | BODY MASS INDEX: 33.75 KG/M2 | RESPIRATION RATE: 16 BRPM | WEIGHT: 222.7 LBS | TEMPERATURE: 97 F | SYSTOLIC BLOOD PRESSURE: 132 MMHG | OXYGEN SATURATION: 92 % | DIASTOLIC BLOOD PRESSURE: 79 MMHG

## 2024-07-09 LAB
ANION GAP SERPL CALC-SCNC: 16 MMOL/L (ref 9–18)
APPEARANCE UR: CLEAR
BASOPHILS # BLD: 0 K/UL (ref 0–0.2)
BASOPHILS NFR BLD: 0 % (ref 0–2)
BILIRUB UR QL: NEGATIVE
BUN SERPL-MCNC: 15 MG/DL (ref 8–23)
CALCIUM SERPL-MCNC: 9.5 MG/DL (ref 8.8–10.2)
CHLORIDE SERPL-SCNC: 99 MMOL/L (ref 98–107)
CO2 SERPL-SCNC: 23 MMOL/L (ref 20–28)
COLOR UR: NORMAL
CREAT SERPL-MCNC: 0.95 MG/DL (ref 0.8–1.3)
DIFFERENTIAL METHOD BLD: ABNORMAL
EOSINOPHIL # BLD: 0.1 K/UL (ref 0–0.8)
EOSINOPHIL NFR BLD: 1 % (ref 0.5–7.8)
ERYTHROCYTE [DISTWIDTH] IN BLOOD BY AUTOMATED COUNT: 12.8 % (ref 11.9–14.6)
GLUCOSE SERPL-MCNC: 146 MG/DL (ref 70–99)
GLUCOSE UR STRIP.AUTO-MCNC: NEGATIVE MG/DL
HCT VFR BLD AUTO: 34.6 % (ref 41.1–50.3)
HGB BLD-MCNC: 11.9 G/DL (ref 13.6–17.2)
HGB UR QL STRIP: NEGATIVE
IMM GRANULOCYTES # BLD AUTO: 0 K/UL (ref 0–0.5)
IMM GRANULOCYTES NFR BLD AUTO: 0 % (ref 0–5)
KETONES UR QL STRIP.AUTO: NEGATIVE MG/DL
LEUKOCYTE ESTERASE UR QL STRIP.AUTO: NEGATIVE
LYMPHOCYTES # BLD: 1.9 K/UL (ref 0.5–4.6)
LYMPHOCYTES NFR BLD: 18 % (ref 13–44)
MCH RBC QN AUTO: 32.9 PG (ref 26.1–32.9)
MCHC RBC AUTO-ENTMCNC: 34.4 G/DL (ref 31.4–35)
MCV RBC AUTO: 95.6 FL (ref 82–102)
MONOCYTES # BLD: 0.7 K/UL (ref 0.1–1.3)
MONOCYTES NFR BLD: 7 % (ref 4–12)
NEUTS SEG # BLD: 7.5 K/UL (ref 1.7–8.2)
NEUTS SEG NFR BLD: 73 % (ref 43–78)
NITRITE UR QL STRIP.AUTO: NEGATIVE
NRBC # BLD: 0 K/UL (ref 0–0.2)
PH UR STRIP: 5.5 (ref 5–9)
PLATELET # BLD AUTO: 271 K/UL (ref 150–450)
PMV BLD AUTO: 9.5 FL (ref 9.4–12.3)
POTASSIUM SERPL-SCNC: 4.3 MMOL/L (ref 3.5–5.1)
PROT UR STRIP-MCNC: NEGATIVE MG/DL
RBC # BLD AUTO: 3.62 M/UL (ref 4.23–5.6)
SODIUM SERPL-SCNC: 138 MMOL/L (ref 136–145)
SP GR UR REFRACTOMETRY: 1.01 (ref 1–1.02)
UROBILINOGEN UR QL STRIP.AUTO: 0.2 EU/DL (ref 0.2–1)
WBC # BLD AUTO: 10.2 K/UL (ref 4.3–11.1)

## 2024-07-09 PROCEDURE — 80048 BASIC METABOLIC PNL TOTAL CA: CPT

## 2024-07-09 PROCEDURE — 81003 URINALYSIS AUTO W/O SCOPE: CPT

## 2024-07-09 PROCEDURE — 87641 MR-STAPH DNA AMP PROBE: CPT

## 2024-07-09 PROCEDURE — 85025 COMPLETE CBC W/AUTO DIFF WBC: CPT

## 2024-07-09 RX ORDER — TAMSULOSIN HYDROCHLORIDE 0.4 MG/1
0.4 CAPSULE ORAL DAILY
COMMUNITY
Start: 2024-07-06

## 2024-07-09 NOTE — PROGRESS NOTES
Labs within anesthesia guidelines, no follow-up required. Labs automatically routed to ordering provider via Epic documentation.    
Preoperative Nutrition Screen (AUSTIN)   Patient's Age: 79 y.o.    Last Serum Albumin Level:  No results found for: \"LABALBU\"  Patient's BMI: Estimated body mass index is 34.36 kg/m² as calculated from the following:    Height as of this encounter: 1.715 m (5' 7.5\").    Weight as of this encounter: 101 kg (222 lb 11.2 oz).     If the answer to any of the following is Yes, then recommend prescribe Oral Nutrition Supplements (ONS) for at least 7 days prior to surgery and/or order referral to dietitian for further assessment and nutrition therapy.    1. Does the patient have a documented serum albumin less than 3.0 within the last 90 days?    Unknown = 0      2. Is patient's BMI less than 18.5 (or less than 20 if age over 65)?  No = 0       3. Has the patient had an unplanned weight loss of 10% of body weight or more in the last 6 months? No = 0   4. Has the patient been eating less than 50% of their normal diet in the preceding week? No = 0   AUSTIN Score (number of Yes responses), 0-4 0     Plan:   No Nutrition Intervention indicated    Electronically signed by Kelli Flores RN on 7/9/24 at 11:10 AM EDT      
do not bring home medications with you on the day of surgery unless otherwise directed by your nurse.  If you are instructed to bring home medications, please give them to your nurse as they will be administered by the nursing staff.    If you have any questions, please call [facility] [phone].    A copy of this note was provided to the patient for reference.

## 2024-07-10 LAB
MRSA DNA SPEC QL NAA+PROBE: NOT DETECTED
S AUREUS CPE NOSE QL NAA+PROBE: DETECTED

## 2024-07-11 ENCOUNTER — TELEPHONE (OUTPATIENT)
Age: 79
End: 2024-07-11

## 2024-07-11 DIAGNOSIS — M54.17 LUMBOSACRAL RADICULOPATHY: ICD-10-CM

## 2024-07-11 DIAGNOSIS — M43.16 SPONDYLOLISTHESIS OF LUMBAR REGION: Primary | ICD-10-CM

## 2024-07-11 DIAGNOSIS — M48.062 LUMBAR STENOSIS WITH NEUROGENIC CLAUDICATION: ICD-10-CM

## 2024-07-11 NOTE — TELEPHONE ENCOUNTER
----- Message from Lisa Oliveros sent at 7/9/2024  6:19 PM EDT -----  Regarding: PT  [employer] requires a sign off/discharge from a physical therapist.    Documentation in the surgeon note does not meet criteria.    I could not find any PT notes.      Thanks,  Lisa

## 2024-07-11 NOTE — TELEPHONE ENCOUNTER
Discuss with patient that he will need formal physical therapy in order for his insurance to approve his surgery. Order will be placed- he would like to go to [facility]. He will call back after he is finished with therapy to re discuss surgery

## 2024-07-23 ENCOUNTER — TELEPHONE (OUTPATIENT)
Dept: ORTHOPEDIC SURGERY | Age: 79
End: 2024-07-23

## 2024-07-23 NOTE — TELEPHONE ENCOUNTER
The patient is calling to speak to you about his surgery. It was denied by [employer] but there is a peer to peer offered. Please call.

## 2024-08-01 RX ORDER — TAMSULOSIN HYDROCHLORIDE 0.4 MG/1
CAPSULE ORAL DAILY
Qty: 90 CAPSULE | Refills: 2 | Status: SHIPPED | OUTPATIENT
Start: 2024-08-01

## 2024-08-01 NOTE — TELEPHONE ENCOUNTER
Left message for pt to return call to office. Need clarification if pt is needing a refill or is he still holding medication.

## 2024-08-12 ENCOUNTER — TELEPHONE (OUTPATIENT)
Dept: ORTHOPEDIC SURGERY | Age: 79
End: 2024-08-12

## 2024-08-13 ENCOUNTER — PREP FOR PROCEDURE (OUTPATIENT)
Age: 79
End: 2024-08-13

## 2024-08-13 DIAGNOSIS — M71.38 CYST OF LUMBAR FACET JOINT: ICD-10-CM

## 2024-08-13 DIAGNOSIS — M43.16 SPONDYLOLISTHESIS OF LUMBAR REGION: Primary | ICD-10-CM

## 2024-08-13 DIAGNOSIS — M51.26 LUMBAR DISC HERNIATION: ICD-10-CM

## 2024-08-13 DIAGNOSIS — M48.062 LUMBAR STENOSIS WITH NEUROGENIC CLAUDICATION: ICD-10-CM

## 2024-08-13 RX ORDER — ACETAMINOPHEN 325 MG/1
1000 TABLET ORAL ONCE
OUTPATIENT
Start: 2024-08-13 | End: 2024-08-13

## 2024-08-13 NOTE — TELEPHONE ENCOUNTER
Called and discussed rescheduling surgery. He would like to proceed with next available. We will proceed with 9/24.

## 2024-08-16 RX ORDER — MONTELUKAST SODIUM 10 MG/1
TABLET ORAL
Qty: 90 TABLET | Refills: 3 | Status: SHIPPED | OUTPATIENT
Start: 2024-08-16

## 2024-09-10 ENCOUNTER — HOSPITAL ENCOUNTER (OUTPATIENT)
Dept: SURGERY | Age: 79
Discharge: HOME | End: 2024-09-13
Payer: MEDICARE

## 2024-09-10 VITALS
HEART RATE: 86 BPM | DIASTOLIC BLOOD PRESSURE: 90 MMHG | OXYGEN SATURATION: 95 % | TEMPERATURE: 98 F | RESPIRATION RATE: 20 BRPM | HEIGHT: 67 IN | BODY MASS INDEX: 35.16 KG/M2 | WEIGHT: 224 LBS | SYSTOLIC BLOOD PRESSURE: 169 MMHG

## 2024-09-10 LAB
ANION GAP SERPL CALC-SCNC: 13 MMOL/L (ref 9–18)
APPEARANCE UR: CLEAR
BASOPHILS # BLD: 0 K/UL (ref 0–0.2)
BASOPHILS NFR BLD: 1 % (ref 0–2)
BILIRUB UR QL: NEGATIVE
BUN SERPL-MCNC: 8 MG/DL (ref 8–23)
CALCIUM SERPL-MCNC: 9 MG/DL (ref 8.8–10.2)
CHLORIDE SERPL-SCNC: 102 MMOL/L (ref 98–107)
CO2 SERPL-SCNC: 25 MMOL/L (ref 20–28)
COLOR UR: NORMAL
CREAT SERPL-MCNC: 0.75 MG/DL (ref 0.8–1.3)
DIFFERENTIAL METHOD BLD: ABNORMAL
EOSINOPHIL # BLD: 0.2 K/UL (ref 0–0.8)
EOSINOPHIL NFR BLD: 2 % (ref 0.5–7.8)
ERYTHROCYTE [DISTWIDTH] IN BLOOD BY AUTOMATED COUNT: 13 % (ref 11.9–14.6)
GLUCOSE SERPL-MCNC: 117 MG/DL (ref 70–99)
GLUCOSE UR STRIP.AUTO-MCNC: NEGATIVE MG/DL
HCT VFR BLD AUTO: 34.6 % (ref 41.1–50.3)
HGB BLD-MCNC: 11.5 G/DL (ref 13.6–17.2)
HGB UR QL STRIP: NEGATIVE
IMM GRANULOCYTES # BLD AUTO: 0 K/UL (ref 0–0.5)
IMM GRANULOCYTES NFR BLD AUTO: 0 % (ref 0–5)
KETONES UR QL STRIP.AUTO: NEGATIVE MG/DL
LEUKOCYTE ESTERASE UR QL STRIP.AUTO: NEGATIVE
LYMPHOCYTES # BLD: 2.1 K/UL (ref 0.5–4.6)
LYMPHOCYTES NFR BLD: 24 % (ref 13–44)
MCH RBC QN AUTO: 32.1 PG (ref 26.1–32.9)
MCHC RBC AUTO-ENTMCNC: 33.2 G/DL (ref 31.4–35)
MCV RBC AUTO: 96.6 FL (ref 82–102)
MONOCYTES # BLD: 0.8 K/UL (ref 0.1–1.3)
MONOCYTES NFR BLD: 9 % (ref 4–12)
NEUTS SEG # BLD: 5.6 K/UL (ref 1.7–8.2)
NEUTS SEG NFR BLD: 64 % (ref 43–78)
NITRITE UR QL STRIP.AUTO: NEGATIVE
NRBC # BLD: 0 K/UL (ref 0–0.2)
PH UR STRIP: 5.5 (ref 5–9)
PLATELET # BLD AUTO: 274 K/UL (ref 150–450)
PMV BLD AUTO: 9.6 FL (ref 9.4–12.3)
POTASSIUM SERPL-SCNC: 4.1 MMOL/L (ref 3.5–5.1)
PROT UR STRIP-MCNC: NEGATIVE MG/DL
RBC # BLD AUTO: 3.58 M/UL (ref 4.23–5.6)
SODIUM SERPL-SCNC: 140 MMOL/L (ref 136–145)
SP GR UR REFRACTOMETRY: 1.01 (ref 1–1.02)
UROBILINOGEN UR QL STRIP.AUTO: 0.2 EU/DL (ref 0.2–1)
WBC # BLD AUTO: 8.8 K/UL (ref 4.3–11.1)

## 2024-09-10 PROCEDURE — 81003 URINALYSIS AUTO W/O SCOPE: CPT

## 2024-09-10 PROCEDURE — 85025 COMPLETE CBC W/AUTO DIFF WBC: CPT

## 2024-09-10 PROCEDURE — 80048 BASIC METABOLIC PNL TOTAL CA: CPT

## 2024-09-10 ASSESSMENT — PAIN DESCRIPTION - ORIENTATION: ORIENTATION: LOWER

## 2024-09-10 ASSESSMENT — PAIN SCALES - GENERAL: PAINLEVEL_OUTOF10: 7

## 2024-09-10 ASSESSMENT — PAIN DESCRIPTION - LOCATION: LOCATION: BACK

## 2024-09-13 ENCOUNTER — TELEPHONE (OUTPATIENT)
Dept: ORTHOPEDIC SURGERY | Age: 79
End: 2024-09-13

## 2024-09-19 ENCOUNTER — OFFICE VISIT (OUTPATIENT)
Dept: SLEEP MEDICINE | Age: 79
End: 2024-09-19
Payer: MEDICARE

## 2024-09-19 VITALS
HEART RATE: 102 BPM | RESPIRATION RATE: 14 BRPM | HEIGHT: 67 IN | WEIGHT: 214 LBS | OXYGEN SATURATION: 98 % | SYSTOLIC BLOOD PRESSURE: 132 MMHG | DIASTOLIC BLOOD PRESSURE: 84 MMHG | BODY MASS INDEX: 33.59 KG/M2

## 2024-09-19 DIAGNOSIS — G47.34 NOCTURNAL HYPOXEMIA: ICD-10-CM

## 2024-09-19 DIAGNOSIS — G47.33 OSA (OBSTRUCTIVE SLEEP APNEA): Primary | ICD-10-CM

## 2024-09-19 PROCEDURE — 3075F SYST BP GE 130 - 139MM HG: CPT | Performed by: INTERNAL MEDICINE

## 2024-09-19 PROCEDURE — 99214 OFFICE O/P EST MOD 30 MIN: CPT | Performed by: INTERNAL MEDICINE

## 2024-09-19 PROCEDURE — 1123F ACP DISCUSS/DSCN MKR DOCD: CPT | Performed by: INTERNAL MEDICINE

## 2024-09-19 PROCEDURE — G2211 COMPLEX E/M VISIT ADD ON: HCPCS | Performed by: INTERNAL MEDICINE

## 2024-09-19 PROCEDURE — 3079F DIAST BP 80-89 MM HG: CPT | Performed by: INTERNAL MEDICINE

## 2024-09-19 ASSESSMENT — SLEEP AND FATIGUE QUESTIONNAIRES
ESS TOTAL SCORE: 4
HOW LIKELY ARE YOU TO NOD OFF OR FALL ASLEEP WHILE SITTING AND TALKING TO SOMEONE: WOULD NEVER DOZE
HOW LIKELY ARE YOU TO NOD OFF OR FALL ASLEEP IN A CAR, WHILE STOPPED FOR A FEW MINUTES IN TRAFFIC: WOULD NEVER DOZE
HOW LIKELY ARE YOU TO NOD OFF OR FALL ASLEEP WHILE SITTING AND READING: WOULD NEVER DOZE
HOW LIKELY ARE YOU TO NOD OFF OR FALL ASLEEP WHILE WATCHING TV: SLIGHT CHANCE OF DOZING
HOW LIKELY ARE YOU TO NOD OFF OR FALL ASLEEP WHEN YOU ARE A PASSENGER IN A CAR FOR AN HOUR WITHOUT A BREAK: WOULD NEVER DOZE
HOW LIKELY ARE YOU TO NOD OFF OR FALL ASLEEP WHILE SITTING INACTIVE IN A PUBLIC PLACE: WOULD NEVER DOZE
HOW LIKELY ARE YOU TO NOD OFF OR FALL ASLEEP WHILE LYING DOWN TO REST IN THE AFTERNOON WHEN CIRCUMSTANCES PERMIT: MODERATE CHANCE OF DOZING
HOW LIKELY ARE YOU TO NOD OFF OR FALL ASLEEP WHILE SITTING QUIETLY AFTER LUNCH WITHOUT ALCOHOL: SLIGHT CHANCE OF DOZING

## 2024-10-07 NOTE — ED NOTES
Patient mobility status  with mild difficulty. Provider aware     I have reviewed discharge instructions with the patient.  The patient verbalized understanding.    Patient left ED via Discharge Method: Ambulatory with cane to Home with Spouse.    Opportunity for questions and clarification provided.     Patient given 0 scripts.        
I acted within this role throughout the entirety of the procedure performed by the primary surgeon

## 2024-10-21 RX ORDER — PANTOPRAZOLE SODIUM 40 MG/1
TABLET, DELAYED RELEASE ORAL
Qty: 90 TABLET | Refills: 2 | Status: SHIPPED | OUTPATIENT
Start: 2024-10-21

## 2024-11-01 RX ORDER — ATORVASTATIN CALCIUM 20 MG/1
TABLET, FILM COATED ORAL
Qty: 90 TABLET | Refills: 3 | Status: SHIPPED | OUTPATIENT
Start: 2024-11-01

## 2024-11-11 ENCOUNTER — OFFICE VISIT (OUTPATIENT)
Dept: FAMILY MEDICINE CLINIC | Facility: CLINIC | Age: 79
End: 2024-11-11

## 2024-11-11 VITALS
BODY MASS INDEX: 33.87 KG/M2 | HEART RATE: 77 BPM | OXYGEN SATURATION: 93 % | DIASTOLIC BLOOD PRESSURE: 62 MMHG | WEIGHT: 215.8 LBS | SYSTOLIC BLOOD PRESSURE: 115 MMHG | HEIGHT: 67 IN

## 2024-11-11 DIAGNOSIS — N62 GYNECOMASTIA: ICD-10-CM

## 2024-11-11 DIAGNOSIS — N40.0 BENIGN PROSTATIC HYPERPLASIA WITHOUT LOWER URINARY TRACT SYMPTOMS: ICD-10-CM

## 2024-11-11 DIAGNOSIS — R10.32 LEFT LOWER QUADRANT ABDOMINAL PAIN: Primary | ICD-10-CM

## 2024-11-11 DIAGNOSIS — K21.9 GERD WITHOUT ESOPHAGITIS: ICD-10-CM

## 2024-11-11 DIAGNOSIS — E78.00 PURE HYPERCHOLESTEROLEMIA: ICD-10-CM

## 2024-11-11 DIAGNOSIS — I10 PRIMARY HYPERTENSION: ICD-10-CM

## 2024-11-11 DIAGNOSIS — M48.062 SPINAL STENOSIS OF LUMBAR REGION WITH NEUROGENIC CLAUDICATION: ICD-10-CM

## 2024-11-11 NOTE — PROGRESS NOTES
\"Have you been to the ER, urgent care clinic since your last visit?  Hospitalized since your last visit?\"    YES - When: approximately 2 months ago.  Where and Why: [facility].    “Have you seen or consulted any other health care providers outside our system since your last visit?”    NO          
pro-air inhaler prn; last used 3/12/18; [facility] work-[facility] was negative for asthma      Past Surgical History:   Procedure Laterality Date    COLONOSCOPY      DENTAL SURGERY  2024    KNEE ARTHROSCOPY Right 2005    muscle repaired    TOTAL KNEE ARTHROPLASTY Left 04/03/2018     Family History   Problem Relation Age of Onset    Hypertension Brother     Depression Brother     Depression Father     Hypertension Father     High Cholesterol Mother     Hypertension Mother     Ovarian Cancer Sister     Cancer Sister     Heart Attack Maternal Grandmother     Heart Attack Maternal Grandfather     Heart Attack Paternal Grandmother      Social History     Tobacco Use    Smoking status: Former     Current packs/day: 0.50     Types: Cigarettes    Smokeless tobacco: Never    Tobacco comments:     Quit smoking: smokes <1 pack of cigarettes per day   Substance Use Topics    Alcohol use: Not Currently         Review of Systems  See above    OBJECTIVE:  /62   Pulse 77   Ht 1.702 m (5' 7\")   Wt 97.9 kg (215 lb 12.8 oz)   SpO2 93%   BMI 33.80 kg/m²      Physical Exam  Constitutional:       General: He is not in acute distress.     Appearance: Normal appearance. He is not ill-appearing.   HENT:      Head: Normocephalic and atraumatic.   Cardiovascular:      Rate and Rhythm: Normal rate and regular rhythm.      Heart sounds: Normal heart sounds. No murmur heard.  Pulmonary:      Effort: Pulmonary effort is normal.      Breath sounds: Normal breath sounds. No wheezing or rhonchi.   Chest:          Comments: Gynecomastia of the left breast right above the nipple was noted.  Feels unchanged from previous exam.  Minimally tender.  No other abnormalities felt.  Musculoskeletal:         General: Normal range of motion.      Cervical back: Normal range of motion and neck supple.      Right lower leg: No edema.      Left lower leg: No edema.   Skin:     General: Skin is warm.      Findings: No rash.   Neurological:      Mental

## 2024-12-04 ENCOUNTER — LAB (OUTPATIENT)
Dept: FAMILY MEDICINE CLINIC | Facility: CLINIC | Age: 79
End: 2024-12-04

## 2024-12-04 DIAGNOSIS — E78.1 PURE HYPERGLYCERIDEMIA: ICD-10-CM

## 2024-12-04 DIAGNOSIS — E78.00 PURE HYPERCHOLESTEROLEMIA: ICD-10-CM

## 2024-12-04 LAB
ALBUMIN SERPL-MCNC: 4.2 G/DL (ref 3.2–4.6)
ALBUMIN/GLOB SERPL: 1.4 (ref 1–1.9)
ALP SERPL-CCNC: 85 U/L (ref 40–129)
ALT SERPL-CCNC: 28 U/L (ref 8–55)
ANION GAP SERPL CALC-SCNC: 14 MMOL/L (ref 7–16)
AST SERPL-CCNC: 31 U/L (ref 15–37)
BILIRUB SERPL-MCNC: 0.4 MG/DL (ref 0–1.2)
BUN SERPL-MCNC: 10 MG/DL (ref 8–23)
CALCIUM SERPL-MCNC: 9.8 MG/DL (ref 8.8–10.2)
CHLORIDE SERPL-SCNC: 100 MMOL/L (ref 98–107)
CHOLEST SERPL-MCNC: 161 MG/DL (ref 0–200)
CO2 SERPL-SCNC: 26 MMOL/L (ref 20–29)
CREAT SERPL-MCNC: 0.97 MG/DL (ref 0.8–1.3)
GLOBULIN SER CALC-MCNC: 3 G/DL (ref 2.3–3.5)
GLUCOSE SERPL-MCNC: 102 MG/DL (ref 70–99)
HDLC SERPL-MCNC: 60 MG/DL (ref 40–60)
HDLC SERPL: 2.7 (ref 0–5)
LDLC SERPL CALC-MCNC: 74 MG/DL (ref 0–100)
POTASSIUM SERPL-SCNC: 4.5 MMOL/L (ref 3.5–5.1)
PROT SERPL-MCNC: 7.3 G/DL (ref 6.3–8.2)
SODIUM SERPL-SCNC: 140 MMOL/L (ref 136–145)
TRIGL SERPL-MCNC: 137 MG/DL (ref 0–150)
VLDLC SERPL CALC-MCNC: 27 MG/DL (ref 6–23)

## 2024-12-11 ENCOUNTER — OFFICE VISIT (OUTPATIENT)
Dept: FAMILY MEDICINE CLINIC | Facility: CLINIC | Age: 79
End: 2024-12-11
Payer: MEDICARE

## 2024-12-11 VITALS
BODY MASS INDEX: 33.83 KG/M2 | OXYGEN SATURATION: 99 % | WEIGHT: 216 LBS | SYSTOLIC BLOOD PRESSURE: 124 MMHG | DIASTOLIC BLOOD PRESSURE: 72 MMHG | HEART RATE: 96 BPM

## 2024-12-11 DIAGNOSIS — I10 PRIMARY HYPERTENSION: Primary | ICD-10-CM

## 2024-12-11 DIAGNOSIS — K21.9 GERD WITHOUT ESOPHAGITIS: ICD-10-CM

## 2024-12-11 DIAGNOSIS — N40.0 BENIGN PROSTATIC HYPERPLASIA WITHOUT LOWER URINARY TRACT SYMPTOMS: ICD-10-CM

## 2024-12-11 DIAGNOSIS — E78.00 PURE HYPERCHOLESTEROLEMIA: ICD-10-CM

## 2024-12-11 DIAGNOSIS — D64.9 NORMOCYTIC ANEMIA: ICD-10-CM

## 2024-12-11 DIAGNOSIS — M48.061 SPINAL STENOSIS OF LUMBAR REGION, UNSPECIFIED WHETHER NEUROGENIC CLAUDICATION PRESENT: ICD-10-CM

## 2024-12-11 LAB
FERRITIN SERPL-MCNC: 86 NG/ML (ref 8–388)
IRON SERPL-MCNC: 86 UG/DL (ref 35–100)
VIT B12 SERPL-MCNC: 1517 PG/ML (ref 193–986)

## 2024-12-11 PROCEDURE — 3078F DIAST BP <80 MM HG: CPT | Performed by: FAMILY MEDICINE

## 2024-12-11 PROCEDURE — 36415 COLL VENOUS BLD VENIPUNCTURE: CPT | Performed by: FAMILY MEDICINE

## 2024-12-11 PROCEDURE — 3074F SYST BP LT 130 MM HG: CPT | Performed by: FAMILY MEDICINE

## 2024-12-11 PROCEDURE — 99214 OFFICE O/P EST MOD 30 MIN: CPT | Performed by: FAMILY MEDICINE

## 2024-12-11 PROCEDURE — 1159F MED LIST DOCD IN RCRD: CPT | Performed by: FAMILY MEDICINE

## 2024-12-11 PROCEDURE — 1123F ACP DISCUSS/DSCN MKR DOCD: CPT | Performed by: FAMILY MEDICINE

## 2024-12-11 RX ORDER — DULOXETIN HYDROCHLORIDE 60 MG/1
60 CAPSULE, DELAYED RELEASE ORAL DAILY
Qty: 90 CAPSULE | Refills: 3 | Status: SHIPPED | OUTPATIENT
Start: 2024-12-11

## 2024-12-11 RX ORDER — DULOXETIN HYDROCHLORIDE 60 MG/1
60 CAPSULE, DELAYED RELEASE ORAL DAILY
COMMUNITY
End: 2024-12-11 | Stop reason: SDUPTHER

## 2024-12-11 NOTE — PROGRESS NOTES
SUBJECTIVE:   Demarcus Gaxiola is a 79 y.o. male who has a past medical history significant for sleep apnea, hypertension, high cholesterol, BPH, gynecomastia felt secondary to Flomax, reflux with Larson's esophagus and lumbar spinal stenosis.  Patient is being preoped for a decompressive surgery on his lumbar spinal stenosis.  He is going through preop twice and had to cancel due to a conflict with his surgeon.  Both times his hemoglobin has been hovering around 11.5.  His most recent CBC showed a red blood count 3.5, hemoglobin of 11.5, hematocrit 34.6 and an MCV of 96.6.  Patient reports no bleeding tendencies.  He reports no melena or hematochezia.  He reports no prior knowledge or history of anemia.  He reports no gastritis although he does have a history of reflux and Larson's esophagus.  He is compliant with his Protonix.    Patient reports no active chest pain, shortness of breath, orthopnea or PND.    HPI  See above    Past Medical History, Past Surgical History, Family history, Social History, and Medications were all reviewed with the patient today and updated as necessary.       Current Outpatient Medications   Medication Sig Dispense Refill    DULoxetine (CYMBALTA) 60 MG extended release capsule Take 1 capsule by mouth daily      atorvastatin (LIPITOR) 20 MG tablet TAKE 1 TABLET BY MOUTH EVERY DAY 90 tablet 3    pantoprazole (PROTONIX) 40 MG tablet TAKE 1 TABLET BY MOUTH EVERY DAY 90 tablet 2    montelukast (SINGULAIR) 10 MG tablet TAKE 1 TABLET BY MOUTH EVERY DAY 90 tablet 3    tamsulosin (FLOMAX) 0.4 MG capsule TAKE 1 CAPSULE BY MOUTH EVERY DAY 90 capsule 2    NONFORMULARY Take 1 tablet by mouth at bedtime SLEEP 3 (L-Theanine 200mg , Nightime herbal blend 50 mg, Melatonin 10 mg)      losartan (COZAAR) 50 MG tablet TAKE 1 TABLET BY MOUTH EVERY DAY 90 tablet 3    gabapentin (NEURONTIN) 300 MG capsule Take 1 capsule by mouth 3 times daily for 180 days. 270 capsule 1    MAGNESIUM PO Take 400 mg by

## 2024-12-19 ENCOUNTER — HOSPITAL ENCOUNTER (OUTPATIENT)
Dept: SURGERY | Age: 79
Discharge: HOME | End: 2024-12-22
Payer: MEDICARE

## 2024-12-19 VITALS
TEMPERATURE: 98.4 F | DIASTOLIC BLOOD PRESSURE: 79 MMHG | SYSTOLIC BLOOD PRESSURE: 146 MMHG | OXYGEN SATURATION: 92 % | HEART RATE: 111 BPM

## 2024-12-19 LAB
APPEARANCE UR: CLEAR
BASOPHILS # BLD: 0 K/UL (ref 0–0.2)
BASOPHILS NFR BLD: 0 % (ref 0–2)
BILIRUB UR QL: NEGATIVE
COLOR UR: YELLOW
DIFFERENTIAL METHOD BLD: ABNORMAL
EKG ATRIAL RATE: 101 BPM
EKG DIAGNOSIS: NORMAL
EKG P AXIS: 34 DEGREES
EKG P-R INTERVAL: 162 MS
EKG Q-T INTERVAL: 348 MS
EKG QRS DURATION: 90 MS
EKG QTC CALCULATION (BAZETT): 451 MS
EKG R AXIS: 45 DEGREES
EKG T AXIS: 52 DEGREES
EKG VENTRICULAR RATE: 101 BPM
EOSINOPHIL # BLD: 0.1 K/UL (ref 0–0.8)
EOSINOPHIL NFR BLD: 1 % (ref 0.5–7.8)
ERYTHROCYTE [DISTWIDTH] IN BLOOD BY AUTOMATED COUNT: 13 % (ref 11.9–14.6)
GLUCOSE UR STRIP.AUTO-MCNC: NEGATIVE MG/DL
HCT VFR BLD AUTO: 38.6 % (ref 41.1–50.3)
HGB BLD-MCNC: 13 G/DL (ref 13.6–17.2)
HGB UR QL STRIP: NEGATIVE
IMM GRANULOCYTES # BLD AUTO: 0 K/UL (ref 0–0.5)
IMM GRANULOCYTES NFR BLD AUTO: 0 % (ref 0–5)
INR PPP: 1
KETONES UR QL STRIP.AUTO: NEGATIVE MG/DL
LEUKOCYTE ESTERASE UR QL STRIP.AUTO: NEGATIVE
LYMPHOCYTES # BLD: 1.9 K/UL (ref 0.5–4.6)
LYMPHOCYTES NFR BLD: 18 % (ref 13–44)
MCH RBC QN AUTO: 32.3 PG (ref 26.1–32.9)
MCHC RBC AUTO-ENTMCNC: 33.7 G/DL (ref 31.4–35)
MCV RBC AUTO: 96 FL (ref 82–102)
MONOCYTES # BLD: 0.9 K/UL (ref 0.1–1.3)
MONOCYTES NFR BLD: 9 % (ref 4–12)
MRSA DNA SPEC QL NAA+PROBE: NOT DETECTED
NEUTS SEG # BLD: 7.4 K/UL (ref 1.7–8.2)
NEUTS SEG NFR BLD: 72 % (ref 43–78)
NITRITE UR QL STRIP.AUTO: NEGATIVE
NRBC # BLD: 0 K/UL (ref 0–0.2)
PH UR STRIP: 5.5 (ref 5–9)
PLATELET # BLD AUTO: 264 K/UL (ref 150–450)
PMV BLD AUTO: 9.3 FL (ref 9.4–12.3)
PROT UR STRIP-MCNC: NEGATIVE MG/DL
PROTHROMBIN TIME: 13.6 SEC (ref 11.3–14.9)
RBC # BLD AUTO: 4.02 M/UL (ref 4.23–5.6)
S AUREUS CPE NOSE QL NAA+PROBE: DETECTED
SP GR UR REFRACTOMETRY: <=1.005 (ref 1–1.02)
UROBILINOGEN UR QL STRIP.AUTO: 0.2 EU/DL (ref 0.2–1)
WBC # BLD AUTO: 10.3 K/UL (ref 4.3–11.1)

## 2024-12-19 PROCEDURE — 81003 URINALYSIS AUTO W/O SCOPE: CPT

## 2024-12-19 PROCEDURE — 87641 MR-STAPH DNA AMP PROBE: CPT

## 2024-12-19 PROCEDURE — 85610 PROTHROMBIN TIME: CPT

## 2024-12-19 PROCEDURE — 85025 COMPLETE CBC W/AUTO DIFF WBC: CPT

## 2024-12-19 PROCEDURE — 93005 ELECTROCARDIOGRAM TRACING: CPT | Performed by: ANESTHESIOLOGY

## 2024-12-19 PROCEDURE — 93010 ELECTROCARDIOGRAM REPORT: CPT | Performed by: INTERNAL MEDICINE

## 2024-12-19 RX ORDER — PHENOL 1.4 %
1 AEROSOL, SPRAY (ML) MUCOUS MEMBRANE NIGHTLY
COMMUNITY

## 2024-12-19 NOTE — PERIOP NOTE
Screening for urinary retention  For Male Spine Patients >50      1. Do you get up more than twice a night to urinate? no  2. Have you had to be catheterized for urinary retention after a previous surgery? no    Flomax indicated? No    Pt is prescribed tamsulosin (Flomax) currently for BPH.

## 2024-12-19 NOTE — PERIOP NOTE
PLEASE CONTINUE TAKING ALL PRESCRIPTION MEDICATIONS UP TO THE DAY OF SURGERY UNLESS OTHERWISE DIRECTED BELOW.   TAKE ONLY THE MEDICATIONS LISTED HERE ON THE DAY OF SURGERY DATE OF SURGERY: 1/7/24     Atorvastatin (Lipitor)  Duloxetine (Cymbalta)   Gabapentin (Neurontin)  Guaifenesin (Mucinex)  Pantoprazole (Protonix)  Tamsulosin (Flomax)     Bring albuterol inhaler to hospital on day of surgery  Bring Spine Book and handouts to all appointments & to the hospital on day of surgery       PRESCRIPTION MEDICATION TO HOLD- PLEASE DO NOT STOP ANY PRESCRIPTION MEDICATIONS UNLESS SPECIFIED BELOW:      Please stop all vitamins & supplements 7 days prior to surgery and stop all NSAIDS (ASA/Excedrin/BC & Goody Powder, ibuprofen/Motrin/Advil, naproxen/Aleve) 5 days before your surgery.  Should you have a surgery date that does not allow for the amount of time instructed above, please stop taking vitamins, supplements, and NSAIDS IMMEDIATELY.      PRESCRIPTION MEDICATIONS TO HOLD :    None     Comments   1/6/24 the day before surgery please take 2 Tylenol in the morning and then again before bed (DO NOT USE TYLENOL/ACETAMINOPHEN IF YOU HAVE A KNOWN HISTORY OF LIVER DISEASE). You may use either regular or extra strength.              Please do not bring home medications with you on the day of surgery unless otherwise directed by your nurse.  If you are instructed to bring home medications, please give them to your nurse as they will be administered by the nursing staff.    If you have any questions, please call [facility] [phone].    A copy of this note was provided to the patient for reference.       Please do not bring home medications with you on the day of surgery unless otherwise directed by your nurse.  If you are instructed to bring home medications, please give them to your nurse as they will be administered by the nursing staff.    If you have any questions, please call [facility]

## 2024-12-19 NOTE — PERIOP NOTE
How to Use Your Incentive Spirometer       About Your Incentive Spirometer  An incentive spirometer is a device that will expand your lungs by helping you to breathe more deeply and fully. The parts of your incentive spirometer are labeled in Figure 1.    Using your incentive spirometer  When you're using your incentive spirometer, make sure to breathe through your mouth. If you breathe through your nose, the incentive spirometer won't work properly. You can hold your nose if you have trouble. DO NOT BLOW INTO THE DEVICE. If you feel dizzy at any time, stop and rest. Try again at a later time.  Sit upright in a chair or in bed. Hold the incentive spirometer at eye level.   Put the mouthpiece in your mouth and close your lips tightly around it. Slowly breathe out (exhale) completely.  Breathe in (inhale) slowly through your mouth as deeply as you can. As you take the breath, you will see the piston rise inside the large column. While the piston rises, the indicator on the right should move upwards. It should stay in between the 2 arrows (see Figure 1).  Try to get the piston as high as you can, while keeping the indicator between the arrows. If the indicator doesn't stay between the arrows, you're breathing either too fast or too slow.  When you get it as high as you can, hold your breath for 10 seconds, or as long as possible. While you're holding your breath, the piston will slowly fall to the base of the spirometer.  Once the piston reaches the bottom of the spirometer, breathe out slowly through your mouth. Rest for a few seconds.  Repeat 10 times. Try to get the piston to the same level with each breath.  After each set of 10 breaths, try to cough as coughing will help loosen or clear any mucus in your lungs.  Put the marker at the level the piston reached on your incentive spirometer. This will be your goal next time.  Repeat these steps every hour that you're awake.  Cover the mouthpiece of the incentive  spirometer when you aren't using it

## 2024-12-19 NOTE — PROGRESS NOTES
Pre-Assessment Surgery Review      Patient ID:  Demarcus Gaxiola  [id number]  79 y.o.  1945  Surgeon: Dr Mendosa  Date of Surgery: 1/7/2025  Procedure:   L4-L5 direct lateral interbody fusion   L2-5 laminectomy , L2-L5 posterior fusion   Primary Care Physician: Cezar Wright [phone]  Specialty Physician(s):      Subjective:   Demarcus Gaxiola is a 79 y.o. White (non-) male who presents for preoperative evaluation. This is a preoperative chart review note based on data collected by the nurse at the surgical Pre-Assessment visit.    Past Medical History:   Diagnosis Date    Thrasher esophagus     BPH (benign prostatic hyperplasia)     GERD (gastroesophageal reflux disease)     medication daily, thrasher's esophogus, 2 pillows    High frequency hearing loss     Hypercholesteremia     on med    Hypertension     on med for control     Nocturnal hypoxemia     wears CPAP, states prescribed inhaler for congestion caused by CPAP- shabana asthma/copd    OA (osteoarthritis)     LEOBARDO (obstructive sleep apnea) 02/24/2023    wears CPAP, states prescribed inhaler for congestion caused by CPAP- shabana asthma/copd    Rhinitis     SNHL (sensorineural hearing loss)     Tinnitus of both ears     Wheezing     pro-air inhaler prn; last used 3/12/18; [facility] work-[facility] was negative for asthma       Past Surgical History:   Procedure Laterality Date    COLONOSCOPY      DENTAL SURGERY  2024    KNEE ARTHROSCOPY Right 2005    muscle repaired    TOTAL KNEE ARTHROPLASTY Left 04/03/2018     Family History   Problem Relation Age of Onset    Hypertension Brother     Depression Brother     Depression Father     Hypertension Father     High Cholesterol Mother     Hypertension Mother     Ovarian Cancer Sister     Cancer Sister     Heart Attack Maternal Grandmother     Heart Attack Maternal Grandfather     Heart Attack Paternal Grandmother       Social History     Tobacco Use    Smoking status: Former     Current

## 2024-12-19 NOTE — PERIOP NOTE
Preoperative Nutrition Screen (AUSTIN)   Patient's Age: 79 y.o.    Last Serum Albumin Level:4.2 (12/4/24)  Lab Results   Component Value Date    ALBUMIN 4.2 12/04/2024    ALBUMIN 4.1 04/08/2024    ALBUMIN 4.1 11/07/2023       Patient's BMI: Estimated body mass index is 33.83 kg/m² as calculated from the following:    Height as of 11/11/24: 1.702 m (5' 7\").    Weight as of 12/11/24: 98 kg (216 lb).     If the answer is yes to any of the following, then recommend prescribed Oral Nutrition Supplements (ONS) for 5 days prior to surgery and in addition, if AUSTIN > 3, order referral to dietitian for further assessment and nutrition therapy.     1. Does the patient have a documented serum albumin less than 3.0 within the last 90 days?    no   2. Is patient's BMI less than 18.5 (or less than 20 if age over 65)?  No = 0       3. Has the patient had an unplanned weight loss of 10% of body weight or more in the last 6 months? No = 0   4. Has the patient been eating less than 50% of their normal diet in the preceding week? No = 0   AUSTIN Score (number of Yes responses), 0-4 0     Plan:   No Nutrition Intervention indicated    Electronically signed by Araceli Hobbs RN on 12/19/24 at 11:52 AM EST

## 2024-12-19 NOTE — PERIOP NOTE
Enhanced Recovery After Surgery- Spine Surgery       Patients with a AUSTIN score < 2:     Drink clear liquids up to 2 hours prior to your hospital arrival. .    Bring your patient handbook with you to the hospital.    Things to remember:    You will be up on a chair the evening of surgery and drinking clear liquids. Your diet will be advanced by your surgeon as appropriate.    Drink Ensure Surgery after surgery. This will be provided to you in the hospital after your surgery. Drink one bottle twice daily for 5 days after surgery in the hospital. It is designed to support immune health and recovery from surgery.     Beginning the day after surgery, you will be up in a chair for all meals.    Beginning the day after surgery, you will be out of the bed for a minimum of 6 hours (not all at one time)    Beginning the day after surgery, you will walk in the drake in the drake at least 50' at least three times a day.    You will be given scheduled non-narcotic pain medication to help keep your pain under control.  You will have stronger pain medication ordered for break through pain.     All of these measures are geared toward returning your bowel to normal function as soon as possible and to prevent complications associated with bowel blockage, blood clots, and/or pneumonia           Learning About Enhanced Recovery After Surgery  What is enhanced recovery after surgery?     Enhanced recovery after surgery, or ERAS, is a way to help people prepare for surgery. The goal is to help you get ready for surgery and get better sooner. Before your surgery, you:  Eat well in the days leading up to the surgery.  Drink lots of clear fluids.  Learn some basic information about your surgery. Doing this can put your mind at ease.  Your doctor will explain any other tips to help you recover. You may have to avoid some foods.   During surgery, you may get different kinds of medicine for pain. This can help you heal sooner and feel

## 2024-12-19 NOTE — PERIOP NOTE
Patient verified name & . Order to obtain consent found in EHR  and matches case posting.    TYPE  CASE:3              Orders:  received  Labs per Spine protocol:  CBC with dif, pt/inr, MRSA/dna nasal swab and UA.  BMP  24 in chart WDL  Labs per anesthesia protocol: T&S on DOS  EKG/cardiac records  :   on arrival @1114,  @1132. EKG completed. WDL for anesthesia protocol.  Glucose: not indicated    Medication list updated today. Medication bottles visualized. Pt answered medical and surgical history questions to the best of their ability.     All questions were addressed. Spine Recovery booklet provided and surgery handouts. Surgery instructions provided to pt and pt verbalizes understanding.    Patient Guide to Surgery Packet provided to patient. Packet includes Patient Guide to surgery handout, Preventing Infections Before & After Surgery, Facts about Pain Management handout, Hand Hygiene handout, Patient Education and Teaching Sheets and [facility] frequent question and answer sheet. Guide reviewed with the patient and all questions answered to the satisfaction of the patient.     Patient instructed on the following and verbalized understanding:  Arrive at MAIN entrance @ [address] [place] [address]  Same day outpatient surgery only: Responsible adult must drive patient to and from hospital, stay during surgery and 24 hours postoperatively.  No food after midnight the night before surgery. UPDATE: a recent change per Hank RUFF, Please finish 32 ounces of non-caffeinated clear liquids 2 hours prior to arrival to avoid dehydration (preferably water).  Once the clear fluids are completed do not chew gum, no hard candy, no mints, no additional fluids (Nothing in mouth including tobacco)   Shower using antiseptic wash both the night before and the morning of surgery. Antiseptic wash provided to the patient with handout and verbal instructions for use.  Leave

## 2025-01-06 ENCOUNTER — ANESTHESIA EVENT (OUTPATIENT)
Dept: SURGERY | Age: 80
End: 2025-01-06
Payer: MEDICARE

## 2025-01-07 ENCOUNTER — APPOINTMENT (OUTPATIENT)
Dept: GENERAL RADIOLOGY | Age: 80
End: 2025-01-07
Attending: ORTHOPAEDIC SURGERY
Payer: MEDICARE

## 2025-01-07 ENCOUNTER — ANESTHESIA EVENT (OUTPATIENT)
Dept: SURGERY | Age: 80
End: 2025-01-07
Payer: MEDICARE

## 2025-01-07 ENCOUNTER — HOSPITAL ENCOUNTER (INPATIENT)
Age: 80
LOS: 3 days | Discharge: HOME HEALTH | End: 2025-01-10
Attending: ORTHOPAEDIC SURGERY | Admitting: ORTHOPAEDIC SURGERY
Payer: MEDICARE

## 2025-01-07 ENCOUNTER — ANESTHESIA (OUTPATIENT)
Dept: SURGERY | Age: 80
End: 2025-01-07
Payer: MEDICARE

## 2025-01-07 DIAGNOSIS — M71.38 CYST OF LUMBAR FACET JOINT: ICD-10-CM

## 2025-01-07 DIAGNOSIS — M43.16 SPONDYLOLISTHESIS OF LUMBAR REGION: ICD-10-CM

## 2025-01-07 DIAGNOSIS — M51.26 LUMBAR DISC HERNIATION: ICD-10-CM

## 2025-01-07 DIAGNOSIS — M48.062 SPINAL STENOSIS, LUMBAR REGION, WITH NEUROGENIC CLAUDICATION: ICD-10-CM

## 2025-01-07 PROBLEM — Z98.1 S/P LUMBAR FUSION: Status: ACTIVE | Noted: 2025-01-07

## 2025-01-07 PROCEDURE — 6360000002 HC RX W HCPCS

## 2025-01-07 PROCEDURE — 63047 LAM FACETEC & FORAMOT LUMBAR: CPT | Performed by: ORTHOPAEDIC SURGERY

## 2025-01-07 PROCEDURE — 1100000000 HC RM PRIVATE

## 2025-01-07 PROCEDURE — 6360000002 HC RX W HCPCS: Performed by: ORTHOPAEDIC SURGERY

## 2025-01-07 PROCEDURE — 2580000003 HC RX 258: Performed by: ORTHOPAEDIC SURGERY

## 2025-01-07 PROCEDURE — 3700000000 HC ANESTHESIA ATTENDED CARE: Performed by: ORTHOPAEDIC SURGERY

## 2025-01-07 PROCEDURE — 22612 ARTHRD PST TQ 1NTRSPC LUMBAR: CPT | Performed by: ORTHOPAEDIC SURGERY

## 2025-01-07 PROCEDURE — 01NB0ZZ RELEASE LUMBAR NERVE, OPEN APPROACH: ICD-10-PCS | Performed by: ORTHOPAEDIC SURGERY

## 2025-01-07 PROCEDURE — 3600000004 HC SURGERY LEVEL 4 BASE: Performed by: ORTHOPAEDIC SURGERY

## 2025-01-07 PROCEDURE — 0ST20ZZ RESECTION OF LUMBAR VERTEBRAL DISC, OPEN APPROACH: ICD-10-PCS | Performed by: ORTHOPAEDIC SURGERY

## 2025-01-07 PROCEDURE — 86900 BLOOD TYPING SEROLOGIC ABO: CPT

## 2025-01-07 PROCEDURE — 4A11X4G MONITORING OF PERIPHERAL NERVOUS ELECTRICAL ACTIVITY, INTRAOPERATIVE, EXTERNAL APPROACH: ICD-10-PCS | Performed by: ORTHOPAEDIC SURGERY

## 2025-01-07 PROCEDURE — 6370000000 HC RX 637 (ALT 250 FOR IP): Performed by: ORTHOPAEDIC SURGERY

## 2025-01-07 PROCEDURE — C1889 IMPLANT/INSERT DEVICE, NOC: HCPCS | Performed by: ORTHOPAEDIC SURGERY

## 2025-01-07 PROCEDURE — 0SG10K1 FUSION OF 2 OR MORE LUMBAR VERTEBRAL JOINTS WITH NONAUTOLOGOUS TISSUE SUBSTITUTE, POSTERIOR APPROACH, POSTERIOR COLUMN, OPEN APPROACH: ICD-10-PCS | Performed by: ORTHOPAEDIC SURGERY

## 2025-01-07 PROCEDURE — 2720000010 HC SURG SUPPLY STERILE: Performed by: ORTHOPAEDIC SURGERY

## 2025-01-07 PROCEDURE — 2580000003 HC RX 258

## 2025-01-07 PROCEDURE — 22842 INSERT SPINE FIXATION DEVICE: CPT | Performed by: ORTHOPAEDIC SURGERY

## 2025-01-07 PROCEDURE — 3700000001 HC ADD 15 MINUTES (ANESTHESIA): Performed by: ORTHOPAEDIC SURGERY

## 2025-01-07 PROCEDURE — 3600000014 HC SURGERY LEVEL 4 ADDTL 15MIN: Performed by: ORTHOPAEDIC SURGERY

## 2025-01-07 PROCEDURE — 2580000003 HC RX 258: Performed by: ANESTHESIOLOGY

## 2025-01-07 PROCEDURE — 72100 X-RAY EXAM L-S SPINE 2/3 VWS: CPT

## 2025-01-07 PROCEDURE — 86850 RBC ANTIBODY SCREEN: CPT

## 2025-01-07 PROCEDURE — 86901 BLOOD TYPING SEROLOGIC RH(D): CPT

## 2025-01-07 PROCEDURE — 2709999900 HC NON-CHARGEABLE SUPPLY: Performed by: ORTHOPAEDIC SURGERY

## 2025-01-07 PROCEDURE — 22853 INSJ BIOMECHANICAL DEVICE: CPT | Performed by: ORTHOPAEDIC SURGERY

## 2025-01-07 PROCEDURE — 7100000001 HC PACU RECOVERY - ADDTL 15 MIN: Performed by: ORTHOPAEDIC SURGERY

## 2025-01-07 PROCEDURE — C1713 ANCHOR/SCREW BN/BN,TIS/BN: HCPCS | Performed by: ORTHOPAEDIC SURGERY

## 2025-01-07 PROCEDURE — 2500000003 HC RX 250 WO HCPCS: Performed by: ORTHOPAEDIC SURGERY

## 2025-01-07 PROCEDURE — 20930 SP BONE ALGRFT MORSEL ADD-ON: CPT | Performed by: ORTHOPAEDIC SURGERY

## 2025-01-07 PROCEDURE — 22614 ARTHRD PST TQ 1NTRSPC EA ADD: CPT | Performed by: ORTHOPAEDIC SURGERY

## 2025-01-07 PROCEDURE — 0SG00A0 FUSION OF LUMBAR VERTEBRAL JOINT WITH INTERBODY FUSION DEVICE, ANTERIOR APPROACH, ANTERIOR COLUMN, OPEN APPROACH: ICD-10-PCS | Performed by: ORTHOPAEDIC SURGERY

## 2025-01-07 PROCEDURE — 8E0WXBF COMPUTER ASSISTED PROCEDURE OF TRUNK REGION, WITH FLUOROSCOPY: ICD-10-PCS | Performed by: ORTHOPAEDIC SURGERY

## 2025-01-07 PROCEDURE — 2500000003 HC RX 250 WO HCPCS

## 2025-01-07 PROCEDURE — 63048 LAM FACETEC &FORAMOT EA ADDL: CPT | Performed by: ORTHOPAEDIC SURGERY

## 2025-01-07 PROCEDURE — C1769 GUIDE WIRE: HCPCS | Performed by: ORTHOPAEDIC SURGERY

## 2025-01-07 PROCEDURE — 7100000000 HC PACU RECOVERY - FIRST 15 MIN: Performed by: ORTHOPAEDIC SURGERY

## 2025-01-07 PROCEDURE — 22558 ARTHRD ANT NTRBD MIN DSC LUM: CPT | Performed by: ORTHOPAEDIC SURGERY

## 2025-01-07 DEVICE — TI ALLOY RAD ROD
Type: IMPLANTABLE DEVICE | Site: SPINE LUMBAR | Status: FUNCTIONAL
Brand: XIA 3

## 2025-01-07 DEVICE — BIO DBM PLUS PUTTY (WITH CANCELLOUS)
Type: IMPLANTABLE DEVICE | Site: SPINE LUMBAR | Status: FUNCTIONAL
Brand: BIO DBM

## 2025-01-07 DEVICE — POLYAXIAL SCREW
Type: IMPLANTABLE DEVICE | Site: SPINE LUMBAR | Status: FUNCTIONAL
Brand: XIA 3 SYSTEM - SERRATO

## 2025-01-07 DEVICE — ALLOGRAFT BNE CHIP 1-4 MM 15 CC CRUSH CANC: Type: IMPLANTABLE DEVICE | Site: SPINE LUMBAR | Status: FUNCTIONAL

## 2025-01-07 DEVICE — SPACER SPNL 7 DEG SM 50X18 MM PROLIFT: Type: IMPLANTABLE DEVICE | Site: SPINE LUMBAR | Status: FUNCTIONAL

## 2025-01-07 DEVICE — GRAFT BNE MED: Type: IMPLANTABLE DEVICE | Site: SPINE LUMBAR | Status: FUNCTIONAL

## 2025-01-07 DEVICE — BLOCKER
Type: IMPLANTABLE DEVICE | Site: SPINE LUMBAR | Status: FUNCTIONAL
Brand: XIA 3

## 2025-01-07 RX ORDER — OXYCODONE HYDROCHLORIDE 5 MG/1
5 TABLET ORAL EVERY 6 HOURS PRN
Qty: 28 TABLET | Refills: 0 | Status: SHIPPED | OUTPATIENT
Start: 2025-01-07 | End: 2025-01-14 | Stop reason: SDUPTHER

## 2025-01-07 RX ORDER — VASOPRESSIN 20 [USP'U]/ML
INJECTION, SOLUTION INTRAVENOUS
Status: DISCONTINUED | OUTPATIENT
Start: 2025-01-07 | End: 2025-01-07 | Stop reason: SDUPTHER

## 2025-01-07 RX ORDER — IBUPROFEN 600 MG/1
1 TABLET ORAL PRN
Status: DISCONTINUED | OUTPATIENT
Start: 2025-01-07 | End: 2025-01-07 | Stop reason: HOSPADM

## 2025-01-07 RX ORDER — PROCHLORPERAZINE EDISYLATE 5 MG/ML
5 INJECTION INTRAMUSCULAR; INTRAVENOUS
Status: DISCONTINUED | OUTPATIENT
Start: 2025-01-07 | End: 2025-01-07 | Stop reason: HOSPADM

## 2025-01-07 RX ORDER — VANCOMYCIN HYDROCHLORIDE 1 G/20ML
INJECTION, POWDER, LYOPHILIZED, FOR SOLUTION INTRAVENOUS PRN
Status: DISCONTINUED | OUTPATIENT
Start: 2025-01-07 | End: 2025-01-07 | Stop reason: HOSPADM

## 2025-01-07 RX ORDER — SODIUM CHLORIDE 9 MG/ML
INJECTION, SOLUTION INTRAVENOUS PRN
Status: DISCONTINUED | OUTPATIENT
Start: 2025-01-07 | End: 2025-01-07 | Stop reason: HOSPADM

## 2025-01-07 RX ORDER — ACETAMINOPHEN 500 MG
1000 TABLET ORAL ONCE
Status: DISCONTINUED | OUTPATIENT
Start: 2025-01-07 | End: 2025-01-07 | Stop reason: HOSPADM

## 2025-01-07 RX ORDER — DULOXETIN HYDROCHLORIDE 30 MG/1
60 CAPSULE, DELAYED RELEASE ORAL DAILY
Status: DISCONTINUED | OUTPATIENT
Start: 2025-01-08 | End: 2025-01-10 | Stop reason: HOSPADM

## 2025-01-07 RX ORDER — SODIUM CHLORIDE, SODIUM LACTATE, POTASSIUM CHLORIDE, CALCIUM CHLORIDE 600; 310; 30; 20 MG/100ML; MG/100ML; MG/100ML; MG/100ML
INJECTION, SOLUTION INTRAVENOUS CONTINUOUS
Status: DISCONTINUED | OUTPATIENT
Start: 2025-01-07 | End: 2025-01-07 | Stop reason: HOSPADM

## 2025-01-07 RX ORDER — TRANEXAMIC ACID 100 MG/ML
INJECTION, SOLUTION INTRAVENOUS
Status: DISCONTINUED | OUTPATIENT
Start: 2025-01-07 | End: 2025-01-07 | Stop reason: SDUPTHER

## 2025-01-07 RX ORDER — POLYETHYLENE GLYCOL 3350 17 G/17G
17 POWDER, FOR SOLUTION ORAL DAILY
Status: DISCONTINUED | OUTPATIENT
Start: 2025-01-08 | End: 2025-01-10 | Stop reason: HOSPADM

## 2025-01-07 RX ORDER — SODIUM CHLORIDE 9 MG/ML
INJECTION, SOLUTION INTRAVENOUS PRN
Status: DISCONTINUED | OUTPATIENT
Start: 2025-01-07 | End: 2025-01-10 | Stop reason: HOSPADM

## 2025-01-07 RX ORDER — MONTELUKAST SODIUM 10 MG/1
10 TABLET ORAL NIGHTLY
Status: DISCONTINUED | OUTPATIENT
Start: 2025-01-07 | End: 2025-01-10 | Stop reason: HOSPADM

## 2025-01-07 RX ORDER — SODIUM CHLORIDE 0.9 % (FLUSH) 0.9 %
5-40 SYRINGE (ML) INJECTION EVERY 12 HOURS SCHEDULED
Status: DISCONTINUED | OUTPATIENT
Start: 2025-01-07 | End: 2025-01-07 | Stop reason: HOSPADM

## 2025-01-07 RX ORDER — DIPHENHYDRAMINE HCL 25 MG
25 CAPSULE ORAL EVERY 6 HOURS PRN
Status: DISCONTINUED | OUTPATIENT
Start: 2025-01-07 | End: 2025-01-10 | Stop reason: HOSPADM

## 2025-01-07 RX ORDER — OXYCODONE HYDROCHLORIDE 5 MG/1
5 TABLET ORAL
Status: DISCONTINUED | OUTPATIENT
Start: 2025-01-07 | End: 2025-01-07 | Stop reason: HOSPADM

## 2025-01-07 RX ORDER — KETOROLAC TROMETHAMINE 15 MG/ML
15 INJECTION, SOLUTION INTRAMUSCULAR; INTRAVENOUS EVERY 6 HOURS
Status: COMPLETED | OUTPATIENT
Start: 2025-01-07 | End: 2025-01-08

## 2025-01-07 RX ORDER — GABAPENTIN 300 MG/1
300 CAPSULE ORAL 3 TIMES DAILY
Status: DISCONTINUED | OUTPATIENT
Start: 2025-01-07 | End: 2025-01-10 | Stop reason: HOSPADM

## 2025-01-07 RX ORDER — SODIUM CHLORIDE 0.9 % (FLUSH) 0.9 %
5-40 SYRINGE (ML) INJECTION PRN
Status: DISCONTINUED | OUTPATIENT
Start: 2025-01-07 | End: 2025-01-10 | Stop reason: HOSPADM

## 2025-01-07 RX ORDER — BISACODYL 5 MG/1
5 TABLET, DELAYED RELEASE ORAL DAILY
Status: DISCONTINUED | OUTPATIENT
Start: 2025-01-08 | End: 2025-01-10 | Stop reason: HOSPADM

## 2025-01-07 RX ORDER — DIPHENHYDRAMINE HYDROCHLORIDE 50 MG/ML
25 INJECTION INTRAMUSCULAR; INTRAVENOUS EVERY 6 HOURS PRN
Status: DISCONTINUED | OUTPATIENT
Start: 2025-01-07 | End: 2025-01-10 | Stop reason: HOSPADM

## 2025-01-07 RX ORDER — ALBUTEROL SULFATE 0.83 MG/ML
2.5 SOLUTION RESPIRATORY (INHALATION) EVERY 6 HOURS PRN
Status: DISCONTINUED | OUTPATIENT
Start: 2025-01-07 | End: 2025-01-10 | Stop reason: HOSPADM

## 2025-01-07 RX ORDER — SUCCINYLCHOLINE CHLORIDE 20 MG/ML
INJECTION INTRAMUSCULAR; INTRAVENOUS
Status: DISCONTINUED | OUTPATIENT
Start: 2025-01-07 | End: 2025-01-07 | Stop reason: SDUPTHER

## 2025-01-07 RX ORDER — LIDOCAINE HYDROCHLORIDE 10 MG/ML
1 INJECTION, SOLUTION INFILTRATION; PERINEURAL
Status: DISCONTINUED | OUTPATIENT
Start: 2025-01-07 | End: 2025-01-07 | Stop reason: HOSPADM

## 2025-01-07 RX ORDER — FENTANYL CITRATE 50 UG/ML
25 INJECTION, SOLUTION INTRAMUSCULAR; INTRAVENOUS EVERY 5 MIN PRN
Status: DISCONTINUED | OUTPATIENT
Start: 2025-01-07 | End: 2025-01-07 | Stop reason: HOSPADM

## 2025-01-07 RX ORDER — GLYCOPYRROLATE 0.2 MG/ML
INJECTION INTRAMUSCULAR; INTRAVENOUS
Status: DISCONTINUED | OUTPATIENT
Start: 2025-01-07 | End: 2025-01-07 | Stop reason: SDUPTHER

## 2025-01-07 RX ORDER — KETAMINE HCL IN NACL, ISO-OSM 20 MG/2 ML
SYRINGE (ML) INJECTION
Status: DISCONTINUED | OUTPATIENT
Start: 2025-01-07 | End: 2025-01-07 | Stop reason: SDUPTHER

## 2025-01-07 RX ORDER — ACETAMINOPHEN 325 MG/1
650 TABLET ORAL EVERY 6 HOURS
Status: DISCONTINUED | OUTPATIENT
Start: 2025-01-07 | End: 2025-01-10 | Stop reason: HOSPADM

## 2025-01-07 RX ORDER — MEPERIDINE HYDROCHLORIDE 25 MG/ML
12.5 INJECTION INTRAMUSCULAR; INTRAVENOUS; SUBCUTANEOUS EVERY 5 MIN PRN
Status: DISCONTINUED | OUTPATIENT
Start: 2025-01-07 | End: 2025-01-07 | Stop reason: HOSPADM

## 2025-01-07 RX ORDER — CYCLOBENZAPRINE HCL 10 MG
10 TABLET ORAL 3 TIMES DAILY PRN
Status: DISCONTINUED | OUTPATIENT
Start: 2025-01-07 | End: 2025-01-10 | Stop reason: HOSPADM

## 2025-01-07 RX ORDER — SODIUM CHLORIDE 9 MG/ML
INJECTION, SOLUTION INTRAVENOUS
Status: DISCONTINUED | OUTPATIENT
Start: 2025-01-07 | End: 2025-01-07 | Stop reason: SDUPTHER

## 2025-01-07 RX ORDER — SODIUM CHLORIDE 0.9 % (FLUSH) 0.9 %
5-40 SYRINGE (ML) INJECTION EVERY 12 HOURS SCHEDULED
Status: DISCONTINUED | OUTPATIENT
Start: 2025-01-07 | End: 2025-01-10 | Stop reason: HOSPADM

## 2025-01-07 RX ORDER — NEOSTIGMINE METHYLSULFATE 1 MG/ML
INJECTION INTRAVENOUS
Status: DISCONTINUED | OUTPATIENT
Start: 2025-01-07 | End: 2025-01-07 | Stop reason: SDUPTHER

## 2025-01-07 RX ORDER — PROPOFOL 10 MG/ML
INJECTION, EMULSION INTRAVENOUS
Status: DISCONTINUED | OUTPATIENT
Start: 2025-01-07 | End: 2025-01-07 | Stop reason: SDUPTHER

## 2025-01-07 RX ORDER — ROCURONIUM BROMIDE 10 MG/ML
INJECTION, SOLUTION INTRAVENOUS
Status: DISCONTINUED | OUTPATIENT
Start: 2025-01-07 | End: 2025-01-07 | Stop reason: SDUPTHER

## 2025-01-07 RX ORDER — SODIUM CHLORIDE 9 MG/ML
INJECTION, SOLUTION INTRAVENOUS CONTINUOUS
Status: ACTIVE | OUTPATIENT
Start: 2025-01-07 | End: 2025-01-08

## 2025-01-07 RX ORDER — TRANEXAMIC ACID 650 MG/1
1300 TABLET ORAL 2 TIMES DAILY
Status: COMPLETED | OUTPATIENT
Start: 2025-01-07 | End: 2025-01-08

## 2025-01-07 RX ORDER — DIPHENHYDRAMINE HYDROCHLORIDE 50 MG/ML
12.5 INJECTION INTRAMUSCULAR; INTRAVENOUS
Status: DISCONTINUED | OUTPATIENT
Start: 2025-01-07 | End: 2025-01-07 | Stop reason: HOSPADM

## 2025-01-07 RX ORDER — NALOXONE HYDROCHLORIDE 0.4 MG/ML
INJECTION, SOLUTION INTRAMUSCULAR; INTRAVENOUS; SUBCUTANEOUS PRN
Status: DISCONTINUED | OUTPATIENT
Start: 2025-01-07 | End: 2025-01-07 | Stop reason: HOSPADM

## 2025-01-07 RX ORDER — EPHEDRINE SULFATE 5 MG/ML
INJECTION INTRAVENOUS
Status: DISCONTINUED | OUTPATIENT
Start: 2025-01-07 | End: 2025-01-07 | Stop reason: SDUPTHER

## 2025-01-07 RX ORDER — DEXAMETHASONE SODIUM PHOSPHATE 10 MG/ML
INJECTION INTRAMUSCULAR; INTRAVENOUS
Status: DISCONTINUED | OUTPATIENT
Start: 2025-01-07 | End: 2025-01-07 | Stop reason: SDUPTHER

## 2025-01-07 RX ORDER — LIDOCAINE HYDROCHLORIDE 20 MG/ML
INJECTION, SOLUTION EPIDURAL; INFILTRATION; INTRACAUDAL; PERINEURAL
Status: DISCONTINUED | OUTPATIENT
Start: 2025-01-07 | End: 2025-01-07 | Stop reason: SDUPTHER

## 2025-01-07 RX ORDER — DEXTROSE MONOHYDRATE 100 MG/ML
INJECTION, SOLUTION INTRAVENOUS CONTINUOUS PRN
Status: DISCONTINUED | OUTPATIENT
Start: 2025-01-07 | End: 2025-01-07 | Stop reason: HOSPADM

## 2025-01-07 RX ORDER — PANTOPRAZOLE SODIUM 40 MG/1
40 TABLET, DELAYED RELEASE ORAL DAILY
Status: DISCONTINUED | OUTPATIENT
Start: 2025-01-08 | End: 2025-01-10 | Stop reason: HOSPADM

## 2025-01-07 RX ORDER — HYDROMORPHONE HYDROCHLORIDE 1 MG/ML
0.5 INJECTION, SOLUTION INTRAMUSCULAR; INTRAVENOUS; SUBCUTANEOUS
Status: DISCONTINUED | OUTPATIENT
Start: 2025-01-07 | End: 2025-01-10 | Stop reason: HOSPADM

## 2025-01-07 RX ORDER — PROMETHAZINE HYDROCHLORIDE 12.5 MG/1
12.5 TABLET ORAL EVERY 6 HOURS PRN
Status: DISCONTINUED | OUTPATIENT
Start: 2025-01-07 | End: 2025-01-10 | Stop reason: HOSPADM

## 2025-01-07 RX ORDER — SODIUM CHLORIDE 0.9 % (FLUSH) 0.9 %
5-40 SYRINGE (ML) INJECTION PRN
Status: DISCONTINUED | OUTPATIENT
Start: 2025-01-07 | End: 2025-01-07 | Stop reason: HOSPADM

## 2025-01-07 RX ORDER — OXYCODONE HYDROCHLORIDE 5 MG/1
10 TABLET ORAL EVERY 4 HOURS PRN
Status: DISCONTINUED | OUTPATIENT
Start: 2025-01-07 | End: 2025-01-10 | Stop reason: HOSPADM

## 2025-01-07 RX ORDER — ONDANSETRON 2 MG/ML
4 INJECTION INTRAMUSCULAR; INTRAVENOUS EVERY 6 HOURS PRN
Status: DISCONTINUED | OUTPATIENT
Start: 2025-01-07 | End: 2025-01-10 | Stop reason: HOSPADM

## 2025-01-07 RX ORDER — HYDROMORPHONE HYDROCHLORIDE 2 MG/ML
INJECTION, SOLUTION INTRAMUSCULAR; INTRAVENOUS; SUBCUTANEOUS
Status: DISCONTINUED | OUTPATIENT
Start: 2025-01-07 | End: 2025-01-07 | Stop reason: SDUPTHER

## 2025-01-07 RX ORDER — MIDAZOLAM HYDROCHLORIDE 1 MG/ML
INJECTION, SOLUTION INTRAMUSCULAR; INTRAVENOUS
Status: DISCONTINUED | OUTPATIENT
Start: 2025-01-07 | End: 2025-01-07 | Stop reason: SDUPTHER

## 2025-01-07 RX ORDER — LOSARTAN POTASSIUM 50 MG/1
50 TABLET ORAL DAILY
Status: DISCONTINUED | OUTPATIENT
Start: 2025-01-08 | End: 2025-01-10 | Stop reason: HOSPADM

## 2025-01-07 RX ORDER — OXYCODONE HYDROCHLORIDE 5 MG/1
5 TABLET ORAL EVERY 4 HOURS PRN
Status: DISCONTINUED | OUTPATIENT
Start: 2025-01-07 | End: 2025-01-10 | Stop reason: HOSPADM

## 2025-01-07 RX ORDER — LIDOCAINE HYDROCHLORIDE ANHYDROUS AND DEXTROSE MONOHYDRATE 5; 400 G/100ML; MG/100ML
INJECTION, SOLUTION INTRAVENOUS
Status: DISCONTINUED | OUTPATIENT
Start: 2025-01-07 | End: 2025-01-07 | Stop reason: SDUPTHER

## 2025-01-07 RX ORDER — LIDOCAINE HYDROCHLORIDE ANHYDROUS AND DEXTROSE MONOHYDRATE 5; 400 G/100ML; MG/100ML
1 INJECTION, SOLUTION INTRAVENOUS CONTINUOUS
Status: ACTIVE | OUTPATIENT
Start: 2025-01-07 | End: 2025-01-08

## 2025-01-07 RX ORDER — ONDANSETRON 2 MG/ML
4 INJECTION INTRAMUSCULAR; INTRAVENOUS
Status: DISCONTINUED | OUTPATIENT
Start: 2025-01-07 | End: 2025-01-07 | Stop reason: HOSPADM

## 2025-01-07 RX ORDER — ONDANSETRON 2 MG/ML
INJECTION INTRAMUSCULAR; INTRAVENOUS
Status: DISCONTINUED | OUTPATIENT
Start: 2025-01-07 | End: 2025-01-07 | Stop reason: SDUPTHER

## 2025-01-07 RX ORDER — SODIUM CHLORIDE, SODIUM LACTATE, POTASSIUM CHLORIDE, AND CALCIUM CHLORIDE .6; .31; .03; .02 G/100ML; G/100ML; G/100ML; G/100ML
500 INJECTION, SOLUTION INTRAVENOUS
Status: COMPLETED | OUTPATIENT
Start: 2025-01-07 | End: 2025-01-07

## 2025-01-07 RX ORDER — REMIFENTANIL HYDROCHLORIDE 1 MG/ML
INJECTION, POWDER, LYOPHILIZED, FOR SOLUTION INTRAVENOUS
Status: DISCONTINUED | OUTPATIENT
Start: 2025-01-07 | End: 2025-01-07 | Stop reason: SDUPTHER

## 2025-01-07 RX ORDER — ACETAMINOPHEN 500 MG
1000 TABLET ORAL ONCE
Status: COMPLETED | OUTPATIENT
Start: 2025-01-07 | End: 2025-01-07

## 2025-01-07 RX ORDER — TAMSULOSIN HYDROCHLORIDE 0.4 MG/1
0.4 CAPSULE ORAL DAILY
Status: DISCONTINUED | OUTPATIENT
Start: 2025-01-08 | End: 2025-01-10 | Stop reason: HOSPADM

## 2025-01-07 RX ORDER — ATORVASTATIN CALCIUM 20 MG/1
20 TABLET, FILM COATED ORAL EVERY MORNING
Status: DISCONTINUED | OUTPATIENT
Start: 2025-01-08 | End: 2025-01-10 | Stop reason: HOSPADM

## 2025-01-07 RX ORDER — MIDAZOLAM HYDROCHLORIDE 2 MG/2ML
2 INJECTION, SOLUTION INTRAMUSCULAR; INTRAVENOUS
Status: DISCONTINUED | OUTPATIENT
Start: 2025-01-07 | End: 2025-01-07 | Stop reason: HOSPADM

## 2025-01-07 RX ADMIN — Medication 10 MG: at 11:59

## 2025-01-07 RX ADMIN — KETOROLAC TROMETHAMINE 15 MG: 15 INJECTION, SOLUTION INTRAMUSCULAR; INTRAVENOUS at 22:32

## 2025-01-07 RX ADMIN — VASOPRESSIN 1 UNITS: 20 INJECTION PARENTERAL at 11:22

## 2025-01-07 RX ADMIN — Medication 3 AMPULE: at 05:59

## 2025-01-07 RX ADMIN — PROPOFOL 200 MCG/KG/MIN: 10 INJECTION, EMULSION INTRAVENOUS at 08:22

## 2025-01-07 RX ADMIN — HYDROMORPHONE HYDROCHLORIDE 0.2 MG: 2 INJECTION INTRAMUSCULAR; INTRAVENOUS; SUBCUTANEOUS at 08:34

## 2025-01-07 RX ADMIN — Medication 140 MG: at 08:20

## 2025-01-07 RX ADMIN — TRANEXAMIC ACID 1000 MG: 100 INJECTION, SOLUTION INTRAVENOUS at 12:13

## 2025-01-07 RX ADMIN — ROCURONIUM BROMIDE 50 MG: 10 INJECTION, SOLUTION INTRAVENOUS at 09:26

## 2025-01-07 RX ADMIN — Medication 10 MG: at 11:45

## 2025-01-07 RX ADMIN — PHENYLEPHRINE HYDROCHLORIDE 100 MCG: 10 INJECTION INTRAVENOUS at 09:26

## 2025-01-07 RX ADMIN — Medication 20 MG: at 08:19

## 2025-01-07 RX ADMIN — SODIUM CHLORIDE: 9 INJECTION, SOLUTION INTRAVENOUS at 08:08

## 2025-01-07 RX ADMIN — PHENYLEPHRINE HYDROCHLORIDE 100 MCG: 10 INJECTION INTRAVENOUS at 09:46

## 2025-01-07 RX ADMIN — HYDROMORPHONE HYDROCHLORIDE 0.4 MG: 2 INJECTION INTRAMUSCULAR; INTRAVENOUS; SUBCUTANEOUS at 07:17

## 2025-01-07 RX ADMIN — ONDANSETRON 4 MG: 2 INJECTION INTRAMUSCULAR; INTRAVENOUS at 11:36

## 2025-01-07 RX ADMIN — SODIUM CHLORIDE: 9 INJECTION, SOLUTION INTRAVENOUS at 06:10

## 2025-01-07 RX ADMIN — Medication 10 MG: at 09:33

## 2025-01-07 RX ADMIN — ACETAMINOPHEN 1000 MG: 500 TABLET, FILM COATED ORAL at 06:06

## 2025-01-07 RX ADMIN — CEFAZOLIN 2000 MG: 2 INJECTION, POWDER, FOR SOLUTION INTRAMUSCULAR; INTRAVENOUS at 08:50

## 2025-01-07 RX ADMIN — HYDROMORPHONE HYDROCHLORIDE 0.4 MG: 2 INJECTION INTRAMUSCULAR; INTRAVENOUS; SUBCUTANEOUS at 12:20

## 2025-01-07 RX ADMIN — PHENYLEPHRINE HYDROCHLORIDE 150 MCG: 10 INJECTION INTRAVENOUS at 10:50

## 2025-01-07 RX ADMIN — VASOPRESSIN 1 UNITS: 20 INJECTION PARENTERAL at 12:06

## 2025-01-07 RX ADMIN — PHENYLEPHRINE HYDROCHLORIDE 100 MCG: 10 INJECTION INTRAVENOUS at 08:40

## 2025-01-07 RX ADMIN — LIDOCAINE HYDROCHLORIDE 100 MG: 20 INJECTION, SOLUTION EPIDURAL; INFILTRATION; INTRACAUDAL; PERINEURAL at 08:18

## 2025-01-07 RX ADMIN — LIDOCAINE HYDROCHLORIDE 1 MG/KG/HR: 4 INJECTION, SOLUTION INTRAVENOUS at 13:08

## 2025-01-07 RX ADMIN — ACETAMINOPHEN 650 MG: 325 TABLET ORAL at 22:31

## 2025-01-07 RX ADMIN — VASOPRESSIN 1 UNITS: 20 INJECTION PARENTERAL at 12:01

## 2025-01-07 RX ADMIN — Medication 10 MG: at 10:52

## 2025-01-07 RX ADMIN — PHENYLEPHRINE HYDROCHLORIDE 100 MCG: 10 INJECTION INTRAVENOUS at 08:38

## 2025-01-07 RX ADMIN — DEXAMETHASONE SODIUM PHOSPHATE 10 MG: 10 INJECTION INTRAMUSCULAR; INTRAVENOUS at 08:50

## 2025-01-07 RX ADMIN — LIDOCAINE HYDROCHLORIDE 1.5 MG/KG/HR: 4 INJECTION, SOLUTION INTRAVENOUS at 08:36

## 2025-01-07 RX ADMIN — PROPOFOL 50 MG: 10 INJECTION, EMULSION INTRAVENOUS at 08:23

## 2025-01-07 RX ADMIN — PHENYLEPHRINE HYDROCHLORIDE 200 MCG: 10 INJECTION INTRAVENOUS at 08:53

## 2025-01-07 RX ADMIN — MONTELUKAST 10 MG: 10 TABLET, FILM COATED ORAL at 20:32

## 2025-01-07 RX ADMIN — PHENYLEPHRINE HYDROCHLORIDE 200 MCG: 10 INJECTION INTRAVENOUS at 09:50

## 2025-01-07 RX ADMIN — EPHEDRINE SULFATE 10 MG: 5 INJECTION INTRAVENOUS at 10:50

## 2025-01-07 RX ADMIN — VASOPRESSIN 1 UNITS: 20 INJECTION PARENTERAL at 11:14

## 2025-01-07 RX ADMIN — REMIFENTANIL HYDROCHLORIDE 0.1 MCG/KG/MIN: 1 INJECTION, POWDER, LYOPHILIZED, FOR SOLUTION INTRAVENOUS at 08:47

## 2025-01-07 RX ADMIN — TRANEXAMIC ACID 1300 MG: 650 TABLET ORAL at 20:32

## 2025-01-07 RX ADMIN — MIDAZOLAM 1 MG: 1 INJECTION INTRAMUSCULAR; INTRAVENOUS at 07:28

## 2025-01-07 RX ADMIN — SODIUM CHLORIDE, POTASSIUM CHLORIDE, SODIUM LACTATE AND CALCIUM CHLORIDE 500 ML: 600; 310; 30; 20 INJECTION, SOLUTION INTRAVENOUS at 13:49

## 2025-01-07 RX ADMIN — VASOPRESSIN 1 UNITS: 20 INJECTION PARENTERAL at 10:52

## 2025-01-07 RX ADMIN — GABAPENTIN 300 MG: 300 CAPSULE ORAL at 20:32

## 2025-01-07 RX ADMIN — PROPOFOL 150 MG: 10 INJECTION, EMULSION INTRAVENOUS at 08:18

## 2025-01-07 RX ADMIN — KETOROLAC TROMETHAMINE 15 MG: 15 INJECTION, SOLUTION INTRAMUSCULAR; INTRAVENOUS at 17:07

## 2025-01-07 RX ADMIN — HYDROMORPHONE HYDROCHLORIDE 0.2 MG: 2 INJECTION INTRAMUSCULAR; INTRAVENOUS; SUBCUTANEOUS at 08:29

## 2025-01-07 RX ADMIN — OXYCODONE 10 MG: 5 TABLET ORAL at 16:05

## 2025-01-07 RX ADMIN — TRANEXAMIC ACID 1000 MG: 100 INJECTION, SOLUTION INTRAVENOUS at 08:50

## 2025-01-07 RX ADMIN — NEOSTIGMINE METHYLSULFATE 5 MG: 1.02 INJECTION INTRAVENOUS at 12:21

## 2025-01-07 RX ADMIN — VASOPRESSIN 1 UNITS: 20 INJECTION PARENTERAL at 11:54

## 2025-01-07 RX ADMIN — EPHEDRINE SULFATE 10 MG: 5 INJECTION INTRAVENOUS at 10:48

## 2025-01-07 RX ADMIN — SODIUM CHLORIDE: 900 INJECTION INTRAVENOUS at 08:08

## 2025-01-07 RX ADMIN — VASOPRESSIN 1 UNITS: 20 INJECTION PARENTERAL at 11:44

## 2025-01-07 RX ADMIN — PROPOFOL 30 MG: 10 INJECTION, EMULSION INTRAVENOUS at 12:08

## 2025-01-07 RX ADMIN — PHENYLEPHRINE HYDROCHLORIDE 100 MCG: 10 INJECTION INTRAVENOUS at 09:40

## 2025-01-07 RX ADMIN — GLYCOPYRROLATE 1 MG: 0.2 INJECTION INTRAMUSCULAR; INTRAVENOUS at 12:21

## 2025-01-07 RX ADMIN — PROPOFOL 30 MG: 10 INJECTION, EMULSION INTRAVENOUS at 12:25

## 2025-01-07 RX ADMIN — VASOPRESSIN 2 UNITS: 20 INJECTION PARENTERAL at 12:08

## 2025-01-07 RX ADMIN — ACETAMINOPHEN 650 MG: 325 TABLET ORAL at 17:08

## 2025-01-07 RX ADMIN — WATER 2000 MG: 1 INJECTION INTRAMUSCULAR; INTRAVENOUS; SUBCUTANEOUS at 17:07

## 2025-01-07 ASSESSMENT — PAIN - FUNCTIONAL ASSESSMENT
PAIN_FUNCTIONAL_ASSESSMENT: 0-10

## 2025-01-07 ASSESSMENT — PAIN DESCRIPTION - DESCRIPTORS
DESCRIPTORS: ACHING
DESCRIPTORS: SORE
DESCRIPTORS: ACHING

## 2025-01-07 ASSESSMENT — PAIN SCALES - GENERAL
PAINLEVEL_OUTOF10: 0
PAINLEVEL_OUTOF10: 7

## 2025-01-07 ASSESSMENT — PAIN DESCRIPTION - LOCATION: LOCATION: BACK

## 2025-01-07 ASSESSMENT — PAIN DESCRIPTION - ORIENTATION: ORIENTATION: MID;LOWER

## 2025-01-07 NOTE — ANESTHESIA PRE PROCEDURE
4.0    Smokeless tobacco: Never    Tobacco comments:     Quit smokin   Substance Use Topics    Alcohol use: Not Currently                                Counseling given: Not Answered  Tobacco comments: Quit smokin      Vital Signs (Current):   Vitals:    25 0535 25 0740 25 0745 25 0800   BP: (!) 165/91 137/72 (!) 146/74 (!) 140/77   Pulse: 90 86 86 86   Resp: 18 17 17 16   Temp: 98.1 °F (36.7 °C)      TempSrc: Oral      SpO2: 96% 92% 93% 92%   Weight: 100.2 kg (221 lb)      Height: 1.702 m (5' 7\")                                                 BP Readings from Last 3 Encounters:   25 (!) 140/77   24 (!) 146/79   24 124/72       NPO Status: Time of last liquid consumption: 0330 (Drank 32  oz of water)                        Time of last solid consumption:                         Date of last liquid consumption: 25                        Date of last solid food consumption: 25    BMI:   Wt Readings from Last 3 Encounters:   25 100.2 kg (221 lb)   24 98 kg (216 lb)   24 97.9 kg (215 lb 12.8 oz)     Body mass index is 34.61 kg/m².    CBC:   Lab Results   Component Value Date/Time    WBC 10.3 2024 11:43 AM    RBC 4.02 2024 11:43 AM    HGB 13.0 2024 11:43 AM    HCT 38.6 2024 11:43 AM    MCV 96.0 2024 11:43 AM    .6** 2024 12:10 PM    RDW 13.0 2024 11:43 AM     2024 11:43 AM       CMP:   Lab Results   Component Value Date/Time     2024 11:31 AM    K 4.5 2024 11:31 AM     2024 11:31 AM    CO2 26 2024 11:31 AM    BUN 10 2024 11:31 AM    CREATININE 0.97 2024 11:31 AM    GFRAA 86 10/06/2021 10:17 AM    AGRATIO 2.1 2022 10:27 AM    LABGLOM 81 2024 11:31 AM    LABGLOM 78 2024 03:18 PM    LABGLOM 89 2022 10:27 AM    GLUCOSE 102 2024 11:31 AM    CALCIUM 9.8 2024 11:31 AM    BILITOT 0.4 2024

## 2025-01-07 NOTE — ANESTHESIA POSTPROCEDURE EVALUATION
Department of Anesthesiology  Postprocedure Note    Patient: Demarcus Gaxiola  MRN: [id number]  YOB: 1945  Date of evaluation: 1/7/2025    Procedure Summary       Date: 01/07/25 Room / Location: [facility] MAIN OR  / [facility] MAIN OR    Anesthesia Start: 0715 Anesthesia Stop: 0740    Procedure: Procedure cancelled Diagnosis:       Spondylolisthesis of lumbar region      Spinal stenosis, lumbar region, with neurogenic claudication      Lumbar disc herniation      Cyst of lumbar facet joint      (Spondylolisthesis of lumbar region [M43.16])      (Spinal stenosis, lumbar region, with neurogenic claudication [M48.062])      (Lumbar disc herniation [M51.26])      (Cyst of lumbar facet joint [M71.38])    Providers: Richard Mendosa MD Responsible Provider: Miguel Neil MD    Anesthesia Type: general ASA Status: 3            Anesthesia Type: No value filed.    Sindhu Phase I: Sindhu Score: 10    Sindhu Phase II:      Anesthesia Post Evaluation    Patient location during evaluation: PACU  Patient participation: complete - patient participated  Level of consciousness: awake and alert  Pain scale: pain adequately controlled.  Airway patency: patent  Nausea & Vomiting: no nausea and no vomiting  Cardiovascular status: hemodynamically stable  Respiratory status: acceptable  Hydration status: stable  Multimodal analgesia pain management approach  Pain management: satisfactory to patient        No notable events documented.

## 2025-01-07 NOTE — ANESTHESIA POSTPROCEDURE EVALUATION
Department of Anesthesiology  Postprocedure Note    Patient: Demarcus Gaxiola  MRN: [id number]  YOB: 1945  Date of evaluation: 1/7/2025    Procedure Summary       Date: 01/07/25 Room / Location: [facility] MAIN OR  / [facility] MAIN OR    Anesthesia Start: 0808 Anesthesia Stop: 1254    Procedures:       ERAS\L4-L5 direct lateral interbody fusion with interbody cage, allograft (Spine Lumbar)      L2-5 laminectomy (Spine Lumbar)      ERAS\L2-L5 posterior fusion with allograft and instrumentation. (Spine Lumbar) Diagnosis:       Spondylolisthesis of lumbar region      Spinal stenosis, lumbar region, with neurogenic claudication      Lumbar disc herniation      Cyst of lumbar facet joint      (Spondylolisthesis of lumbar region [M43.16])      (Spinal stenosis, lumbar region, with neurogenic claudication [M48.062])      (Lumbar disc herniation [M51.26])      (Cyst of lumbar facet joint [M71.38])    Providers: Richard Mendosa MD Responsible Provider: Miguel Neil MD    Anesthesia Type: general ASA Status: 3            Anesthesia Type: No value filed.    Sindhu Phase I: Sindhu Score: 8    Sindhu Phase II:      Anesthesia Post Evaluation    Patient location during evaluation: PACU  Patient participation: complete - patient participated  Level of consciousness: awake and alert  Pain scale: pain adequately controlled.  Airway patency: patent  Nausea & Vomiting: no nausea and no vomiting  Cardiovascular status: blood pressure returned to baseline  Respiratory status: acceptable  Hydration status: euvolemic  Multimodal analgesia pain management approach  Pain management: adequate    No notable events documented.

## 2025-01-07 NOTE — PROGRESS NOTES
Physical Therapy Note:    Attempted to see patient this PM for physical therapy evaluation session. Patient was complaining of pain being a 7/10 and requesting to hold therapy until the AM. Will follow and re-attempt as schedule permits/patient available. Thank you,    Maura Wilson, PT     Rehab Caseload Tracker

## 2025-01-07 NOTE — ANESTHESIA PRE PROCEDURE
Department of Anesthesiology  Preprocedure Note       Name:  Demarcus Gaxiola   Age:  79 y.o.  :  1945                                          MRN:  [id number]         Date:  2025      Surgeon: Surgeon(s):  Richard Mendosa MD    Procedure: Procedure(s):  ERAS\L4-L5 direct lateral interbody fusion with interbody cage, allograft  L2-5 laminectomy  ERAS\L2-L5 posterior fusion with allograft and instrumentation.    Medications prior to admission:   Prior to Admission medications    Medication Sig Start Date End Date Taking? Authorizing Provider   DULoxetine (CYMBALTA) 60 MG extended release capsule Take 1 capsule by mouth daily 24  Yes Cezar Wright MD   atorvastatin (LIPITOR) 20 MG tablet TAKE 1 TABLET BY MOUTH EVERY DAY  Patient taking differently: Take 1 tablet by mouth every morning TAKE 1 TABLET BY MOUTH EVERY DAY 24  Yes Cezar Wright MD   pantoprazole (PROTONIX) 40 MG tablet TAKE 1 TABLET BY MOUTH EVERY DAY 10/21/24  Yes Cezar Wright MD   montelukast (SINGULAIR) 10 MG tablet TAKE 1 TABLET BY MOUTH EVERY DAY  Patient taking differently: Take 1 tablet by mouth nightly TAKE 1 TABLET BY MOUTH EVERY DAY 24  Yes Carley Joseph APRN - CNP   tamsulosin (FLOMAX) 0.4 MG capsule TAKE 1 CAPSULE BY MOUTH EVERY DAY 24  Yes Cezar Wright MD   losartan (COZAAR) 50 MG tablet TAKE 1 TABLET BY MOUTH EVERY DAY 24  Yes Cezar Wright MD   gabapentin (NEURONTIN) 300 MG capsule Take 1 capsule by mouth 3 times daily for 180 days. 24 Yes Carley Joseph APRN - CNP   MAGNESIUM PO Take 400 mg by mouth at bedtime   Yes Roe Borden MD   B Complex-C (SUPER B COMPLEX PO) Take 1 tablet by mouth daily   Yes Roe Borden MD   Multiple Vitamin (MULTIVITAMIN ADULT PO) Take 1 tablet by mouth daily   Yes Roe Borden MD   albuterol sulfate  (90 Base) MCG/ACT inhaler INHALE 1 PUFF BY MOUTH EVERY 6 HOURS AS NEEDED FOR WHEEZING 3/16/22

## 2025-01-07 NOTE — ANESTHESIA PROCEDURE NOTES
Arterial Line:    An arterial line was placed using surface landmarks, in the OR for the following indication(s): continuous blood pressure monitoring and blood sampling needed.    A 20 gauge (size), 4.45 cm (length), Arrow (type) catheter was placed, Seldinger technique used, into the right radial artery, secured by tape and Tegaderm.  Anesthesia type: General    Events:  patient tolerated procedure well with no complications and EBL 0mL.1/7/2025 8:36 AM  Resident/CRNA: Violet Giron APRN - CRNA  Performed: Resident/CRNA   Preanesthetic Checklist  Completed: patient identified, IV checked, site marked, risks and benefits discussed, surgical/procedural consents, equipment checked, pre-op evaluation, timeout performed, anesthesia consent given, oxygen available, monitors applied/VS acknowledged, fire risk safety assessment completed and verbalized and blood product R/B/A discussed and consented

## 2025-01-07 NOTE — PROGRESS NOTES
TRANSFER - IN REPORT:    Verbal report received from KRISTINA Mckinney on Demarcus Gaxiola  being received from PACU for routine progression of patient care      Report consisted of patient's Situation, Background, Assessment and   Recommendations(SBAR).     Information from the following report(s) Nurse Handoff Report was reviewed with the receiving nurse.    Opportunity for questions and clarification was provided.      Assessment completed upon patient's arrival to unit and care assumed.

## 2025-01-07 NOTE — OP NOTE
[facility] [address]   [phone]    OPERATIVE REPORT    Patient ID:Demarcus Gaxiola  [id number]  1945  79 y.o.    DATE OF SURGERY: 1/7/2025    SURGEON: Richard Han MD    PREOPERATIVE DIAGNOSIS: Lumbar stenosis and spondylolisthesis    POSTOPERATIVE DIAGNOSIS: Lumbar stenosis and spondylolisthesis    PROCEDURE:    Lateral flank incision  1.  L4-5 direct lateral arthrodesis (CPT 44909)   2. Insertion biomechanical device L4-5 (CPT 79626)  Posterior incision   3. Lumbar laminectomy  L2, L3, L4, and L5  with bilateral foraminotomies. (CPT 77099, 63048 X 3)  4. Lumbar posterolateral fusion  L2 through L5 . (CPT 04210, 22614 X 2)  5. Instrumentation  L2 through L5 . (CPT 78280)  6. Allograft (CPT code 73064)      ANESTHESIA: General.    ESTIMATED BLOOD LOSS: 200 ml    POSTOPERATIVE CONDITION: Stable.    INTRAOPERATIVE COMPLICATIONS: None.    IMPLANTS:   Implant Name Type Inv. Item Serial No.  Lot No. LRB No. Used Action   GRAFT BNE MED - [id number]  GRAFT [employer]NE MED [id number] BIOLOGICA  N/A 1 Implanted   SPACER SPNL 7 DEG SM 50X18 MM PROLIFT - [id number]  SPACER SPNL 7 DEG SM 50X18 MM PROLIFT  [employer] [facility] [id number] N/A 1 Implanted   ALLOGRAFT BNE CHIP 1-4 MM 15 CC CRUSH CAN - [id number]  ALLOGRAFT BNE CHIP 1-4 MM 15 CC CRUSH CANC [id number] [employer] [facility]  N/A 1 Implanted   PUTTY BIO DBM 10 ML W CHIPS - [id number]  PUTTY BIO DBM 10 ML W CHIPS  [employer] [facility] [id number] N/A 1 Implanted   PUTTY BIO DBM 10 ML W CHIPS - [id number]  PUTTY BIO DBM 10 ML W CHIPS  [employer] [facility] [id number] N/A 1 Implanted   BLOCKER SPNL L50MM DIA6MM TI 1 LEV HIRAM 3 - [id number]  BLOCKER SPNL L50MM DIA6MM TI 1 LEV HIRAM 3  [employer] [facility] [id number] N/A 8 Implanted   SCREW SPNL L50MM DIA5.5MM POLYAX CARNEY - [id number]  SCREW SPNL L50MM DIA5.5MM POLYAX CARNEY  [employer] HOW- [id number] N/A 2 Implanted

## 2025-01-07 NOTE — PERIOP NOTE
Lidocaine drip pump settings confirmed at Ideal body weight: 66.1 kg (145 lb 11.6 oz) .  Infusing at 16.5 ml/hour.

## 2025-01-07 NOTE — PROGRESS NOTES
4 Eyes Skin Assessment     NAME:  Demarcus Gaxiola  YOB: 1945  MEDICAL RECORD NUMBER:  [id number]    The patient is being assessed for  Admission    I agree that at least one RN has performed a thorough Head to Toe Skin Assessment on the patient. ALL assessment sites listed below have been assessed.      Areas assessed by both nurses:    Head, Face, Ears, Shoulders, Back, Chest, Arms, Elbows, Hands, Sacrum. Buttock, Coccyx, Ischium, Legs. Feet and Heels, and Under Medical Devices         Does the Patient have a Wound? No noted wound(s)       Leonidas Prevention initiated by RN: Yes  Wound Care Orders initiated by RN: No    Pressure Injury (Stage 3,4, Unstageable, DTI, NWPT, and Complex wounds) if present, place Wound referral order by RN under : No    New Ostomies, if present place, Ostomy referral order under : No     Nurse 1 eSignature: Electronically signed by ALIREZA MAHAJAN RN on 1/7/25 at 4:50 PM EST    **SHARE this note so that the co-signing nurse can place an eSignature**    Nurse 2 eSignature: {Esignature:[id number]}

## 2025-01-07 NOTE — PERIOP NOTE
TRANSFER - OUT REPORT:    Verbal report given to Mario ACEVEDO on Demarcus Gaxiola  being transferred to [facility] for routine post-op       Report consisted of patient’s Situation, Background, Assessment and   Recommendations(SBAR).     Information from the following report(s) Nurse Handoff Report, Adult Overview, Surgery Report, Intake/Output, and MAR was reviewed with the receiving nurse.    Lines:   Peripheral IV 01/07/25 Posterior;Right Hand (Active)   Site Assessment Clean, dry & intact 01/07/25 1405   Line Status Infusing 01/07/25 1405   Line Care Connections checked and tightened 01/07/25 1405   Phlebitis Assessment No symptoms 01/07/25 1405   Infiltration Assessment 0 01/07/25 1405   Alcohol Cap Used No 01/07/25 1405   Dressing Status Clean, dry & intact 01/07/25 1405   Dressing Type Transparent 01/07/25 1405       Peripheral IV 01/07/25 Left Hand (Active)   Site Assessment Clean, dry & intact 01/07/25 1405   Line Status Infusing 01/07/25 1405   Line Care Connections checked and tightened 01/07/25 1405   Phlebitis Assessment No symptoms 01/07/25 1405   Infiltration Assessment 0 01/07/25 1405   Alcohol Cap Used No 01/07/25 1405   Dressing Status Clean, dry & intact 01/07/25 1405   Dressing Type Transparent 01/07/25 1405        Opportunity for questions and clarification was provided.      Patient transported with:   O2 @ 2 liters    VTE prophylaxis orders have been written for Demarcus Gaxiola.    Patient and family given floor number and nurses name.  Family updated re: pt status after security code verified.

## 2025-01-07 NOTE — H&P
spondylotic changes from L1-L5.  There is rather severe stenosis from L2-L5.  He has a superimposed spondylolisthesis with low sided disc herniation at L4-L5.  There are gaping facets at L4-L5.  There is a left-sided L3-L4 facet cyst causing significant stenosis.     Diagnosis:         ICD-10-CM     1. Low back pain, unspecified back pain laterality, unspecified chronicity, unspecified whether sciatica present  M54.50 XR LUMBAR SPINE (2-3 VIEWS)       2. Spondylolisthesis of lumbar region  M43.16         3. Lumbar stenosis with neurogenic claudication  M48.062               Assessment/Plan:       This patient's clinical history and physical exam is consistent with neurogenic claudication and a superimposed left L4 and L5 radiculopathy resulting in foot drop due to spondylolisthesis and stenosis.  At this point, he has fallen numerous times and has an unstable gait.  He has not responded to conservative efforts and is no longer able to enjoy life.  He would like to consider surgical options.  I am in agreement as there is really no hope of this getting better without surgery.         We discussed surgical options including a combined direct lateral fusion and posterior fusion.  We discussed the details of the the surgery including a small lateral incision over the flank followed by dissection to the area of disc collapse and deformity. This would be done using neural monitoring and a series of minimally invasive retractors.   Once identifying the disk space radiographically, the retractors would be docked and the disk would be excised.  The disk space would be distracted as much as possible and measured for the appropriate cage.   This would be packed with allograft. Lateral screws may be applied for supplemental stability.  The wound would then be closed with suture and covered with sterile dressings. A second incision would likely be made in the middle of the low back for supplemental screws and rods.  He would  expect to stay in the hospital 1-2 days or until he can get about safely with minimal assistance.  A short stay in a rehabilitation facility could also be considered depending on how quickly he recovers.  Follow-up would be scheduled for 2 weeks and there will be restrictions including no driving, and no lifting greater than 15 lbs until follow up with me. He was encouraged to walk as much as possible before and after the operation to facilitate an expeditious recovery.  We also discussed the potential risks of the surgery including, but not limited to infection, spinal fluid leak and potential headaches requiring him to remain supine post-operatively; injury to the cauda equina or peripheral nerve root resulting in weakness, numbness, or very rarely bowel or bladder dysfunction; persistent back or leg symptoms, recurrence of stenosis or the development of instability or hardware failure possibly needing additional surgery;  blood loss needing transfusion; postoperative hematoma; and the risks of anesthesia.  Also there is the risk of visceral, vascular, renal, or other intra-abdominal injury. He also understands that patients likely have groin pain and anterior thigh pain due to dissection through the iliopsoas.  The patient voiced an understanding of these issues as outlined.  The procedure we will plan for is a L4-L5 direct lateral fusion at with interbody cage, allograft, and L2-5 laminectomy and posterior fusion and instrumentation.

## 2025-01-08 LAB
ABO + RH BLD: NORMAL
BLOOD GROUP ANTIBODIES SERPL: NORMAL
SPECIMEN EXP DATE BLD: NORMAL

## 2025-01-08 PROCEDURE — 97535 SELF CARE MNGMENT TRAINING: CPT

## 2025-01-08 PROCEDURE — 97161 PT EVAL LOW COMPLEX 20 MIN: CPT

## 2025-01-08 PROCEDURE — 97165 OT EVAL LOW COMPLEX 30 MIN: CPT

## 2025-01-08 PROCEDURE — 6370000000 HC RX 637 (ALT 250 FOR IP): Performed by: ORTHOPAEDIC SURGERY

## 2025-01-08 PROCEDURE — 6360000002 HC RX W HCPCS: Performed by: ORTHOPAEDIC SURGERY

## 2025-01-08 PROCEDURE — 2500000003 HC RX 250 WO HCPCS: Performed by: ORTHOPAEDIC SURGERY

## 2025-01-08 PROCEDURE — 97530 THERAPEUTIC ACTIVITIES: CPT

## 2025-01-08 PROCEDURE — 1100000000 HC RM PRIVATE

## 2025-01-08 RX ORDER — TRANEXAMIC ACID 650 MG/1
1300 TABLET ORAL 2 TIMES DAILY
Status: COMPLETED | OUTPATIENT
Start: 2025-01-08 | End: 2025-01-09

## 2025-01-08 RX ADMIN — WATER 2000 MG: 1 INJECTION INTRAMUSCULAR; INTRAVENOUS; SUBCUTANEOUS at 01:20

## 2025-01-08 RX ADMIN — ACETAMINOPHEN 650 MG: 325 TABLET ORAL at 10:34

## 2025-01-08 RX ADMIN — ACETAMINOPHEN 650 MG: 325 TABLET ORAL at 22:07

## 2025-01-08 RX ADMIN — BISACODYL 5 MG: 5 TABLET, COATED ORAL at 08:30

## 2025-01-08 RX ADMIN — DULOXETINE HYDROCHLORIDE 60 MG: 30 CAPSULE, DELAYED RELEASE ORAL at 08:30

## 2025-01-08 RX ADMIN — PHENOL 1 SPRAY: 1.5 LIQUID ORAL at 04:33

## 2025-01-08 RX ADMIN — POLYETHYLENE GLYCOL 3350 17 G: 17 POWDER, FOR SOLUTION ORAL at 08:28

## 2025-01-08 RX ADMIN — TAMSULOSIN HYDROCHLORIDE 0.4 MG: 0.4 CAPSULE ORAL at 08:29

## 2025-01-08 RX ADMIN — KETOROLAC TROMETHAMINE 15 MG: 15 INJECTION, SOLUTION INTRAMUSCULAR; INTRAVENOUS at 04:32

## 2025-01-08 RX ADMIN — TRANEXAMIC ACID 1300 MG: 650 TABLET ORAL at 08:30

## 2025-01-08 RX ADMIN — LOSARTAN POTASSIUM 50 MG: 50 TABLET, FILM COATED ORAL at 08:30

## 2025-01-08 RX ADMIN — GABAPENTIN 300 MG: 300 CAPSULE ORAL at 14:00

## 2025-01-08 RX ADMIN — SODIUM CHLORIDE, PRESERVATIVE FREE 10 ML: 5 INJECTION INTRAVENOUS at 20:04

## 2025-01-08 RX ADMIN — ACETAMINOPHEN 650 MG: 325 TABLET ORAL at 16:39

## 2025-01-08 RX ADMIN — GABAPENTIN 300 MG: 300 CAPSULE ORAL at 08:29

## 2025-01-08 RX ADMIN — ACETAMINOPHEN 650 MG: 325 TABLET ORAL at 04:32

## 2025-01-08 RX ADMIN — KETOROLAC TROMETHAMINE 15 MG: 15 INJECTION, SOLUTION INTRAMUSCULAR; INTRAVENOUS at 10:34

## 2025-01-08 RX ADMIN — ATORVASTATIN CALCIUM 20 MG: 20 TABLET, FILM COATED ORAL at 08:30

## 2025-01-08 RX ADMIN — MONTELUKAST 10 MG: 10 TABLET, FILM COATED ORAL at 20:04

## 2025-01-08 RX ADMIN — PANTOPRAZOLE SODIUM 40 MG: 40 TABLET, DELAYED RELEASE ORAL at 08:30

## 2025-01-08 RX ADMIN — TRANEXAMIC ACID 1300 MG: 650 TABLET ORAL at 22:07

## 2025-01-08 RX ADMIN — GABAPENTIN 300 MG: 300 CAPSULE ORAL at 20:04

## 2025-01-08 ASSESSMENT — PAIN SCALES - GENERAL: PAINLEVEL_OUTOF10: 0

## 2025-01-08 NOTE — CARE COORDINATION
Chart reviewed by  for potential transition of care (BRITNEY) needs or concerns.  Pt is insured with pharmacy benefits and is established with a PCP.  Therapy evals complete with the recommendation for HH therapy and a RW at [place].  Pt will have HH PT/OT services through [facility] as arranged by the surgeon's office prior to admission.  Fixed wheel rolling walker supplied by [employer] delivered to room.  Consent signed and faxed to their office.  No other BRITNEY needs or concerns identified or reported. CM to continue to monitor. Please notify/consult  if other BRITNEY needs arise.       01/08/25 4295   Service Assessment   Patient Orientation Alert and Oriented   Cognition Alert   History Provided By Patient;Child/Family;Medical Record   Primary Caregiver Self   Accompanied By/Relationship son   Support Systems Spouse/Significant Other;Children;Family Members   Patient's Healthcare Decision Maker is: Legal Next of Kin   PCP Verified by CM Yes   Last Visit to PCP Within last 3 months  (12/11/2024)   Prior Functional Level Independent in ADLs/IADLs   Current Functional Level Assistance with the following:;Mobility   Can patient return to prior living arrangement Yes   Ability to make needs known: Good   Family able to assist with home care needs: Yes   Would you like for me to discuss the discharge plan with any other family members/significant others, and if so, who? No   Financial Resources Medicare   Community Resources None   Social/Functional History   Lives With Spouse   Type of Home House   Home Layout Two level;Bed/Bath upstairs   Bathroom Equipment Shower chair   Home Equipment Cane;Rollator   Prior Level of Assist for ADLs Independent   Prior Level of Assist for Homemaking Independent   Ambulation Assistance Independent   Prior Level of Assist for Transfers Independent   Occupation Retired   Discharge Planning   Type of Residence House   Living Arrangements Spouse/Significant Other

## 2025-01-08 NOTE — PROGRESS NOTES
End of Shift-Night Spine Eras Patient  1 Day Post-Op   Ideal body weight: 66.1 kg (145 lb 11.6 oz)    OOB POD#0 (within 6 hours of end anesthesia-if not contraindicated) Yes    Ambulated in drake 1 time.     Up to chair 0 times    Lidocaine: Yes Discontinued POD#1 approx 0600 Yes    PRN Pain Medications Used?: No    IS Used 10x/hr while awke: Yes     DRAINS? Yes    Melendrez? Yes    BM?  No   Passing flatus? Yes    TEDs Yes    SCDs Yes         Signed By: LAMAR PAUL RN     January 8, 2025

## 2025-01-08 NOTE — PROGRESS NOTES
ORTHO PROGRESS NOTE    2025    Admit Date: 2025  Post Op day: 1 Day Post-Op      Subjective:     Demarcus Gaxiola is a patient who is now 1 Day Post-Op  and has no complaints.       Objective:     PT/OT:    Progressing    Vital Signs:    No data found.  Temp (24hrs), Av.1 °F (36.7 °C), Min:97.9 °F (36.6 °C), Max:98.4 °F (36.9 °C)      LAB:    No results for input(s): \"HGB\", \"WBC\", \"PLT\" in the last 72 hours.    Physical Exam:    Awake and in no acute distress.  Mood and affect appropriate.  Respirations unlabored and no evidence cyanosis.  Calves nontender.  Abdomen soft and nontender.  Dressing clean/dry  No new neurologic deficit.    Assessment:      1 Day Post-Op STATUS POST Procedure(s):  ERAS\L4-L5 direct lateral interbody fusion with interbody cage, allograft  L2-5 laminectomy  ERAS\L2-L5 posterior fusion with allograft and instrumentation.      Plan:       Anticipate discharge to: HOME tomorrow      Signed By: MEGAN LLANES MD

## 2025-01-08 NOTE — PROGRESS NOTES
ACUTE PHYSICAL THERAPY GOALS:   (Developed with and agreed upon by patient and/or caregiver.)  LTG:  (1.)Mr. Gaxiola will move from supine to sit and sit to supine and roll side to side, using log roll technique, with MODIFIED INDEPENDENCE within 7 day(s).    (2.)Mr. Gaxiola will transfer from bed to chair and chair to bed with MODIFIED INDEPENDENCE using the least restrictive device within 7 treatment day(s).    (3.)Mr. Gaxiola will ambulate with MODIFIED INDEPENDENCE for 7 feet with the least restrictive device within 7 treatment day(s).  (4.)Mr. Gaxiola will verbalize 3/3 post-op spinal precautions with INDEPENDENCE for improved safety awareness within 7 treatment days.  (5.)Mr. Gaxiola will ascend and descend 10 stairs using L hand rail(s) with SUPERVISION to improve functional mobility and safety within 7 day(s).    PHYSICAL THERAPY: Daily Note PM   (Link to Caseload Tracking: PT Visit Days : 1  Time In/Out PT Charge Capture  Rehab Caseload Tracker  Orders    Demarcus Gaxiola is a 79 y.o. male   PRIMARY DIAGNOSIS: Spinal stenosis, lumbar region, with neurogenic claudication  Spondylolisthesis of lumbar region [M43.16]  Spinal stenosis, lumbar region, with neurogenic claudication [M48.062]  Lumbar disc herniation [M51.26]  Cyst of lumbar facet joint [M71.38]  S/P lumbar fusion [Z98.1]  Procedure(s) (LRB):  ERAS\L4-L5 direct lateral interbody fusion with interbody cage, allograft (N/A)  L2-5 laminectomy (N/A)  ERAS\L2-L5 posterior fusion with allograft and instrumentation. (N/A)  1 Day Post-Op  Inpatient: Payor: [facility] MEDICARE / Plan: AET MEDICARE-ADVANTAGE PPO / Product Type: Medicare /     ASSESSMENT:     REHAB RECOMMENDATIONS:   Recommendation to date pending progress:  Setting:  No further skilled physical therapy after discharge from hospital    Equipment:    Rolling Walker     ASSESSMENT:  Mr. Gaxiola was supine in bed and agreeable to therapy.  Pt came to sitting on EOB today with supervision and good  sitting balance.  Pt performed several STS transfers today with Sekou/RW.  Pt ambulated in drake today with Sekou and RW demonstrated no loss of balance.  He took short, seated rest break in recliner.  Pt then performed stairs with SBA/cane and  use of handrail.  Pt then ambulated short distance with cane and supervision.  Pt progressed in ambulation and mobility today.     SUBJECTIVE:   Mr. Gaxiola states, \"Thank you\"     Social/Functional Lives With: Spouse  Type of Home: House  Home Layout: Two level, Bed/Bath upstairs  Bathroom Equipment: Shower chair  Home Equipment: Cane, Rollator  Prior Level of Assist for ADLs: Independent  Prior Level of Assist for Homemaking: Independent  Prior Level of Assist for Ambulation: Independent community ambulator, with or without device  Prior Level of Assist for Transfers: Independent  Additional Comments: Lives with spouse in two story home with bedroom on second level; using rollator/SPC PTA for ambulation  OBJECTIVE:     PAIN: VITALS / O2: PRECAUTION / LINES / DRAINS:   Pre Treatment: 0         Post Treatment: 0 Vitals        Oxygen    Hemovac    RESTRICTIONS/PRECAUTIONS:  Position Activity Restriction  Spinal Precautions: No Bending, No Lifting, No Twisting     MOBILITY: I Mod I S SBA CGA Min Mod Max Total  NT x2 Comments:   Bed Mobility    Rolling [] [] [] [] [] [] [] [] [] [] []    Supine to Sit [] [] [x] [] [] [] [] [] [] [] []    Scooting [] [] [x] [] [] [] [] [] [] [] []    Sit to Supine [] [] [] [] [] [] [] [] [] [] []    Transfers    Sit to Stand [] [x] [x] [] [] [] [] [] [] [] []    Bed to Chair [] [x] [] [] [] [] [] [] [] [] []    Stand to Sit [] [x] [] [] [] [] [] [] [] [] []     [] [] [] [] [] [] [] [] [] [] []    I=Independent, Mod I=Modified Independent, S=Supervision, SBA=Standby Assistance, CGA=Contact Guard Assistance,   Min=Minimal Assistance, Mod=Moderate Assistance, Max=Maximal Assistance, Total=Total Assistance, NT=Not Tested    BALANCE: Good Fair+ Fair

## 2025-01-08 NOTE — THERAPY EVALUATION
ACUTE OCCUPATIONAL THERAPY GOALS:   (Developed with and agreed upon by patient and/or caregiver.)  1. Patient will verbalize and demonstrate understanding of spinal precautions with 100% accuracy during ADLs.   2. Patient will complete lower body dressing with SUPERVISION and adaptive equipment as needed.   3. Patient will complete functional mobility of household distances with SUPERVISION and adaptive equipment as needed.   4. Patient will demonstrate ability to log roll in bed with INDEPENDENCE and no verbal cues from therapist.     GOALS MET UPON INITIAL EVALUATION 1/8/25    OCCUPATIONAL THERAPY Initial Assessment, Daily Note, and Discharge       OT Visit Days: 1  Acknowledge Orders  Time  OT Charge Capture  Rehab Caseload Tracker      Demarcus Gaxiola is a 79 y.o. male   PRIMARY DIAGNOSIS: Spinal stenosis, lumbar region, with neurogenic claudication  Spondylolisthesis of lumbar region [M43.16]  Spinal stenosis, lumbar region, with neurogenic claudication [M48.062]  Lumbar disc herniation [M51.26]  Cyst of lumbar facet joint [M71.38]  S/P lumbar fusion [Z98.1]  Procedure(s) (LRB):  ERAS\L4-L5 direct lateral interbody fusion with interbody cage, allograft (N/A)  L2-5 laminectomy (N/A)  ERAS\L2-L5 posterior fusion with allograft and instrumentation. (N/A)  1 Day Post-Op  Reason for Referral: Low Back Pain (M54.5)  Inpatient: Payor: [facility] MEDICARE / Plan: AETNA MEDICARE-ADVANTAGE PPO / Product Type: Medicare /     ASSESSMENT:     REHAB RECOMMENDATIONS:   Recommendation to date pending progress:  Setting:  No further skilled occupational therapy after discharge from hospital    Equipment:    Rolling Walker     ASSESSMENT:  Mr. Gaxiola is a 80 y/o male who presents POD#1 s/p L2-5 posterior fusion with Dr. Mendosa. At baseline pt lives with his wife, is independent with ADLs, and uses a cane or rollator for mobility.     This date, pt presented supine and agreeable to session. Pt overall SBA progressing to  [x]       Posture / Balance [x]     Sensation []     Coordination []       Tone []       Edema []    Activity Tolerance [x]       Hand Dominance R [] L []      COGNITION/  PERCEPTION: INTACT IMPAIRED   (See Comments)   Orientation [x]     Vision [x]     Hearing [x]     Cognition  [x]     Perception []       MOBILITY: I Mod I S SBA CGA Min Mod Max Total  NT x2 Comments:   Bed Mobility    Rolling [] [] [x] [] [] [] [] [] [] [] [] Via log roll   Supine to Sit [] [] [x] [] [] [] [] [] [] [] []    Scooting [] [] [x] [] [] [] [] [] [] [] []    Sit to Supine [] [] [] [] [] [] [] [] [] [x] []    Transfers    Sit to Stand [] [] [x] [x] [] [] [] [] [] [] []    Bed to Chair [] [] [] [] [] [] [] [] [] [x] []    Stand to Sit [] [] [x] [x] [] [] [] [] [] [] []    Tub/Shower [] [] [] [] [] [] [] [] [] [x] []     Toilet [] [] [] [] [] [] [] [] [] [x] []      [] [] [] [] [] [] [] [] [] [] []    I=Independent, Mod I=Modified Independent, S=Supervision/Setup, SBA=Standby Assistance, CGA=Contact Guard Assistance, Min=Minimal Assistance, Mod=Moderate Assistance, Max=Maximal Assistance, Total=Total Assistance, NT=Not Tested    ACTIVITIES OF DAILY LIVING: I Mod I S SBA CGA Min Mod Max Total NT Comments   BASIC ADLs:              Upper Body Bathing  [] [] [] [] [] [] [] [] [] [x]     Lower Body Bathing [] [] [] [] [] [] [] [] [] [x]     Toileting [] [] [] [] [] [] [] [] [] [x]    Upper Body Dressing [] [] [] [] [] [] [] [] [] [x]    Lower Body Dressing [] [] [x] [] [] [] [] [] [] [] Donning pants   Feeding [] [] [] [] [] [] [] [] [] [x]    Grooming [] [] [x] [x] [] [] [] [] [] [] Standing, oral care   Personal Device Care [] [] [] [] [] [] [] [] [] [x]    Functional Mobility [] [] [x] [x] [] [] [] [] [] [] Rolling walker, short community distance   I=Independent, Mod I=Modified Independent, S=Supervision/Setup, SBA=Standby Assistance, CGA=Contact Guard Assistance, Min=Minimal Assistance, Mod=Moderate Assistance, Max=Maximal Assistance,

## 2025-01-08 NOTE — PROGRESS NOTES
ACUTE PHYSICAL THERAPY GOALS:   (Developed with and agreed upon by patient and/or caregiver.)  LTG:  (1.)Mr. Gaxiola will move from supine to sit and sit to supine and roll side to side, using log roll technique, with MODIFIED INDEPENDENCE within 7 day(s).    (2.)Mr. Gaxiola will transfer from bed to chair and chair to bed with MODIFIED INDEPENDENCE using the least restrictive device within 7 treatment day(s).    (3.)Mr. Gaxiola will ambulate with MODIFIED INDEPENDENCE for 7 feet with the least restrictive device within 7 treatment day(s).  (4.)Mr. Gaxiola will verbalize 3/3 post-op spinal precautions with INDEPENDENCE for improved safety awareness within 7 treatment days.  (5.)Mr. Gaxiola will ascend and descend 10 stairs using L hand rail(s) with SUPERVISION to improve functional mobility and safety within 7 day(s).      ________________________________________________________________________________________________        PHYSICAL THERAPY Initial Assessment and AM  (Link to Caseload Tracking: PT Visit Days : 1  Acknowledge Orders  Time In/Out  PT Charge Capture  Rehab Caseload Tracker    Demarcus Gaxiola is a 79 y.o. male   PRIMARY DIAGNOSIS: Spinal stenosis, lumbar region, with neurogenic claudication  Spondylolisthesis of lumbar region [M43.16]  Spinal stenosis, lumbar region, with neurogenic claudication [M48.062]  Lumbar disc herniation [M51.26]  Cyst of lumbar facet joint [M71.38]  S/P lumbar fusion [Z98.1]  Procedure(s) (LRB):  ERAS\L4-L5 direct lateral interbody fusion with interbody cage, allograft (N/A)  L2-5 laminectomy (N/A)  ERAS\L2-L5 posterior fusion with allograft and instrumentation. (N/A)  1 Day Post-Op  Reason for Referral: Generalized Muscle Weakness (M62.81)  Difficulty in walking, Not elsewhere classified (R26.2)  Inpatient: Payor: [facility] MEDICARE / Plan: AETNA MEDICARE-ADVANTAGE PPO / Product Type: Medicare /     ASSESSMENT:     REHAB RECOMMENDATIONS:   Recommendation to date pending  duration of hospital stay or until stated goals are met, whichever comes first.    THERAPY PROGNOSIS: Good    PROBLEM LIST:   (Skilled intervention is medically necessary to address:)  Decreased Balance  Decreased Gait Ability  Decreased Strength  Decreased Transfer Abilities INTERVENTIONS PLANNED:   (Benefits and precautions of physical therapy have been discussed with the patient.)  Therapeutic Activity  Therapeutic Exercise/HEP  Neuromuscular Re-education  Gait Training  Education       TREATMENT:   EVALUATION: LOW COMPLEXITY: (Untimed Charge)  The initial evaluation charge encompasses clinical chart review, objective assessment, interpretation of assessment, and skilled monitoring of the patient's response to treatment in order to develop a plan of care.     TREATMENT:   Co-Treatment PT/OT necessary due to patient's decreased overall endurance/tolerance levels, as well as need for high level skilled assistance to complete functional transfers/mobility and functional tasks  Therapeutic Activity (10 Minutes): Therapeutic activity included Supine to Sit, Transfer Training, Ambulation on level ground, and Standing balance to improve functional Activity tolerance, Balance, Mobility, and Strength.    TREATMENT GRID:  N/A    AFTER TREATMENT PRECAUTIONS: Bed/Chair Locked, Call light within reach, Chair, Needs within reach, and Visitors at bedside    INTERDISCIPLINARY COLLABORATION:  RN/ PCT, PT/ PTA, and OT/ CASTELLANOS    EDUCATION: Education Given To: Patient  Education Provided: Role of Therapy    TIME IN/OUT:  Time In: 0845  Time Out: 0905  Minutes: 20    Maura Wilson, PT

## 2025-01-09 PROCEDURE — 97530 THERAPEUTIC ACTIVITIES: CPT

## 2025-01-09 PROCEDURE — 51798 US URINE CAPACITY MEASURE: CPT

## 2025-01-09 PROCEDURE — 1100000000 HC RM PRIVATE

## 2025-01-09 PROCEDURE — 6370000000 HC RX 637 (ALT 250 FOR IP): Performed by: ORTHOPAEDIC SURGERY

## 2025-01-09 PROCEDURE — 2500000003 HC RX 250 WO HCPCS: Performed by: ORTHOPAEDIC SURGERY

## 2025-01-09 RX ADMIN — BISACODYL 5 MG: 5 TABLET, COATED ORAL at 08:59

## 2025-01-09 RX ADMIN — TAMSULOSIN HYDROCHLORIDE 0.4 MG: 0.4 CAPSULE ORAL at 08:59

## 2025-01-09 RX ADMIN — ACETAMINOPHEN 650 MG: 325 TABLET ORAL at 11:06

## 2025-01-09 RX ADMIN — GABAPENTIN 300 MG: 300 CAPSULE ORAL at 14:26

## 2025-01-09 RX ADMIN — ATORVASTATIN CALCIUM 20 MG: 20 TABLET, FILM COATED ORAL at 08:59

## 2025-01-09 RX ADMIN — DULOXETINE HYDROCHLORIDE 60 MG: 30 CAPSULE, DELAYED RELEASE ORAL at 08:59

## 2025-01-09 RX ADMIN — ACETAMINOPHEN 650 MG: 325 TABLET ORAL at 17:11

## 2025-01-09 RX ADMIN — GABAPENTIN 300 MG: 300 CAPSULE ORAL at 20:23

## 2025-01-09 RX ADMIN — TRANEXAMIC ACID 1300 MG: 650 TABLET ORAL at 08:59

## 2025-01-09 RX ADMIN — LOSARTAN POTASSIUM 50 MG: 50 TABLET, FILM COATED ORAL at 08:59

## 2025-01-09 RX ADMIN — ACETAMINOPHEN 650 MG: 325 TABLET ORAL at 05:32

## 2025-01-09 RX ADMIN — PANTOPRAZOLE SODIUM 40 MG: 40 TABLET, DELAYED RELEASE ORAL at 08:59

## 2025-01-09 RX ADMIN — GABAPENTIN 300 MG: 300 CAPSULE ORAL at 08:59

## 2025-01-09 RX ADMIN — SODIUM CHLORIDE, PRESERVATIVE FREE 10 ML: 5 INJECTION INTRAVENOUS at 08:59

## 2025-01-09 RX ADMIN — MONTELUKAST 10 MG: 10 TABLET, FILM COATED ORAL at 20:23

## 2025-01-09 RX ADMIN — POLYETHYLENE GLYCOL 3350 17 G: 17 POWDER, FOR SOLUTION ORAL at 08:59

## 2025-01-09 RX ADMIN — OXYCODONE 10 MG: 5 TABLET ORAL at 09:08

## 2025-01-09 RX ADMIN — SODIUM CHLORIDE, PRESERVATIVE FREE 10 ML: 5 INJECTION INTRAVENOUS at 20:23

## 2025-01-09 ASSESSMENT — PAIN SCALES - GENERAL
PAINLEVEL_OUTOF10: 0
PAINLEVEL_OUTOF10: 7

## 2025-01-09 ASSESSMENT — PAIN DESCRIPTION - DESCRIPTORS: DESCRIPTORS: ACHING

## 2025-01-09 ASSESSMENT — PAIN DESCRIPTION - ORIENTATION: ORIENTATION: MID;LOWER

## 2025-01-09 ASSESSMENT — PAIN DESCRIPTION - LOCATION: LOCATION: BACK

## 2025-01-09 NOTE — PROGRESS NOTES
ACUTE PHYSICAL THERAPY GOALS:   (Developed with and agreed upon by patient and/or caregiver.)  LTG:  (1.)Mr. Gaxiola will move from supine to sit and sit to supine and roll side to side, using log roll technique, with MODIFIED INDEPENDENCE within 7 day(s).    (2.)Mr. Gaxiola will transfer from bed to chair and chair to bed with MODIFIED INDEPENDENCE using the least restrictive device within 7 treatment day(s).    (3.)Mr. Gaxiola will ambulate with MODIFIED INDEPENDENCE for 7 feet with the least restrictive device within 7 treatment day(s).  (4.)Mr. Gaxiola will verbalize 3/3 post-op spinal precautions with INDEPENDENCE for improved safety awareness within 7 treatment days.  (5.)Mr. Gaxiola will ascend and descend 10 stairs using L hand rail(s) with SUPERVISION to improve functional mobility and safety within 7 day(s).    PHYSICAL THERAPY: Daily Note AM   (Link to Caseload Tracking: PT Visit Days : 2  Time In/Out PT Charge Capture  Rehab Caseload Tracker  Orders    Demarcus Gaxiola is a 79 y.o. male   PRIMARY DIAGNOSIS: Spinal stenosis, lumbar region, with neurogenic claudication  Spondylolisthesis of lumbar region [M43.16]  Spinal stenosis, lumbar region, with neurogenic claudication [M48.062]  Lumbar disc herniation [M51.26]  Cyst of lumbar facet joint [M71.38]  S/P lumbar fusion [Z98.1]  Procedure(s) (LRB):  ERAS\L4-L5 direct lateral interbody fusion with interbody cage, allograft (N/A)  L2-5 laminectomy (N/A)  ERAS\L2-L5 posterior fusion with allograft and instrumentation. (N/A)  2 Days Post-Op  Inpatient: Payor: [facility] MEDICARE / Plan: AET MEDICARE-ADVANTAGE PPO / Product Type: Medicare /     ASSESSMENT:     REHAB RECOMMENDATIONS:   Recommendation to date pending progress:  Setting:  No further skilled physical therapy after discharge from hospital    Equipment:    Rolling Walker     ASSESSMENT:  Mr. Gaxiola was supine in bed on arrival and agreeable to therapy. Supine to sit on edge of bed via log roll with  Mod I S SBA CGA Min Mod Max Total  NT x2 Comments:   Level of Assistance [] [x] [x] [] [] [] [] [] [] [] []    Distance   250 feet     DME Rolling Walker    Gait Quality Decreased malorie     Weightbearing Status      Stairs      I=Independent, Mod I=Modified Independent, S=Supervision, SBA=Standby Assistance, CGA=Contact Guard Assistance,   Min=Minimal Assistance, Mod=Moderate Assistance, Max=Maximal Assistance, Total=Total Assistance, NT=Not Tested    PLAN:   FREQUENCY AND DURATION: BID for duration of hospital stay or until stated goals are met, whichever comes first.    TREATMENT:   TREATMENT:   Therapeutic Activity (15 Minutes): Therapeutic activity included Rolling, Supine to Sit, Sit to Supine, Scooting, Transfer Training, Ambulation on level ground, and Standing balance to improve functional Activity tolerance, Balance, Mobility, and Strength.    TREATMENT GRID:   Date:  1/8/25 Date:   Date:     Activity/Exercise Parameters Parameters Parameters   APs  1 x 5 B                                             AFTER TREATMENT PRECAUTIONS: Bed, Bed/Chair Locked, Call light within reach, Needs within reach, and RN notified    INTERDISCIPLINARY COLLABORATION:  RN/ PCT and PT/ PTA    EDUCATION:      TIME IN/OUT:  Time In: 0955  Time Out: 1010  Minutes: 15    Peg Norton PTA

## 2025-01-09 NOTE — PROGRESS NOTES
ACUTE PHYSICAL THERAPY GOALS:   (Developed with and agreed upon by patient and/or caregiver.)  LTG:  (1.)Mr. Gaxiola will move from supine to sit and sit to supine and roll side to side, using log roll technique, with MODIFIED INDEPENDENCE within 7 day(s).    (2.)Mr. Gaxiola will transfer from bed to chair and chair to bed with MODIFIED INDEPENDENCE using the least restrictive device within 7 treatment day(s).    (3.)Mr. Gaxiola will ambulate with MODIFIED INDEPENDENCE for 7 feet with the least restrictive device within 7 treatment day(s).  (4.)Mr. Gaxiola will verbalize 3/3 post-op spinal precautions with INDEPENDENCE for improved safety awareness within 7 treatment days.  (5.)Mr. Gaxiola will ascend and descend 10 stairs using L hand rail(s) with SUPERVISION to improve functional mobility and safety within 7 day(s).    PHYSICAL THERAPY: Daily Note PM   (Link to Caseload Tracking: PT Visit Days : 2  Time In/Out PT Charge Capture  Rehab Caseload Tracker  Orders    Demarcus Gaxiola is a 79 y.o. male   PRIMARY DIAGNOSIS: Spinal stenosis, lumbar region, with neurogenic claudication  Spondylolisthesis of lumbar region [M43.16]  Spinal stenosis, lumbar region, with neurogenic claudication [M48.062]  Lumbar disc herniation [M51.26]  Cyst of lumbar facet joint [M71.38]  S/P lumbar fusion [Z98.1]  Procedure(s) (LRB):  ERAS\L4-L5 direct lateral interbody fusion with interbody cage, allograft (N/A)  L2-5 laminectomy (N/A)  ERAS\L2-L5 posterior fusion with allograft and instrumentation. (N/A)  2 Days Post-Op  Inpatient: Payor: [facility] MEDICARE / Plan: AET MEDICARE-ADVANTAGE PPO / Product Type: Medicare /     ASSESSMENT:     REHAB RECOMMENDATIONS:   Recommendation to date pending progress:  Setting:  No further skilled physical therapy after discharge from hospital    Equipment:    Rolling Walker     ASSESSMENT:  Mr. Gaxiola was supine in bed on arrival and agreeable to therapy. Supine to sit on edge of bed via log roll with

## 2025-01-10 VITALS
RESPIRATION RATE: 18 BRPM | WEIGHT: 221 LBS | BODY MASS INDEX: 34.69 KG/M2 | SYSTOLIC BLOOD PRESSURE: 133 MMHG | HEART RATE: 97 BPM | TEMPERATURE: 98.8 F | DIASTOLIC BLOOD PRESSURE: 73 MMHG | HEIGHT: 67 IN | OXYGEN SATURATION: 97 %

## 2025-01-10 PROCEDURE — 2500000003 HC RX 250 WO HCPCS: Performed by: ORTHOPAEDIC SURGERY

## 2025-01-10 PROCEDURE — 6370000000 HC RX 637 (ALT 250 FOR IP): Performed by: ORTHOPAEDIC SURGERY

## 2025-01-10 PROCEDURE — 97530 THERAPEUTIC ACTIVITIES: CPT

## 2025-01-10 RX ADMIN — POLYETHYLENE GLYCOL 3350 17 G: 17 POWDER, FOR SOLUTION ORAL at 09:20

## 2025-01-10 RX ADMIN — PANTOPRAZOLE SODIUM 40 MG: 40 TABLET, DELAYED RELEASE ORAL at 09:18

## 2025-01-10 RX ADMIN — CYCLOBENZAPRINE HYDROCHLORIDE 10 MG: 10 TABLET, FILM COATED ORAL at 09:19

## 2025-01-10 RX ADMIN — SODIUM CHLORIDE, PRESERVATIVE FREE 5 ML: 5 INJECTION INTRAVENOUS at 09:20

## 2025-01-10 RX ADMIN — ATORVASTATIN CALCIUM 20 MG: 20 TABLET, FILM COATED ORAL at 09:18

## 2025-01-10 RX ADMIN — DULOXETINE HYDROCHLORIDE 60 MG: 30 CAPSULE, DELAYED RELEASE ORAL at 09:18

## 2025-01-10 RX ADMIN — LOSARTAN POTASSIUM 50 MG: 50 TABLET, FILM COATED ORAL at 09:18

## 2025-01-10 RX ADMIN — TAMSULOSIN HYDROCHLORIDE 0.4 MG: 0.4 CAPSULE ORAL at 09:18

## 2025-01-10 RX ADMIN — BISACODYL 5 MG: 5 TABLET, COATED ORAL at 09:18

## 2025-01-10 RX ADMIN — ACETAMINOPHEN 650 MG: 325 TABLET ORAL at 00:13

## 2025-01-10 RX ADMIN — ACETAMINOPHEN 650 MG: 325 TABLET ORAL at 05:36

## 2025-01-10 RX ADMIN — OXYCODONE 5 MG: 5 TABLET ORAL at 03:51

## 2025-01-10 RX ADMIN — GABAPENTIN 300 MG: 300 CAPSULE ORAL at 09:18

## 2025-01-10 RX ADMIN — OXYCODONE 10 MG: 5 TABLET ORAL at 09:19

## 2025-01-10 ASSESSMENT — PAIN DESCRIPTION - DESCRIPTORS
DESCRIPTORS: ACHING
DESCRIPTORS: DULL

## 2025-01-10 ASSESSMENT — PAIN - FUNCTIONAL ASSESSMENT
PAIN_FUNCTIONAL_ASSESSMENT: ACTIVITIES ARE NOT PREVENTED
PAIN_FUNCTIONAL_ASSESSMENT: ACTIVITIES ARE NOT PREVENTED

## 2025-01-10 ASSESSMENT — PAIN DESCRIPTION - PAIN TYPE: TYPE: ACUTE PAIN

## 2025-01-10 ASSESSMENT — PAIN DESCRIPTION - ORIENTATION
ORIENTATION: POSTERIOR
ORIENTATION: LOWER

## 2025-01-10 ASSESSMENT — PAIN DESCRIPTION - FREQUENCY: FREQUENCY: INTERMITTENT

## 2025-01-10 ASSESSMENT — PAIN DESCRIPTION - ONSET: ONSET: AWAKENED FROM SLEEP

## 2025-01-10 ASSESSMENT — PAIN SCALES - GENERAL
PAINLEVEL_OUTOF10: 7
PAINLEVEL_OUTOF10: 6
PAINLEVEL_OUTOF10: 2
PAINLEVEL_OUTOF10: 2

## 2025-01-10 ASSESSMENT — PAIN DESCRIPTION - LOCATION
LOCATION: BACK
LOCATION: BACK

## 2025-01-10 NOTE — PLAN OF CARE
Problem: Pain  Goal: Verbalizes/displays adequate comfort level or baseline comfort level  1/10/2025 1018 by Shantelle Mcallister RN  Outcome: Completed  1/10/2025 1017 by Shantelle Mcallister RN  Outcome: Progressing  1/9/2025 2304 by Houston Forbes RN  Outcome: Not Progressing  1/9/2025 2301 by Houston Forbes RN  Outcome: Progressing     Problem: Safety - Adult  Goal: Free from fall injury  1/10/2025 1018 by Shantelle Mcallister RN  Outcome: Completed  1/10/2025 1017 by Shantelle Mcallister RN  Outcome: Progressing  1/9/2025 2304 by Houston Forbes RN  Outcome: Not Progressing  1/9/2025 2301 by Houston Forbes RN  Outcome: Progressing     Problem: Discharge Planning  Goal: Discharge to home or other facility with appropriate resources  1/10/2025 1018 by Shantelle Mcallister RN  Outcome: Completed  1/10/2025 1017 by Shantelle Mcallister RN  Outcome: Progressing  1/9/2025 2304 by Houston Forbes RN  Outcome: Not Progressing  1/9/2025 2301 by Houston Forbes RN  Outcome: Progressing     Problem: ABCDS Injury Assessment  Goal: Absence of physical injury  1/10/2025 1018 by Shantelle Mcallister RN  Outcome: Completed  1/10/2025 1017 by Shantelle Mcallister RN  Outcome: Progressing  1/9/2025 2304 by Houston Forbes RN  Outcome: Not Progressing  1/9/2025 2301 by Houston Forbes RN  Outcome: Progressing     Problem: Pain  Goal: Verbalizes/displays adequate comfort level or baseline comfort level  1/10/2025 1018 by Shantelle Mcallister RN  Outcome: Completed  1/10/2025 1017 by Shantelle Mcallister RN  Outcome: Progressing  1/9/2025 2304 by Houston Forbes RN  Outcome: Not Progressing  1/9/2025 2301 by Houston Forbes RN  Outcome: Progressing     Problem: Safety - Adult  Goal: Free from fall injury  1/10/2025 1018 by Shantelle Mcallister RN  Outcome: Completed  1/10/2025 1017 by Shantelle Mcallister RN  Outcome: Progressing  1/9/2025 2304 by [place], Quaker, RN  Outcome: Not Progressing  1/9/2025 2301 by Houston Forbes, RN  Outcome: Progressing     Problem:

## 2025-01-10 NOTE — PROGRESS NOTES
ACUTE PHYSICAL THERAPY GOALS:   (Developed with and agreed upon by patient and/or caregiver.)  LTG:  (1.)Mr. Gaxiola will move from supine to sit and sit to supine and roll side to side, using log roll technique, with MODIFIED INDEPENDENCE within 7 day(s).    (2.)Mr. Gaxiola will transfer from bed to chair and chair to bed with MODIFIED INDEPENDENCE using the least restrictive device within 7 treatment day(s).    (3.)Mr. Gaxiola will ambulate with MODIFIED INDEPENDENCE for 7 feet with the least restrictive device within 7 treatment day(s).  (4.)Mr. Gaxiola will verbalize 3/3 post-op spinal precautions with INDEPENDENCE for improved safety awareness within 7 treatment days.  (5.)Mr. Gaxiola will ascend and descend 10 stairs using L hand rail(s) with SUPERVISION to improve functional mobility and safety within 7 day(s).    PHYSICAL THERAPY: Daily Note AM   (Link to Caseload Tracking: PT Visit Days : 3  Time In/Out PT Charge Capture  Rehab Caseload Tracker  Orders    Demarcus Gaxiola is a 79 y.o. male   PRIMARY DIAGNOSIS: Spinal stenosis, lumbar region, with neurogenic claudication  Spondylolisthesis of lumbar region [M43.16]  Spinal stenosis, lumbar region, with neurogenic claudication [M48.062]  Lumbar disc herniation [M51.26]  Cyst of lumbar facet joint [M71.38]  S/P lumbar fusion [Z98.1]  Procedure(s) (LRB):  ERAS\L4-L5 direct lateral interbody fusion with interbody cage, allograft (N/A)  L2-5 laminectomy (N/A)  ERAS\L2-L5 posterior fusion with allograft and instrumentation. (N/A)  3 Days Post-Op  Inpatient: Payor: On license of [facility] MEDICARE / Plan: AET MEDICARE-ADVANTAGE PPO / Product Type: Medicare /     ASSESSMENT:     REHAB RECOMMENDATIONS:   Recommendation to date pending progress:  Setting:  No further skilled physical therapy after discharge from hospital    Equipment:    Rolling Walker     ASSESSMENT:  Mr. Gaxiola was supine in bed on arrival and agreeable to therapy. Supine to sit via log roll with SBA. Ambulated  Static [x] [] [] [] [] []    Sitting Dynamic [x] [] [] [] [] []              Standing Static [x] [] [] [] [] []    Standing Dynamic [] [x] [] [] [] []      GAIT: I Mod I S SBA CGA Min Mod Max Total  NT x2 Comments:   Level of Assistance [] [] [] [x] [] [] [] [] [] [] []    Distance   250 feet     DME Rolling Walker    Gait Quality Decreased malorie     Weightbearing Status      Stairs      I=Independent, Mod I=Modified Independent, S=Supervision, SBA=Standby Assistance, CGA=Contact Guard Assistance,   Min=Minimal Assistance, Mod=Moderate Assistance, Max=Maximal Assistance, Total=Total Assistance, NT=Not Tested    PLAN:   FREQUENCY AND DURATION: BID for duration of hospital stay or until stated goals are met, whichever comes first.    TREATMENT:   TREATMENT:   Therapeutic Activity (14 Minutes): Therapeutic activity included Supine to Sit, Scooting, Transfer Training, Ambulation on level ground, and Standing balance to improve functional Activity tolerance, Balance, Mobility, and Strength.    TREATMENT GRID:   Date:  1/8/25 Date:   Date:     Activity/Exercise Parameters Parameters Parameters   APs  1 x 5 B X 10 B    LAQs  X 10 B    Marching  X 10 B    Hip ABD/ADD  X 10 B                        AFTER TREATMENT PRECAUTIONS: Bed/Chair Locked, Call light within reach, Chair, Needs within reach, and RN notified    INTERDISCIPLINARY COLLABORATION:  RN/ PCT and PT/ PTA    EDUCATION:      TIME IN/OUT:  Time In: 0800  Time Out: 0814  Minutes: 14    Peg Norton PTA

## 2025-01-10 NOTE — DISCHARGE SUMMARY
Discharge Summary    Patient ID:Demarcus Gaxiola  [id number]  1945  79 y.o.    Admit date: 1/7/2025    Discharge date:1/10/2025    Admitting Physician: Richard Mendosa, *    DATE OF SURGERY: 1/7/2025    SURGEON: Richard Mendosa MD     PREOPERATIVE DIAGNOSIS: Lumbar stenosis and spondylolisthesis    POSTOPERATIVE DIAGNOSIS: Lumbar stenosis and spondylolisthesis    PROCEDURE:     Lateral flank incision  1.  L4-5 direct lateral arthrodesis (CPT 89916)   2. Insertion biomechanical device L4-5 (CPT 51201)  Posterior incision   3. Lumbar laminectomy  L2, L3, L4, and L5  with bilateral foraminotomies. (CPT 19365, 63048 X 3)  4. Lumbar posterolateral fusion  L2 through L5 . (CPT 20149, 22614 X 2)  5. Instrumentation  L2 through L5 . (CPT 83259)  6. Allograft (CPT code 81513)        ANESTHESIA: General.    ESTIMATED BLOOD LOSS: 200 ml    POSTOPERATIVE CONDITION: Stable.    INTRAOPERATIVE COMPLICATIONS: None.     IMPLANTS:   Implant Name Type Inv. Item Serial No.  Lot No. LRB No. Used Action   GRAFT BNE MED - [id number]   GRAFT [employer]NE MED [id number] BIOLOGICA   N/A 1 Implanted   SPACER SPNL 7 DEG SM 50X18 MM PROLIFT - [id number]   SPACER SPNL 7 DEG SM 50X18 MM PROLIFT   [employer] [facility] [id number] N/A 1 Implanted   ALLOGRAFT BNE CHIP 1-4 MM 15 CC CRUSH CAN - [id number]   ALLOGRAFT BNE CHIP 1-4 MM 15 CC CRUSH CAN [id number] [employer] [facility]   N/A 1 Implanted   PUTTY BIO DBM 10 ML W CHIPS - [id number]   PUTTY BIO DBM 10 ML W CHIPS   [employer] [facility] [id number] N/A 1 Implanted   PUTTY BIO DBM 10 ML W CHIPS - [id number]   PUTTY BIO DBM 10 ML W CHIPS   [employer] [facility] [id number] N/A 1 Implanted   BLOCKER SPNL L50MM DIA6MM TI 1 LEV HIRAM 3 - [id number]   BLOCKER SPNL L50MM DIA6MM TI 1 LEV HIRAM 3   [employer] [facility] [id number] N/A 8 Implanted   SCREW SPNL L50MM DIA5.5MM POLYAX CARNEY - [id number]   SCREW SPNL L50MM DIA5.5MM POLYAX CARNEY   [employer]  normally. Patient did not receive blood transfusion.  Patient tolerated pain medications and po diet. The dressing remained clean, dry, and intact.  Physical Therapy started on the day following surgery and progressed to ambulation without assistance. He initially had high surgical drain output and which was monitored until output diminished reasonably and was discontinued.   At the time of discharge, had understanding of precautions needed following surgery.      Postoperative complications requiring an extended length of stay: None    Discharged to: Home       Medication List        START taking these medications      oxyCODONE 5 MG immediate release tablet  Commonly known as: Roxicodone  Take 1 tablet by mouth every 6 hours as needed for Pain for up to 7 days. Intended supply: 5 days. Take lowest dose possible to manage pain Max Daily Amount: 20 mg            CHANGE how you take these medications      atorvastatin 20 MG tablet  Commonly known as: LIPITOR  TAKE 1 TABLET BY MOUTH EVERY DAY  What changed:   when to take this  additional instructions     montelukast 10 MG tablet  Commonly known as: SINGULAIR  TAKE 1 TABLET BY MOUTH EVERY DAY  What changed:   when to take this  additional instructions            CONTINUE taking these medications      albuterol sulfate  (90 Base) MCG/ACT inhaler  Commonly known as: PROVENTIL;VENTOLIN;PROAIR     aspirin 81 MG EC tablet     DULoxetine 60 MG extended release capsule  Commonly known as: CYMBALTA  Take 1 capsule by mouth daily     gabapentin 300 MG capsule  Commonly known as: NEURONTIN  Take 1 capsule by mouth 3 times daily for 180 days.     Handicap Placard Misc  by Does not apply route     losartan 50 MG tablet  Commonly known as: COZAAR  TAKE 1 TABLET BY MOUTH EVERY DAY     MAGNESIUM PO     Melatonin 10 MG Tabs     Mucinex Maximum Strength 1200 MG Tb12  Generic drug: guaiFENesin     MULTIVITAMIN ADULT PO     pantoprazole 40 MG tablet  Commonly known as:

## 2025-01-10 NOTE — PROGRESS NOTES
ORTHOPAEDIC PROGRESS NOTE    January 10, 2025  Admit Date: 2025  Admit Diagnosis: Spondylolisthesis of lumbar region [M43.16]  Spinal stenosis, lumbar region, with neurogenic claudication [M48.062]  Lumbar disc herniation [M51.26]  Cyst of lumbar facet joint [M71.38]  S/P lumbar fusion [Z98.1]  Procedure: Procedure(s):  ERAS\L4-L5 direct lateral interbody fusion with interbody cage, allograft  L2-5 laminectomy  ERAS\L2-L5 posterior fusion with allograft and instrumentation.  Post Op day: 3 Days Post-Op      Subjective:     Demarcus Gaxiola is a patient who has no complaints.       Objective:     Vital Signs:    Blood pressure 133/73, pulse 97, temperature 98.8 °F (37.1 °C), temperature source Oral, resp. rate 18, height 1.702 m (5' 7\"), weight 100.2 kg (221 lb), SpO2 97%.    Temp (24hrs), Av.3 °F (36.8 °C), Min:98.1 °F (36.7 °C), Max:98.8 °F (37.1 °C)      01/10 0701 - 01/10 190  In: 597 [P.O.:597]  Out: -   1901 - 01/10 0700  In: -   Out: 525 [Drains:525]    LAB:    No results for input(s): \"HGB\", \"WBC\", \"PLT\" in the last 72 hours.    Physical Exam    General:   Alert and oriented. No acute distress  Lungs:  Respirations unlabored.  Extremities: No evidence of cyanosis. Calves soft, nontender.    Moves both upper and lower extremities.   Dressing:  clean, dry, and intact will reinforce at bottom.  Neuro:  no deficit      Assessment:      Patient Active Problem List   Diagnosis    Hypertension    Osteoarthritis    Total knee replacement status, left    Hypercholesteremia    Spinal stenosis of lumbosacral region    LEOBARDO (obstructive sleep apnea)    Nocturnal hypoxemia    Nocturnal polyuria    Severe obesity (BMI 35.0-39.9) with comorbidity    Obesity (BMI 35.0-39.9 without comorbidity)    Spondylolisthesis of lumbar region    Spinal stenosis, lumbar region, with neurogenic claudication    Cyst of lumbar facet joint    Lumbar disc herniation    S/P lumbar fusion       Plan:     Continue

## 2025-01-10 NOTE — CARE COORDINATION
Pt is medically cleared for dc to home today with HH therapy services through [facility] and a RW from [facility].  No additional needs or concerns identified or reported.  CM remains available to assist as needed.       01/08/25 5028   Service Assessment   Patient Orientation Alert and Oriented   Cognition Alert   History Provided By Patient;Child/Family;Medical Record   Primary Caregiver Self   Accompanied By/Relationship son   Support Systems Spouse/Significant Other;Children;Family Members   Patient's Healthcare Decision Maker is: Legal Next of Kin   PCP Verified by CM Yes   Last Visit to PCP Within last 3 months  (12/11/2024)   Prior Functional Level Independent in ADLs/IADLs   Current Functional Level Assistance with the following:;Mobility   Can patient return to prior living arrangement Yes   Ability to make needs known: Good   Family able to assist with home care needs: Yes   Would you like for me to discuss the discharge plan with any other family members/significant others, and if so, who? No   Financial Resources Medicare   Community Resources None   Social/Functional History   Lives With Spouse   Type of Home House   Home Layout Two level;Bed/Bath upstairs   Bathroom Equipment Shower chair   Home Equipment Cane;Rollator   Prior Level of Assist for ADLs Independent   Prior Level of Assist for Homemaking Independent   Ambulation Assistance Independent   Prior Level of Assist for Transfers Independent   Occupation Retired   Discharge Planning   Type of Residence House   Living Arrangements Spouse/Significant Other   Current Services Prior To Admission C-pap;Durable Medical Equipment   Current DME Prior to Arrival Shower Chair;Cane;Other (Comment)  (rollator)   Potential Assistance Needed Durable Medical Equipment;Home Care   Potential DME Needed Walker   DME Ordered? Walker  (fixed wheel rolling walker from [facility])   Potential Assistance Purchasing Medications No   Type of Home Care

## 2025-01-10 NOTE — PLAN OF CARE
Problem: Pain  Goal: Verbalizes/displays adequate comfort level or baseline comfort level  1/10/2025 1017 by Shantelle Mcallister RN  Outcome: Progressing  1/9/2025 2304 by Houston Forbes RN  Outcome: Not Progressing  1/9/2025 2301 by Houston Forbes RN  Outcome: Progressing     Problem: Safety - Adult  Goal: Free from fall injury  1/10/2025 1017 by Shantelle Mcallister RN  Outcome: Progressing  1/9/2025 2304 by Houston Forbes RN  Outcome: Not Progressing  1/9/2025 2301 by Houston Forbes RN  Outcome: Progressing     Problem: Discharge Planning  Goal: Discharge to home or other facility with appropriate resources  1/10/2025 1017 by Shantelle Mcallister RN  Outcome: Progressing  1/9/2025 2304 by Houston Forbes RN  Outcome: Not Progressing  1/9/2025 2301 by Houston Forbes RN  Outcome: Progressing     Problem: ABCDS Injury Assessment  Goal: Absence of physical injury  1/10/2025 1017 by Shantelle Mcallister RN  Outcome: Progressing  1/9/2025 2304 by Houston Forbes RN  Outcome: Not Progressing  1/9/2025 2301 by Houston Forbes RN  Outcome: Progressing     Problem: Pain  Goal: Verbalizes/displays adequate comfort level or baseline comfort level  1/10/2025 1017 by Shantelle Mcallister RN  Outcome: Progressing  1/9/2025 2304 by Houston Forbes RN  Outcome: Not Progressing  1/9/2025 2301 by Houston Forbes RN  Outcome: Progressing     Problem: Safety - Adult  Goal: Free from fall injury  1/10/2025 1017 by Shantelle Mcallister RN  Outcome: Progressing  1/9/2025 2304 by Houston Forbes RN  Outcome: Not Progressing  1/9/2025 2301 by Houston Forbes RN  Outcome: Progressing     Problem: Discharge Planning  Goal: Discharge to home or other facility with appropriate resources  1/10/2025 1017 by Shantelle Mcallister RN  Outcome: Progressing  1/9/2025 2304 by Houston Forbes RN  Outcome: Not Progressing  1/9/2025 2301 by Houston Forbes RN  Outcome: Progressing     Problem: ABCDS Injury Assessment  Goal: Absence of

## 2025-01-13 ENCOUNTER — CARE COORDINATION (OUTPATIENT)
Dept: CARE COORDINATION | Facility: CLINIC | Age: 80
End: 2025-01-13

## 2025-01-13 NOTE — CARE COORDINATION
Care Transitions Note    Initial Call - Call within 2 business days of discharge: Yes    Patient Current Location:  Home: [address] [place] [id number]    Care Transition Nurse contacted the patient by telephone to perform post hospital discharge assessment, verified name and  as identifiers. Provided introduction to self, and explanation of the Care Transition Nurse role.     Patient: Demarcus Gaxiola    Patient : 1945   MRN: [id number]    Reason for Admission: Lumbar stenosis and spondylolisthesis   Procedure:  Lateral flank incision  1.  L4-5 direct lateral arthrodesis   2. Insertion biomechanical device   Posterior incision   3. Lumbar laminectomy  L2, L3, L4, and L5  with bilateral foraminotomies  4. Lumbar posterolateral fusion  L2 through L5   5. Instrumentation  L2 through L5   6. Allograft  Discharge Date: 1/10/25  RURS: Readmission Risk Score: 4.5      Last Discharge Facility       Date Complaint Diagnosis Description Type Department Provider    25  Spondylolisthesis of lumbar region ... Admission (Discharged) [id number] Richard Mendosa MD            Was this an external facility discharge? No    Additional needs identified to be addressed with provider   No needs identified             Method of communication with provider: none.    Patients top risk factors for readmission: medical condition-S/P Lumbar stenosis and spondylolisthesis, risk for infection, HTN    Interventions to address risk factors:   Home Health: [facility] scheduled for start of care today, 2025. Office to call patient with time.  Reviewed discharge instructions and offered opportunity to ask any questions regarding discharge instructions.  Confirmed medications obtained and taking as ordered  Reviewed upcoming follow up appointments:  [facility]-2025 at 8:45 am  Patient to contact CTN with any issues, questions, or concerns prior to next outreach.      Care Transition Nurse

## 2025-01-14 ENCOUNTER — TELEPHONE (OUTPATIENT)
Dept: ORTHOPEDIC SURGERY | Age: 80
End: 2025-01-14

## 2025-01-14 DIAGNOSIS — M48.062 SPINAL STENOSIS, LUMBAR REGION, WITH NEUROGENIC CLAUDICATION: ICD-10-CM

## 2025-01-14 RX ORDER — OXYCODONE HYDROCHLORIDE 5 MG/1
5 TABLET ORAL EVERY 6 HOURS PRN
Qty: 28 TABLET | Refills: 0 | Status: SHIPPED | OUTPATIENT
Start: 2025-01-14 | End: 2025-01-21

## 2025-01-14 NOTE — TELEPHONE ENCOUNTER
Spoke with Parvin (patient's wife) and she said she was expecting someone to call regarding wound/bandage changes. I asked her about the incision she said she changed it. I asked if it was draining she stated no and that it looked good. I told her no need for someone to do wound care, she can leave his bandage off and leave open to air.

## 2025-01-14 NOTE — TELEPHONE ENCOUNTER
Henry PT with [facility] [phone] needs verb orders to see for 2xwk for 2wks then 1xwk for 4wks is ok to leave a VM if with a patient

## 2025-01-16 ENCOUNTER — CARE COORDINATION (OUTPATIENT)
Dept: CARE COORDINATION | Facility: CLINIC | Age: 80
End: 2025-01-16

## 2025-01-16 ENCOUNTER — TELEPHONE (OUTPATIENT)
Dept: ORTHOPEDIC SURGERY | Age: 80
End: 2025-01-16

## 2025-01-16 NOTE — TELEPHONE ENCOUNTER
Having concerns regarding ot/pt not coming but once this week and would really like to have another visit this week. I did tell her I would reach out to someone and see what I could do. He is scheduled to have 2 visits next week. Her other questions was regarding the bandage. I did instruct her that she could remove it and leave open to air; shower as normal and make sure it was dry when dressing. She will call back with any additional concerns

## 2025-01-16 NOTE — TELEPHONE ENCOUNTER
He is nine days post op and she wants to know when his dressing is supposed to be changed. They have not had PT to come out. She is asking for a return call. He also had a fall today that she wants to talk to you about.

## 2025-01-16 NOTE — CARE COORDINATION
Received call from patient and patient's spouse regarding HH PT/OT. Per our conversation PT completed initial visit on 1/13/2025. They were told PT was to visit 2 times weekly and they haven't heard from anyone about the next scheduled visit or from OT regarding initial visit. Spouse is concerned due to patient almost falling a couple times.   CTN contacted [facility]. Per Yenny they weren't aware of OT referral. Upon further research she found the original order and will notify OT and have them outreach to patient for visit. Yenny states that PT is scheduled again for next Tuesday-1/21/2025 and Thursday-1/23/2025. Yenny will speak with PT regarding scheduling another visit for this week. She reports PT may need to request an order from Dr. Mendosa to add an additional visit. Yenny will contact patient with plan once she discusses with PT and MD.   CTN notified spouse of the above information and provided her with contact information for [facility]. Spouse agrees to contact [facility] with any other issues prior to next outreach.

## 2025-01-20 ENCOUNTER — TELEPHONE (OUTPATIENT)
Dept: ORTHOPEDIC SURGERY | Age: 80
End: 2025-01-20

## 2025-01-20 ENCOUNTER — CARE COORDINATION (OUTPATIENT)
Dept: CARE COORDINATION | Facility: CLINIC | Age: 80
End: 2025-01-20

## 2025-01-20 DIAGNOSIS — M62.838 MUSCLE SPASMS OF LOWER EXTREMITY: Primary | ICD-10-CM

## 2025-01-20 RX ORDER — METHYLPREDNISOLONE 4 MG/1
4 TABLET ORAL SEE ADMIN INSTRUCTIONS
Qty: 1 KIT | Refills: 0 | Status: SHIPPED | OUTPATIENT
Start: 2025-01-20

## 2025-01-20 NOTE — TELEPHONE ENCOUNTER
Discussed with Dr Foster his muscle spasms and tightness in his legs s/p lumbar fusion. He will send in a medrol dospk and a muscle relaxer to his pharmacy. He will keep his appointment on Thursday to see Dr Foster

## 2025-01-20 NOTE — CARE COORDINATION
Care Transitions Note    Follow Up Call     Patient Current Location:  Home: [address] [address]    [facility] Care Coordinator contacted the patient by telephone. Verified name and  as identifiers.    Additional needs identified to be addressed with provider   No needs identified                 Method of communication with provider: none.    Care Summary Note: Patient reports doing ok at time of call.   He reports having muscle spasms and plans to reach out to Dr. Mendosa's office today to discuss. Patient declines assistance making the call.   Mr. Gaxiola is scheduled for post op with Dr. Mendosa on 25.  He remains engaged with [facility] for PT/OT.  No other questions or concerns voiced.    Advance Care Planning:   Does patient have an Advance Directive: reviewed during previous call, see note. .    Medication Review:  No changes since last call.     Assessments:   Goals Addressed                   This Visit's Progress     Returns to baseline activity level.   On track     Patient/Family able to obtain medicine after d/c     Patient/Family able to verbalize medicine changes     Patient/Family aware and attends follow up appointments s/p d/c.     Patient/Family agrees to notify provider of any barriers to plan of care.    Patient/Family agrees to notify provider of any symptoms that indicate a worsening of condition               Follow Up Appointment:   Reviewed upcoming appointment(s).  Future Appointments         Provider Specialty Dept Phone    2025 8:45 AM (Arrive by 8:30 AM) Richard Mendosa MD Orthopedic Surgery [phone]    3/18/2025 2:40 PM PST LAB Family [facility] [phone]    2025 2:00 PM Cezar Wright MD Family [facility] [phone]            [facility] Care Coordinator provided contact information.  Plan for follow-up call in 6-10 days based on severity of symptoms and risk factors.  Plan for next call: symptom management    Evelyn Easton

## 2025-01-20 NOTE — TELEPHONE ENCOUNTER
He is having some severe spasms in his legs and is wondering what he can do. He has an appt on Thursday but doesn't feel he can wait until then.

## 2025-01-22 ENCOUNTER — CARE COORDINATION (OUTPATIENT)
Dept: CARE COORDINATION | Facility: CLINIC | Age: 80
End: 2025-01-22

## 2025-01-22 NOTE — CARE COORDINATION
Care Transitions Note    Follow Up Call     Patient Current Location:  Home: [address] [address]    [facility] Care Coordinator contacted the patient by telephone. Verified name and  as identifiers.    Additional needs identified to be addressed with provider   No needs identified                 Method of communication with provider: none.    Care Summary Note: Patient reports doing well today. Dr. Mendosa addressed patient's c/o leg pain/muscle spasms s/p lumbar fusion last week with prescription for tizanidine 4mg as well as medrol dose pack.  Patient reports feeling much improved.  No questions or concerns voiced today.  Patient will f/u in office with Dr. Mendosa on tomorrow, 25.    Advance Care Planning:   Does patient have an Advance Directive: reviewed during previous call, see note. .    Medication Review:  Medications changed since last call, reviewed today.     Assessments:   Goals Addressed                   This Visit's Progress     Returns to baseline activity level.   On track     Patient/Family able to obtain medicine after d/c     Patient/Family able to verbalize medicine changes     Patient/Family aware and attends follow up appointments s/p d/c.     Patient/Family agrees to notify provider of any barriers to plan of care.    Patient/Family agrees to notify provider of any symptoms that indicate a worsening of condition               Follow Up Appointment:   Reviewed upcoming appointment(s).  Future Appointments         Provider Specialty Dept Phone    2025 8:45 AM (Arrive by 8:30 AM) Richard Mendosa MD Orthopedic Surgery [phone]    3/18/2025 2:40 PM PST LAB Family [facility] [phone]    2025 2:00 PM Cezar Wright MD Family Medicine [phone]            [facility] Care Coordinator provided contact information.  Plan for follow-up call in 6-10 days based on severity of symptoms and risk factors.  Plan for next call: symptom management    Evelyn

## 2025-01-23 ENCOUNTER — OFFICE VISIT (OUTPATIENT)
Age: 80
End: 2025-01-23

## 2025-01-23 DIAGNOSIS — S32.059A CLOSED FRACTURE OF FIFTH LUMBAR VERTEBRA, UNSPECIFIED FRACTURE MORPHOLOGY, INITIAL ENCOUNTER (HCC): ICD-10-CM

## 2025-01-23 DIAGNOSIS — Z98.1 STATUS POST LUMBAR SPINAL ARTHRODESIS: Primary | ICD-10-CM

## 2025-01-23 NOTE — PROGRESS NOTES
Name: Demarcus Gaxiola  YOB: 1945  Gender: male  MRN: [id number]  Age: 79 y.o.    Chief Complaint: Lumbar spine surgery follow up    History of Present Illness:      Demarcus Gaxiola  is here for 2 week follow up of his  DLIF and posterior lumbar fusion surgery.  After initially doing well, he had a dramatic increase in pain.  His family reports a twisting near fall injury at home that seem to correlate with that.  He is also noticed a worsening of his left-sided foot drop which he had preceding surgery but it seems weaker now.       Medications:       Current Outpatient Medications:     methylPREDNISolone (MEDROL DOSEPACK) 4 MG tablet, Take 1 tablet by mouth See Admin Instructions Per package instructions, Disp: 1 kit, Rfl: 0    tiZANidine (ZANAFLEX) 4 MG tablet, Take 1 tablet by mouth 3 times daily as needed (muscle spasm), Disp: 30 tablet, Rfl: 0    Melatonin 10 MG TABS, Take 1 tablet by mouth at bedtime, Disp: , Rfl:     DULoxetine (CYMBALTA) 60 MG extended release capsule, Take 1 capsule by mouth daily, Disp: 90 capsule, Rfl: 3    atorvastatin (LIPITOR) 20 MG tablet, TAKE 1 TABLET BY MOUTH EVERY DAY (Patient taking differently: Take 1 tablet by mouth every morning TAKE 1 TABLET BY MOUTH EVERY DAY), Disp: 90 tablet, Rfl: 3    pantoprazole (PROTONIX) 40 MG tablet, TAKE 1 TABLET BY MOUTH EVERY DAY, Disp: 90 tablet, Rfl: 2    montelukast (SINGULAIR) 10 MG tablet, TAKE 1 TABLET BY MOUTH EVERY DAY (Patient taking differently: Take 1 tablet by mouth nightly TAKE 1 TABLET BY MOUTH EVERY DAY), Disp: 90 tablet, Rfl: 3    tamsulosin (FLOMAX) 0.4 MG capsule, TAKE 1 CAPSULE BY MOUTH EVERY DAY, Disp: 90 capsule, Rfl: 2    losartan (COZAAR) 50 MG tablet, TAKE 1 TABLET BY MOUTH EVERY DAY, Disp: 90 tablet, Rfl: 3    gabapentin (NEURONTIN) 300 MG capsule, Take 1 capsule by mouth 3 times daily for 180 days., Disp: 270 capsule, Rfl: 1    MAGNESIUM PO, Take 400 mg by mouth at bedtime, Disp: , Rfl:     Handicap

## 2025-01-24 NOTE — PROGRESS NOTES
The patient was fitted and instructed on use of a  EXOS Form Back Brace (lumbar). It has been prescribed to reduce pain by restricting mobility of the trunk and/or to otherwise support weak spinal muscles.  This brace was prescribed by Dr. LLANES.  It was instructed and demonstrated to the patient how to apply and use the brace. It was also explained how the brace would facilitate lower lumbar support throughout the day. Pt. was also instructed on wear schedule to be no more than 4 hours at a time.    Patient read and signed documenting they understand and agree to [facility]'s current DME return policy.

## 2025-01-27 ENCOUNTER — TELEPHONE (OUTPATIENT)
Dept: ORTHOPEDIC SURGERY | Age: 80
End: 2025-01-27

## 2025-01-27 RX ORDER — GABAPENTIN 300 MG/1
300 CAPSULE ORAL 3 TIMES DAILY
Qty: 270 CAPSULE | Refills: 1 | Status: SHIPPED | OUTPATIENT
Start: 2025-01-27 | End: 2025-07-26

## 2025-01-27 NOTE — TELEPHONE ENCOUNTER
Left message for Daysi with [facility]. Getting up to walk a little bit will keep him from being so stiff. We are d/c exercises /pt for now until he follows back up with Dr Foster

## 2025-01-27 NOTE — TELEPHONE ENCOUNTER
She left a voicemail message wanting to know if he is to continue PT. Please give him a return call.

## 2025-01-27 NOTE — TELEPHONE ENCOUNTER
His wife is calling to let you know he has a fracture now below his surgery and she has has a question about if she has to get him up every hour with the fracture.

## 2025-01-27 NOTE — TELEPHONE ENCOUNTER
Called and instructed patient to hold off on continuing therapy until he comes back in to see Dr Foster for his follow up

## 2025-01-28 ENCOUNTER — CARE COORDINATION (OUTPATIENT)
Dept: CARE COORDINATION | Facility: CLINIC | Age: 80
End: 2025-01-28

## 2025-01-28 NOTE — CARE COORDINATION
Care Transitions Note    Follow Up Call     Patient Current Location:  Home: [address] [place] [phone]    [facility] Care Coordinator contacted the patient by telephone. Verified name and  as identifiers.    Additional needs identified to be addressed with provider   No needs identified                 Method of communication with provider: none.    Care Summary Note: Patient reports having seen Dr. Mendosa for post op on 25.  Patient reported a drastic increase in pain with worsening foot drop.  Unfortunately radiographs show a significant compression fracture of L5 with greater than 50% loss of height and some mild resulting kyphosis but without hardware pullout.  Per Dr. Mendosa's notes patient to return to the office on 25 for repeat radiographs.  If worsening kyphosis or hardware pullout patient will return to OR for a L5-S1 fusion with iliac fixation and L2-S1 instrumentation.  Patient is asking if he may be eligible for some assistance at home as his wife is having difficulty assisting him.  Will ask our , Shivani Marshall to contact patient.  Instructed patient to speak with Dr. Mendosa depending on the results of his next radiographs.       Advance Care Planning:   Does patient have an Advance Directive: reviewed during previous call, see note. .    Medication Review:  No changes since last call.     Assessments:   Goals Addressed                   This Visit's Progress     Returns to baseline activity level.   On track     Patient/Family able to obtain medicine after d/c     Patient/Family able to verbalize medicine changes     Patient/Family aware and attends follow up appointments s/p d/c.     Patient/Family agrees to notify provider of any barriers to plan of care.    Patient/Family agrees to notify provider of any symptoms that indicate a worsening of condition               Follow Up Appointment:   Reviewed upcoming appointment(s).  Future Appointments

## 2025-01-29 ENCOUNTER — TELEPHONE (OUTPATIENT)
Dept: ORTHOPEDIC SURGERY | Age: 80
End: 2025-01-29

## 2025-01-29 ENCOUNTER — CARE COORDINATION (OUTPATIENT)
Dept: CARE COORDINATION | Facility: CLINIC | Age: 80
End: 2025-01-29

## 2025-01-29 NOTE — TELEPHONE ENCOUNTER
His wife is calling to see what they can do about his pain. Please call to discuss. She doesn't think the fracture is going to heal.

## 2025-01-29 NOTE — TELEPHONE ENCOUNTER
Called and left a message for Parvin (patient's wife) stating that after discussion with Dr Foster other than waiting the only other thing we could do is proceed with surgery. I instructed her to return my call if they would like to discuss this further. Otherwise, we would just see him at his follow up appointment

## 2025-01-29 NOTE — CARE COORDINATION
EILEEN ESTEVES heard back from pt's spouse.  She inquired about in-home assistance.  She is struggling to care for pt at home without assistance.  No family locally.  They do not have Medicaid-have not applied.  Sitter services discussed.  Pt may need further surgery, per spouse, at which time SNF may be necessary. Spouse informed that their insurance does not cover in-home care beyond HH.  No further needs at this time.     EILEEN ESTEVES will remain on care team for 30 days.

## 2025-02-03 DIAGNOSIS — S32.059A CLOSED FRACTURE OF FIFTH LUMBAR VERTEBRA, UNSPECIFIED FRACTURE MORPHOLOGY, INITIAL ENCOUNTER (HCC): ICD-10-CM

## 2025-02-03 DIAGNOSIS — Z98.1 STATUS POST LUMBAR SPINAL ARTHRODESIS: Primary | ICD-10-CM

## 2025-02-03 RX ORDER — OXYCODONE HYDROCHLORIDE 5 MG/1
5 TABLET ORAL EVERY 6 HOURS PRN
Qty: 28 TABLET | Refills: 0 | Status: SHIPPED | OUTPATIENT
Start: 2025-02-03 | End: 2025-02-10

## 2025-02-03 NOTE — TELEPHONE ENCOUNTER
Sent prescription refill request for his oxycodone to Dr Foster to authorize and send to his pharmacy on file

## 2025-02-04 ENCOUNTER — CARE COORDINATION (OUTPATIENT)
Dept: CARE COORDINATION | Facility: CLINIC | Age: 80
End: 2025-02-04

## 2025-02-04 NOTE — CARE COORDINATION
Care Transitions Note    Follow Up Call     Patient Current Location:  Home: [address] [id number]    [facility] Care Coordinator contacted the patient by telephone. Verified name and  as identifiers.    Additional needs identified to be addressed with provider   No needs identified                 Method of communication with provider: none.    Care Summary Note: Patient reports no change since our last conversation on 25.  He is unable to sleep in bed but reports resting well in his recliner.  He will return to Dr. Mendosa on 25 for repeat radiographs to decide whether compression fracture of L5 will need surgical intervention.  Patient verbalized no questions or concerns at this time.  He does have this LPN CC's contact information should a need or concern arise.        Advance Care Planning:   Does patient have an Advance Directive: reviewed during previous call, see note. .    Medication Review:  No changes since last call.     Assessments:   Goals Addressed                   This Visit's Progress     Returns to baseline activity level.   On track     Patient/Family able to obtain medicine after d/c     Patient/Family able to verbalize medicine changes     Patient/Family aware and attends follow up appointments s/p d/c.     Patient/Family agrees to notify provider of any barriers to plan of care.    Patient/Family agrees to notify provider of any symptoms that indicate a worsening of condition               Follow Up Appointment:   Reviewed upcoming appointment(s).  Future Appointments         Provider Specialty Dept Phone    2025 11:15 AM (Arrive by 11:00 AM) Richard Mendosa MD Orthopedic Surgery [phone]    3/18/2025 2:40 PM PST LAB Family [facility] [phone]    2025 2:00 PM Cezar Wright MD Family Medicine [phone]            [facility] Care Coordinator provided contact information.  Plan for follow-up call in 6-10 days based on severity of symptoms and

## 2025-02-06 ENCOUNTER — OFFICE VISIT (OUTPATIENT)
Age: 80
End: 2025-02-06

## 2025-02-06 DIAGNOSIS — S32.059A CLOSED FRACTURE OF FIFTH LUMBAR VERTEBRA, UNSPECIFIED FRACTURE MORPHOLOGY, INITIAL ENCOUNTER (HCC): ICD-10-CM

## 2025-02-06 DIAGNOSIS — Z98.1 STATUS POST LUMBAR SPINAL ARTHRODESIS: Primary | ICD-10-CM

## 2025-02-06 PROCEDURE — 99024 POSTOP FOLLOW-UP VISIT: CPT | Performed by: ORTHOPAEDIC SURGERY

## 2025-02-06 NOTE — PROGRESS NOTES
vertebrae and he has not fallen into kyphosis.  However, he does have the expected pain associated with the fracture and his postoperative changes.  At this point, I would recommend following closely for kyphotic decompensation or hardware failure or pullout.  Will see him back in 2 weeks with repeat radiographs.  If at any point he does appear to be decompensating further, I would recommend returning to surgery for an L5-S1 fusion, reinstrumentation L2-L5, and iliac fixation.    Electronically Signed By Richard Foster MD   02/06/25  11:36 AM

## 2025-02-12 ENCOUNTER — CARE COORDINATION (OUTPATIENT)
Dept: CARE COORDINATION | Facility: CLINIC | Age: 80
End: 2025-02-12

## 2025-02-12 NOTE — CARE COORDINATION
Care Transitions Note    Final Call     Patient Current Location:  Home: [address] [place] [phone]    [facility] Care Coordinator contacted the patient by telephone. Verified name and  as identifiers.    Patient graduated from the Care Transitions program on 25.  Patient/family verbalizes confidence in the ability to self-manage at this time..      Advance Care Planning:   Does patient have an Advance Directive: reviewed during previous call, see note. .    Handoff:   Patient was not referred to the ACM team due to patient declined services.      Care Summary Note: Patient reports doing well this morning.  His follow up with Dr. Mendosa on 25 went well.  Per Dr. Mendosa's notes pt sustained L5 fracture s/p recent L2-L5 laminectomy and fusion, his screws have not pulled out of the L5 vertebrae and he has not fallen into kyphosis.  Dr. Mendosa is watching closely for kyphotic decompensation or hardware failure or pullout.  Patient scheduled on 25 for repeat radiographs.  If at any point patient does appear to be decompensating further, surgery for an L5-S-1 fusion, reinstrumentation L2-L5 and iliac fixation is recommended (per Dr Mendosa).   Patient reports no questions or concerns at this time and respectfully declines further outreach.  Patient provided with this LPN and CTN's contact information.      Upcoming Appointments:    Future Appointments         Provider Specialty Dept Phone    2025 12:45 PM (Arrive by 12:30 PM) Richard Mendosa MD Orthopedic Surgery [phone]    3/18/2025 2:40 PM PST LAB Family [facility] [phone]    2025 2:00 PM Cezar Wright MD Family Medicine [phone]            Patient has agreed to contact primary care provider and/or specialist for any further questions, concerns, or needs.    Evelyn Easton LPN

## 2025-02-13 ENCOUNTER — TELEPHONE (OUTPATIENT)
Dept: ORTHOPEDIC SURGERY | Age: 80
End: 2025-02-13

## 2025-02-13 DIAGNOSIS — Z98.1 STATUS POST LUMBAR SPINAL ARTHRODESIS: Primary | ICD-10-CM

## 2025-02-13 RX ORDER — OXYCODONE HYDROCHLORIDE 5 MG/1
5 TABLET ORAL EVERY 6 HOURS PRN
Qty: 28 TABLET | Refills: 0 | Status: SHIPPED | OUTPATIENT
Start: 2025-02-13 | End: 2025-02-20

## 2025-02-13 NOTE — TELEPHONE ENCOUNTER
He is requesting a refill on Oxycodone to [facility] on [facility] in [place].

## 2025-02-14 ENCOUNTER — CARE COORDINATION (OUTPATIENT)
Dept: CARE COORDINATION | Facility: CLINIC | Age: 80
End: 2025-02-14

## 2025-02-20 ENCOUNTER — OFFICE VISIT (OUTPATIENT)
Age: 80
End: 2025-02-20

## 2025-02-20 DIAGNOSIS — S32.059A CLOSED FRACTURE OF FIFTH LUMBAR VERTEBRA, UNSPECIFIED FRACTURE MORPHOLOGY, INITIAL ENCOUNTER (HCC): ICD-10-CM

## 2025-02-20 DIAGNOSIS — Z98.1 STATUS POST LUMBAR SPINAL ARTHRODESIS: Primary | ICD-10-CM

## 2025-02-20 RX ORDER — HYDROCODONE BITARTRATE AND ACETAMINOPHEN 5; 325 MG/1; MG/1
1 TABLET ORAL EVERY 6 HOURS PRN
Qty: 28 TABLET | Refills: 0 | Status: SHIPPED | OUTPATIENT
Start: 2025-02-20 | End: 2025-02-27

## 2025-02-20 NOTE — PROGRESS NOTES
Name: Demarcus Gaxiola  YOB: 1945  Gender: male  MRN: [id number]  Age: 80 y.o.       History of present illness:     This is the gentleman that is now 6 weeks month from surgery which included an L2-5 laminectomy and fusion.  Somewhere in the first week after surgery, he sustained a significant fracture of the L5 vertebrae with near complete collapse.  Again, I did prescribe a brace.  He reports significant reduction in pain over the last 2 weeks.  Also, he has had resolution of his left-sided foot drop.  He has been ambulating with a walker and is able to get around much better than before.  He has been getting home health therapy.  He would like to transition to outpatient.    Physical Exam:    He is awake and oriented in no acute distress while sitting in the wheelchair.  He is now neurologically intact in both lower extremities with resolution of his left-sided foot drop.    Radiographic Studies:     AP and lateral views lumbar spine show postoperative changes status post L2-L5 laminectomy and fusion with instrumentation.  As previously noted, there has been near complete collapse of the L5 vertebrae.  There has been no pullout of the L5 screws and he has not fallen into kyphosis.    Radiographic impression: L5 fracture status post L2-L5 laminectomy and fusion with instrumentation and without evidence of instrumentation pullout or kyphotic decompensation.    Diagnosis:      ICD-10-CM    1. Status post lumbar spinal arthrodesis  Z98.1 XR LUMBAR SPINE (2-3 VIEWS)     HYDROcodone-acetaminophen (NORCO) 5-325 MG per tablet     Ambulatory referral to Physical Therapy      2. Closed fracture of fifth lumbar vertebra, unspecified fracture morphology, initial encounter ([facility])  S32.059A HYDROcodone-acetaminophen (NORCO) 5-325 MG per tablet     Ambulatory referral to Physical Therapy          Assessment/Plan:      He sustained an L5 fracture status post his recent L2-L5 laminectomy and fusion.

## 2025-02-25 ENCOUNTER — CARE COORDINATION (OUTPATIENT)
Dept: CARE COORDINATION | Facility: CLINIC | Age: 80
End: 2025-02-25

## 2025-03-14 DIAGNOSIS — D64.9 NORMOCYTIC ANEMIA: Primary | ICD-10-CM

## 2025-03-27 ENCOUNTER — OFFICE VISIT (OUTPATIENT)
Age: 80
End: 2025-03-27
Payer: MEDICARE

## 2025-03-27 DIAGNOSIS — Z98.1 STATUS POST LUMBAR SPINAL ARTHRODESIS: Primary | ICD-10-CM

## 2025-03-27 DIAGNOSIS — S32.059A CLOSED FRACTURE OF FIFTH LUMBAR VERTEBRA, UNSPECIFIED FRACTURE MORPHOLOGY, INITIAL ENCOUNTER (HCC): ICD-10-CM

## 2025-03-27 PROCEDURE — 99213 OFFICE O/P EST LOW 20 MIN: CPT | Performed by: ORTHOPAEDIC SURGERY

## 2025-03-27 PROCEDURE — 1123F ACP DISCUSS/DSCN MKR DOCD: CPT | Performed by: ORTHOPAEDIC SURGERY

## 2025-03-27 NOTE — PROGRESS NOTES
Name: Demarcus Gaxiola  YOB: 1945  Gender: male  MRN: [id number]  Age: 80 y.o.    Chief Complaint: Lumbar spine surgery follow up    History of Present Illness:      Demarcus Gaxiola  is here for 11 week follow up of his  lumbar posteolateral fusion and subsequent L5 fracture after surgery.   We have been watching closely for any further decompensation.  The patient reports that his pain continues to diminish.  He is no longer taking oxycodone.  He takes primarily Tylenol.  His pain is at most 3/10.  His foot drop has resolved completely.  He continues to have significant gait instability and uses a rolling walker.  He has not started physical therapy.         Medications:       Current Outpatient Medications:     gabapentin (NEURONTIN) 300 MG capsule, Take 1 capsule by mouth 3 times daily for 180 days., Disp: 270 capsule, Rfl: 1    methylPREDNISolone (MEDROL DOSEPACK) 4 MG tablet, Take 1 tablet by mouth See Admin Instructions Per package instructions, Disp: 1 kit, Rfl: 0    tiZANidine (ZANAFLEX) 4 MG tablet, Take 1 tablet by mouth 3 times daily as needed (muscle spasm), Disp: 30 tablet, Rfl: 0    Melatonin 10 MG TABS, Take 1 tablet by mouth at bedtime, Disp: , Rfl:     DULoxetine (CYMBALTA) 60 MG extended release capsule, Take 1 capsule by mouth daily, Disp: 90 capsule, Rfl: 3    atorvastatin (LIPITOR) 20 MG tablet, TAKE 1 TABLET BY MOUTH EVERY DAY (Patient taking differently: Take 1 tablet by mouth every morning TAKE 1 TABLET BY MOUTH EVERY DAY), Disp: 90 tablet, Rfl: 3    pantoprazole (PROTONIX) 40 MG tablet, TAKE 1 TABLET BY MOUTH EVERY DAY, Disp: 90 tablet, Rfl: 2    montelukast (SINGULAIR) 10 MG tablet, TAKE 1 TABLET BY MOUTH EVERY DAY (Patient taking differently: Take 1 tablet by mouth nightly TAKE 1 TABLET BY MOUTH EVERY DAY), Disp: 90 tablet, Rfl: 3    tamsulosin (FLOMAX) 0.4 MG capsule, TAKE 1 CAPSULE BY MOUTH EVERY DAY, Disp: 90 capsule, Rfl: 2    losartan (COZAAR) 50 MG tablet, TAKE

## 2025-04-21 RX ORDER — TAMSULOSIN HYDROCHLORIDE 0.4 MG/1
CAPSULE ORAL DAILY
Qty: 90 CAPSULE | Refills: 2 | Status: SHIPPED | OUTPATIENT
Start: 2025-04-21

## 2025-04-21 NOTE — TELEPHONE ENCOUNTER
Refill requested     [facility]/pharmacy #[id number] - [place] - [address] - P [phone] -  [phone]

## 2025-04-22 SDOH — HEALTH STABILITY: PHYSICAL HEALTH: ON AVERAGE, HOW MANY MINUTES DO YOU ENGAGE IN EXERCISE AT THIS LEVEL?: 10 MIN

## 2025-04-22 SDOH — HEALTH STABILITY: PHYSICAL HEALTH: ON AVERAGE, HOW MANY DAYS PER WEEK DO YOU ENGAGE IN MODERATE TO STRENUOUS EXERCISE (LIKE A BRISK WALK)?: 0 DAYS

## 2025-04-22 ASSESSMENT — LIFESTYLE VARIABLES
HOW MANY STANDARD DRINKS CONTAINING ALCOHOL DO YOU HAVE ON A TYPICAL DAY: 1
HOW OFTEN DO YOU HAVE A DRINK CONTAINING ALCOHOL: 3
HOW OFTEN DO YOU HAVE SIX OR MORE DRINKS ON ONE OCCASION: 1
HOW OFTEN DO YOU HAVE A DRINK CONTAINING ALCOHOL: 2-4 TIMES A MONTH
HOW MANY STANDARD DRINKS CONTAINING ALCOHOL DO YOU HAVE ON A TYPICAL DAY: 1 OR 2

## 2025-04-22 ASSESSMENT — PATIENT HEALTH QUESTIONNAIRE - PHQ9
SUM OF ALL RESPONSES TO PHQ QUESTIONS 1-9: 0
1. LITTLE INTEREST OR PLEASURE IN DOING THINGS: NOT AT ALL
SUM OF ALL RESPONSES TO PHQ QUESTIONS 1-9: 0
2. FEELING DOWN, DEPRESSED OR HOPELESS: NOT AT ALL
SUM OF ALL RESPONSES TO PHQ QUESTIONS 1-9: 0
SUM OF ALL RESPONSES TO PHQ QUESTIONS 1-9: 0

## 2025-04-23 ENCOUNTER — OFFICE VISIT (OUTPATIENT)
Dept: FAMILY MEDICINE CLINIC | Facility: CLINIC | Age: 80
End: 2025-04-23
Payer: MEDICARE

## 2025-04-23 VITALS
DIASTOLIC BLOOD PRESSURE: 82 MMHG | HEIGHT: 67 IN | BODY MASS INDEX: 34.31 KG/M2 | SYSTOLIC BLOOD PRESSURE: 126 MMHG | HEART RATE: 101 BPM | OXYGEN SATURATION: 97 % | WEIGHT: 218.6 LBS

## 2025-04-23 DIAGNOSIS — M79.605 LEFT LEG PAIN: ICD-10-CM

## 2025-04-23 DIAGNOSIS — I10 PRIMARY HYPERTENSION: ICD-10-CM

## 2025-04-23 DIAGNOSIS — N40.0 BENIGN PROSTATIC HYPERPLASIA WITHOUT LOWER URINARY TRACT SYMPTOMS: ICD-10-CM

## 2025-04-23 DIAGNOSIS — M48.061 SPINAL STENOSIS OF LUMBAR REGION WITHOUT NEUROGENIC CLAUDICATION: ICD-10-CM

## 2025-04-23 DIAGNOSIS — Z00.00 MEDICARE ANNUAL WELLNESS VISIT, SUBSEQUENT: Primary | ICD-10-CM

## 2025-04-23 DIAGNOSIS — K21.9 GERD WITHOUT ESOPHAGITIS: ICD-10-CM

## 2025-04-23 DIAGNOSIS — E78.00 PURE HYPERCHOLESTEROLEMIA: ICD-10-CM

## 2025-04-23 DIAGNOSIS — D64.9 NORMOCYTIC ANEMIA: ICD-10-CM

## 2025-04-23 LAB
ALBUMIN SERPL-MCNC: 4 G/DL (ref 3.2–4.6)
ALBUMIN/GLOB SERPL: 1.3 (ref 1–1.9)
ALP SERPL-CCNC: 109 U/L (ref 40–129)
ALT SERPL-CCNC: 25 U/L (ref 8–55)
ANION GAP SERPL CALC-SCNC: 14 MMOL/L (ref 7–16)
AST SERPL-CCNC: 31 U/L (ref 15–37)
BASOPHILS # BLD: 0.03 K/UL (ref 0–0.2)
BASOPHILS NFR BLD: 0.3 % (ref 0–2)
BILIRUB SERPL-MCNC: 0.4 MG/DL (ref 0–1.2)
BUN SERPL-MCNC: 9 MG/DL (ref 8–23)
CALCIUM SERPL-MCNC: 9.9 MG/DL (ref 8.8–10.2)
CHLORIDE SERPL-SCNC: 97 MMOL/L (ref 98–107)
CHOLEST SERPL-MCNC: 144 MG/DL (ref 0–200)
CO2 SERPL-SCNC: 25 MMOL/L (ref 20–29)
CREAT SERPL-MCNC: 1.03 MG/DL (ref 0.8–1.3)
DIFFERENTIAL METHOD BLD: ABNORMAL
EOSINOPHIL # BLD: 0.11 K/UL (ref 0–0.8)
EOSINOPHIL NFR BLD: 1.1 % (ref 0.5–7.8)
ERYTHROCYTE [DISTWIDTH] IN BLOOD BY AUTOMATED COUNT: 14.6 % (ref 11.9–14.6)
GLOBULIN SER CALC-MCNC: 3.1 G/DL (ref 2.3–3.5)
GLUCOSE SERPL-MCNC: 109 MG/DL (ref 70–99)
HCT VFR BLD AUTO: 37.9 % (ref 41.1–50.3)
HDLC SERPL-MCNC: 66 MG/DL (ref 40–60)
HDLC SERPL: 2.2 (ref 0–5)
HGB BLD-MCNC: 12.2 G/DL (ref 13.6–17.2)
IMM GRANULOCYTES # BLD AUTO: 0.03 K/UL (ref 0–0.5)
IMM GRANULOCYTES NFR BLD AUTO: 0.3 % (ref 0–5)
LDLC SERPL CALC-MCNC: 48 MG/DL (ref 0–100)
LYMPHOCYTES # BLD: 2.22 K/UL (ref 0.5–4.6)
LYMPHOCYTES NFR BLD: 21.3 % (ref 13–44)
MCH RBC QN AUTO: 30.4 PG (ref 26.1–32.9)
MCHC RBC AUTO-ENTMCNC: 32.2 G/DL (ref 31.4–35)
MCV RBC AUTO: 94.5 FL (ref 82–102)
MONOCYTES # BLD: 1.14 K/UL (ref 0.1–1.3)
MONOCYTES NFR BLD: 10.9 % (ref 4–12)
NEUTS SEG # BLD: 6.91 K/UL (ref 1.7–8.2)
NEUTS SEG NFR BLD: 66.1 % (ref 43–78)
NRBC # BLD: 0 K/UL (ref 0–0.2)
PLATELET # BLD AUTO: 341 K/UL (ref 150–450)
PMV BLD AUTO: 9.7 FL (ref 9.4–12.3)
POTASSIUM SERPL-SCNC: 4.4 MMOL/L (ref 3.5–5.1)
PROT SERPL-MCNC: 7.1 G/DL (ref 6.3–8.2)
RBC # BLD AUTO: 4.01 M/UL (ref 4.23–5.6)
SODIUM SERPL-SCNC: 136 MMOL/L (ref 136–145)
TRIGL SERPL-MCNC: 151 MG/DL (ref 0–150)
TSH, 3RD GENERATION: 1.97 UIU/ML (ref 0.27–4.2)
VLDLC SERPL CALC-MCNC: 30 MG/DL (ref 6–23)
WBC # BLD AUTO: 10.4 K/UL (ref 4.3–11.1)

## 2025-04-23 PROCEDURE — 1123F ACP DISCUSS/DSCN MKR DOCD: CPT | Performed by: FAMILY MEDICINE

## 2025-04-23 PROCEDURE — 3079F DIAST BP 80-89 MM HG: CPT | Performed by: FAMILY MEDICINE

## 2025-04-23 PROCEDURE — G0439 PPPS, SUBSEQ VISIT: HCPCS | Performed by: FAMILY MEDICINE

## 2025-04-23 PROCEDURE — G2211 COMPLEX E/M VISIT ADD ON: HCPCS | Performed by: FAMILY MEDICINE

## 2025-04-23 PROCEDURE — 3074F SYST BP LT 130 MM HG: CPT | Performed by: FAMILY MEDICINE

## 2025-04-23 PROCEDURE — 36415 COLL VENOUS BLD VENIPUNCTURE: CPT | Performed by: FAMILY MEDICINE

## 2025-04-23 PROCEDURE — 1159F MED LIST DOCD IN RCRD: CPT | Performed by: FAMILY MEDICINE

## 2025-04-23 PROCEDURE — 99213 OFFICE O/P EST LOW 20 MIN: CPT | Performed by: FAMILY MEDICINE

## 2025-04-23 NOTE — PROGRESS NOTES
\"Have you been to the ER, urgent care clinic since your last visit?  Hospitalized since your last visit?\"    YES - When: approximately 3 months ago.  Where and Why: [facility] back surgery.    “Have you seen or consulted any other health care providers outside our system since your last visit?”    NO

## 2025-04-23 NOTE — PROGRESS NOTES
SUBJECTIVE:   Demarcus Gaxiola is a 80 y.o. male who has a past medical history significant for sleep apnea, hypertension, high cholesterol, BPH, gynecomastia felt secondary to Flomax, reflux with Larson's esophagus and lumbar spinal stenosis status post L2-5 laminectomy and fusion.  Postoperatively he fell and sustained a compression fracture in his lumbar spine.  He continues to recover from both of these injuries and surgeries.  He is having a lot of tightness and discomfort in his left hip and left leg.  He has been given muscle relaxants and pain medication including Neurontin.  He does not feel like this is helping significantly.  He reports no bowel or bladder dysfunction.  No reports of chest pain, shortness of breath, orthopnea or PND.    HPI  See above    Past Medical History, Past Surgical History, Family history, Social History, and Medications were all reviewed with the patient today and updated as necessary.       Current Outpatient Medications   Medication Sig Dispense Refill    tamsulosin (FLOMAX) 0.4 MG capsule TAKE 1 CAPSULE BY MOUTH EVERY DAY 90 capsule 2    gabapentin (NEURONTIN) 300 MG capsule Take 1 capsule by mouth 3 times daily for 180 days. 270 capsule 1    tiZANidine (ZANAFLEX) 4 MG tablet Take 1 tablet by mouth 3 times daily as needed (muscle spasm) 30 tablet 0    Melatonin 10 MG TABS Take 1 tablet by mouth at bedtime      DULoxetine (CYMBALTA) 60 MG extended release capsule Take 1 capsule by mouth daily 90 capsule 3    atorvastatin (LIPITOR) 20 MG tablet TAKE 1 TABLET BY MOUTH EVERY DAY (Patient taking differently: Take 1 tablet by mouth every morning TAKE 1 TABLET BY MOUTH EVERY DAY) 90 tablet 3    pantoprazole (PROTONIX) 40 MG tablet TAKE 1 TABLET BY MOUTH EVERY DAY 90 tablet 2    montelukast (SINGULAIR) 10 MG tablet TAKE 1 TABLET BY MOUTH EVERY DAY (Patient taking differently: Take 1 tablet by mouth nightly TAKE 1 TABLET BY MOUTH EVERY DAY) 90 tablet 3    losartan (COZAAR) 50 MG

## 2025-04-23 NOTE — PROGRESS NOTES
Medicare Annual Wellness Visit    Demarcus Gaxiola is here for Medicare AWV (Subs )    Assessment & Plan   Medicare annual wellness visit, subsequent     No follow-ups on file.     Subjective   who has a past medical history significant for sleep apnea, hypertension, high cholesterol, BPH, gynecomastia felt secondary to Flomax, reflux with Larson's esophagus and lumbar spinal stenosis.      Patient's complete Health Risk Assessment and screening values have been reviewed and are found in Flowsheets. The following problems were reviewed today and where indicated follow up appointments were made and/or referrals ordered.    Positive Risk Factor Screenings with Interventions:    Fall Risk:  Do you feel unsteady or are you worried about falling? : (!) (Patient-Rptd) yes  2 or more falls in past year?: (!) (Patient-Rptd) yes  Fall with injury in past year?: (!) (Patient-Rptd) yes     Interventions:    Reviewed medications, home hazards, visual acuity, and co-morbidities that can increase risk for falls             Inactivity:  On average, how many days per week do you engage in moderate to strenuous exercise (like a brisk walk)?: (Patient-Rptd) 0 days (!) Abnormal  On average, how many minutes do you engage in exercise at this level?: (Patient-Rptd) 10 min  Interventions:  Patient continues to recover from the surgery     Abnormal BMI (obese):  Body mass index is 34.24 kg/m². (!) Abnormal  Interventions:  Patient will slowly integrate exercise as he recovers from his back surgery          Vision Screen:  Do you have difficulty driving, watching TV, or doing any of your daily activities because of your eyesight?: (Patient-Rptd) No  Have you had an eye exam within the past year?: (!) (Patient-Rptd) No  Interventions:   Patient encouraged to make appointment with their eye specialist     ADL's:   Patient reports needing help with:  Select all that apply: (!) (Patient-Rptd) Dressing, Bathing  Select all that apply: (!)

## 2025-04-23 NOTE — PATIENT INSTRUCTIONS
appointments, and call your doctor if you are having problems. It's also a good idea to know your test results and keep a list of the medicines you take.  Current as of: October 24, 2024  Content Version: 14.4  © 2024-2025 [employer].   Care instructions adapted under license by [employer]. If you have questions about a medical condition or this instruction, always ask your healthcare professional. [employer] disclaims any warranty or liability for your use of this information.         Starting a Weight-Loss Plan: Care Instructions  Overview    It can be a challenge to lose weight. But your doctor can help you make a weight-loss plan that meets your needs.  You don't have to make a lot of big changes at once. A better idea might be to focus on small changes and stick with them. When those changes become habit, you can add a few more changes.  Some people find it helpful to take an exercise or nutrition class. If you have questions, ask your doctor about seeing a registered dietitian or an exercise specialist. You might also think about joining a weight-loss support group.  If you're not ready to make changes right now, try to pick a date in the future. Then make an appointment with your doctor to talk about when and how you'll get started with a plan.  Follow-up care is a key part of your treatment and safety. Be sure to make and go to all appointments, and call your doctor if you are having problems. It's also a good idea to know your test results and keep a list of the medicines you take.  How can you care for yourself as you start a weight-loss plan?  Set realistic goals. Many people expect to lose much more weight than is likely. A weight loss of 5% to 10% of your body weight may be enough to improve your health.  Get family and friends involved to provide support. Talk to them about why you are trying to lose weight, and ask them to help. They can help by participating in exercise and

## 2025-05-05 ENCOUNTER — OFFICE VISIT (OUTPATIENT)
Age: 80
End: 2025-05-05

## 2025-05-05 DIAGNOSIS — G89.29 CHRONIC BILATERAL LOW BACK PAIN WITH LEFT-SIDED SCIATICA: Primary | ICD-10-CM

## 2025-05-05 DIAGNOSIS — M54.16 RADICULOPATHY, LUMBAR REGION: ICD-10-CM

## 2025-05-05 DIAGNOSIS — M54.42 CHRONIC BILATERAL LOW BACK PAIN WITH LEFT-SIDED SCIATICA: Primary | ICD-10-CM

## 2025-05-05 DIAGNOSIS — Z98.1 HISTORY OF LUMBAR SPINAL FUSION: ICD-10-CM

## 2025-05-05 ASSESSMENT — ENCOUNTER SYMPTOMS
BACK PAIN: 1
ABDOMINAL PAIN: 0
SHORTNESS OF BREATH: 0

## 2025-05-05 NOTE — PROGRESS NOTES
therapy but has gotten limited relief.  Has tried heat/ice without benefit.    Medications:   Is currently on gabapentin 300 mg twice per day without much benefit.  He is going to try gradually increasing to 600 mg 3 times per day.  He denies side effects from his current dose.  We could also consider Lyrica.  Is already on Cymbalta 60 mg daily  Has tried tizanidine.  We did talk about the fact that we, in general, try to avoid opioids for long-term pain management.  However, if the alternative is a large, complex, high risk back surgery, a trial of opioids may be a reasonable step before considering that.    Interventions:   We talked about trying a lumbar epidural steroid injection based on the results of the MRI.  He does say he has had about 9 of these in the past with limited relief.  We talked about spinal cord stimulation.    Consults:   None.  Is already established with Dr. Han    Follow up:   After MRI has been completed.    ====================================================================    Subjective:   Mr. Gaxiola is seen in consultation at the request of Cezar Wright MD for evaluation and recommendations regarding back pain radiating to the left leg.  The pain causes significant stress and impairment in the patient's life.     History of Present Illness  The patient is a 80-year-old male referred for left leg pain.    He has a history of lumbar spinal stenosis status post L2-L5 laminectomy and fusion. Prior to his surgery, he experienced mild discomfort in his left leg, necessitating the use of a cane for mobility. He also had foot drop, which has since improved post-surgery. He underwent knee replacement surgery and has been experiencing persistent, albeit manageable, knee pain. Post-surgery, he experienced an increase in pain intensity. A week after his surgery, he fell while attempting to get out of bed, resulting in a compression fracture in the lumbar spine (L5).  He initially had

## 2025-05-09 ENCOUNTER — TELEPHONE (OUTPATIENT)
Age: 80
End: 2025-05-09

## 2025-05-09 NOTE — TELEPHONE ENCOUNTER
Patient says he has been in pain for a while. Pt says his PCP referred him to Pain management he is currently seeing Dr. Perez.

## 2025-05-09 NOTE — TELEPHONE ENCOUNTER
Spoke with patient who wanted to make Dr. Foster aware that he has an MRI of the Lumbar spine scheduled for 5/16/25, and if he had anything to add.  The patient has seen Dr. Perez with [facility] pain management, who ordered the MRI. His PCP referred him.  He manages his pain with OTC medications and does fine with those.  He also mentioned that after each PT session he is in pain, so I advised that he hold off for now until he has the MRI.  The patient agreed and will cancel his upcoming appointments with [facility] until further instructions are given from doctor.

## 2025-05-14 ENCOUNTER — TELEMEDICINE (OUTPATIENT)
Dept: FAMILY MEDICINE CLINIC | Facility: CLINIC | Age: 80
End: 2025-05-14
Payer: MEDICARE

## 2025-05-14 DIAGNOSIS — M48.061 SPINAL STENOSIS OF LUMBAR REGION, UNSPECIFIED WHETHER NEUROGENIC CLAUDICATION PRESENT: ICD-10-CM

## 2025-05-14 DIAGNOSIS — M25.552 LEFT HIP PAIN: ICD-10-CM

## 2025-05-14 DIAGNOSIS — E78.00 PURE HYPERCHOLESTEROLEMIA: Primary | ICD-10-CM

## 2025-05-14 DIAGNOSIS — N40.0 BENIGN PROSTATIC HYPERPLASIA WITHOUT LOWER URINARY TRACT SYMPTOMS: ICD-10-CM

## 2025-05-14 DIAGNOSIS — D64.9 NORMOCHROMIC ANEMIA: ICD-10-CM

## 2025-05-14 DIAGNOSIS — K21.9 GERD WITHOUT ESOPHAGITIS: ICD-10-CM

## 2025-05-14 DIAGNOSIS — I10 PRIMARY HYPERTENSION: ICD-10-CM

## 2025-05-14 PROCEDURE — 1159F MED LIST DOCD IN RCRD: CPT | Performed by: FAMILY MEDICINE

## 2025-05-14 PROCEDURE — 1123F ACP DISCUSS/DSCN MKR DOCD: CPT | Performed by: FAMILY MEDICINE

## 2025-05-14 PROCEDURE — 99213 OFFICE O/P EST LOW 20 MIN: CPT | Performed by: FAMILY MEDICINE

## 2025-05-14 ASSESSMENT — PATIENT HEALTH QUESTIONNAIRE - PHQ9
SUM OF ALL RESPONSES TO PHQ QUESTIONS 1-9: 0
1. LITTLE INTEREST OR PLEASURE IN DOING THINGS: NOT AT ALL
2. FEELING DOWN, DEPRESSED OR HOPELESS: NOT AT ALL
SUM OF ALL RESPONSES TO PHQ QUESTIONS 1-9: 0

## 2025-05-14 NOTE — PROGRESS NOTES
SUBJECTIVE:   Demarcus Gaxiola is a 80 y.o. male who has a past medical history significant for sleep apnea, hypertension, high cholesterol, BPH, gynecomastia felt secondary to Flomax, reflux with Larson's esophagus and lumbar spinal stenosis status post L2-5 laminectomy and fusion presents today for virtual visit through telephone encounter.  [employer] messaging deferred secondary to technical challenges.  At previous visit the patient had experienced ongoing hip and back pain on the left side.  He was referred to pain management.  He states that he has had a MRI scheduled and that his pain management doctor is coordinating care with his orthopedic surgeon.  He is happy with his process so far.  He reports no active chest pain, shortness of breath, orthopnea or PND.    Patient's vital signs were not performed as encounter was performed virtually.  Location of virtual visit was patient's home.      HPI  See above    Past Medical History, Past Surgical History, Family history, Social History, and Medications were all reviewed with the patient today and updated as necessary.       Current Outpatient Medications   Medication Sig Dispense Refill    tamsulosin (FLOMAX) 0.4 MG capsule TAKE 1 CAPSULE BY MOUTH EVERY DAY 90 capsule 2    gabapentin (NEURONTIN) 300 MG capsule Take 1 capsule by mouth 3 times daily for 180 days. 270 capsule 1    tiZANidine (ZANAFLEX) 4 MG tablet Take 1 tablet by mouth 3 times daily as needed (muscle spasm) 30 tablet 0    Melatonin 10 MG TABS Take 1 tablet by mouth at bedtime      DULoxetine (CYMBALTA) 60 MG extended release capsule Take 1 capsule by mouth daily 90 capsule 3    atorvastatin (LIPITOR) 20 MG tablet TAKE 1 TABLET BY MOUTH EVERY DAY (Patient taking differently: Take 1 tablet by mouth every morning TAKE 1 TABLET BY MOUTH EVERY DAY) 90 tablet 3    pantoprazole (PROTONIX) 40 MG tablet TAKE 1 TABLET BY MOUTH EVERY DAY 90 tablet 2    montelukast (SINGULAIR) 10 MG tablet TAKE 1

## 2025-05-16 ENCOUNTER — HOSPITAL ENCOUNTER (OUTPATIENT)
Dept: MRI IMAGING | Age: 80
Discharge: HOME | End: 2025-05-19
Attending: ANESTHESIOLOGY
Payer: MEDICARE

## 2025-05-16 DIAGNOSIS — M54.42 CHRONIC BILATERAL LOW BACK PAIN WITH LEFT-SIDED SCIATICA: ICD-10-CM

## 2025-05-16 DIAGNOSIS — M54.16 RADICULOPATHY, LUMBAR REGION: ICD-10-CM

## 2025-05-16 DIAGNOSIS — Z98.1 HISTORY OF LUMBAR SPINAL FUSION: ICD-10-CM

## 2025-05-16 DIAGNOSIS — G89.29 CHRONIC BILATERAL LOW BACK PAIN WITH LEFT-SIDED SCIATICA: ICD-10-CM

## 2025-05-16 PROCEDURE — 72158 MRI LUMBAR SPINE W/O & W/DYE: CPT

## 2025-05-16 PROCEDURE — 6360000004 HC RX CONTRAST MEDICATION: Performed by: ANESTHESIOLOGY

## 2025-05-16 PROCEDURE — A9579 GAD-BASE MR CONTRAST NOS,1ML: HCPCS | Performed by: ANESTHESIOLOGY

## 2025-05-16 RX ADMIN — GADOTERIDOL 20 ML: 279.3 INJECTION, SOLUTION INTRAVENOUS at 08:46

## 2025-05-19 ENCOUNTER — RESULTS FOLLOW-UP (OUTPATIENT)
Age: 80
End: 2025-05-19

## 2025-05-19 DIAGNOSIS — M54.16 RADICULOPATHY, LUMBAR REGION: Primary | ICD-10-CM

## 2025-05-19 RX ORDER — GABAPENTIN 600 MG/1
600 TABLET ORAL 3 TIMES DAILY
Qty: 270 TABLET | Refills: 0 | Status: SHIPPED | OUTPATIENT
Start: 2025-05-19 | End: 2025-06-09 | Stop reason: SDUPTHER

## 2025-05-19 NOTE — TELEPHONE ENCOUNTER
He has an appt with Dr. Han on this upcoming Thursday. He would like to see him first.  I have scheduled him for a follow up.    He stated that you mentioned increasing his gabapentin.  He would like to do this and he would like the rx sent to [facility] in [place] for 90 days, if possible. Rx is pended if appropriate.

## 2025-05-19 NOTE — TELEPHONE ENCOUNTER
----- Message from Dr. Asher Perez MD sent at 5/19/2025  1:08 PM EDT -----  The MRI of the patient's lumbar spine does appear to show ongoing areas of severe narrowing in the spine which may be compressing nerves which would be consistent with his symptoms.  The disc herniation he had before surgery improved after the surgery, however, the spinal canal was once again narrowed after his compression fracture.  I see he has a follow-up scheduled already in 10 days with Dr. Han.  He is welcome to meet with me before or after to discuss options, which ever he prefers.  ----- Message -----  From: Tim Glover Incoming Orders Results To [facility]  Sent: 5/19/2025  11:27 AM EDT  To: Asher Perez MD

## 2025-05-22 ENCOUNTER — OFFICE VISIT (OUTPATIENT)
Age: 80
End: 2025-05-22
Payer: MEDICARE

## 2025-05-22 DIAGNOSIS — M54.16 LUMBAR RADICULOPATHY: ICD-10-CM

## 2025-05-22 DIAGNOSIS — M48.062 SPINAL STENOSIS, LUMBAR REGION, WITH NEUROGENIC CLAUDICATION: ICD-10-CM

## 2025-05-22 DIAGNOSIS — Z98.1 STATUS POST LUMBAR SPINAL ARTHRODESIS: ICD-10-CM

## 2025-05-22 DIAGNOSIS — S32.059A CLOSED FRACTURE OF FIFTH LUMBAR VERTEBRA, UNSPECIFIED FRACTURE MORPHOLOGY, INITIAL ENCOUNTER (HCC): Primary | ICD-10-CM

## 2025-05-22 PROCEDURE — 1123F ACP DISCUSS/DSCN MKR DOCD: CPT | Performed by: ORTHOPAEDIC SURGERY

## 2025-05-22 PROCEDURE — 99214 OFFICE O/P EST MOD 30 MIN: CPT | Performed by: ORTHOPAEDIC SURGERY

## 2025-05-22 NOTE — PROGRESS NOTES
Name: Demarcus Gaxiola  YOB: 1945  Gender: male  MRN: [id number]  Age: 80 y.o.      Chief Complaint:  Radiating back and leg pain    History of Present Illness:      This is a very pleasant 80 y.o. male who is now about 5 months status post L2-L5 laminectomy and fusion surgery that was complicated by a twist and near fall incident after he was discharged to home which resulted in an L5 fracture.  His hardware remains stable however he has never really regained the ability to ambulate without a walker.  He remains hunched forward and feels that he has an unstable gait.  Overall, he is comfortable particularly when sitting in a chair.  Drop that he was experiencing prior to surgery has resolved completely and he is happy about that.            Medications:       Current Outpatient Medications:     gabapentin (NEURONTIN) 600 MG tablet, Take 1 tablet by mouth 3 times daily for 90 days., Disp: 270 tablet, Rfl: 0    tamsulosin (FLOMAX) 0.4 MG capsule, TAKE 1 CAPSULE BY MOUTH EVERY DAY, Disp: 90 capsule, Rfl: 2    methylPREDNISolone (MEDROL DOSEPACK) 4 MG tablet, Take 1 tablet by mouth See Admin Instructions Per package instructions (Patient not taking: Reported on 5/14/2025), Disp: 1 kit, Rfl: 0    tiZANidine (ZANAFLEX) 4 MG tablet, Take 1 tablet by mouth 3 times daily as needed (muscle spasm), Disp: 30 tablet, Rfl: 0    Melatonin 10 MG TABS, Take 1 tablet by mouth at bedtime, Disp: , Rfl:     DULoxetine (CYMBALTA) 60 MG extended release capsule, Take 1 capsule by mouth daily, Disp: 90 capsule, Rfl: 3    atorvastatin (LIPITOR) 20 MG tablet, TAKE 1 TABLET BY MOUTH EVERY DAY (Patient taking differently: Take 1 tablet by mouth every morning TAKE 1 TABLET BY MOUTH EVERY DAY), Disp: 90 tablet, Rfl: 3    pantoprazole (PROTONIX) 40 MG tablet, TAKE 1 TABLET BY MOUTH EVERY DAY, Disp: 90 tablet, Rfl: 2    montelukast (SINGULAIR) 10 MG tablet, TAKE 1 TABLET BY MOUTH EVERY DAY (Patient taking differently: Take 1

## 2025-05-23 ENCOUNTER — TELEPHONE (OUTPATIENT)
Dept: ORTHOPEDIC SURGERY | Age: 80
End: 2025-05-23

## 2025-05-27 ENCOUNTER — TELEPHONE (OUTPATIENT)
Dept: SLEEP MEDICINE | Age: 80
End: 2025-05-27

## 2025-05-27 NOTE — TELEPHONE ENCOUNTER
Called patient to inform them of upcoming appointment being [facility] to [facility]. Patient stated he has called regarding his prescription for supplies.   Patient stated Nydia keep sending him email that states: \" Requires billable prescription before shipping is allowed.\" Please call patient at your earliest convenience.

## 2025-05-28 ENCOUNTER — PREP FOR PROCEDURE (OUTPATIENT)
Age: 80
End: 2025-05-28

## 2025-05-28 DIAGNOSIS — S32.059A CLOSED FRACTURE OF FIFTH LUMBAR VERTEBRA, UNSPECIFIED FRACTURE MORPHOLOGY, INITIAL ENCOUNTER (HCC): Primary | ICD-10-CM

## 2025-05-28 DIAGNOSIS — Z98.1 S/P LUMBAR SPINAL ARTHRODESIS: ICD-10-CM

## 2025-05-28 DIAGNOSIS — M48.062 SPINAL STENOSIS, LUMBAR REGION, WITH NEUROGENIC CLAUDICATION: ICD-10-CM

## 2025-05-28 DIAGNOSIS — M54.16 LUMBAR RADICULOPATHY: ICD-10-CM

## 2025-05-28 DIAGNOSIS — Z98.1 STATUS POST LUMBAR SPINAL ARTHRODESIS: ICD-10-CM

## 2025-05-28 RX ORDER — ACETAMINOPHEN 325 MG/1
1000 TABLET ORAL ONCE
Status: CANCELLED | OUTPATIENT
Start: 2025-05-28 | End: 2025-05-28

## 2025-06-06 RX ORDER — LOSARTAN POTASSIUM 50 MG/1
50 TABLET ORAL DAILY
Qty: 90 TABLET | Refills: 3 | Status: SHIPPED | OUTPATIENT
Start: 2025-06-06

## 2025-06-09 ENCOUNTER — OFFICE VISIT (OUTPATIENT)
Age: 80
End: 2025-06-09
Payer: MEDICARE

## 2025-06-09 DIAGNOSIS — M54.16 RADICULOPATHY, LUMBAR REGION: ICD-10-CM

## 2025-06-09 DIAGNOSIS — Z98.1 HISTORY OF LUMBAR SPINAL FUSION: ICD-10-CM

## 2025-06-09 DIAGNOSIS — G89.29 CHRONIC BILATERAL LOW BACK PAIN WITH LEFT-SIDED SCIATICA: Primary | ICD-10-CM

## 2025-06-09 DIAGNOSIS — M54.42 CHRONIC BILATERAL LOW BACK PAIN WITH LEFT-SIDED SCIATICA: Primary | ICD-10-CM

## 2025-06-09 PROCEDURE — 1123F ACP DISCUSS/DSCN MKR DOCD: CPT | Performed by: ANESTHESIOLOGY

## 2025-06-09 PROCEDURE — 99214 OFFICE O/P EST MOD 30 MIN: CPT | Performed by: ANESTHESIOLOGY

## 2025-06-09 RX ORDER — GABAPENTIN 600 MG/1
600 TABLET ORAL 3 TIMES DAILY
Qty: 270 TABLET | Refills: 0 | Status: SHIPPED | OUTPATIENT
Start: 2025-06-09 | End: 2025-09-07

## 2025-06-09 ASSESSMENT — ENCOUNTER SYMPTOMS
BACK PAIN: 1
SHORTNESS OF BREATH: 0
ABDOMINAL PAIN: 0

## 2025-06-09 NOTE — PROGRESS NOTES
left leg, necessitating the use of a cane for mobility. He also had foot drop, which has since improved post-surgery. He underwent knee replacement surgery and has been experiencing persistent, albeit manageable, knee pain. Post-surgery, he experienced an increase in pain intensity. A week after his surgery, he fell while attempting to get out of bed, resulting in a compression fracture in the lumbar spine (L5).  He initially had severe pain in both legs, which was managed with muscle relaxers. Currently, his right leg is pain-free, but he continues to experience issues with his left leg, including persistent muscle tightness and pain radiating down the left side primarily in an L5 distribution. He has not undergone any advanced imaging like MRI post-surgery or post-fracture. He has had several postoperative visits with Dr. Han and is scheduled for a fourth on 05/29/2025. He has received 9 epidural steroid injections over the years. He has been sleeping in a recliner for the past year and has been advised by his physical therapist to get up every hour. He has been using Voltaren gel for pain management, which provides some relief. He has tried muscle relaxers and gabapentin. His prior pain medications have included Cymbalta, gabapentin 300 mg twice daily, Norco, Medrol Dosepak, oxycodone 5 mg, tizanidine, and tramadol. He took oxycodone for a month post-surgery but did not find it beneficial.    His past medical history is significant for hypertension, sleep apnea, and obesity. He has sleep apnea and uses a mask at night.     Location: left hip, left leg     Onset: 1/2025   Inciting event: surgery followed by fracture  Course: came on suddenly, improved in intensity since onset   Description: dull/aching, shooting, spasms, tightness,    Chronicity: comes and goes   Pain score:   -current 7/10   -least in past month 3/10   -worst in past month 9/10   -average in past month 3/10   Effect on mood: frustrated

## 2025-06-20 ENCOUNTER — TELEPHONE (OUTPATIENT)
Dept: ORTHOPEDIC SURGERY | Age: 80
End: 2025-06-20

## 2025-06-20 NOTE — TELEPHONE ENCOUNTER
Surgery has been denied. She says they did not get clinicals at all. Peer to Peer is available.[phone], option 3  Ref # [id number]  They need notes uploaded or peer to peer done by 5:00 today.

## 2025-06-25 ENCOUNTER — TELEPHONE (OUTPATIENT)
Dept: ORTHOPEDIC SURGERY | Age: 80
End: 2025-06-25

## 2025-06-25 NOTE — TELEPHONE ENCOUNTER
He is having surgery July 22. He has some questions about post op care and wants Viv to return his call.

## 2025-06-26 NOTE — TELEPHONE ENCOUNTER
Returned call and discussed rehab after surgery. He would like to go to the 9th floor after surgery. He states that his wife will not be able to care for him at home. I told him I would document this on his chart. He was also asking if his surgery has been approved. I told him I would reach out to our precert person and check for him

## 2025-07-07 RX ORDER — PANTOPRAZOLE SODIUM 40 MG/1
40 TABLET, DELAYED RELEASE ORAL DAILY
Qty: 90 TABLET | Refills: 1 | Status: SHIPPED | OUTPATIENT
Start: 2025-07-07 | End: 2025-07-10

## 2025-07-07 RX ORDER — PANTOPRAZOLE SODIUM 40 MG/1
40 TABLET, DELAYED RELEASE ORAL DAILY
Qty: 90 TABLET | Refills: 2 | Status: SHIPPED | OUTPATIENT
Start: 2025-07-07

## 2025-07-10 ENCOUNTER — HOSPITAL ENCOUNTER (OUTPATIENT)
Dept: SURGERY | Age: 80
Discharge: HOME | End: 2025-07-13
Payer: MEDICARE

## 2025-07-10 VITALS
WEIGHT: 217.4 LBS | SYSTOLIC BLOOD PRESSURE: 159 MMHG | TEMPERATURE: 98.6 F | RESPIRATION RATE: 20 BRPM | HEART RATE: 117 BPM | BODY MASS INDEX: 36.22 KG/M2 | HEIGHT: 65 IN | OXYGEN SATURATION: 93 % | DIASTOLIC BLOOD PRESSURE: 87 MMHG

## 2025-07-10 LAB
ANION GAP SERPL CALC-SCNC: 13 MMOL/L (ref 7–16)
APPEARANCE UR: CLEAR
BASOPHILS # BLD: 0.04 K/UL (ref 0–0.2)
BASOPHILS NFR BLD: 0.4 % (ref 0–2)
BILIRUB UR QL: NEGATIVE
BUN SERPL-MCNC: 9 MG/DL (ref 8–23)
CALCIUM SERPL-MCNC: 9.5 MG/DL (ref 8.8–10.2)
CHLORIDE SERPL-SCNC: 100 MMOL/L (ref 98–107)
CO2 SERPL-SCNC: 25 MMOL/L (ref 20–29)
COLOR UR: NORMAL
CREAT SERPL-MCNC: 0.84 MG/DL (ref 0.8–1.3)
DIFFERENTIAL METHOD BLD: ABNORMAL
EOSINOPHIL # BLD: 0.12 K/UL (ref 0–0.8)
EOSINOPHIL NFR BLD: 1.2 % (ref 0.5–7.8)
ERYTHROCYTE [DISTWIDTH] IN BLOOD BY AUTOMATED COUNT: 14.2 % (ref 11.9–14.6)
GLUCOSE SERPL-MCNC: 114 MG/DL (ref 70–99)
GLUCOSE UR STRIP.AUTO-MCNC: NEGATIVE MG/DL
HCT VFR BLD AUTO: 37.8 % (ref 41.1–50.3)
HGB BLD-MCNC: 12.3 G/DL (ref 13.6–17.2)
HGB UR QL STRIP: NEGATIVE
IMM GRANULOCYTES # BLD AUTO: 0.02 K/UL (ref 0–0.5)
IMM GRANULOCYTES NFR BLD AUTO: 0.2 % (ref 0–5)
INR PPP: 1
KETONES UR QL STRIP.AUTO: NEGATIVE MG/DL
LEUKOCYTE ESTERASE UR QL STRIP.AUTO: NEGATIVE
LYMPHOCYTES # BLD: 2.08 K/UL (ref 0.5–4.6)
LYMPHOCYTES NFR BLD: 20.4 % (ref 13–44)
MCH RBC QN AUTO: 31.5 PG (ref 26.1–32.9)
MCHC RBC AUTO-ENTMCNC: 32.5 G/DL (ref 31.4–35)
MCV RBC AUTO: 96.7 FL (ref 82–102)
MONOCYTES # BLD: 1.05 K/UL (ref 0.1–1.3)
MONOCYTES NFR BLD: 10.3 % (ref 4–12)
NEUTS SEG # BLD: 6.87 K/UL (ref 1.7–8.2)
NEUTS SEG NFR BLD: 67.5 % (ref 43–78)
NITRITE UR QL STRIP.AUTO: NEGATIVE
NRBC # BLD: 0 K/UL (ref 0–0.2)
PH UR STRIP: 6.5 (ref 5–9)
PLATELET # BLD AUTO: 347 K/UL (ref 150–450)
PMV BLD AUTO: 9.4 FL (ref 9.4–12.3)
POTASSIUM SERPL-SCNC: 4.4 MMOL/L (ref 3.5–5.1)
PROT UR STRIP-MCNC: NEGATIVE MG/DL
PROTHROMBIN TIME: 12.9 SEC (ref 11.3–14.9)
RBC # BLD AUTO: 3.91 M/UL (ref 4.23–5.6)
SODIUM SERPL-SCNC: 138 MMOL/L (ref 136–145)
SP GR UR REFRACTOMETRY: 1.01 (ref 1–1.02)
UROBILINOGEN UR QL STRIP.AUTO: 0.2 EU/DL (ref 0.2–1)
WBC # BLD AUTO: 10.2 K/UL (ref 4.3–11.1)

## 2025-07-10 PROCEDURE — 85025 COMPLETE CBC W/AUTO DIFF WBC: CPT

## 2025-07-10 PROCEDURE — 81003 URINALYSIS AUTO W/O SCOPE: CPT

## 2025-07-10 PROCEDURE — 87641 MR-STAPH DNA AMP PROBE: CPT

## 2025-07-10 PROCEDURE — 80048 BASIC METABOLIC PNL TOTAL CA: CPT

## 2025-07-10 PROCEDURE — 85610 PROTHROMBIN TIME: CPT

## 2025-07-10 ASSESSMENT — PAIN DESCRIPTION - ORIENTATION: ORIENTATION: LEFT

## 2025-07-10 ASSESSMENT — PAIN DESCRIPTION - LOCATION: LOCATION: LEG;BACK

## 2025-07-10 ASSESSMENT — PAIN SCALES - GENERAL: PAINLEVEL_OUTOF10: 7

## 2025-07-10 NOTE — PERIOP NOTE
Patient verified name, , and surgery as listed in [facility]. Patient provided medical/health information and PTA medications to the best of their ability.    TYPE  CASE: 3  Orders per surgeon: none received  Labs per surgeon: none. Results: n/a  Labs per anesthesia protocol: MRSA, PT/INR, BMP, CBC WITH DIFF, T/S (DOS), U/A. Results PENDING  EKG:  N/A     MRSA/MSSA swab collected; pharmacy to review and dose antibiotic as appropriate.     Patient provided with and instructed on education handouts including Guide to Surgery,  Preventing Infections, Pain Management, and [facility] brochure.     Road to Recovery Spine surgery patient guide given. Instructed on incentive spirometry.   Surgical skin cleanser and instructions given per hospital policy.    Original medication prescription bottles NOT visualized during patient appointment.     Patient teach back successful and patient demonstrates knowledge of instruction.         How to Use Your Incentive Spirometer       About Your Incentive Spirometer  An incentive spirometer is a device that will expand your lungs by helping you to breathe more deeply and fully. The parts of your incentive spirometer are labeled in Figure 1.    Using your incentive spirometer  When you're using your incentive spirometer, make sure to breathe through your mouth. If you breathe through your nose, the incentive spirometer won't work properly. You can hold your nose if you have trouble. DO NOT BLOW INTO THE DEVICE. If you feel dizzy at any time, stop and rest. Try again at a later time.  Sit upright in a chair or in bed. Hold the incentive spirometer at eye level.   Put the mouthpiece in your mouth and close your lips tightly around it. Slowly breathe out (exhale) completely.  Breathe in (inhale) slowly through your mouth as deeply as you can. As you take the breath, you will see the piston rise inside the large column. While the piston rises, the indicator on the  right should move upwards. It should stay in between the 2 arrows (see Figure 1).  Try to get the piston as high as you can, while keeping the indicator between the arrows. If the indicator doesn't stay between the arrows, you're breathing either too fast or too slow.  When you get it as high as you can, hold your breath for 10 seconds, or as long as possible. While you're holding your breath, the piston will slowly fall to the base of the spirometer.  Once the piston reaches the bottom of the spirometer, breathe out slowly through your mouth. Rest for a few seconds.  Repeat 10 times. Try to get the piston to the same level with each breath.  After each set of 10 breaths, try to cough as coughing will help loosen or clear any mucus in your lungs.  Put the marker at the level the piston reached on your incentive spirometer. This will be your goal next time.  Repeat these steps every hour that you're awake.  Cover the mouthpiece of the incentive spirometer when you aren't using it

## 2025-07-10 NOTE — PERIOP NOTE
clots, and/or pneumonia           Learning About Enhanced Recovery After Surgery  What is enhanced recovery after surgery?     Enhanced recovery after surgery, or ERAS, is a way to help people prepare for surgery. The goal is to help you get ready for surgery and get better sooner. Before your surgery, you:  Eat well in the days leading up to the surgery.  Drink lots of clear fluids.  Learn some basic information about your surgery. Doing this can put your mind at ease.  Your doctor will explain any other tips to help you recover. You may have to avoid some foods.   During surgery, you may get different kinds of medicine for pain. This can help you heal sooner and feel less pain later.  Soon after surgery, you will also drink fluids, eat as soon as you can, and try to walk a little. Different types of pain medicine may be used after surgery. This can also reduce the need for opioids and reduce side effects like constipation.  These recovery tips may be recommended for many types of surgeries, such as:  Colorectal surgery.  Surgery on the veins, arteries, heart, lungs, spine, or other organs.  Surgery to remove a growth (tumor).  Knowing what to expect before, during, and after surgery can help ease your fears. It can also help you take an active role in your recovery. Ask the doctor any questions you may have.  How do you prepare for surgery?  Eat healthy foods in the week leading up to your surgery. Your doctor will tell you how long before surgery to stop eating.  Try to get more exercise in the weeks leading up to your surgery. Even a little walking can help. The better shape you are in, the sooner you are likely to recover.  Drink plenty of liquids. Your doctor will tell you when you should stop drinking liquids.  Your doctor will also tell you if you should stop taking any medicines.  Do not smoke. Smoking can delay recovery. If you need help quitting, talk to your doctor about stop-smoking programs and  medicines. These can increase your chances of quitting for good.  What can you expect during your surgery?  You will get medicine so that you relax and don't feel pain during your surgery. The surgical team will closely watch your fluid levels during the surgery to keep you well hydrated.  What can you expect after surgery?  You will drink water and eat healthy foods as soon as you can after surgery. Your care team will urge you to sit up in a chair while you eat.  The doctor or nurse will encourage you to get up and walk as soon as you can. The more you can move, or at least sit up in a chair, the better.  Your doctor may give you a shot of medicine. This is to block the pain from the affected area of your body. You may also be given medicine like acetaminophen or ibuprofen. If opioids are used, your doctor will give you the lowest dose for the shortest possible time. Opioids can make your recovery harder. And they can be less safe than other medicines.  Follow-up care is a key part of your treatment and safety. Be sure to make and go to all appointments, and call your doctor if you are having problems. It's also a good idea to know your test results and keep a list of the medicines you take.    Current as of: July 26, 2023  Content Version: 14.2  © 2024 [employer].   Care instructions adapted under license by [employer]. If you have questions about a medical condition or this instruction, always ask your healthcare professional. [employer] disclaims any warranty or liability for your use of this information.

## 2025-07-10 NOTE — PERIOP NOTE
PLEASE CONTINUE TAKING ALL PRESCRIPTION MEDICATIONS UP TO THE DAY OF SURGERY UNLESS OTHERWISE DIRECTED BELOW. You may take Tylenol, allergy,  and/or indigestion medications.     TAKE ONLY THESE MEDICATIONS ON THE DAY OF SURGERY   gabapentin (Neurontin)  Duloxetine (Cymbalta)     Atorvastatin (Lipitor)   Guaifenesin (Mucinex)  Tamsulosin (Flomax)   Pantoprazole (Protonix)         DISCONTINUE all vitamins and supplements 7 days prior to surgery. DISCONTINUE Non-Steroidal Anti-Inflammatory (NSAIDS) such as Advil and Aleve 5 days prior to surgery.     Home Medications to Hold- please continue all other medications except these.    Aspirin 81mg- Please start to hold as of 7/15/25   Losartan (Cozaar) - Please do not take this medication on the day of surgery.      Comments      On the day before surgery please take 2 Tylenol in the morning and then again before bed. You may use either regular or extra strength.           Please do not bring home medications with you on the day of surgery unless otherwise directed by your nurse.  If you are instructed to bring home medications, please give them to your nurse as they will be administered by the nursing staff.    If you have any questions, please call [facility] [phone].    A copy of this note was provided to the patient for reference.

## 2025-07-10 NOTE — PERIOP NOTE
Latest Reference Range & Units 07/10/25 11:43   Sodium 136 - 145 mmol/L 138   Potassium 3.5 - 5.1 mmol/L 4.4   Chloride 98 - 107 mmol/L 100   CARBON DIOXIDE 20 - 29 mmol/L 25   BUN,BUNPL 8 - 23 MG/DL 9   Creatinine 0.80 - 1.30 MG/DL 0.84   Anion Gap 7 - 16 mmol/L 13   Est, Glom Filt Rate >60 ml/min/1.73m2 89   Glucose 70 - 99 mg/dL 114 (H)   Calcium 8.8 - 10.2 MG/DL 9.5   pH, Urine 5.0 - 9.0   6.5   WBC 4.3 - 11.1 K/uL 10.2   RBC 4.23 - 5.6 M/uL 3.91 (L)   Hemoglobin Quant 13.6 - 17.2 g/dL 12.3 (L)   Hematocrit 41.1 - 50.3 % 37.8 (L)   MCV 82.0 - 102.0 FL 96.7   MCH 26.1 - 32.9 PG 31.5   MCHC 31.4 - 35.0 g/dL 32.5   MPV 9.4 - 12.3 FL 9.4   RDW 11.9 - 14.6 % 14.2   Platelet Count 150 - 450 K/uL 347   Neutrophils % 43.0 - 78.0 % 67.5   Lymphocyte % 13.0 - 44.0 % 20.4   Monocytes % 4.0 - 12.0 % 10.3   Eosinophils % 0.5 - 7.8 % 1.2   Basophils % 0.0 - 2.0 % 0.4   Neutrophils Absolute 1.70 - 8.20 K/UL 6.87   Lymphocytes Absolute 0.50 - 4.60 K/UL 2.08   Monocytes Absolute 0.10 - 1.30 K/UL 1.05   Eosinophils Absolute 0.00 - 0.80 K/UL 0.12   Basophils Absolute 0.00 - 0.20 K/UL 0.04   Differential Type -   AUTOMATED   Immature Granulocytes % 0.0 - 5.0 % 0.2   Nucleated Red Blood Cells 0.0 - 0.2 K/uL 0.00   Immature Granulocytes Absolute 0.0 - 0.5 K/UL 0.02   Prothrombin Time 11.3 - 14.9 sec 12.9   INR -   1.0   Color, UA -   YELLOW/STRAW   Glucose, Ur NEG mg/dL Negative   Bilirubin, Urine NEG   Negative   Ketones, Urine NEG mg/dL Negative   Specific Gravity, UA 1.001 - 1.023   1.006   Blood, Urine NEG   Negative   Protein, UA NEG mg/dL Negative   Urobilinogen 0.2 - 1.0 EU/dL 0.2   Nitrite, Urine NEG   Negative   Leukocyte Esterase, Urine NEG   Negative   Appearance -   CLEAR   MRSA/STAPH AUREUS DNA  Rpt (IP)   (H): Data is abnormally high  (L): Data is abnormally low  (IP): In Process  Rpt: View report in Results Review for more information

## 2025-07-11 LAB
MRSA DNA SPEC QL NAA+PROBE: NOT DETECTED
S AUREUS CPE NOSE QL NAA+PROBE: DETECTED

## 2025-07-14 ENCOUNTER — TELEPHONE (OUTPATIENT)
Dept: ORTHOPEDIC SURGERY | Age: 80
End: 2025-07-14

## 2025-07-14 NOTE — TELEPHONE ENCOUNTER
Discussed with patient that SA detected will not impair his surgery and he also wanted to make sure that we are aware he would like to go to the 9th floor due to his wife not be able to care for him at home. I informed him that I have already documented it on his chart.

## 2025-07-21 ENCOUNTER — ANESTHESIA EVENT (OUTPATIENT)
Dept: SURGERY | Age: 80
End: 2025-07-21
Payer: MEDICARE

## 2025-07-22 ENCOUNTER — ANESTHESIA (OUTPATIENT)
Dept: SURGERY | Age: 80
End: 2025-07-22
Payer: MEDICARE

## 2025-07-22 ENCOUNTER — APPOINTMENT (OUTPATIENT)
Dept: GENERAL RADIOLOGY | Age: 80
End: 2025-07-22
Attending: ORTHOPAEDIC SURGERY
Payer: MEDICARE

## 2025-07-22 ENCOUNTER — HOSPITAL ENCOUNTER (INPATIENT)
Age: 80
LOS: 3 days | Discharge: NURSING, REHAB OR LONG-TERM CARE | End: 2025-07-25
Attending: ORTHOPAEDIC SURGERY | Admitting: ORTHOPAEDIC SURGERY
Payer: MEDICARE

## 2025-07-22 DIAGNOSIS — Z98.1 S/P LUMBAR SPINAL ARTHRODESIS: ICD-10-CM

## 2025-07-22 DIAGNOSIS — M54.16 LUMBAR RADICULOPATHY: Primary | ICD-10-CM

## 2025-07-22 LAB
ABO + RH BLD: NORMAL
BLOOD GROUP ANTIBODIES SERPL: NORMAL
SPECIMEN EXP DATE BLD: NORMAL

## 2025-07-22 PROCEDURE — 86850 RBC ANTIBODY SCREEN: CPT

## 2025-07-22 PROCEDURE — C1713 ANCHOR/SCREW BN/BN,TIS/BN: HCPCS | Performed by: ORTHOPAEDIC SURGERY

## 2025-07-22 PROCEDURE — 2580000003 HC RX 258: Performed by: ANESTHESIOLOGY

## 2025-07-22 PROCEDURE — 97161 PT EVAL LOW COMPLEX 20 MIN: CPT

## 2025-07-22 PROCEDURE — 22848 INSERT PELV FIXATION DEVICE: CPT | Performed by: ORTHOPAEDIC SURGERY

## 2025-07-22 PROCEDURE — 2720000010 HC SURG SUPPLY STERILE: Performed by: ORTHOPAEDIC SURGERY

## 2025-07-22 PROCEDURE — 1100000000 HC RM PRIVATE

## 2025-07-22 PROCEDURE — 63047 LAM FACETEC & FORAMOT LUMBAR: CPT | Performed by: ORTHOPAEDIC SURGERY

## 2025-07-22 PROCEDURE — 2580000003 HC RX 258: Performed by: NURSE ANESTHETIST, CERTIFIED REGISTERED

## 2025-07-22 PROCEDURE — 6370000000 HC RX 637 (ALT 250 FOR IP): Performed by: ORTHOPAEDIC SURGERY

## 2025-07-22 PROCEDURE — 2500000003 HC RX 250 WO HCPCS: Performed by: ORTHOPAEDIC SURGERY

## 2025-07-22 PROCEDURE — 7100000001 HC PACU RECOVERY - ADDTL 15 MIN: Performed by: ORTHOPAEDIC SURGERY

## 2025-07-22 PROCEDURE — 3700000001 HC ADD 15 MINUTES (ANESTHESIA): Performed by: ORTHOPAEDIC SURGERY

## 2025-07-22 PROCEDURE — 2500000003 HC RX 250 WO HCPCS: Performed by: NURSE ANESTHETIST, CERTIFIED REGISTERED

## 2025-07-22 PROCEDURE — 85014 HEMATOCRIT: CPT

## 2025-07-22 PROCEDURE — 6360000002 HC RX W HCPCS: Performed by: ORTHOPAEDIC SURGERY

## 2025-07-22 PROCEDURE — 72100 X-RAY EXAM L-S SPINE 2/3 VWS: CPT

## 2025-07-22 PROCEDURE — 2709999900 HC NON-CHARGEABLE SUPPLY: Performed by: ORTHOPAEDIC SURGERY

## 2025-07-22 PROCEDURE — 22830 EXPLORATION OF SPINAL FUSION: CPT | Performed by: ORTHOPAEDIC SURGERY

## 2025-07-22 PROCEDURE — 01NB0ZZ RELEASE LUMBAR NERVE, OPEN APPROACH: ICD-10-PCS | Performed by: ORTHOPAEDIC SURGERY

## 2025-07-22 PROCEDURE — 22842 INSERT SPINE FIXATION DEVICE: CPT | Performed by: ORTHOPAEDIC SURGERY

## 2025-07-22 PROCEDURE — 0SG1071 FUSION OF 2 OR MORE LUMBAR VERTEBRAL JOINTS WITH AUTOLOGOUS TISSUE SUBSTITUTE, POSTERIOR APPROACH, POSTERIOR COLUMN, OPEN APPROACH: ICD-10-PCS | Performed by: ORTHOPAEDIC SURGERY

## 2025-07-22 PROCEDURE — 82947 ASSAY GLUCOSE BLOOD QUANT: CPT

## 2025-07-22 PROCEDURE — 84295 ASSAY OF SERUM SODIUM: CPT

## 2025-07-22 PROCEDURE — 97530 THERAPEUTIC ACTIVITIES: CPT

## 2025-07-22 PROCEDURE — 83605 ASSAY OF LACTIC ACID: CPT

## 2025-07-22 PROCEDURE — 82803 BLOOD GASES ANY COMBINATION: CPT

## 2025-07-22 PROCEDURE — 2780000010 HC IMPLANT OTHER: Performed by: ORTHOPAEDIC SURGERY

## 2025-07-22 PROCEDURE — 2580000003 HC RX 258: Performed by: ORTHOPAEDIC SURGERY

## 2025-07-22 PROCEDURE — P9045 ALBUMIN (HUMAN), 5%, 250 ML: HCPCS | Performed by: NURSE ANESTHETIST, CERTIFIED REGISTERED

## 2025-07-22 PROCEDURE — 3600000014 HC SURGERY LEVEL 4 ADDTL 15MIN: Performed by: ORTHOPAEDIC SURGERY

## 2025-07-22 PROCEDURE — 22612 ARTHRD PST TQ 1NTRSPC LUMBAR: CPT | Performed by: ORTHOPAEDIC SURGERY

## 2025-07-22 PROCEDURE — 84132 ASSAY OF SERUM POTASSIUM: CPT

## 2025-07-22 PROCEDURE — 0SG30K1 FUSION OF LUMBOSACRAL JOINT WITH NONAUTOLOGOUS TISSUE SUBSTITUTE, POSTERIOR APPROACH, POSTERIOR COLUMN, OPEN APPROACH: ICD-10-PCS | Performed by: ORTHOPAEDIC SURGERY

## 2025-07-22 PROCEDURE — 8E0WXBF COMPUTER ASSISTED PROCEDURE OF TRUNK REGION, WITH FLUOROSCOPY: ICD-10-PCS | Performed by: ORTHOPAEDIC SURGERY

## 2025-07-22 PROCEDURE — 7100000000 HC PACU RECOVERY - FIRST 15 MIN: Performed by: ORTHOPAEDIC SURGERY

## 2025-07-22 PROCEDURE — 86901 BLOOD TYPING SEROLOGIC RH(D): CPT

## 2025-07-22 PROCEDURE — 6360000002 HC RX W HCPCS: Performed by: NURSE ANESTHETIST, CERTIFIED REGISTERED

## 2025-07-22 PROCEDURE — 20930 SP BONE ALGRFT MORSEL ADD-ON: CPT | Performed by: ORTHOPAEDIC SURGERY

## 2025-07-22 PROCEDURE — 86900 BLOOD TYPING SEROLOGIC ABO: CPT

## 2025-07-22 PROCEDURE — 6370000000 HC RX 637 (ALT 250 FOR IP): Performed by: ANESTHESIOLOGY

## 2025-07-22 PROCEDURE — 82330 ASSAY OF CALCIUM: CPT

## 2025-07-22 PROCEDURE — 3700000000 HC ANESTHESIA ATTENDED CARE: Performed by: ORTHOPAEDIC SURGERY

## 2025-07-22 PROCEDURE — 3600000004 HC SURGERY LEVEL 4 BASE: Performed by: ORTHOPAEDIC SURGERY

## 2025-07-22 DEVICE — GRAFT BNE XL: Type: IMPLANTABLE DEVICE | Site: SPINE LUMBAR | Status: FUNCTIONAL

## 2025-07-22 DEVICE — POLYAXIAL SCREW
Type: IMPLANTABLE DEVICE | Site: SPINE LUMBAR | Status: FUNCTIONAL
Brand: XIA 3 SYSTEM - SERRATO

## 2025-07-22 DEVICE — BIO DBM PLUS PUTTY (WITH CANCELLOUS)
Type: IMPLANTABLE DEVICE | Site: SPINE LUMBAR | Status: FUNCTIONAL
Brand: BIO DBM

## 2025-07-22 DEVICE — TI ALLOY RAD ROD
Type: IMPLANTABLE DEVICE | Site: SPINE LUMBAR | Status: FUNCTIONAL
Brand: XIA 3

## 2025-07-22 DEVICE — ALLOGRAFT BNE CHIP 1-4 MM 30 CC CRUSH CANC: Type: IMPLANTABLE DEVICE | Site: SPINE LUMBAR | Status: FUNCTIONAL

## 2025-07-22 DEVICE — BLOCKER
Type: IMPLANTABLE DEVICE | Site: SPINE LUMBAR | Status: FUNCTIONAL
Brand: XIA 3

## 2025-07-22 DEVICE — TI ALLOY ROD
Type: IMPLANTABLE DEVICE | Site: SPINE LUMBAR | Status: FUNCTIONAL
Brand: XIA 3

## 2025-07-22 DEVICE — 53-73 MM MULTI AXIAL CROSS CONNECTOR
Type: IMPLANTABLE DEVICE | Site: SPINE LUMBAR | Status: FUNCTIONAL
Brand: XIA 3

## 2025-07-22 DEVICE — OFFSET CONNECTOR 75 DEG. BEND
Type: IMPLANTABLE DEVICE | Site: SPINE LUMBAR | Status: FUNCTIONAL
Brand: XIA II

## 2025-07-22 RX ORDER — LIDOCAINE HYDROCHLORIDE 10 MG/ML
1 INJECTION, SOLUTION INFILTRATION; PERINEURAL
Status: DISCONTINUED | OUTPATIENT
Start: 2025-07-22 | End: 2025-07-22 | Stop reason: HOSPADM

## 2025-07-22 RX ORDER — PROPOFOL 10 MG/ML
INJECTION, EMULSION INTRAVENOUS
Status: DISCONTINUED | OUTPATIENT
Start: 2025-07-22 | End: 2025-07-22 | Stop reason: SDUPTHER

## 2025-07-22 RX ORDER — CYCLOBENZAPRINE HCL 10 MG
10 TABLET ORAL 3 TIMES DAILY PRN
Status: DISCONTINUED | OUTPATIENT
Start: 2025-07-22 | End: 2025-07-25 | Stop reason: HOSPADM

## 2025-07-22 RX ORDER — LIDOCAINE HYDROCHLORIDE ANHYDROUS AND DEXTROSE MONOHYDRATE 5; 400 G/100ML; MG/100ML
1 INJECTION, SOLUTION INTRAVENOUS CONTINUOUS
Status: ACTIVE | OUTPATIENT
Start: 2025-07-22 | End: 2025-07-23

## 2025-07-22 RX ORDER — OXYCODONE HYDROCHLORIDE 5 MG/1
10 TABLET ORAL EVERY 4 HOURS PRN
Status: DISCONTINUED | OUTPATIENT
Start: 2025-07-22 | End: 2025-07-25 | Stop reason: HOSPADM

## 2025-07-22 RX ORDER — SODIUM CHLORIDE 0.9 % (FLUSH) 0.9 %
5-40 SYRINGE (ML) INJECTION EVERY 12 HOURS SCHEDULED
Status: DISCONTINUED | OUTPATIENT
Start: 2025-07-22 | End: 2025-07-22 | Stop reason: HOSPADM

## 2025-07-22 RX ORDER — SODIUM CHLORIDE 9 MG/ML
INJECTION, SOLUTION INTRAVENOUS CONTINUOUS
Status: ACTIVE | OUTPATIENT
Start: 2025-07-22 | End: 2025-07-22

## 2025-07-22 RX ORDER — KETOROLAC TROMETHAMINE 15 MG/ML
15 INJECTION, SOLUTION INTRAMUSCULAR; INTRAVENOUS EVERY 6 HOURS
Status: COMPLETED | OUTPATIENT
Start: 2025-07-22 | End: 2025-07-23

## 2025-07-22 RX ORDER — ALBUTEROL SULFATE 0.83 MG/ML
2.5 SOLUTION RESPIRATORY (INHALATION) EVERY 4 HOURS PRN
Status: DISCONTINUED | OUTPATIENT
Start: 2025-07-22 | End: 2025-07-25 | Stop reason: HOSPADM

## 2025-07-22 RX ORDER — LABETALOL HYDROCHLORIDE 5 MG/ML
10 INJECTION, SOLUTION INTRAVENOUS
Status: DISCONTINUED | OUTPATIENT
Start: 2025-07-22 | End: 2025-07-22 | Stop reason: HOSPADM

## 2025-07-22 RX ORDER — NOREPINEPHRINE BITARTRATE 0.02 MG/ML
INJECTION, SOLUTION INTRAVENOUS
Status: DISCONTINUED | OUTPATIENT
Start: 2025-07-22 | End: 2025-07-22 | Stop reason: SDUPTHER

## 2025-07-22 RX ORDER — HYDRALAZINE HYDROCHLORIDE 20 MG/ML
10 INJECTION INTRAMUSCULAR; INTRAVENOUS
Status: DISCONTINUED | OUTPATIENT
Start: 2025-07-22 | End: 2025-07-22 | Stop reason: HOSPADM

## 2025-07-22 RX ORDER — OXYCODONE HYDROCHLORIDE 5 MG/1
5 TABLET ORAL
Status: DISCONTINUED | OUTPATIENT
Start: 2025-07-22 | End: 2025-07-22 | Stop reason: HOSPADM

## 2025-07-22 RX ORDER — DIPHENHYDRAMINE HYDROCHLORIDE 50 MG/ML
25 INJECTION, SOLUTION INTRAMUSCULAR; INTRAVENOUS EVERY 6 HOURS PRN
Status: DISCONTINUED | OUTPATIENT
Start: 2025-07-22 | End: 2025-07-25 | Stop reason: HOSPADM

## 2025-07-22 RX ORDER — TAMSULOSIN HYDROCHLORIDE 0.4 MG/1
0.4 CAPSULE ORAL DAILY
Status: DISCONTINUED | OUTPATIENT
Start: 2025-07-23 | End: 2025-07-25 | Stop reason: HOSPADM

## 2025-07-22 RX ORDER — POLYETHYLENE GLYCOL 3350 17 G/17G
17 POWDER, FOR SOLUTION ORAL DAILY
Status: DISCONTINUED | OUTPATIENT
Start: 2025-07-22 | End: 2025-07-25 | Stop reason: HOSPADM

## 2025-07-22 RX ORDER — SODIUM CHLORIDE, SODIUM LACTATE, POTASSIUM CHLORIDE, CALCIUM CHLORIDE 600; 310; 30; 20 MG/100ML; MG/100ML; MG/100ML; MG/100ML
INJECTION, SOLUTION INTRAVENOUS CONTINUOUS
Status: DISCONTINUED | OUTPATIENT
Start: 2025-07-22 | End: 2025-07-22 | Stop reason: HOSPADM

## 2025-07-22 RX ORDER — ONDANSETRON 2 MG/ML
INJECTION INTRAMUSCULAR; INTRAVENOUS
Status: DISCONTINUED | OUTPATIENT
Start: 2025-07-22 | End: 2025-07-22 | Stop reason: SDUPTHER

## 2025-07-22 RX ORDER — DULOXETIN HYDROCHLORIDE 60 MG/1
60 CAPSULE, DELAYED RELEASE ORAL DAILY
Status: DISCONTINUED | OUTPATIENT
Start: 2025-07-23 | End: 2025-07-25 | Stop reason: HOSPADM

## 2025-07-22 RX ORDER — BISACODYL 5 MG/1
5 TABLET, DELAYED RELEASE ORAL DAILY
Status: DISCONTINUED | OUTPATIENT
Start: 2025-07-22 | End: 2025-07-25 | Stop reason: HOSPADM

## 2025-07-22 RX ORDER — SODIUM CHLORIDE, SODIUM LACTATE, POTASSIUM CHLORIDE, CALCIUM CHLORIDE 600; 310; 30; 20 MG/100ML; MG/100ML; MG/100ML; MG/100ML
INJECTION, SOLUTION INTRAVENOUS
Status: DISCONTINUED | OUTPATIENT
Start: 2025-07-22 | End: 2025-07-22 | Stop reason: SDUPTHER

## 2025-07-22 RX ORDER — MONTELUKAST SODIUM 10 MG/1
10 TABLET ORAL NIGHTLY
Status: DISCONTINUED | OUTPATIENT
Start: 2025-07-23 | End: 2025-07-25 | Stop reason: HOSPADM

## 2025-07-22 RX ORDER — SODIUM CHLORIDE 0.9 % (FLUSH) 0.9 %
5-40 SYRINGE (ML) INJECTION EVERY 12 HOURS SCHEDULED
Status: DISCONTINUED | OUTPATIENT
Start: 2025-07-22 | End: 2025-07-25 | Stop reason: HOSPADM

## 2025-07-22 RX ORDER — VANCOMYCIN HYDROCHLORIDE 1 G/20ML
INJECTION, POWDER, LYOPHILIZED, FOR SOLUTION INTRAVENOUS PRN
Status: DISCONTINUED | OUTPATIENT
Start: 2025-07-22 | End: 2025-07-22 | Stop reason: ALTCHOICE

## 2025-07-22 RX ORDER — PROMETHAZINE HYDROCHLORIDE 12.5 MG/1
12.5 TABLET ORAL EVERY 6 HOURS PRN
Status: DISCONTINUED | OUTPATIENT
Start: 2025-07-22 | End: 2025-07-25 | Stop reason: HOSPADM

## 2025-07-22 RX ORDER — OXYCODONE HYDROCHLORIDE 5 MG/1
5 TABLET ORAL EVERY 6 HOURS PRN
Qty: 28 TABLET | Refills: 0 | Status: ON HOLD | OUTPATIENT
Start: 2025-07-22 | End: 2025-07-31

## 2025-07-22 RX ORDER — SODIUM CHLORIDE 0.9 % (FLUSH) 0.9 %
5-40 SYRINGE (ML) INJECTION PRN
Status: DISCONTINUED | OUTPATIENT
Start: 2025-07-22 | End: 2025-07-25 | Stop reason: HOSPADM

## 2025-07-22 RX ORDER — SODIUM CHLORIDE 0.9 % (FLUSH) 0.9 %
5-40 SYRINGE (ML) INJECTION PRN
Status: DISCONTINUED | OUTPATIENT
Start: 2025-07-22 | End: 2025-07-22 | Stop reason: HOSPADM

## 2025-07-22 RX ORDER — SODIUM CHLORIDE 9 MG/ML
INJECTION, SOLUTION INTRAVENOUS PRN
Status: DISCONTINUED | OUTPATIENT
Start: 2025-07-22 | End: 2025-07-22 | Stop reason: HOSPADM

## 2025-07-22 RX ORDER — GABAPENTIN 300 MG/1
600 CAPSULE ORAL 3 TIMES DAILY
Status: DISCONTINUED | OUTPATIENT
Start: 2025-07-22 | End: 2025-07-25 | Stop reason: HOSPADM

## 2025-07-22 RX ORDER — ONDANSETRON 2 MG/ML
4 INJECTION INTRAMUSCULAR; INTRAVENOUS
Status: DISCONTINUED | OUTPATIENT
Start: 2025-07-22 | End: 2025-07-22 | Stop reason: HOSPADM

## 2025-07-22 RX ORDER — GUAIFENESIN 1200 MG/1
1200 TABLET, EXTENDED RELEASE ORAL DAILY
Status: DISCONTINUED | OUTPATIENT
Start: 2025-07-23 | End: 2025-07-22

## 2025-07-22 RX ORDER — BUPIVACAINE HYDROCHLORIDE AND EPINEPHRINE 5; 5 MG/ML; UG/ML
INJECTION, SOLUTION EPIDURAL; INTRACAUDAL; PERINEURAL PRN
Status: DISCONTINUED | OUTPATIENT
Start: 2025-07-22 | End: 2025-07-22 | Stop reason: ALTCHOICE

## 2025-07-22 RX ORDER — ACETAMINOPHEN 500 MG
1000 TABLET ORAL ONCE
Status: DISCONTINUED | OUTPATIENT
Start: 2025-07-22 | End: 2025-07-22 | Stop reason: SDUPTHER

## 2025-07-22 RX ORDER — ATORVASTATIN CALCIUM 20 MG/1
20 TABLET, FILM COATED ORAL EVERY MORNING
Status: DISCONTINUED | OUTPATIENT
Start: 2025-07-23 | End: 2025-07-25 | Stop reason: HOSPADM

## 2025-07-22 RX ORDER — SODIUM CHLORIDE 9 MG/ML
INJECTION, SOLUTION INTRAVENOUS PRN
Status: DISCONTINUED | OUTPATIENT
Start: 2025-07-22 | End: 2025-07-25 | Stop reason: HOSPADM

## 2025-07-22 RX ORDER — ALBUMIN HUMAN 50 G/1000ML
SOLUTION INTRAVENOUS
Status: DISCONTINUED | OUTPATIENT
Start: 2025-07-22 | End: 2025-07-22 | Stop reason: SDUPTHER

## 2025-07-22 RX ORDER — TRANEXAMIC ACID 100 MG/ML
INJECTION, SOLUTION INTRAVENOUS
Status: DISCONTINUED | OUTPATIENT
Start: 2025-07-22 | End: 2025-07-22 | Stop reason: SDUPTHER

## 2025-07-22 RX ORDER — PHENOL 1.4 %
1 AEROSOL, SPRAY (ML) MUCOUS MEMBRANE NIGHTLY
Status: DISCONTINUED | OUTPATIENT
Start: 2025-07-22 | End: 2025-07-22

## 2025-07-22 RX ORDER — OXYCODONE HYDROCHLORIDE 5 MG/1
5 TABLET ORAL EVERY 4 HOURS PRN
Status: DISCONTINUED | OUTPATIENT
Start: 2025-07-22 | End: 2025-07-25 | Stop reason: HOSPADM

## 2025-07-22 RX ORDER — ACETAMINOPHEN 500 MG
1000 TABLET ORAL ONCE
Status: COMPLETED | OUTPATIENT
Start: 2025-07-22 | End: 2025-07-22

## 2025-07-22 RX ORDER — DEXAMETHASONE SODIUM PHOSPHATE 4 MG/ML
INJECTION, SOLUTION INTRA-ARTICULAR; INTRALESIONAL; INTRAMUSCULAR; INTRAVENOUS; SOFT TISSUE
Status: DISCONTINUED | OUTPATIENT
Start: 2025-07-22 | End: 2025-07-22 | Stop reason: SDUPTHER

## 2025-07-22 RX ORDER — LOSARTAN POTASSIUM 50 MG/1
50 TABLET ORAL DAILY
Status: DISCONTINUED | OUTPATIENT
Start: 2025-07-22 | End: 2025-07-25 | Stop reason: HOSPADM

## 2025-07-22 RX ORDER — PROCHLORPERAZINE EDISYLATE 5 MG/ML
5 INJECTION INTRAMUSCULAR; INTRAVENOUS
Status: DISCONTINUED | OUTPATIENT
Start: 2025-07-22 | End: 2025-07-22 | Stop reason: HOSPADM

## 2025-07-22 RX ORDER — EPHEDRINE SULFATE 5 MG/ML
INJECTION INTRAVENOUS
Status: DISCONTINUED | OUTPATIENT
Start: 2025-07-22 | End: 2025-07-22 | Stop reason: SDUPTHER

## 2025-07-22 RX ORDER — ACETAMINOPHEN 325 MG/1
650 TABLET ORAL EVERY 6 HOURS
Status: DISCONTINUED | OUTPATIENT
Start: 2025-07-22 | End: 2025-07-25 | Stop reason: HOSPADM

## 2025-07-22 RX ORDER — KETAMINE HCL IN NACL, ISO-OSM 20 MG/2 ML
SYRINGE (ML) INJECTION
Status: DISCONTINUED | OUTPATIENT
Start: 2025-07-22 | End: 2025-07-22 | Stop reason: SDUPTHER

## 2025-07-22 RX ORDER — ONDANSETRON 2 MG/ML
4 INJECTION INTRAMUSCULAR; INTRAVENOUS EVERY 6 HOURS PRN
Status: DISCONTINUED | OUTPATIENT
Start: 2025-07-22 | End: 2025-07-25 | Stop reason: HOSPADM

## 2025-07-22 RX ORDER — HYDROMORPHONE HYDROCHLORIDE 1 MG/ML
0.5 INJECTION, SOLUTION INTRAMUSCULAR; INTRAVENOUS; SUBCUTANEOUS
Status: DISCONTINUED | OUTPATIENT
Start: 2025-07-22 | End: 2025-07-25 | Stop reason: HOSPADM

## 2025-07-22 RX ORDER — VANCOMYCIN HYDROCHLORIDE 1 G/20ML
INJECTION, POWDER, LYOPHILIZED, FOR SOLUTION INTRAVENOUS
Status: DISCONTINUED | OUTPATIENT
Start: 2025-07-22 | End: 2025-07-22 | Stop reason: SDUPTHER

## 2025-07-22 RX ORDER — ROCURONIUM BROMIDE 10 MG/ML
INJECTION, SOLUTION INTRAVENOUS
Status: DISCONTINUED | OUTPATIENT
Start: 2025-07-22 | End: 2025-07-22 | Stop reason: SDUPTHER

## 2025-07-22 RX ORDER — FENTANYL CITRATE 50 UG/ML
100 INJECTION, SOLUTION INTRAMUSCULAR; INTRAVENOUS
Status: DISCONTINUED | OUTPATIENT
Start: 2025-07-22 | End: 2025-07-22 | Stop reason: HOSPADM

## 2025-07-22 RX ORDER — PANTOPRAZOLE SODIUM 40 MG/1
40 TABLET, DELAYED RELEASE ORAL DAILY
Status: DISCONTINUED | OUTPATIENT
Start: 2025-07-23 | End: 2025-07-25 | Stop reason: HOSPADM

## 2025-07-22 RX ORDER — MIDAZOLAM HYDROCHLORIDE 2 MG/2ML
2 INJECTION, SOLUTION INTRAMUSCULAR; INTRAVENOUS
Status: DISCONTINUED | OUTPATIENT
Start: 2025-07-22 | End: 2025-07-22 | Stop reason: HOSPADM

## 2025-07-22 RX ORDER — LIDOCAINE HYDROCHLORIDE 20 MG/ML
INJECTION, SOLUTION EPIDURAL; INFILTRATION; INTRACAUDAL; PERINEURAL
Status: DISCONTINUED | OUTPATIENT
Start: 2025-07-22 | End: 2025-07-22 | Stop reason: SDUPTHER

## 2025-07-22 RX ORDER — DIPHENHYDRAMINE HCL 25 MG
25 CAPSULE ORAL EVERY 6 HOURS PRN
Status: DISCONTINUED | OUTPATIENT
Start: 2025-07-22 | End: 2025-07-25 | Stop reason: HOSPADM

## 2025-07-22 RX ADMIN — ACETAMINOPHEN 1000 MG: 500 TABLET, FILM COATED ORAL at 09:27

## 2025-07-22 RX ADMIN — EPHEDRINE SULFATE 10 MG: 5 INJECTION INTRAVENOUS at 11:50

## 2025-07-22 RX ADMIN — EPHEDRINE SULFATE 5 MG: 5 INJECTION INTRAVENOUS at 10:57

## 2025-07-22 RX ADMIN — PHENYLEPHRINE HYDROCHLORIDE 20 MCG/MIN: 10 INJECTION INTRAVENOUS at 11:20

## 2025-07-22 RX ADMIN — VANCOMYCIN HYDROCHLORIDE 1500 MG: 1 INJECTION, POWDER, LYOPHILIZED, FOR SOLUTION INTRAVENOUS at 14:00

## 2025-07-22 RX ADMIN — LIDOCAINE HYDROCHLORIDE 1.5 MG/KG/HR: 20 INJECTION, SOLUTION EPIDURAL; INFILTRATION; INTRACAUDAL; PERINEURAL at 11:09

## 2025-07-22 RX ADMIN — SODIUM CHLORIDE, PRESERVATIVE FREE 5 ML: 5 INJECTION INTRAVENOUS at 20:49

## 2025-07-22 RX ADMIN — PROPOFOL 20 MG: 10 INJECTION, EMULSION INTRAVENOUS at 13:56

## 2025-07-22 RX ADMIN — PHENYLEPHRINE HYDROCHLORIDE 200 MCG: 0.1 INJECTION, SOLUTION INTRAVENOUS at 13:41

## 2025-07-22 RX ADMIN — KETOROLAC TROMETHAMINE 15 MG: 15 INJECTION, SOLUTION INTRAMUSCULAR; INTRAVENOUS at 22:13

## 2025-07-22 RX ADMIN — ALBUMIN (HUMAN) 12.5 G: 12.5 SOLUTION INTRAVENOUS at 12:21

## 2025-07-22 RX ADMIN — ACETAMINOPHEN 650 MG: 325 TABLET ORAL at 16:08

## 2025-07-22 RX ADMIN — KETOROLAC TROMETHAMINE 15 MG: 15 INJECTION, SOLUTION INTRAMUSCULAR; INTRAVENOUS at 16:08

## 2025-07-22 RX ADMIN — ACETAMINOPHEN 650 MG: 325 TABLET ORAL at 22:13

## 2025-07-22 RX ADMIN — Medication 3 AMPULE: at 09:11

## 2025-07-22 RX ADMIN — GABAPENTIN 600 MG: 300 CAPSULE ORAL at 20:49

## 2025-07-22 RX ADMIN — Medication 2000 MG: at 10:41

## 2025-07-22 RX ADMIN — Medication 10 MG: at 12:06

## 2025-07-22 RX ADMIN — Medication 4 MCG/MIN: at 12:00

## 2025-07-22 RX ADMIN — DEXAMETHASONE SODIUM PHOSPHATE 4 MG: 4 INJECTION INTRA-ARTICULAR; INTRALESIONAL; INTRAMUSCULAR; INTRAVENOUS; SOFT TISSUE at 10:59

## 2025-07-22 RX ADMIN — LIDOCAINE HYDROCHLORIDE 100 MG: 20 INJECTION, SOLUTION EPIDURAL; INFILTRATION; INTRACAUDAL; PERINEURAL at 10:30

## 2025-07-22 RX ADMIN — SODIUM CHLORIDE, SODIUM LACTATE, POTASSIUM CHLORIDE, AND CALCIUM CHLORIDE: .6; .31; .03; .02 INJECTION, SOLUTION INTRAVENOUS at 09:22

## 2025-07-22 RX ADMIN — Medication 40 MG: at 10:50

## 2025-07-22 RX ADMIN — ROCURONIUM BROMIDE 10 MG: 10 INJECTION, SOLUTION INTRAVENOUS at 11:48

## 2025-07-22 RX ADMIN — ONDANSETRON 4 MG: 2 INJECTION, SOLUTION INTRAMUSCULAR; INTRAVENOUS at 13:47

## 2025-07-22 RX ADMIN — PHENYLEPHRINE HYDROCHLORIDE 300 MCG: 0.1 INJECTION, SOLUTION INTRAVENOUS at 11:29

## 2025-07-22 RX ADMIN — PHENYLEPHRINE HYDROCHLORIDE 100 MCG: 0.1 INJECTION, SOLUTION INTRAVENOUS at 10:56

## 2025-07-22 RX ADMIN — SUGAMMADEX 200 MG: 100 INJECTION, SOLUTION INTRAVENOUS at 14:20

## 2025-07-22 RX ADMIN — PROPOFOL 150 MG: 10 INJECTION, EMULSION INTRAVENOUS at 10:30

## 2025-07-22 RX ADMIN — PHENYLEPHRINE HYDROCHLORIDE 200 MCG: 0.1 INJECTION, SOLUTION INTRAVENOUS at 10:52

## 2025-07-22 RX ADMIN — HYDROMORPHONE HYDROCHLORIDE 0.5 MG: 0.5 INJECTION, SOLUTION INTRAMUSCULAR; INTRAVENOUS; SUBCUTANEOUS at 10:30

## 2025-07-22 RX ADMIN — SODIUM CHLORIDE, SODIUM LACTATE, POTASSIUM CHLORIDE, AND CALCIUM CHLORIDE: 600; 310; 30; 20 INJECTION, SOLUTION INTRAVENOUS at 10:35

## 2025-07-22 RX ADMIN — PHENYLEPHRINE HYDROCHLORIDE 300 MCG: 0.1 INJECTION, SOLUTION INTRAVENOUS at 10:59

## 2025-07-22 RX ADMIN — TRANEXAMIC ACID 1000 MG: 100 INJECTION, SOLUTION INTRAVENOUS at 11:02

## 2025-07-22 RX ADMIN — ROCURONIUM BROMIDE 50 MG: 10 INJECTION, SOLUTION INTRAVENOUS at 10:31

## 2025-07-22 RX ADMIN — EPHEDRINE SULFATE 5 MG: 5 INJECTION INTRAVENOUS at 11:30

## 2025-07-22 RX ADMIN — GABAPENTIN 600 MG: 300 CAPSULE ORAL at 16:09

## 2025-07-22 RX ADMIN — SODIUM CHLORIDE: 0.9 INJECTION, SOLUTION INTRAVENOUS at 16:23

## 2025-07-22 RX ADMIN — ROCURONIUM BROMIDE 10 MG: 10 INJECTION, SOLUTION INTRAVENOUS at 12:28

## 2025-07-22 RX ADMIN — TRANEXAMIC ACID 1000 MG: 100 INJECTION, SOLUTION INTRAVENOUS at 13:47

## 2025-07-22 RX ADMIN — Medication 10 MG: at 11:14

## 2025-07-22 RX ADMIN — PHENYLEPHRINE HYDROCHLORIDE 200 MCG: 0.1 INJECTION, SOLUTION INTRAVENOUS at 11:34

## 2025-07-22 RX ADMIN — EPHEDRINE SULFATE 5 MG: 5 INJECTION INTRAVENOUS at 10:59

## 2025-07-22 RX ADMIN — LOSARTAN POTASSIUM 50 MG: 50 TABLET, FILM COATED ORAL at 16:09

## 2025-07-22 RX ADMIN — CEFAZOLIN 2000 MG: 10 INJECTION, POWDER, FOR SOLUTION INTRAVENOUS at 17:56

## 2025-07-22 RX ADMIN — HYDROMORPHONE HYDROCHLORIDE 0.5 MG: 0.5 INJECTION, SOLUTION INTRAMUSCULAR; INTRAVENOUS; SUBCUTANEOUS at 10:46

## 2025-07-22 ASSESSMENT — PAIN - FUNCTIONAL ASSESSMENT
PAIN_FUNCTIONAL_ASSESSMENT: 0-10
PAIN_FUNCTIONAL_ASSESSMENT: 0-10
PAIN_FUNCTIONAL_ASSESSMENT: NONE - DENIES PAIN

## 2025-07-22 ASSESSMENT — PAIN DESCRIPTION - DESCRIPTORS
DESCRIPTORS: BURNING
DESCRIPTORS: ACHING

## 2025-07-22 NOTE — ANESTHESIA POSTPROCEDURE EVALUATION
Department of Anesthesiology  Postprocedure Note    Patient: Demarcus Gaxiola  MRN: [id number]  YOB: 1945  Date of evaluation: 7/22/2025    Procedure Summary       Date: 07/22/25 Room / Location: [facility] MAIN OR  / [facility] MAIN OR    Anesthesia Start: 1011 Anesthesia Stop: 1430    Procedure: eras\L5 laminectomy and L5-S1 fusion with allograft, L2-S1 instrumentation and iliac fixation. (Spine Lumbar) Diagnosis:       Fracture of fifth lumbar vertebra (HCC)      S/P lumbar spinal arthrodesis      Spinal stenosis, lumbar region, with neurogenic claudication      Lumbar radiculopathy      (Fracture of fifth lumbar vertebra (HCC) [S32.059A])      (S/P lumbar spinal arthrodesis [Z98.1])      (Spinal stenosis, lumbar region, with neurogenic claudication [M48.062])      (Lumbar radiculopathy [M54.16])    Providers: Richard Mendosa MD Responsible Provider: Pedro Cloud DO    Anesthesia Type: general ASA Status: 3            Anesthesia Type: No value filed.    Sindhu Phase I: Sindhu Score: 9    Sindhu Phase II:      Anesthesia Post Evaluation    Patient location during evaluation: PACU  Level of consciousness: awake and alert  Airway patency: patent  Nausea & Vomiting: no nausea  Cardiovascular status: hemodynamically stable  Respiratory status: acceptable  Hydration status: euvolemic  Comments: Blood pressure 124/60, pulse (!) 115, temperature 98.5 °F (36.9 °C), resp. rate 16, height 1.651 m (5' 5\"), weight 96.4 kg (212 lb 9.6 oz), SpO2 99%.  Pain management: satisfactory to patient    No notable events documented.

## 2025-07-22 NOTE — ACP (ADVANCE CARE PLANNING)
Advance Care Planning     Advance Care Planning Activator (Inpatient)  Conversation Note      Health Care Decision Maker:  HCPOA on file and verified with patient     Current Designated Health Care Decision Maker:     Primary Decision Maker: GaxiolaParvin sawyer - Spouse - [phone]    Secondary Decision Maker: Cris Gaxiola - Other Relative - [phone]    Supplemental (Other) Decision Maker: Ismael Gaxiola - Other Relative - [phone]    Supplemental (Other) Decision Maker: Vicky Chong - Other Relative - [phone]      Care Preferences  Full Code per MD order

## 2025-07-22 NOTE — H&P
Name: Demarcus Gaxiola  YOB: 1945  Gender: male  MRN: [id number]  Age: 80 y.o.        Chief Complaint:  Radiating back and leg pain     History of Present Illness:       This is a very pleasant 80 y.o. male who is now about 5 months status post L2-L5 laminectomy and fusion surgery that was complicated by a twist and near fall incident after he was discharged to home which resulted in an L5 fracture.  His hardware remains stable however he has never really regained the ability to ambulate without a walker.  He remains hunched forward and feels that he has an unstable gait.  Overall, he is comfortable particularly when sitting in a chair.  Drop that he was experiencing prior to surgery has resolved completely and he is happy about that.           Medications:       Current Medication      Current Outpatient Medications:     gabapentin (NEURONTIN) 600 MG tablet, Take 1 tablet by mouth 3 times daily for 90 days., Disp: 270 tablet, Rfl: 0    tamsulosin (FLOMAX) 0.4 MG capsule, TAKE 1 CAPSULE BY MOUTH EVERY DAY, Disp: 90 capsule, Rfl: 2    methylPREDNISolone (MEDROL DOSEPACK) 4 MG tablet, Take 1 tablet by mouth See Admin Instructions Per package instructions (Patient not taking: Reported on 5/14/2025), Disp: 1 kit, Rfl: 0    tiZANidine (ZANAFLEX) 4 MG tablet, Take 1 tablet by mouth 3 times daily as needed (muscle spasm), Disp: 30 tablet, Rfl: 0    Melatonin 10 MG TABS, Take 1 tablet by mouth at bedtime, Disp: , Rfl:     DULoxetine (CYMBALTA) 60 MG extended release capsule, Take 1 capsule by mouth daily, Disp: 90 capsule, Rfl: 3    atorvastatin (LIPITOR) 20 MG tablet, TAKE 1 TABLET BY MOUTH EVERY DAY (Patient taking differently: Take 1 tablet by mouth every morning TAKE 1 TABLET BY MOUTH EVERY DAY), Disp: 90 tablet, Rfl: 3    pantoprazole (PROTONIX) 40 MG tablet, TAKE 1 TABLET BY MOUTH EVERY DAY, Disp: 90 tablet, Rfl: 2    montelukast (SINGULAIR) 10 MG tablet, TAKE 1 TABLET BY MOUTH EVERY DAY (Patient taking

## 2025-07-22 NOTE — ANESTHESIA PRE PROCEDURE
Start date:      Quit date:      Years since quittin.5    Smokeless tobacco: Never    Tobacco comments:     Quit smokin   Substance Use Topics    Alcohol use: Yes     Alcohol/week: 7.0 standard drinks of alcohol     Types: 7 Glasses of wine per week                                Counseling given: Not Answered  Tobacco comments: Quit smokin      Vital Signs (Current):   Vitals:    25 0847   BP: (!) 162/81   Pulse: 93   Resp: 18   Temp: 98.3 °F (36.8 °C)   TempSrc: Infrared   SpO2: 96%   Weight: 96.4 kg (212 lb 9.6 oz)   Height: 1.651 m (5' 5\")                                              BP Readings from Last 3 Encounters:   25 (!) 162/81   07/10/25 (!) 159/87   25 126/82       NPO Status: Time of last liquid consumption: 0600                        Time of last solid consumption: 1700                        Date of last liquid consumption: 25                        Date of last solid food consumption: 25    BMI:   Wt Readings from Last 3 Encounters:   25 96.4 kg (212 lb 9.6 oz)   07/10/25 98.6 kg (217 lb 6.4 oz)   25 99.2 kg (218 lb 9.6 oz)     Body mass index is 35.38 kg/m².    CBC:   Lab Results   Component Value Date/Time    WBC 10.2 07/10/2025 11:43 AM    RBC 3.91 07/10/2025 11:43 AM    HGB 12.3 07/10/2025 11:43 AM    HCT 37.8 07/10/2025 11:43 AM    MCV 96.7 07/10/2025 11:43 AM    .6** 2024 12:10 PM    RDW 14.2 07/10/2025 11:43 AM     07/10/2025 11:43 AM       CMP:   Lab Results   Component Value Date/Time     07/10/2025 11:43 AM    K 4.4 07/10/2025 11:43 AM     07/10/2025 11:43 AM    CO2 25 07/10/2025 11:43 AM    BUN 9 07/10/2025 11:43 AM    CREATININE 0.84 07/10/2025 11:43 AM    GFRAA 86 10/06/2021 10:17 AM    AGRATIO 2.1 2022 10:27 AM    LABGLOM 89 07/10/2025 11:43 AM    LABGLOM 78 2024 03:18 PM    LABGLOM 89 2022 10:27 AM    GLUCOSE 114 07/10/2025 11:43 AM    CALCIUM 9.5 07/10/2025 11:43 AM

## 2025-07-22 NOTE — PROGRESS NOTES
ER[facility] End of Shift-Days For Spine Surgery Patient    * Day of Surgery *   Ideal body weight: 61.5 kg (135 lb 9.3 oz)    OOB POD#0 (within 6 hours of end anesthesia-if not contraindicated) Yes    ADULT DIET; Regular  ADULT ORAL NUTRITION SUPPLEMENT; AM Snack, PM Snack; Other Oral Supplement; Ensure Surgery    Tolerating Diet?: Yes    Ensure Surgery Immunonutrition Shakes - 2 per day (Starting POD 0)?  Yes    Ambulated in drake 1 times.     Up to chair for 0 meals.    Lidocaine: Yes Discontinued POD#1 approx 0600 No POD#0    PRN Pain Medications Used?: No    IS Used 10x/hr while awake: Yes     DRAINS? Yes    Melendrez? Yes    BM?  No   Passing flatus? Yes    TEDs Yes    SCDs Yes         Signed By: ALIREZA MAHAJAN RN     July 22, 2025

## 2025-07-22 NOTE — OP NOTE
[facility] [id number]   [phone]    OPERATIVE REPORT    Patient ID:Demarcus Gaxiola  [id number]  1945  80 y.o.    DATE OF SURGERY: 7/22/2025    SURGEON: Richard Han MD    PREOP DIAGNOSIS:     1. Lumbar stenosis  2. Prior lumbar laminectomy and fusion with instrumentation L2-L5  3. L5 burst fracture    POSTOP DIAGNOSIS:     1. Lumbar stenosis  2. Prior lumbar laminectomy and fusion with instrumentation L2-5  3. L5 burst fracture    PROCEDURE:  1. Posterolateral lumbar fusion without interbody fusion L5-S1  (CPT 75240)  2. Lumbar laminectomy and bilateral foraminotomies L5 (CPT 26801)  3. Instrumentation  L2-S1. (CPT 14272)  4. Allograft (CPT 63403)  5. Iliac fixation (CPT 89592)  6. Exploration L4-5 fusion (CPT 66933)     ANESTHESIA: General    ESTIMATED BLOOD LOSS:  500 ml    INTRAOPERATIVE COMPLICATIONS: None.    POSTOP CONDITION: Stable.    IMPLANTS:   Implant Name Type Inv. Item Serial No.  Lot No. LRB No. Used Action   ALLOGRAFT BNE CHIP 1-4 MM 30 CC CRUSH CAN - [id number]  ALLOGRAFT BNE CHIP 1-4 MM 30 CC CRUSH [facility] [id number] [employer] [facility]  N/A 1 Implanted   PUTTY BIO DBM 10 ML W CHIPS - [id number]  PUTTY BIO DBM 10 ML W CHIPS  [employer] [facility] [id number] N/A 1 Implanted   PUTTY BIO DBM 10 ML W CHIPS - [id number]  PUTTY BIO DBM 10 ML W CHIPS  [employer] [facility] [id number] N/A 1 Implanted   GRAFT BNE XL - [id number]  GRAFT BNE XL [id number] [employer]  N/A 1 Implanted   SCREW SPNL POLYAX 9.5X100 MM CARNEY - [id number]  SCREW SPNL POLYAX 9.5X100 MM CARNEY  [employer] [facility] [id number] N/A 1 Implanted   SCREW SPNL POLYAX 9.5X90 MM CARNEY - [id number]  SCREW SPNL POLYAX 9.5X90 MM CARNEY  [employer] [facility] [id number] N/A 1 Implanted   BLOCKER SPNL L50MM DIA6MM TI 1 JAMESON GANDHI 3 - [id number]  BLOCKER SPNL L50MM DIA6MM TI 1 JAMESON GANDHI 3  [employer] [facility]- [id number] N/A 12 Implanted  contralateral side by exposing the iliac crest and using the bur to create a cortical window. The Steffee probe was again directed under fluoroscopic guidance and this was followed by the tap and placement of the second iliac screw. With the iliac bolts in place, attention was directed toward the previous lumbar instrumentation.     The 4 mm vidal was used to decorticate the previously exposed transverse processes and lateral aspect of the facet joints and pars intra-articularis.  The sacral ala was decorticated as well. Pedicle screws were placed bilaterally in S1. The medial border of the pedicle was visualized through the spinal canal to confirm no medial or inferior breech. This was felt to be satisfactory. At this point the Protios soaked allograft was then packed into the lateral gutters beneath the screw heads, along the decorticated transverse processes and lateral facet joints for the posterolateral arthrodesis at L5-S1.    Appropriately sized rods were then selected and bent into additional lordosis and laid into the pedicle screw heads from L2 - S1 and connectors were used to attach the iliac bolts to the lumbar rods. The set screws were then applied and tightened to the appropriate torque. C-arm fluoroscopy was brought in and used to obtain images to confirm appropriate hardware level and placement. This was felt to be satisfactory.  A cross-link was added minimize the risk of pull-out and provide additional rotational stability. With this, the wound was liberally irrigated and a hemovac drain was inserted through a separate incision in a subfascial plane. The lumbodorsal fascia, subcutaneous tissue, and skin were approximated in a layered fashion with absorbable monofilament sutures. Dermabond was applied. Sterile dressings were applied. The patient tolerated the procedure well and was returned to the postanesthesia care unit in stable condition. At the end of the case, all sponge, needle, and instrument

## 2025-07-22 NOTE — ANESTHESIA PROCEDURE NOTES
Airway  Date/Time: 7/22/2025 10:34 AM  Urgency: elective    Airway not difficult    General Information and Staff    Patient location during procedure: OR  Performed: other anesthesia staff   Performed by: Parish Cifuentes APRN - CRNA  Authorized by: Pedro Cloud DO      Indications and Patient Condition  Indications for airway management: anesthesia  Sedation level: deep  Preoxygenated: yes  Patient position: sniffing  MILS not maintained throughout  Mask difficulty assessment: vent by bag mask + OA or adjuvant +/- NMBA    Final Airway Details  Final airway type: endotracheal airway      Successful airway: ETT  Cuffed: yes   Successful intubation technique: direct laryngoscopy  Facilitating devices/methods: intubating stylet  Endotracheal tube insertion site: oral  Blade: Jose  Blade size: #4  ETT size (mm): 8.0  Cormack-Lehane Classification: grade IIa - partial view of glottis  Placement verified by: chest auscultation and capnometry   Measured from: lips  ETT to lips (cm): 23  Number of attempts at approach: 1  Number of other approaches attempted: 0    Additional Comments  Placed by Fidelina Lux SRNA, lips and teeth unchanged.  no

## 2025-07-22 NOTE — CARE COORDINATION
Case Management Assessment  Initial Evaluation    Date/Time of Evaluation: 7/22/2025 4:05 PM  Assessment Completed by: HANNAH HOANG    If patient is discharged prior to next notation, then this note serves as note for discharge by case management.    Patient Name: Demarcus Gaxiola                   YOB: 1945  Diagnosis:   Fracture of fifth lumbar vertebra (HCC) [S32.059A]  S/P lumbar spinal arthrodesis [Z98.1]  Spinal stenosis, lumbar region, with neurogenic claudication [M48.062]  Lumbar radiculopathy [M54.16]  S/P lumbar fusion [Z98.1]                   Date / Time: 7/22/2025  7:35 AM    Patient Admission Status: Inpatient   Readmission Risk (Low < 19, Mod (19-27), High > 27): Readmission Risk Score: 4.5        Current PCP: Cezar Wright MD  PCP verified by CM? (P) Yes    Chart Reviewed: Yes      History Provided by: (P) Patient, Significant Other, Child/Family, Medical Record  Patient Orientation: (P) Alert and Oriented    Patient Cognition: (P) Alert    Hospitalization in the last 30 days (Readmission):  No    If yes, Readmission Assessment in CM Navigator will be completed.    Advance Directives:      Code Status: Full Code   Patient's Primary Decision Maker is: (P) Named in Scanned ACP Document    Primary Decision Maker: Parvin Gaxiola - Spouse - [phone]    Secondary Decision Maker: Cris Gaxiola - Other Relative - [phone]    Supplemental (Other) Decision Maker: Ismael Gaxiola - Other Relative - [phone]    Supplemental (Other) Decision Maker: Vicky Chong - Other Relative - [phone]    Discharge Planning:    Patient lives with: (P) Spouse/Significant Other Type of Home: (P) Acute Rehab  Primary Care Giver: (P) Self  Patient Support Systems include: (P) Spouse/Significant Other, Children, Family Members   Current Financial resources: (P) Medicare  Current community resources: (P) None  Current services prior to admission: (P) C-pap, Durable Medical Equipment      Arrangements Spouse/Significant Other   Current Services Prior To Admission C-pap;Durable Medical Equipment   Current DME Prior to Arrival Cane;Shower Chair;Walker   Potential Assistance Purchasing Medications No   Patient expects to be discharged to: Acute rehab   One/Two Story Residence Two story   # of Interior Steps 14   Lift Chair Available Yes   History of falls? 1   Services At/After Discharge   Transition of Care Consult (CM Consult) Discharge Planning   Condition of Participation: Discharge Planning   Freedom of Choice list was provided with basic dialogue that supports the patient's individualized plan of care/goals, treatment preferences, and shares the quality data associated with the providers?  Yes

## 2025-07-22 NOTE — PROGRESS NOTES
TRANSFER - IN REPORT:    Verbal report received from AprilKRISTINA on Demarcus Gaxiola  being received from PACU for routine progression of patient care      Report consisted of patient's Situation, Background, Assessment and   Recommendations(SBAR).     Information from the following report(s) Nurse Handoff Report was reviewed with the receiving nurse.    Opportunity for questions and clarification was provided.      Assessment completed upon patient's arrival to unit and care assumed.

## 2025-07-22 NOTE — PROGRESS NOTES
4 Eyes Skin Assessment     NAME:  Demarcus Gaxiola  YOB: 1945  MEDICAL RECORD NUMBER:  [id number]    The patient is being assessed for  Admission    I agree that at least one RN has performed a thorough Head to Toe Skin Assessment on the patient. ALL assessment sites listed below have been assessed.      Areas assessed by both nurses:    Head, Face, Ears, Shoulders, Back, Chest, Arms, Elbows, Hands, Sacrum. Buttock, Coccyx, Ischium, Legs. Feet and Heels, and Under Medical Devices         Does the Patient have a Wound? No noted wound(s)       Leonidas Prevention initiated by RN: Yes  Wound Care Orders initiated by RN: Yes    For hospital-acquired stage 1 & 2 and ALL Stage 3,4, Unstageable, DTI, NWPT, and Complex wounds: place order “IP Wound Care/Ostomy Nurse Eval and Treat” by RN under : Yes    New Ostomies, if present place, Ostomy referral order under : No     Nurse 1 eSignature: Electronically signed by ALIREZA MAHAJAN RN on 7/22/25 at 5:39 PM EDT    **SHARE this note so that the co-signing nurse can place an eSignature**    Nurse 2 eSignature: Electronically signed by Xochitl Evans RN on 7/22/25 at 5:40 PM EDT

## 2025-07-22 NOTE — ANESTHESIA PROCEDURE NOTES
Arterial Line:    An arterial line was placed using surface landmarks, in the OR for the following indication(s): continuous blood pressure monitoring.    A 20 gauge (size), 4.45 cm (length), Arrow (type) catheter was placed, Seldinger technique used, into the left radial artery, secured by tape and Tegaderm.  Anesthesia type: General    Events:  patient tolerated procedure well with no complications.    Additional notes:  Placed by Fidelina Lux [facility]7/22/2025 10:38 AM7/22/2025 10:45 AM  Performed: Other staff   Preanesthetic Checklist  Completed: patient identified, IV checked, risks and benefits discussed, surgical/procedural consents, equipment checked, pre-op evaluation, timeout performed, anesthesia consent given, oxygen available, monitors applied/VS acknowledged, fire risk safety assessment completed and verbalized and blood product R/B/A discussed and consented

## 2025-07-22 NOTE — PERIOP NOTE
TRANSFER - OUT REPORT:    Verbal report given to KRISTINA Martin on Demarcus Gaxiola  being transferred to [facility] for routine progression of patient care       Report consisted of patient's Situation, Background, Assessment and   Recommendations(SBAR).     Information from the following report(s) Nurse Handoff Report, Surgery Report, MAR, Cardiac Rhythm Sinus tach, and Neuro Assessment was reviewed with the receiving nurse.           Lines:   Peripheral IV 07/22/25 Left;Posterior Hand (Active)   Site Assessment Clean, dry & intact 07/22/25 1430   Line Status Brisk blood return;Infusing 07/22/25 1430   Line Care Connections checked and tightened 07/22/25 1430   Phlebitis Assessment No symptoms 07/22/25 1430   Infiltration Assessment 0 07/22/25 1430   Alcohol Cap Used No 07/22/25 1430   Dressing Status Clean, dry & intact 07/22/25 1430   Dressing Type Transparent 07/22/25 1430       Peripheral IV 07/22/25 Right Wrist (Active)        Opportunity for questions and clarification was provided.      Patient transported with:  O2 @ 2lpm and Patient-specific medications from Pharmacy

## 2025-07-22 NOTE — PROGRESS NOTES
ACUTE PHYSICAL THERAPY GOALS:   (Developed with and agreed upon by patient and/or caregiver.)    (1.) Demarcus Gaxiola  will move from supine to sit and sit to supine  with SUPERVISION within 7 treatment day(s).    (2.) Demarcus Gaxiola will transfer from bed to chair and chair to bed with SUPERVISION using the least restrictive device within 7 treatment day(s).    (3.) Demarcus Gaxiola will ambulate with SUPERVISION for 500 feet with the least restrictive device within 7 treatment day(s).   (4.) Demarcus Gaxiola will perform standing static and dynamic balance activities x 10 minutes with SUPERVISION to improve safety within 7 treatment day(s).  (5.) Demarcus Gaxiola will perform therapeutic exercises x 15 min for HEP with SUPERVISION to improve strength, endurance, and functional mobility within 7 treatment day(s).      PHYSICAL THERAPY Initial Assessment, Daily Note, and PM  (Link to Caseload Tracking: PT Visit Days : 1  Acknowledge Orders  Time In/Out  PT Charge Capture  Rehab Caseload Tracker    Demarcus Gaxiola is a 80 y.o. male   PRIMARY DIAGNOSIS: Spinal stenosis, lumbar region, with neurogenic claudication  Fracture of fifth lumbar vertebra (HCC) [S32.059A]  S/P lumbar spinal arthrodesis [Z98.1]  Spinal stenosis, lumbar region, with neurogenic claudication [M48.062]  Lumbar radiculopathy [M54.16]  S/P lumbar fusion [Z98.1]  Procedure(s) (LRB):  eras\L5 laminectomy and L5-S1 fusion with allograft, L2-S1 instrumentation and iliac fixation. (N/A)  * Day of Surgery *  Reason for Referral: Generalized Muscle Weakness (M62.81)  Difficulty in walking, Not elsewhere classified (R26.2)  Low Back Pain (M54.5)  Inpatient: Payor: [employer] MEDICARE / Plan: [employer] MEDICARE-ADVANTAGE PPO / Product Type: Medicare /     ASSESSMENT:     REHAB RECOMMENDATIONS:   Recommendation to date pending progress:  Setting:  Inpatient Rehab Facility    Equipment:    To Be Determined     ASSESSMENT:  Mr. Gaxiola is a 80 year old

## 2025-07-23 PROBLEM — Z51.89 ENCOUNTER FOR OTHER SPECIFIED AFTERCARE: Status: ACTIVE | Noted: 2025-07-23

## 2025-07-23 PROBLEM — Z78.9 DECREASED ACTIVITIES OF DAILY LIVING (ADL): Status: ACTIVE | Noted: 2025-07-23

## 2025-07-23 PROBLEM — R26.9 GAIT ABNORMALITY: Status: ACTIVE | Noted: 2025-07-23

## 2025-07-23 LAB
ALBUMIN SERPL-MCNC: 3.3 G/DL (ref 3.2–4.6)
ALBUMIN/GLOB SERPL: 1.1 (ref 1–1.9)
ALP SERPL-CCNC: 89 U/L (ref 40–129)
ALT SERPL-CCNC: 17 U/L (ref 8–55)
ANION GAP BLD CALC-SCNC: ABNORMAL MMOL/L
ANION GAP SERPL CALC-SCNC: 12 MMOL/L (ref 7–16)
AST SERPL-CCNC: 24 U/L (ref 15–37)
BASE DEFICIT BLD-SCNC: 0.1 MMOL/L
BASOPHILS # BLD: 0.02 K/UL (ref 0–0.2)
BASOPHILS NFR BLD: 0.1 % (ref 0–2)
BILIRUB SERPL-MCNC: 0.3 MG/DL (ref 0–1.2)
BUN SERPL-MCNC: 8 MG/DL (ref 8–23)
CA-I BLD-MCNC: 1.22 MMOL/L (ref 1.12–1.32)
CALCIUM SERPL-MCNC: 9 MG/DL (ref 8.8–10.2)
CHLORIDE SERPL-SCNC: 101 MMOL/L (ref 98–107)
CO2 BLD-SCNC: 25 MMOL/L (ref 13–23)
CO2 SERPL-SCNC: 25 MMOL/L (ref 20–29)
CREAT SERPL-MCNC: 0.89 MG/DL (ref 0.8–1.3)
DIFFERENTIAL METHOD BLD: ABNORMAL
EOSINOPHIL # BLD: 0 K/UL (ref 0–0.8)
EOSINOPHIL NFR BLD: 0 % (ref 0.5–7.8)
ERYTHROCYTE [DISTWIDTH] IN BLOOD BY AUTOMATED COUNT: 14.4 % (ref 11.9–14.6)
GLOBULIN SER CALC-MCNC: 2.9 G/DL (ref 2.3–3.5)
GLUCOSE BLD STRIP.AUTO-MCNC: 145 MG/DL (ref 65–100)
GLUCOSE SERPL-MCNC: 120 MG/DL (ref 70–99)
HCO3 BLD-SCNC: 25.8 MMOL/L (ref 21–28)
HCT VFR BLD AUTO: 29 % (ref 41.1–50.3)
HGB BLD-MCNC: 9.6 G/DL (ref 13.6–17.2)
IMM GRANULOCYTES # BLD AUTO: 0.05 K/UL (ref 0–0.5)
IMM GRANULOCYTES NFR BLD AUTO: 0.4 % (ref 0–5)
LYMPHOCYTES # BLD: 1.64 K/UL (ref 0.5–4.6)
LYMPHOCYTES NFR BLD: 11.6 % (ref 13–44)
MCH RBC QN AUTO: 32.2 PG (ref 26.1–32.9)
MCHC RBC AUTO-ENTMCNC: 33.1 G/DL (ref 31.4–35)
MCV RBC AUTO: 97.3 FL (ref 82–102)
MONOCYTES # BLD: 1.45 K/UL (ref 0.1–1.3)
MONOCYTES NFR BLD: 10.2 % (ref 4–12)
NEUTS SEG # BLD: 11 K/UL (ref 1.7–8.2)
NEUTS SEG NFR BLD: 77.7 % (ref 43–78)
NRBC # BLD: 0 K/UL (ref 0–0.2)
PCO2 BLD: 46.9 MMHG (ref 35–45)
PH BLD: 7.35 (ref 7.35–7.45)
PLATELET # BLD AUTO: 231 K/UL (ref 150–450)
PMV BLD AUTO: 9.1 FL (ref 9.4–12.3)
PO2 BLD: 139 MMHG (ref 75–100)
POTASSIUM BLD-SCNC: 3.4 MMOL/L (ref 3.5–5.1)
POTASSIUM SERPL-SCNC: 3.7 MMOL/L (ref 3.5–5.1)
PROT SERPL-MCNC: 6.2 G/DL (ref 6.3–8.2)
RBC # BLD AUTO: 2.98 M/UL (ref 4.23–5.6)
SAO2 % BLD: 99 %
SERVICE CMNT-IMP: ABNORMAL
SODIUM BLD-SCNC: 137 MMOL/L (ref 136–145)
SODIUM SERPL-SCNC: 137 MMOL/L (ref 136–145)
SPECIMEN SITE: ABNORMAL
WBC # BLD AUTO: 14.2 K/UL (ref 4.3–11.1)

## 2025-07-23 PROCEDURE — 99222 1ST HOSP IP/OBS MODERATE 55: CPT | Performed by: STUDENT IN AN ORGANIZED HEALTH CARE EDUCATION/TRAINING PROGRAM

## 2025-07-23 PROCEDURE — 97530 THERAPEUTIC ACTIVITIES: CPT

## 2025-07-23 PROCEDURE — 36415 COLL VENOUS BLD VENIPUNCTURE: CPT

## 2025-07-23 PROCEDURE — 85025 COMPLETE CBC W/AUTO DIFF WBC: CPT

## 2025-07-23 PROCEDURE — 97535 SELF CARE MNGMENT TRAINING: CPT

## 2025-07-23 PROCEDURE — 6370000000 HC RX 637 (ALT 250 FOR IP): Performed by: ORTHOPAEDIC SURGERY

## 2025-07-23 PROCEDURE — 1100000000 HC RM PRIVATE

## 2025-07-23 PROCEDURE — 2500000003 HC RX 250 WO HCPCS: Performed by: ORTHOPAEDIC SURGERY

## 2025-07-23 PROCEDURE — 6360000002 HC RX W HCPCS: Performed by: ORTHOPAEDIC SURGERY

## 2025-07-23 PROCEDURE — 97165 OT EVAL LOW COMPLEX 30 MIN: CPT

## 2025-07-23 PROCEDURE — 80053 COMPREHEN METABOLIC PANEL: CPT

## 2025-07-23 RX ADMIN — GABAPENTIN 600 MG: 300 CAPSULE ORAL at 12:33

## 2025-07-23 RX ADMIN — POLYETHYLENE GLYCOL 3350 17 G: 17 POWDER, FOR SOLUTION ORAL at 08:21

## 2025-07-23 RX ADMIN — MONTELUKAST 10 MG: 10 TABLET, FILM COATED ORAL at 20:58

## 2025-07-23 RX ADMIN — BISACODYL 5 MG: 5 TABLET, COATED ORAL at 08:21

## 2025-07-23 RX ADMIN — DULOXETINE HYDROCHLORIDE 60 MG: 60 CAPSULE, DELAYED RELEASE ORAL at 08:21

## 2025-07-23 RX ADMIN — KETOROLAC TROMETHAMINE 15 MG: 15 INJECTION, SOLUTION INTRAMUSCULAR; INTRAVENOUS at 12:33

## 2025-07-23 RX ADMIN — PANTOPRAZOLE SODIUM 40 MG: 40 TABLET, DELAYED RELEASE ORAL at 08:21

## 2025-07-23 RX ADMIN — KETOROLAC TROMETHAMINE 15 MG: 15 INJECTION, SOLUTION INTRAMUSCULAR; INTRAVENOUS at 04:06

## 2025-07-23 RX ADMIN — CEFAZOLIN 2000 MG: 10 INJECTION, POWDER, FOR SOLUTION INTRAVENOUS at 01:56

## 2025-07-23 RX ADMIN — ACETAMINOPHEN 650 MG: 325 TABLET ORAL at 04:06

## 2025-07-23 RX ADMIN — ACETAMINOPHEN 650 MG: 325 TABLET ORAL at 20:59

## 2025-07-23 RX ADMIN — TAMSULOSIN HYDROCHLORIDE 0.4 MG: 0.4 CAPSULE ORAL at 08:21

## 2025-07-23 RX ADMIN — LOSARTAN POTASSIUM 50 MG: 50 TABLET, FILM COATED ORAL at 08:21

## 2025-07-23 RX ADMIN — SODIUM CHLORIDE, PRESERVATIVE FREE 10 ML: 5 INJECTION INTRAVENOUS at 08:26

## 2025-07-23 RX ADMIN — GABAPENTIN 600 MG: 300 CAPSULE ORAL at 20:58

## 2025-07-23 RX ADMIN — ACETAMINOPHEN 650 MG: 325 TABLET ORAL at 14:07

## 2025-07-23 RX ADMIN — GABAPENTIN 600 MG: 300 CAPSULE ORAL at 08:21

## 2025-07-23 RX ADMIN — ATORVASTATIN CALCIUM 20 MG: 20 TABLET, FILM COATED ORAL at 08:21

## 2025-07-23 RX ADMIN — SODIUM CHLORIDE, PRESERVATIVE FREE 10 ML: 5 INJECTION INTRAVENOUS at 20:59

## 2025-07-23 ASSESSMENT — PAIN SCALES - GENERAL: PAINLEVEL_OUTOF10: 0

## 2025-07-23 NOTE — PROGRESS NOTES
End of Shift-Night Spine Eras Patient  1 Day Post-Op   Ideal body weight: 61.5 kg (135 lb 9.3 oz)    OOB POD#0 (within 6 hours of end anesthesia-if not contraindicated) Yes    Ambulated in drake 0 times.     Up to chair 0 times    Lidocaine: Yes Discontinued POD#1 approx 0600 Yes    PRN Pain Medications Used?: No    IS Used 10x/hr while awke: Yes     DRAINS? Yes    Melendrez? No    BM?  No   Passing flatus? Yes    TEDs Yes    SCDs Yes         Signed By: ROSS SANDHU RN     July 23, 2025

## 2025-07-23 NOTE — PROGRESS NOTES
ACUTE OCCUPATIONAL THERAPY GOALS:   (Developed with and agreed upon by patient and/or caregiver.)  1. Patient will verbalize and demonstrate understanding of spinal precautions with 100% accuracy during ADLs.   2. Patient will complete lower body bathing and dressing with Min A and adaptive equipment as needed.   3. Patient will complete functional transfers with modified independence and adaptive equipment as needed.   4. Patient will complete toileting and toilet transfer with Min A.   5. Patient will complete functional mobility of household distances with modified independence and adaptive equipment as needed.   6. Patient will demonstrate ability to log roll in bed with modified independence and no verbal cues from therapist.     Timeframe: 7 visits      OCCUPATIONAL THERAPY Initial Assessment, Daily Note, and AM       OT Visit Days: 1  Acknowledge Orders  Time  OT Charge Capture  Rehab Caseload Tracker      Demarcus Gaxiola is a 80 y.o. male   PRIMARY DIAGNOSIS: Spinal stenosis, lumbar region, with neurogenic claudication  Fracture of fifth lumbar vertebra (HCC) [S32.059A]  S/P lumbar spinal arthrodesis [Z98.1]  Spinal stenosis, lumbar region, with neurogenic claudication [M48.062]  Lumbar radiculopathy [M54.16]  S/P lumbar fusion [Z98.1]  Procedure(s) (LRB):  eras\L5 laminectomy and L5-S1 fusion with allograft, L2-S1 instrumentation and iliac fixation. (N/A)  1 Day Post-Op  Reason for Referral: Generalized Muscle Weakness (M62.81)  Other lack of cordination (R27.8)  Low Back Pain (M54.5)  Inpatient: Payor: [facility] MEDICARE / Plan: AET MEDICARE-ADVANTAGE PPO / Product Type: Medicare /     ASSESSMENT:     REHAB RECOMMENDATIONS:   Recommendation to date pending progress:  Setting:  Inpatient Rehab Facility     Equipment:    To Be Determined     ASSESSMENT:  Mr. Gaxiola is a 80 year old male presenting to the hospital for spinal stenosis. Pt has a history of a L2-L5 laminectomy and fusion surgery 5 months ago

## 2025-07-23 NOTE — CONSULTS
[facility]  Inpatient Rehab Consult        Admission Date: 7/22/2025  Primary Care Provider: Cezar Wright MD  Specialty Group / Referring Service: Orthopedic spine surgeon      Chief Complaint : Low back pain, left lower extremity weakness  Admitting Diagnosis:   Fracture of fifth lumbar vertebra (HCC) [S32.059A]  S/P lumbar spinal arthrodesis [Z98.1]  Spinal stenosis, lumbar region, with neurogenic claudication [M48.062]  Lumbar radiculopathy [M54.16]  S/P lumbar fusion [Z98.1]    Principal Problem:    Spinal stenosis, lumbar region, with neurogenic claudication  Active Problems:    S/P lumbar fusion    Fracture of fifth lumbar vertebra (HCC)    S/P lumbar spinal arthrodesis    Lumbar radiculopathy  Resolved Problems:    * No resolved hospital problems. *      Acute Rehab Diagnoses:  Encounter for rehabilitation [Z51.89]   Abnormality of gait and mobility [R26.9]  Decreased independence for activities of daily living (ADL) [Z78.9]  Physical debility / deconditioning [R53.81]      Medical Dx:  Past Medical History:   Diagnosis Date    Thrasher esophagus     BPH (benign prostatic hyperplasia)     GERD (gastroesophageal reflux disease)     medication daily, thrasher's esophogus, 2 pillows    High frequency hearing loss     Hypercholesteremia     on med    Hypertension     on med for control     Nocturnal hypoxemia     wears CPAP, states prescribed inhaler for congestion caused by CPAP- shabana asthma/copd    OA (osteoarthritis)     LEOBARDO (obstructive sleep apnea) 02/24/2023    wears CPAP, states prescribed inhaler for congestion caused by CPAP- shabana asthma/copd    Rhinitis     SNHL (sensorineural hearing loss)     Tinnitus of both ears     Wheezing     pro-air inhaler prn; last used 3/12/18; [facility] work-[facility] was negative for asthma        Date of Evaluation: July 23, 2025    Subjective       HPI: Demarcus Gaxiola is a 80 y.o. male patient who presented to [facility]  apnea) 2023    wears CPAP, states prescribed inhaler for congestion caused by CPAP- shabana asthma/copd    Rhinitis     SNHL (sensorineural hearing loss)     Tinnitus of both ears     Wheezing     pro-air inhaler prn; last used 3/12/18; [facility] work-up was negative for asthma       Past Surgical History:   Procedure Laterality Date    COLONOSCOPY      DENTAL SURGERY      KNEE ARTHROSCOPY Right 2005    muscle repaired    LAMINECTOMY N/A 2025    L2-5 laminectomy performed by Richard Mendosa MD at [facility] MAIN OR    LUMBAR FUSION N/A 2025    ERAS\L4-L5 direct lateral interbody fusion with interbody cage, allograft performed by Richard Mendosa MD at [facility] MAIN OR    LUMBAR FUSION N/A 2025    ERAS\L2-L5 posterior fusion with allograft and instrumentation. performed by Richard Mendosa MD at [facility] MAIN OR    TOTAL KNEE ARTHROPLASTY Left 2018     Allergies   Allergen Reactions    Cat Dander Other (See Comments)     Runny eyes      Family History   Problem Relation Age of Onset    Hypertension Brother     Depression Brother     Depression Father     Hypertension Father     High Cholesterol Mother     Hypertension Mother     Ovarian Cancer Sister     Cancer Sister     Heart Attack Maternal Grandmother     Heart Attack Maternal Grandfather     Heart Attack Paternal Grandmother       Social History     Tobacco Use    Smoking status: Former     Current packs/day: 0.00     Average packs/day: 0.5 packs/day for 39.0 years (19.5 ttl pk-yrs)     Types: Cigarettes     Start date:      Quit date:      Years since quittin.5    Smokeless tobacco: Never    Tobacco comments:     Quit smokin   Substance Use Topics    Alcohol use: Yes     Alcohol/week: 7.0 standard drinks of alcohol     Types: 7 Glasses of wine per week      Current Facility-Administered Medications   Medication Dose Route Frequency    albuterol (PROVENTIL) (2.5 MG/3ML) 0.083% nebulizer solution 2.5 mg

## 2025-07-23 NOTE — PROGRESS NOTES
ORTHO PROGRESS NOTE    2025    Admit Date: 2025  Post Op day: 1 Day Post-Op      Subjective:     Demarcus Gaxiola is a patient who is now 1 Day Post-Op  and has no complaints.       Objective:     PT/OT:    Progressing    Vital Signs:    Patient Vitals for the past 8 hrs:   BP Temp Temp src Pulse Resp SpO2   25 0835 (!) 157/88 98.4 °F (36.9 °C) Oral 91 16 100 %     Temp (24hrs), Av.3 °F (36.8 °C), Min:97.5 °F (36.4 °C), Max:99 °F (37.2 °C)      LAB:    Recent Labs     25  1042   HGB 9.6*   WBC 14.2*          Physical Exam:    Awake and in no acute distress.  Mood and affect appropriate.  Respirations unlabored and no evidence cyanosis.  Dressing clean/dry  No new neurologic deficit.    Assessment:      1 Day Post-Op STATUS POST Procedure(s):  eras\L5 laminectomy and L5-S1 fusion with allograft, L2-S1 instrumentation and iliac fixation.      Plan:     Progressing as expected. Continue PT/OT  Anticipate discharge to: 9th floor vs SNF      Signed By: MEGAN LLANES MD

## 2025-07-23 NOTE — PROGRESS NOTES
ACUTE PHYSICAL THERAPY GOALS:   (Developed with and agreed upon by patient and/or caregiver.)  (1.) Demarcus Gaxiola  will move from supine to sit and sit to supine  with SUPERVISION within 7 treatment day(s).    (2.) Deamrcus Gaxiola will transfer from bed to chair and chair to bed with SUPERVISION using the least restrictive device within 7 treatment day(s).    (3.) Demarcus Gaxiola will ambulate with SUPERVISION for 500 feet with the least restrictive device within 7 treatment day(s).   (4.) Demarcus Gaxiola will perform standing static and dynamic balance activities x 10 minutes with SUPERVISION to improve safety within 7 treatment day(s).  (5.) Demarcus Gaxiola will perform therapeutic exercises x 15 min for HEP with SUPERVISION to improve strength, endurance, and functional mobility within 7 treatment day(s).         PHYSICAL THERAPY: Daily Note AM   (Link to Caseload Tracking: PT Visit Days : 2  Time In/Out PT Charge Capture  Rehab Caseload Tracker  Orders    Demarcus Gaxiola is a 80 y.o. male   PRIMARY DIAGNOSIS: Spinal stenosis, lumbar region, with neurogenic claudication  Fracture of fifth lumbar vertebra (HCC) [S32.059A]  S/P lumbar spinal arthrodesis [Z98.1]  Spinal stenosis, lumbar region, with neurogenic claudication [M48.062]  Lumbar radiculopathy [M54.16]  S/P lumbar fusion [Z98.1]  Procedure(s) (LRB):  eras\L5 laminectomy and L5-S1 fusion with allograft, L2-S1 instrumentation and iliac fixation. (N/A)  1 Day Post-Op  Inpatient: Payor: [employer] MEDICARE / Plan: [employer] MEDICARE-ADVANTAGE PPO / Product Type: Medicare /     ASSESSMENT:     REHAB RECOMMENDATIONS:   Recommendation to date pending progress:  Setting:  Inpatient Rehab Facility    Equipment:    To Be Determined     ASSESSMENT:  Mr. Gaxiola continues to make steady progress toward goals with increased gait distances and activity tolerance.  The patient required verbal cueing for log rolling technique to prevent twisting.  He

## 2025-07-23 NOTE — PROGRESS NOTES
ACUTE PHYSICAL THERAPY GOALS:   (Developed with and agreed upon by patient and/or caregiver.)  (1.) Demarcus Gaxiola  will move from supine to sit and sit to supine  with SUPERVISION within 7 treatment day(s).    (2.) Demarcus Gaxiola will transfer from bed to chair and chair to bed with SUPERVISION using the least restrictive device within 7 treatment day(s).    (3.) Demarcus Gaxiola will ambulate with SUPERVISION for 500 feet with the least restrictive device within 7 treatment day(s).   (4.) Demarcus Gaxiola will perform standing static and dynamic balance activities x 10 minutes with SUPERVISION to improve safety within 7 treatment day(s).  (5.) Demarcus Gaxiola will perform therapeutic exercises x 15 min for HEP with SUPERVISION to improve strength, endurance, and functional mobility within 7 treatment day(s).         PHYSICAL THERAPY: Daily Note PM   (Link to Caseload Tracking: PT Visit Days : 2  Time In/Out PT Charge Capture  Rehab Caseload Tracker  Orders    Demarcus Gaxiola is a 80 y.o. male   PRIMARY DIAGNOSIS: Spinal stenosis, lumbar region, with neurogenic claudication  Fracture of fifth lumbar vertebra (HCC) [S32.059A]  S/P lumbar spinal arthrodesis [Z98.1]  Spinal stenosis, lumbar region, with neurogenic claudication [M48.062]  Lumbar radiculopathy [M54.16]  S/P lumbar fusion [Z98.1]  Procedure(s) (LRB):  eras\L5 laminectomy and L5-S1 fusion with allograft, L2-S1 instrumentation and iliac fixation. (N/A)  1 Day Post-Op  Inpatient: Payor: [employer] / Plan: [employer] MEDICARE-ADVANTAGE PPO / Product Type: Medicare /     ASSESSMENT:     REHAB RECOMMENDATIONS:   Recommendation to date pending progress:  Setting:  Inpatient Rehab Facility    Equipment:    To Be Determined     ASSESSMENT:  Mr. Gaxiola demonstrated good progress toward goals with improved bed mobility.  The patient performed log roll technique with SBA and did not require cueing this session.  He transfers with CGA and  performed hallway ambulation with the RW and CGA.  He did attempt a brief distance without the RW and is noted to be much more unsteady, requiring min A to maintain balance.  The patient returned to the room and to bed at the end of treatment, family at bedside.      SUBJECTIVE:   Mr. Gaxiola states, \"II feel better than last time\"     Social/Functional Lives With: Spouse  Type of Home: House  Home Layout: Two level  Home Access: Level entry  Bathroom Shower/Tub: Walk-in shower  Bathroom Toilet: Standard  Bathroom Equipment: Grab bars in shower, Hand-held shower, Shower chair, Commode  Bathroom Accessibility: Accessible  Home Equipment: Cane, Walker - Rolling, Rollator  Receives Help From: Family  Prior Level of Assist for ADLs: Independent  Prior Level of Assist for Homemaking: Independent  Homemaking Responsibilities: Yes  Prior Level of Assist for Transfers: Independent  Active : No  OBJECTIVE:     PAIN: VITALS / O2: PRECAUTION / LINES / DRAINS:   Pre Treatment:    0      Post Treatment: 0 Vitals        Oxygen    None    RESTRICTIONS/PRECAUTIONS:  Restrictions/Precautions  Restrictions/Precautions: Fall Risk  Position Activity Restriction  Spinal Precautions: No Bending/Lifting/Twisting (BLT)  Sternal Precautions:  (15# liftinr restriction)  Restrictions/Precautions: Fall Risk     MOBILITY: I Mod I S SBA CGA Min Mod Max Total  NT x2 Comments:   Bed Mobility    Rolling [] [] [] [x] [] [] [] [] [] [] []    Supine to Sit [] [] [] [x] [] [] [] [] [] [] []    Scooting [] [] [] [x] [] [] [] [] [] [] []    Sit to Supine [] [] [] [x] [] [] [] [] [] [] []    Transfers    Sit to Stand [] [] [] [] [x] [] [] [] [] [] []    Bed to Chair [] [] [] [] [] [] [] [] [] [x] []    Stand to Sit [] [] [] [] [x] [] [] [] [] [] []     [] [] [] [] [] [] [] [] [] [] []    I=Independent, Mod I=Modified Independent, S=Supervision, SBA=Standby Assistance, CGA=Contact Guard Assistance,   Min=Minimal Assistance, Mod=Moderate Assistance,

## 2025-07-24 PROCEDURE — 97530 THERAPEUTIC ACTIVITIES: CPT

## 2025-07-24 PROCEDURE — 1100000000 HC RM PRIVATE

## 2025-07-24 PROCEDURE — 6370000000 HC RX 637 (ALT 250 FOR IP): Performed by: ORTHOPAEDIC SURGERY

## 2025-07-24 PROCEDURE — 2500000003 HC RX 250 WO HCPCS: Performed by: ORTHOPAEDIC SURGERY

## 2025-07-24 PROCEDURE — 97535 SELF CARE MNGMENT TRAINING: CPT

## 2025-07-24 RX ADMIN — ACETAMINOPHEN 650 MG: 325 TABLET ORAL at 17:43

## 2025-07-24 RX ADMIN — GABAPENTIN 600 MG: 300 CAPSULE ORAL at 20:46

## 2025-07-24 RX ADMIN — BISACODYL 5 MG: 5 TABLET, COATED ORAL at 09:00

## 2025-07-24 RX ADMIN — LOSARTAN POTASSIUM 50 MG: 50 TABLET, FILM COATED ORAL at 09:00

## 2025-07-24 RX ADMIN — PANTOPRAZOLE SODIUM 40 MG: 40 TABLET, DELAYED RELEASE ORAL at 09:00

## 2025-07-24 RX ADMIN — ATORVASTATIN CALCIUM 20 MG: 20 TABLET, FILM COATED ORAL at 09:00

## 2025-07-24 RX ADMIN — GABAPENTIN 600 MG: 300 CAPSULE ORAL at 09:00

## 2025-07-24 RX ADMIN — GABAPENTIN 600 MG: 300 CAPSULE ORAL at 17:43

## 2025-07-24 RX ADMIN — ACETAMINOPHEN 650 MG: 325 TABLET ORAL at 05:59

## 2025-07-24 RX ADMIN — DULOXETINE HYDROCHLORIDE 60 MG: 60 CAPSULE, DELAYED RELEASE ORAL at 09:00

## 2025-07-24 RX ADMIN — SODIUM CHLORIDE, PRESERVATIVE FREE 10 ML: 5 INJECTION INTRAVENOUS at 09:04

## 2025-07-24 RX ADMIN — SODIUM CHLORIDE, PRESERVATIVE FREE 10 ML: 5 INJECTION INTRAVENOUS at 20:46

## 2025-07-24 RX ADMIN — ACETAMINOPHEN 650 MG: 325 TABLET ORAL at 20:45

## 2025-07-24 RX ADMIN — TAMSULOSIN HYDROCHLORIDE 0.4 MG: 0.4 CAPSULE ORAL at 09:00

## 2025-07-24 RX ADMIN — MONTELUKAST 10 MG: 10 TABLET, FILM COATED ORAL at 20:45

## 2025-07-24 ASSESSMENT — PAIN SCALES - GENERAL
PAINLEVEL_OUTOF10: 0
PAINLEVEL_OUTOF10: 0

## 2025-07-24 NOTE — PROGRESS NOTES
ACUTE OCCUPATIONAL THERAPY GOALS:   (Developed with and agreed upon by patient and/or caregiver.)      1. Patient will verbalize and demonstrate understanding of spinal precautions with 100% accuracy during ADLs.   2. Patient will complete lower body bathing and dressing with Min A and adaptive equipment as needed.   3. Patient will complete functional transfers with modified independence and adaptive equipment as needed.   4. Patient will complete toileting and toilet transfer with Min A.   5. Patient will complete functional mobility of household distances with modified independence and adaptive equipment as needed.   6. Patient will demonstrate ability to log roll in bed with modified independence and no verbal cues from therapist.      Timeframe: 7 visits                                                             OCCUPATIONAL THERAPY: Daily Note PM   OT Visit Days: 2   Time In/Out  OT Charge Capture  Rehab Caseload Tracker  OT Orders    Demarcus Gaxiola is a 80 y.o. male   PRIMARY DIAGNOSIS: Spinal stenosis, lumbar region, with neurogenic claudication  Fracture of fifth lumbar vertebra (HCC) [S32.059A]  S/P lumbar spinal arthrodesis [Z98.1]  Spinal stenosis, lumbar region, with neurogenic claudication [M48.062]  Lumbar radiculopathy [M54.16]  S/P lumbar fusion [Z98.1]  Procedure(s) (LRB):  eras\L5 laminectomy and L5-S1 fusion with allograft, L2-S1 instrumentation and iliac fixation. (N/A)  2 Days Post-Op  Inpatient: Payor: [facility] MEDICARE / Plan: [facility] MEDICARE-ADVANTAGE PPO / Product Type: Medicare /     ASSESSMENT:     REHAB RECOMMENDATIONS:   Recommendation to date pending progress:  Setting:  Continued occupational therapy recommended at discharge    Equipment:    To Be Determined     ASSESSMENT:  Mr. Gaxiola sitting up in the chair upon arrival. Pt recently worked with PT but agreeable to some therapy with OT and completed grooming and hygiene activities at the sink. Pt was assisted back to bed with  supervision. Continue POC.       SUBJECTIVE:     Mr. Gaxiola states, \"I need to go to Rehab because I'm 80 and my wife is 70 and she can't take care of me.\"     Social/Functional Lives With: Spouse  Type of Home: House  Home Layout: Two level  Home Access: Level entry  Bathroom Shower/Tub: Walk-in shower  Bathroom Toilet: Standard  Bathroom Equipment: Grab bars in shower, Hand-held shower, Shower chair, Commode  Bathroom Accessibility: Accessible  Home Equipment: Cane, Walker - Rolling, Rollator  Receives Help From: Family  Prior Level of Assist for ADLs: Independent  Prior Level of Assist for Homemaking: Independent  Homemaking Responsibilities: Yes  Prior Level of Assist for Transfers: Independent  Active : No    OBJECTIVE:     LINES / DRAINS / AIRWAY: Hemovac    RESTRICTIONS/PRECAUTIONS:  Restrictions/Precautions  Restrictions/Precautions: Fall Risk  Position Activity Restriction  Spinal Precautions: No Bending/Lifting/Twisting (BLT)  Sternal Precautions:  (15# liftinr restriction)        PAIN: VITALS / O2:   Pre Treatment:    none        Post Treatment: none Vitals          Oxygen        MOBILITY: I Mod I S SBA CGA Min Mod Max Total  NT x2 Comments:   Bed Mobility    Rolling [] [] [x] [] [] [] [] [] [] [] []    Supine to Sit [] [] [x] [] [] [] [] [] [] [] []    Scooting [] [] [x] [] [] [] [] [] [] [] []    Sit to Supine [] [] [x] [] [] [] [] [] [] [] []    Transfers    Sit to Stand [] [] [] [x] [] [] [] [] [] [] []    Bed to Chair [] [] [] [] [] [] [] [] [] [] []    Stand to Sit [] [] [] [x] [] [] [] [] [] [] []    Tub/Shower [] [] [] [] [] [] [] [] [] [] []     Toilet [] [] [] [] [] [] [] [] [] [] []      [] [] [] [] [] [] [] [] [] [] []    I=Independent, Mod I=Modified Independent, S=Supervision/Setup, SBA=Standby Assistance, CGA=Contact Guard Assistance, Min=Minimal Assistance, Mod=Moderate Assistance, Max=Maximal Assistance, Total=Total Assistance, NT=Not Tested    ACTIVITIES OF DAILY LIVING: I Mod I S

## 2025-07-24 NOTE — PROGRESS NOTES
ACUTE PHYSICAL THERAPY GOALS:   (Developed with and agreed upon by patient and/or caregiver.)  (1.) Demarcus Gaxiola  will move from supine to sit and sit to supine  with SUPERVISION within 7 treatment day(s).    (2.) Demarcus Gaxiola will transfer from bed to chair and chair to bed with SUPERVISION using the least restrictive device within 7 treatment day(s).    (3.) Demarcus Gaxiola will ambulate with SUPERVISION for 500 feet with the least restrictive device within 7 treatment day(s).   (4.) Demarcus Gaxiola will perform standing static and dynamic balance activities x 10 minutes with SUPERVISION to improve safety within 7 treatment day(s).  (5.) Demarcus Gaxiola will perform therapeutic exercises x 15 min for HEP with SUPERVISION to improve strength, endurance, and functional mobility within 7 treatment day(s).         PHYSICAL THERAPY: Daily Note PM   (Link to Caseload Tracking: PT Visit Days : 3  Time In/Out PT Charge Capture  Rehab Caseload Tracker  Orders    Demarcus Gaxiola is a 80 y.o. male   PRIMARY DIAGNOSIS: Spinal stenosis, lumbar region, with neurogenic claudication  Fracture of fifth lumbar vertebra (HCC) [S32.059A]  S/P lumbar spinal arthrodesis [Z98.1]  Spinal stenosis, lumbar region, with neurogenic claudication [M48.062]  Lumbar radiculopathy [M54.16]  S/P lumbar fusion [Z98.1]  Procedure(s) (LRB):  eras\L5 laminectomy and L5-S1 fusion with allograft, L2-S1 instrumentation and iliac fixation. (N/A)  2 Days Post-Op  Inpatient: Payor: [employer] / Plan: [employer] MEDICARE-ADVANTAGE PPO / Product Type: Medicare /     ASSESSMENT:     REHAB RECOMMENDATIONS:   Recommendation to date pending progress:  Setting:  Inpatient Rehab Facility    Equipment:    To Be Determined     ASSESSMENT:  Mr. Gaxiola continues to maintain gait distances and mobility tolerance.  He performed bed mobility with good log roll technique in and out.  The patient performed hallway ambulation with improved posture

## 2025-07-24 NOTE — PRE-CERTIFICATION NOTE
[facility]  Pre-admission Assessment    Facility Information: [facility]  Patient Name: Demarcus Gotti        MRN: [id number]    : 1945 (80 y.o.)  Gender: male   Ethnicity:Not of , /a, or Iranian origin  Race:White    COVERAGE INFORMATION  Active Insurance as of 2025       Primary Coverage       Payor Plan Insurance Group Employer/Plan Group    AETNA MEDICARE AETNA MEDICARE-ADVANTAGE PPO [id number]       Payor Address Payor Phone Number Payor Fax Number Effective Dates    [address]   2022 - None Entered    [place] [address]         Subscriber Name Subscriber Birth Date Member ID       DEMARCUS GOTTI 1945 [id number]                   Update Due: Auth # [id number] Update     PHYSICIAN/REFERRAL INFORMATION  Attending Physician: Richard Mendosa, *   Admitted From: [facility]  Date of Admission to the Hospital: 2025  7:35 AM  Date Patient Eligible for Admission: 2025    PRIOR LIVING SITUATION/LEVEL OF FUNCTION:  Social/Functional History  Lives With: Spouse  Type of Home: House  Home Layout: Two level  Home Access: Level entry  Bathroom Shower/Tub: Walk-in shower  Bathroom Toilet: Standard  Bathroom Equipment: Grab bars in shower, Hand-held shower, Shower chair, Commode  Bathroom Accessibility: Accessible  Home Equipment: Cane, Walker - Rolling, Rollator  Receives Help From: Family  Prior Level of Assist for ADLs: Independent  Prior Level of Assist for Homemaking: Independent  Homemaking Responsibilities: Yes  Prior Level of Assist for Transfers: Independent  Active : No   Has lack of transportation kept you from medical appointments, meetings, work, or from getting things needed for daily living? (Check all that apply.)  No    REHABILITATION DIAGNOSIS/PMH:  Primary Diagnosis: Fracture of fifth lumbar vertebra (HCC) [S32.059A]  S/P lumbar spinal arthrodesis [Z98.1]  Spinal stenosis, lumbar  Sternal  Isolation Precautions: None       CURRENT FUNCTIONAL STATUS:  Upper Extremity ADL’s: NT  Lower Extremity ADL’s: NT  Bed, Chair, Wheelchair Transfers: 04, Supervision or Touching Assistance  Toilet Transfers: 04, Supervision or Touching Assistance  Locomotion: 04, Supervision or Touching Assistance  Bladder Continence: 0, Always continent  Bowel Continence: 0, Always continent    REHABILITATION GOALS AND PLAN:  Expected Level of Improvement For Safe Discharge: Mod I with ADLs and Mobility  Required Therapy:   Physical Therapy: > 90 minutes per day, > 5 days per week for duration of rehab stay  Occupational Therapy: > 90 minutes per day, > 5 days per week for duration of rehab stay  Speech Therapy: Per doctor's order   Anticipated Duration of Inpatient Rehab Stay: 2 weeks    Expected Discharge Destination: Home with Assistance  Expected Services Upon Discharge: Home Health: PT and OT    Acute Inpatient Rehabilitation Disclosure Statement provided to patient. Patient verbalized understanding. yes    I have reviewed and concur with the findings and results of the pre-admission screening assessment completed by the Inpatient Rehabilitation Admissions Coordinator.

## 2025-07-24 NOTE — PROGRESS NOTES
ORTHO PROGRESS NOTE    2025    Admit Date: 2025  Post Op day: 2 Days Post-Op      Subjective:     Demarcus Gaxiola is a patient who is now 2 Days Post-Op  and has no complaints.       Objective:     PT/OT:    Progressing    Vital Signs:    Patient Vitals for the past 8 hrs:   BP Temp Temp src Pulse Resp SpO2   25 0746 (!) 141/81 (!) 101.3 °F (38.5 °C) Oral 100 17 93 %   25 0259 121/60 99 °F (37.2 °C) Oral 99 17 94 %     Temp (24hrs), Av.1 °F (37.3 °C), Min:97.5 °F (36.4 °C), Max:101.3 °F (38.5 °C)      LAB:    Recent Labs     25  1042   HGB 9.6*   WBC 14.2*          Physical Exam:    Awake and in no acute distress.  Mood and affect appropriate.  Respirations unlabored and no evidence cyanosis.  Dressing clean/dry  No new neurologic deficit.    Assessment:      2 Days Post-Op STATUS POST Procedure(s):  eras\L5 laminectomy and L5-S1 fusion with allograft, L2-S1 instrumentation and iliac fixation.      Plan:     Progressing as expected. Continue PT/OT    Insurance authorization for 9th floor pending    Monitor drain output      Anticipate discharge to: 9th floor vs SNF      Signed By: Geoffrey Willis MD

## 2025-07-24 NOTE — PROGRESS NOTES
Received call from [facility] and they are offering a peer to peer review due by 430 pm today  The number to call is [phone] option 3..  The provider will need the patient [facility],  and [facility] which [id number].   Case ref # is [id number]

## 2025-07-24 NOTE — CARE COORDINATION
RN LINN in room, patient sitting up in chair, alert and oriented, no family present at this time. Patient aware he is waiting on insurance authorization for IR 9th floor admission. He denies any other needs at this time and RN LINN will continue to monitor for DC needs.     1250 Update - Per [facility] peer to peer requested and they placed information in chart. Dr Willis notified. peer to peer review due by 430 pm today  The number to call is [phone] option 3..  The provider will need the patient name,  and [employer] # which is [phone].   Case ref # is [id number]

## 2025-07-24 NOTE — PROGRESS NOTES
ACUTE PHYSICAL THERAPY GOALS:   (Developed with and agreed upon by patient and/or caregiver.)  (1.) Demarcus Gaxiola  will move from supine to sit and sit to supine  with SUPERVISION within 7 treatment day(s).    (2.) Demarcus Gaxiola will transfer from bed to chair and chair to bed with SUPERVISION using the least restrictive device within 7 treatment day(s).    (3.) Demarcus Gaxiola will ambulate with SUPERVISION for 500 feet with the least restrictive device within 7 treatment day(s).   (4.) Demarcus Gaxiola will perform standing static and dynamic balance activities x 10 minutes with SUPERVISION to improve safety within 7 treatment day(s).  (5.) Demarcus Gaxiola will perform therapeutic exercises x 15 min for HEP with SUPERVISION to improve strength, endurance, and functional mobility within 7 treatment day(s).         PHYSICAL THERAPY: Daily Note AM   (Link to Caseload Tracking: PT Visit Days : 3  Time In/Out PT Charge Capture  Rehab Caseload Tracker  Orders    Demarcus Gaxiola is a 80 y.o. male   PRIMARY DIAGNOSIS: Spinal stenosis, lumbar region, with neurogenic claudication  Fracture of fifth lumbar vertebra (HCC) [S32.059A]  S/P lumbar spinal arthrodesis [Z98.1]  Spinal stenosis, lumbar region, with neurogenic claudication [M48.062]  Lumbar radiculopathy [M54.16]  S/P lumbar fusion [Z98.1]  Procedure(s) (LRB):  eras\L5 laminectomy and L5-S1 fusion with allograft, L2-S1 instrumentation and iliac fixation. (N/A)  2 Days Post-Op  Inpatient: Payor: [employer] / Plan: [employer] MEDICARE-ADVANTAGE PPO / Product Type: Medicare /     ASSESSMENT:     REHAB RECOMMENDATIONS:   Recommendation to date pending progress:  Setting:  Inpatient Rehab Facility    Equipment:    To Be Determined     ASSESSMENT:  Mr. Gaxiola is making steady progress toward goals with good activity tolerance.  He performed transfers and ambulation with the RW and CGA/SBA.  He does continue to require verbal cueing for walker

## 2025-07-25 ENCOUNTER — HOSPITAL ENCOUNTER (INPATIENT)
Age: 80
LOS: 6 days | Discharge: HOME | DRG: 074 | End: 2025-07-31
Attending: STUDENT IN AN ORGANIZED HEALTH CARE EDUCATION/TRAINING PROGRAM | Admitting: STUDENT IN AN ORGANIZED HEALTH CARE EDUCATION/TRAINING PROGRAM
Payer: MEDICARE

## 2025-07-25 VITALS
DIASTOLIC BLOOD PRESSURE: 73 MMHG | HEART RATE: 92 BPM | RESPIRATION RATE: 18 BRPM | TEMPERATURE: 98.8 F | OXYGEN SATURATION: 92 % | BODY MASS INDEX: 35.42 KG/M2 | HEIGHT: 65 IN | SYSTOLIC BLOOD PRESSURE: 137 MMHG | WEIGHT: 212.6 LBS

## 2025-07-25 DIAGNOSIS — Z98.1 S/P LUMBAR SPINAL ARTHRODESIS: ICD-10-CM

## 2025-07-25 PROBLEM — D72.829 LEUKOCYTOSIS: Status: ACTIVE | Noted: 2025-07-25

## 2025-07-25 PROBLEM — N40.0 BENIGN PROSTATIC HYPERPLASIA: Status: ACTIVE | Noted: 2025-07-25

## 2025-07-25 PROBLEM — G62.9 POLYNEUROPATHY: Status: ACTIVE | Noted: 2025-07-25

## 2025-07-25 LAB
BASOPHILS # BLD: 0.03 K/UL (ref 0–0.2)
BASOPHILS NFR BLD: 0.2 % (ref 0–2)
DIFFERENTIAL METHOD BLD: ABNORMAL
EOSINOPHIL # BLD: 0.12 K/UL (ref 0–0.8)
EOSINOPHIL NFR BLD: 1 % (ref 0.5–7.8)
ERYTHROCYTE [DISTWIDTH] IN BLOOD BY AUTOMATED COUNT: 14.5 % (ref 11.9–14.6)
HCT VFR BLD AUTO: 28.6 % (ref 41.1–50.3)
HGB BLD-MCNC: 9.4 G/DL (ref 13.6–17.2)
IMM GRANULOCYTES # BLD AUTO: 0.05 K/UL (ref 0–0.5)
IMM GRANULOCYTES NFR BLD AUTO: 0.4 % (ref 0–5)
LYMPHOCYTES # BLD: 1.6 K/UL (ref 0.5–4.6)
LYMPHOCYTES NFR BLD: 12.8 % (ref 13–44)
MCH RBC QN AUTO: 32.1 PG (ref 26.1–32.9)
MCHC RBC AUTO-ENTMCNC: 32.9 G/DL (ref 31.4–35)
MCV RBC AUTO: 97.6 FL (ref 82–102)
MONOCYTES # BLD: 1.15 K/UL (ref 0.1–1.3)
MONOCYTES NFR BLD: 9.2 % (ref 4–12)
NEUTS SEG # BLD: 9.58 K/UL (ref 1.7–8.2)
NEUTS SEG NFR BLD: 76.4 % (ref 43–78)
NRBC # BLD: 0 K/UL (ref 0–0.2)
PLATELET # BLD AUTO: 272 K/UL (ref 150–450)
PMV BLD AUTO: 9.6 FL (ref 9.4–12.3)
RBC # BLD AUTO: 2.93 M/UL (ref 4.23–5.6)
WBC # BLD AUTO: 12.5 K/UL (ref 4.3–11.1)

## 2025-07-25 PROCEDURE — 97162 PT EVAL MOD COMPLEX 30 MIN: CPT

## 2025-07-25 PROCEDURE — 99222 1ST HOSP IP/OBS MODERATE 55: CPT | Performed by: STUDENT IN AN ORGANIZED HEALTH CARE EDUCATION/TRAINING PROGRAM

## 2025-07-25 PROCEDURE — 97116 GAIT TRAINING THERAPY: CPT

## 2025-07-25 PROCEDURE — 6370000000 HC RX 637 (ALT 250 FOR IP): Performed by: STUDENT IN AN ORGANIZED HEALTH CARE EDUCATION/TRAINING PROGRAM

## 2025-07-25 PROCEDURE — 1180000000 HC REHAB R&B

## 2025-07-25 PROCEDURE — 6370000000 HC RX 637 (ALT 250 FOR IP): Performed by: ORTHOPAEDIC SURGERY

## 2025-07-25 PROCEDURE — 97535 SELF CARE MNGMENT TRAINING: CPT

## 2025-07-25 PROCEDURE — 85025 COMPLETE CBC W/AUTO DIFF WBC: CPT

## 2025-07-25 PROCEDURE — 97530 THERAPEUTIC ACTIVITIES: CPT

## 2025-07-25 PROCEDURE — 97166 OT EVAL MOD COMPLEX 45 MIN: CPT

## 2025-07-25 PROCEDURE — 36415 COLL VENOUS BLD VENIPUNCTURE: CPT

## 2025-07-25 PROCEDURE — 2500000003 HC RX 250 WO HCPCS: Performed by: ORTHOPAEDIC SURGERY

## 2025-07-25 RX ORDER — TAMSULOSIN HYDROCHLORIDE 0.4 MG/1
0.4 CAPSULE ORAL
Status: CANCELLED | OUTPATIENT
Start: 2025-07-26

## 2025-07-25 RX ORDER — HYDRALAZINE HYDROCHLORIDE 10 MG/1
10 TABLET, FILM COATED ORAL 3 TIMES DAILY PRN
Status: DISCONTINUED | OUTPATIENT
Start: 2025-07-25 | End: 2025-07-31 | Stop reason: HOSPADM

## 2025-07-25 RX ORDER — CYCLOBENZAPRINE HCL 10 MG
10 TABLET ORAL 3 TIMES DAILY PRN
Status: CANCELLED | OUTPATIENT
Start: 2025-07-25

## 2025-07-25 RX ORDER — ACETAMINOPHEN 325 MG/1
650 TABLET ORAL EVERY 4 HOURS PRN
Status: CANCELLED | OUTPATIENT
Start: 2025-07-25

## 2025-07-25 RX ORDER — SENNA AND DOCUSATE SODIUM 50; 8.6 MG/1; MG/1
1 TABLET, FILM COATED ORAL
Status: CANCELLED | OUTPATIENT
Start: 2025-07-25

## 2025-07-25 RX ORDER — MONTELUKAST SODIUM 10 MG/1
10 TABLET ORAL NIGHTLY
Status: DISCONTINUED | OUTPATIENT
Start: 2025-07-25 | End: 2025-07-31 | Stop reason: HOSPADM

## 2025-07-25 RX ORDER — ALBUTEROL SULFATE 0.83 MG/ML
2.5 SOLUTION RESPIRATORY (INHALATION) EVERY 4 HOURS PRN
Status: DISCONTINUED | OUTPATIENT
Start: 2025-07-25 | End: 2025-07-31 | Stop reason: HOSPADM

## 2025-07-25 RX ORDER — ONDANSETRON 4 MG/1
4 TABLET, ORALLY DISINTEGRATING ORAL EVERY 8 HOURS PRN
Status: CANCELLED | OUTPATIENT
Start: 2025-07-25

## 2025-07-25 RX ORDER — SODIUM CHLORIDE 0.9 % (FLUSH) 0.9 %
5-40 SYRINGE (ML) INJECTION PRN
Status: DISCONTINUED | OUTPATIENT
Start: 2025-07-25 | End: 2025-07-31 | Stop reason: HOSPADM

## 2025-07-25 RX ORDER — ALBUTEROL SULFATE 0.83 MG/ML
2.5 SOLUTION RESPIRATORY (INHALATION) EVERY 4 HOURS PRN
Status: CANCELLED | OUTPATIENT
Start: 2025-07-25

## 2025-07-25 RX ORDER — GABAPENTIN 300 MG/1
600 CAPSULE ORAL 3 TIMES DAILY
Status: CANCELLED | OUTPATIENT
Start: 2025-07-25

## 2025-07-25 RX ORDER — SODIUM PHOSPHATE, DIBASIC AND SODIUM PHOSPHATE, MONOBASIC 7; 19 G/230ML; G/230ML
1 ENEMA RECTAL DAILY PRN
Status: CANCELLED | OUTPATIENT
Start: 2025-07-25

## 2025-07-25 RX ORDER — SODIUM CHLORIDE 0.9 % (FLUSH) 0.9 %
5-40 SYRINGE (ML) INJECTION EVERY 12 HOURS SCHEDULED
Status: DISCONTINUED | OUTPATIENT
Start: 2025-07-25 | End: 2025-07-31 | Stop reason: HOSPADM

## 2025-07-25 RX ORDER — OXYCODONE HYDROCHLORIDE 5 MG/1
5 TABLET ORAL EVERY 4 HOURS PRN
Refills: 0 | Status: CANCELLED | OUTPATIENT
Start: 2025-07-25

## 2025-07-25 RX ORDER — SENNA AND DOCUSATE SODIUM 50; 8.6 MG/1; MG/1
1 TABLET, FILM COATED ORAL
Status: DISCONTINUED | OUTPATIENT
Start: 2025-07-25 | End: 2025-07-31 | Stop reason: HOSPADM

## 2025-07-25 RX ORDER — MENTHOL/CAMPHOR/ALLANTOIN/PHE 0.6-0.5-1%
OINTMENT(EA) TOPICAL PRN
Status: CANCELLED | OUTPATIENT
Start: 2025-07-25

## 2025-07-25 RX ORDER — CALCIUM CARBONATE 500 MG/1
500 TABLET, CHEWABLE ORAL 3 TIMES DAILY PRN
Status: DISCONTINUED | OUTPATIENT
Start: 2025-07-25 | End: 2025-07-31 | Stop reason: HOSPADM

## 2025-07-25 RX ORDER — LOSARTAN POTASSIUM 50 MG/1
50 TABLET ORAL DAILY
Status: CANCELLED | OUTPATIENT
Start: 2025-07-25

## 2025-07-25 RX ORDER — CYCLOBENZAPRINE HCL 10 MG
10 TABLET ORAL 3 TIMES DAILY PRN
Status: DISCONTINUED | OUTPATIENT
Start: 2025-07-25 | End: 2025-07-31 | Stop reason: HOSPADM

## 2025-07-25 RX ORDER — CALCIUM CARBONATE 500 MG/1
500 TABLET, CHEWABLE ORAL 3 TIMES DAILY PRN
Status: CANCELLED | OUTPATIENT
Start: 2025-07-25

## 2025-07-25 RX ORDER — ACETAMINOPHEN 500 MG
1000 TABLET ORAL 3 TIMES DAILY
Status: DISCONTINUED | OUTPATIENT
Start: 2025-07-25 | End: 2025-07-31 | Stop reason: HOSPADM

## 2025-07-25 RX ORDER — MONTELUKAST SODIUM 10 MG/1
10 TABLET ORAL NIGHTLY
Status: CANCELLED | OUTPATIENT
Start: 2025-07-25

## 2025-07-25 RX ORDER — PANTOPRAZOLE SODIUM 40 MG/1
40 TABLET, DELAYED RELEASE ORAL DAILY
Status: DISCONTINUED | OUTPATIENT
Start: 2025-07-26 | End: 2025-07-31 | Stop reason: HOSPADM

## 2025-07-25 RX ORDER — ATORVASTATIN CALCIUM 20 MG/1
20 TABLET, FILM COATED ORAL EVERY MORNING
Status: DISCONTINUED | OUTPATIENT
Start: 2025-07-25 | End: 2025-07-31 | Stop reason: HOSPADM

## 2025-07-25 RX ORDER — DULOXETIN HYDROCHLORIDE 60 MG/1
60 CAPSULE, DELAYED RELEASE ORAL DAILY
Status: DISCONTINUED | OUTPATIENT
Start: 2025-07-26 | End: 2025-07-31 | Stop reason: HOSPADM

## 2025-07-25 RX ORDER — LOSARTAN POTASSIUM 50 MG/1
50 TABLET ORAL DAILY
Status: DISCONTINUED | OUTPATIENT
Start: 2025-07-26 | End: 2025-07-28

## 2025-07-25 RX ORDER — OXYCODONE HYDROCHLORIDE 5 MG/1
10 TABLET ORAL EVERY 4 HOURS PRN
Refills: 0 | Status: DISCONTINUED | OUTPATIENT
Start: 2025-07-25 | End: 2025-07-31 | Stop reason: HOSPADM

## 2025-07-25 RX ORDER — OXYCODONE HYDROCHLORIDE 5 MG/1
5 TABLET ORAL EVERY 4 HOURS PRN
Refills: 0 | Status: DISCONTINUED | OUTPATIENT
Start: 2025-07-25 | End: 2025-07-31 | Stop reason: HOSPADM

## 2025-07-25 RX ORDER — HYDRALAZINE HYDROCHLORIDE 10 MG/1
10 TABLET, FILM COATED ORAL 3 TIMES DAILY PRN
Status: CANCELLED | OUTPATIENT
Start: 2025-07-25

## 2025-07-25 RX ORDER — MENTHOL/CAMPHOR/ALLANTOIN/PHE 0.6-0.5-1%
OINTMENT(EA) TOPICAL PRN
Status: DISCONTINUED | OUTPATIENT
Start: 2025-07-25 | End: 2025-07-31 | Stop reason: HOSPADM

## 2025-07-25 RX ORDER — BISACODYL 10 MG
10 SUPPOSITORY, RECTAL RECTAL DAILY PRN
Status: CANCELLED | OUTPATIENT
Start: 2025-07-25

## 2025-07-25 RX ORDER — ACETAMINOPHEN 500 MG
1000 TABLET ORAL 3 TIMES DAILY
Status: CANCELLED | OUTPATIENT
Start: 2025-07-25

## 2025-07-25 RX ORDER — GABAPENTIN 300 MG/1
600 CAPSULE ORAL 3 TIMES DAILY
Status: DISCONTINUED | OUTPATIENT
Start: 2025-07-25 | End: 2025-07-28

## 2025-07-25 RX ORDER — POLYETHYLENE GLYCOL 3350 17 G/17G
17 POWDER, FOR SOLUTION ORAL DAILY PRN
Status: CANCELLED | OUTPATIENT
Start: 2025-07-25

## 2025-07-25 RX ORDER — ACETAMINOPHEN 325 MG/1
650 TABLET ORAL EVERY 4 HOURS PRN
Status: DISCONTINUED | OUTPATIENT
Start: 2025-07-25 | End: 2025-07-31 | Stop reason: HOSPADM

## 2025-07-25 RX ORDER — DULOXETIN HYDROCHLORIDE 60 MG/1
60 CAPSULE, DELAYED RELEASE ORAL DAILY
Status: CANCELLED | OUTPATIENT
Start: 2025-07-25

## 2025-07-25 RX ORDER — BISACODYL 10 MG
10 SUPPOSITORY, RECTAL RECTAL DAILY PRN
Status: DISCONTINUED | OUTPATIENT
Start: 2025-07-25 | End: 2025-07-31 | Stop reason: HOSPADM

## 2025-07-25 RX ORDER — SODIUM PHOSPHATE, DIBASIC AND SODIUM PHOSPHATE, MONOBASIC 7; 19 G/230ML; G/230ML
1 ENEMA RECTAL DAILY PRN
Status: DISCONTINUED | OUTPATIENT
Start: 2025-07-25 | End: 2025-07-31 | Stop reason: HOSPADM

## 2025-07-25 RX ORDER — CARBOXYMETHYLCELLULOSE SODIUM 10 MG/ML
1 GEL OPHTHALMIC 3 TIMES DAILY PRN
Status: DISCONTINUED | OUTPATIENT
Start: 2025-07-25 | End: 2025-07-31 | Stop reason: HOSPADM

## 2025-07-25 RX ORDER — POLYETHYLENE GLYCOL 3350 17 G/17G
17 POWDER, FOR SOLUTION ORAL DAILY PRN
Status: DISCONTINUED | OUTPATIENT
Start: 2025-07-25 | End: 2025-07-31 | Stop reason: HOSPADM

## 2025-07-25 RX ORDER — SODIUM CHLORIDE 0.9 % (FLUSH) 0.9 %
5-40 SYRINGE (ML) INJECTION EVERY 12 HOURS SCHEDULED
Status: CANCELLED | OUTPATIENT
Start: 2025-07-25

## 2025-07-25 RX ORDER — MINERAL OIL/HYDROPHIL PETROLAT
OINTMENT (GRAM) TOPICAL 2 TIMES DAILY PRN
Status: DISCONTINUED | OUTPATIENT
Start: 2025-07-25 | End: 2025-07-31 | Stop reason: HOSPADM

## 2025-07-25 RX ORDER — MINERAL OIL/HYDROPHIL PETROLAT
OINTMENT (GRAM) TOPICAL 2 TIMES DAILY PRN
Status: CANCELLED | OUTPATIENT
Start: 2025-07-25

## 2025-07-25 RX ORDER — TAMSULOSIN HYDROCHLORIDE 0.4 MG/1
0.4 CAPSULE ORAL
Status: DISCONTINUED | OUTPATIENT
Start: 2025-07-26 | End: 2025-07-31 | Stop reason: HOSPADM

## 2025-07-25 RX ORDER — ATORVASTATIN CALCIUM 20 MG/1
20 TABLET, FILM COATED ORAL EVERY MORNING
Status: CANCELLED | OUTPATIENT
Start: 2025-07-25

## 2025-07-25 RX ORDER — ONDANSETRON 4 MG/1
4 TABLET, ORALLY DISINTEGRATING ORAL EVERY 8 HOURS PRN
Status: DISCONTINUED | OUTPATIENT
Start: 2025-07-25 | End: 2025-07-31 | Stop reason: HOSPADM

## 2025-07-25 RX ORDER — CARBOXYMETHYLCELLULOSE SODIUM 10 MG/ML
1 GEL OPHTHALMIC 3 TIMES DAILY PRN
Status: CANCELLED | OUTPATIENT
Start: 2025-07-25

## 2025-07-25 RX ORDER — OXYCODONE HYDROCHLORIDE 5 MG/1
10 TABLET ORAL EVERY 4 HOURS PRN
Refills: 0 | Status: CANCELLED | OUTPATIENT
Start: 2025-07-25

## 2025-07-25 RX ORDER — PANTOPRAZOLE SODIUM 40 MG/1
40 TABLET, DELAYED RELEASE ORAL DAILY
Status: CANCELLED | OUTPATIENT
Start: 2025-07-25

## 2025-07-25 RX ORDER — SODIUM CHLORIDE 0.9 % (FLUSH) 0.9 %
5-40 SYRINGE (ML) INJECTION PRN
Status: CANCELLED | OUTPATIENT
Start: 2025-07-25

## 2025-07-25 RX ADMIN — DOCUSATE SODIUM 50 MG AND SENNOSIDES 8.6 MG 1 TABLET: 8.6; 5 TABLET, FILM COATED ORAL at 20:27

## 2025-07-25 RX ADMIN — SODIUM CHLORIDE, PRESERVATIVE FREE 10 ML: 5 INJECTION INTRAVENOUS at 10:15

## 2025-07-25 RX ADMIN — GABAPENTIN 600 MG: 300 CAPSULE ORAL at 15:22

## 2025-07-25 RX ADMIN — BISACODYL 5 MG: 5 TABLET, COATED ORAL at 10:15

## 2025-07-25 RX ADMIN — GABAPENTIN 600 MG: 300 CAPSULE ORAL at 20:27

## 2025-07-25 RX ADMIN — PANTOPRAZOLE SODIUM 40 MG: 40 TABLET, DELAYED RELEASE ORAL at 10:14

## 2025-07-25 RX ADMIN — ACETAMINOPHEN 1000 MG: 500 TABLET, FILM COATED ORAL at 20:27

## 2025-07-25 RX ADMIN — DULOXETINE HYDROCHLORIDE 60 MG: 60 CAPSULE, DELAYED RELEASE ORAL at 10:13

## 2025-07-25 RX ADMIN — MONTELUKAST 10 MG: 10 TABLET, FILM COATED ORAL at 20:27

## 2025-07-25 RX ADMIN — ATORVASTATIN CALCIUM 20 MG: 20 TABLET, FILM COATED ORAL at 10:14

## 2025-07-25 RX ADMIN — ACETAMINOPHEN 1000 MG: 500 TABLET, FILM COATED ORAL at 15:22

## 2025-07-25 RX ADMIN — ACETAMINOPHEN 650 MG: 325 TABLET ORAL at 10:14

## 2025-07-25 RX ADMIN — GABAPENTIN 600 MG: 300 CAPSULE ORAL at 10:14

## 2025-07-25 RX ADMIN — LOSARTAN POTASSIUM 50 MG: 50 TABLET, FILM COATED ORAL at 10:14

## 2025-07-25 RX ADMIN — TAMSULOSIN HYDROCHLORIDE 0.4 MG: 0.4 CAPSULE ORAL at 10:13

## 2025-07-25 RX ADMIN — ACETAMINOPHEN 650 MG: 325 TABLET ORAL at 05:27

## 2025-07-25 ASSESSMENT — PAIN SCALES - GENERAL: PAINLEVEL_OUTOF10: 0

## 2025-07-25 NOTE — DISCHARGE SUMMARY
Discharge Summary    Patient ID:Demarcus Gaxiola  [id number]  1945  80 y.o.    Admit date: 7/22/2025    Discharge date:7/25/2025    Admitting Physician: Richard Han, *    DATE OF SURGERY: 7/22/2025    SURGEON: Richard Han MD     PREOP DIAGNOSIS:      1. Lumbar stenosis  2. Prior lumbar laminectomy and fusion with instrumentation L2-L5  3. L5 burst fracture    POSTOP DIAGNOSIS:      1. Lumbar stenosis  2. Prior lumbar laminectomy and fusion with instrumentation L2-5  3. L5 burst fracture    PROCEDURE:  1. Posterolateral lumbar fusion without interbody fusion L5-S1  (CPT 97909)  2. Lumbar laminectomy and bilateral foraminotomies L5 (CPT 40916)  3. Instrumentation  L2-S1. (CPT 11342)  4. Allograft (CPT 00019)  5. Iliac fixation (CPT 83210)  6. Exploration L4-5 fusion (CPT 87085)     ANESTHESIA: General    ESTIMATED BLOOD LOSS:  500 ml    INTRAOPERATIVE COMPLICATIONS: None.    POSTOP CONDITION: Stable.     IMPLANTS:   Implant Name Type Inv. Item Serial No.  Lot No. LRB No. Used Action   ALLOGRAFT BNE CHIP 1-4 MM 30 CC CRUSH CAN - [id number]   ALLOGRAFT BNE CHIP 1-4 MM 30 CC CRUSH [facility] [id number] [employer] [facility]   N/A 1 Implanted   PUTTY BIO DBM 10 ML W CHIPS - [id number]   PUTTY BIO DBM 10 ML W CHIPS   [employer] [facility] [id number] N/A 1 Implanted   PUTTY BIO DBM 10 ML W CHIPS - [id number]   PUTTY BIO DBM 10 ML W CHIPS   [employer] [facility] [id number] N/A 1 Implanted   GRAFT BNE XL - [id number]   GRAFT BNE XL [id number] [employer]   N/A 1 Implanted   SCREW SPNL POLYAX 9.5X100 MM CARNEY - [id number]   SCREW SPNL POLYAX 9.5X100 MM CARNEY   [employer] [facility] [id number] N/A 1 Implanted   SCREW SPNL POLYAX 9.5X90 MM CARNEY - [id number]   SCREW SPNL POLYAX 9.5X90 MM CARNEY   [employer] [facility] [id number] N/A 1 Implanted   BLOCKER SPNL L50MM DIA6MM TI 1 JAMESON GANDHI 3 - [id number]   BLOCKER SPNL L50MM DIA6MM TI 1 JAMESON GANDHI 3   [employer]

## 2025-07-25 NOTE — PROGRESS NOTES
PHYSICAL THERAPY EXAMINATION    PT Individual Minutes  Time In: 1500  Time Out: 1540  Minutes: 40                   Total Treatment Time:  40 Minutes         HPI:  80 y.o. male who is about 5 months status post L2-L5 laminectomy and fusion surgery (1/7/25) that was complicated by a twist and near fall incident after he was discharged home (attempting to get OOB) which resulted in an L5 fracture. L LE strength improved but his pain in back remained. Hardware remained stable however he has never really regained the ability to ambulate without a walker. He remains hunched forward and feels that he has an unstable gait. Pt had additional L/S fusion on 7/22/25    Past Medical History:   Past Medical History:   Diagnosis Date    Thrasher esophagus     BPH (benign prostatic hyperplasia)     GERD (gastroesophageal reflux disease)     medication daily, thrasher's esophogus, 2 pillows    High frequency hearing loss     Hypercholesteremia     on med    Hypertension     on med for control     Nocturnal hypoxemia     wears CPAP, states prescribed inhaler for congestion caused by CPAP- [place]ana asthma/copd    OA (osteoarthritis)     LEOBARDO (obstructive sleep apnea) 02/24/2023    wears CPAP, states prescribed inhaler for congestion caused by CPAP- shabana asthma/copd    Rhinitis     SNHL (sensorineural hearing loss)     Tinnitus of both ears     Wheezing     pro-air inhaler prn; last used 3/12/18; [facility] work-up was negative for asthma        Pt's Goal:  Be able to walk without the walker, possibly a cane.      Precautions:    Restrictions/Precautions: Fall Risk           Spinal Precautions: No Bending/Lifting/Twisting (BLT)    Impairments:    Decreased LE strength  [x]     Decreased strength trunk/core  [x]     Decreased AROM   []     Decreased PROM  []    Decreased endurance  [x]     Decreased balance sitting  []     Decreased balance standing  [x]     Pain   []     Slow ambulation velocity  [x]    Decreased coordination  []

## 2025-07-25 NOTE — PROGRESS NOTES
ACUTE PHYSICAL THERAPY GOALS:   (Developed with and agreed upon by patient and/or caregiver.)  (1.) Demarcus Gaxiola  will move from supine to sit and sit to supine  with SUPERVISION within 7 treatment day(s).    (2.) Demarcus Gaxiola will transfer from bed to chair and chair to bed with SUPERVISION using the least restrictive device within 7 treatment day(s).    (3.) Demarcus Gaxiola will ambulate with SUPERVISION for 500 feet with the least restrictive device within 7 treatment day(s).   (4.) Demarcus Gaxiola will perform standing static and dynamic balance activities x 10 minutes with SUPERVISION to improve safety within 7 treatment day(s).  (5.) Demarcus Gaxiola will perform therapeutic exercises x 15 min for HEP with SUPERVISION to improve strength, endurance, and functional mobility within 7 treatment day(s).         PHYSICAL THERAPY: Daily Note AM   (Link to Caseload Tracking: PT Visit Days : 4  Time In/Out PT Charge Capture  Rehab Caseload Tracker  Orders    Demarcus Gaxiola is a 80 y.o. male   PRIMARY DIAGNOSIS: Spinal stenosis, lumbar region, with neurogenic claudication  Fracture of fifth lumbar vertebra (HCC) [S32.059A]  S/P lumbar spinal arthrodesis [Z98.1]  Spinal stenosis, lumbar region, with neurogenic claudication [M48.062]  Lumbar radiculopathy [M54.16]  S/P lumbar fusion [Z98.1]  Procedure(s) (LRB):  eras\L5 laminectomy and L5-S1 fusion with allograft, L2-S1 instrumentation and iliac fixation. (N/A)  3 Days Post-Op  Inpatient: Payor: [employer] / Plan: [employer] MEDICARE-ADVANTAGE PPO / Product Type: Medicare /     ASSESSMENT:     REHAB RECOMMENDATIONS:   Recommendation to date pending progress:  Setting:  Inpatient Rehab Facility    Equipment:    To Be Determined     ASSESSMENT:  Mr. Gaxiola continues to progress toward goals with increased activity tolerance.  The patient demonstrated good technique for transfers and ambulation.  He ambulates with the RW and continues to require

## 2025-07-25 NOTE — H&P
[facility]  Inpatient Rehab Program    Admission History and Physical Exam  INPATIENT REHABILITATION CENTER      IRC Admission Date: 7/25/2025  Primary Care Provider: Cezar Wright MD  Specialty Group / Referring Service: Orthopedic surgeon      Chief Complaint : Left lower extremity weakness and pain  Admitting Diagnosis:   Polyneuropathy [G62.9]    Principal Problem:    Polyneuropathy  Resolved Problems:    * No resolved hospital problems. *      Acute Rehab Diagnoses:  Encounter for rehabilitation [Z51.89]   Abnormality of gait and mobility [R26.9]  Decreased independence for activities of daily living (ADL) [Z78.9]  Physical debility / deconditioning [R53.81]      Medical Dx:  Past Medical History:   Diagnosis Date    Thrasher esophagus     BPH (benign prostatic hyperplasia)     GERD (gastroesophageal reflux disease)     medication daily, thrasher's esophogus, 2 pillows    High frequency hearing loss     Hypercholesteremia     on med    Hypertension     on med for control     Nocturnal hypoxemia     wears CPAP, states prescribed inhaler for congestion caused by CPAP- shabana asthma/copd    OA (osteoarthritis)     LEOBARDO (obstructive sleep apnea) 02/24/2023    wears CPAP, states prescribed inhaler for congestion caused by CPAP- shabana asthma/copd    Rhinitis     SNHL (sensorineural hearing loss)     Tinnitus of both ears     Wheezing     pro-air inhaler prn; last used 3/12/18; [facility] pulmonary work-up was negative for asthma        Date of Evaluation: July 25, 2025      HPI: Demarcus Gaxiola is a 80 y.o. male patient who presented to [facility] on 7/22/2025 secondary to elective surgery.  Patient with a past medical history of BPH, GERD, hyperlipidemia, hypertension, LEOBARDO, OA, sensorineural hearing loss underwent posterior L2 L5 laminectomy and fusion surgery which was complicated by a fall which ended in a L5 fracture.  Ambulation became difficult, and lower back pain

## 2025-07-25 NOTE — PLAN OF CARE
Problem: Pain  Goal: Verbalizes/displays adequate comfort level or baseline comfort level  7/25/2025 1116 by Fco Gonzalez RN  Outcome: Progressing  7/24/2025 2242 by Houston Forbes RN  Outcome: Progressing     Problem: Safety - Adult  Goal: Free from fall injury  7/25/2025 1116 by Fco Gonzalez RN  Outcome: Progressing  7/24/2025 2242 by Houston Forbes RN  Outcome: Progressing     Problem: Discharge Planning  Goal: Discharge to home or other facility with appropriate resources  7/25/2025 1116 by Fco Gonzalez RN  Outcome: Progressing  7/24/2025 2242 by Houston Forbes RN  Outcome: Progressing     Problem: ABCDS Injury Assessment  Goal: Absence of physical injury  7/25/2025 1116 by Fco Gonzalez RN  Outcome: Progressing  7/24/2025 2242 by Houston Forbes RN  Outcome: Progressing     Problem: Neurosensory - Adult  Goal: Achieves stable or improved neurological status  7/25/2025 1116 by Fco Gonzalez RN  Outcome: Progressing  7/24/2025 2242 by Houston Forbes RN  Outcome: Progressing  Goal: Achieves maximal functionality and self care  7/25/2025 1116 by Fco Gonzalez RN  Outcome: Progressing  7/24/2025 2242 by Houston Forbes RN  Outcome: Progressing     Problem: Respiratory - Adult  Goal: Achieves optimal ventilation and oxygenation  7/25/2025 1116 by Fco Gonzalez RN  Outcome: Progressing  7/24/2025 2242 by Houston Forbes RN  Outcome: Progressing     Problem: Cardiovascular - Adult  Goal: Maintains optimal cardiac output and hemodynamic stability  7/25/2025 1116 by Fco Gonzalez RN  Outcome: Progressing  7/24/2025 2242 by Houston Forbes RN  Outcome: Progressing     Problem: Skin/Tissue Integrity - Adult  Goal: Skin integrity remains intact  7/25/2025 1116 by Fco Gonzalez RN  Outcome: Progressing  7/24/2025 2242 by Houston Forbes RN  Outcome: Progressing  Goal: Incisions, wounds, or drain sites healing without S/S of infection  7/25/2025 1116 by Carlos

## 2025-07-25 NOTE — PROGRESS NOTES
ACUTE OCCUPATIONAL THERAPY GOALS:   (Developed with and agreed upon by patient and/or caregiver.)      1. Patient will verbalize and demonstrate understanding of spinal precautions with 100% accuracy during ADLs.   2. Patient will complete lower body bathing and dressing with Min A and adaptive equipment as needed.   3. Patient will complete functional transfers with modified independence and adaptive equipment as needed.   4. Patient will complete toileting and toilet transfer with Min A.   5. Patient will complete functional mobility of household distances with modified independence and adaptive equipment as needed.   6. Patient will demonstrate ability to log roll in bed with modified independence and no verbal cues from therapist.      Timeframe: 7 visits                                                             OCCUPATIONAL THERAPY: Daily Note AM   OT Visit Days: 3   Time In/Out  OT Charge Capture  Rehab Caseload Tracker  OT Orders    Demarcus Gaxiola is a 80 y.o. male   PRIMARY DIAGNOSIS: Spinal stenosis, lumbar region, with neurogenic claudication  Fracture of fifth lumbar vertebra (HCC) [S32.059A]  S/P lumbar spinal arthrodesis [Z98.1]  Spinal stenosis, lumbar region, with neurogenic claudication [M48.062]  Lumbar radiculopathy [M54.16]  S/P lumbar fusion [Z98.1]  Procedure(s) (LRB):  eras\L5 laminectomy and L5-S1 fusion with allograft, L2-S1 instrumentation and iliac fixation. (N/A)  3 Days Post-Op  Inpatient: Payor: [facility] MEDICARE / Plan: AET MEDICARE-ADVANTAGE PPO / Product Type: Medicare /     ASSESSMENT:     REHAB RECOMMENDATIONS:   Recommendation to date pending progress:  Setting:  Continued occupational therapy recommended at discharge    Equipment:    To Be Determined     ASSESSMENT:  Mr. Gaxiola presents up in the chair upon arrival. Pt agreeable to therapy and completed functional mobility in the room and in the hallway with a rolling walker and SBA. Pt walked into the shower room and sat

## 2025-07-25 NOTE — PROGRESS NOTES
[facility] OCCUPATIONAL THERAPY INITIAL EVALUATION  OT Individual Minutes  Time In: 1351  Time Out: 1424  Minutes: 33               HPI (per MD report): \"Demarcus Gaxiola is a 80 y.o. male patient who presented to [facility] on 7/22/2025 secondary to elective surgery.  Patient with a past medical history of BPH, GERD, hyperlipidemia, hypertension, LEOBARDO, OA, sensorineural hearing loss underwent posterior L2 L5 laminectomy and fusion surgery which was complicated by a fall which ended in a L5 fracture.  Ambulation became difficult, and lower back pain worsened.  Due to ongoing severe stenosis with neurogenic claudication, patient underwent additional surgery to include interbody fusion L5-S1, bilateral foraminotomies and iliac fixation. Due to ongoing functional deficits, PM&R consulted to evaluate patient's rehabilitation and ongoing medical needs to determine best suited placement for patient once discharged from acute care.\"  Past Medical History:   Diagnosis Date    Thrasher esophagus     BPH (benign prostatic hyperplasia)     GERD (gastroesophageal reflux disease)     medication daily, thrasher's esophogus, 2 pillows    High frequency hearing loss     Hypercholesteremia     on med    Hypertension     on med for control     Nocturnal hypoxemia     wears CPAP, states prescribed inhaler for congestion caused by CPAP- shabana asthma/copd    OA (osteoarthritis)     LEOBARDO (obstructive sleep apnea) 02/24/2023    wears CPAP, states prescribed inhaler for congestion caused by CPAP- shabana asthma/copd    Rhinitis     SNHL (sensorineural hearing loss)     Tinnitus of both ears     Wheezing     pro-air inhaler prn; [facility]        Patient's Goal: Return to PLOF  Pain: No pain expressed.  Precautions: Falls, [place] Alarm, and Spinal Precautions  Prior Level of Function: Mod I, living with supportive spouse who is home most of day  Pertinent PMHx: ~5mo ago L2-L5 fusion,

## 2025-07-25 NOTE — PROGRESS NOTES
4 Eyes Skin Assessment     NAME:  Demarcus Gaxiola  YOB: 1945  MEDICAL RECORD NUMBER:  [id number]    The patient is being assessed for  Admission    I agree that at least one RN has performed a thorough Head to Toe Skin Assessment on the patient. ALL assessment sites listed below have been assessed.      Areas assessed by both nurses:    Head, Face, Ears, Shoulders, Back, Chest, Arms, Elbows, Hands, Sacrum. Buttock, Coccyx, Ischium, and Legs. Feet and Heels        Does the Patient have a Wound? No noted wound(s)       Leonidas Prevention initiated by RN: Yes  Wound Care Orders initiated by RN: No    For hospital-acquired stage 1 & 2 and ALL Stage 3,4, Unstageable, DTI, NWPT, and Complex wounds: place order “IP Wound Care/Ostomy Nurse Eval and Treat” by RN under : No    New Ostomies, if present place, Ostomy referral order under : No     Incision on back, scattered bruising across body.     Nurse 1 eSignature: Electronically signed by Joanne Kaba RN on 7/25/25 at 4:36 PM EDT    **SHARE this note so that the co-signing nurse can place an eSignature**    Nurse 2 eSignature: Electronically signed by Joanne Valdes RN on 7/25/25 at 6:09 PM EDT

## 2025-07-25 NOTE — PROGRESS NOTES
ORTHO PROGRESS NOTE    2025    Admit Date: 2025  Post Op day: 3 Days Post-Op      Subjective:     Demarcus Gaxiola is a patient who is now 3 Days Post-Op  and has no complaints.       Objective:     PT/OT:    Progressing    Vital Signs:    Patient Vitals for the past 8 hrs:   BP Temp Temp src Pulse Resp SpO2   25 0734 137/73 98.8 °F (37.1 °C) Oral 92 18 92 %   25 0327 121/70 98.2 °F (36.8 °C) Axillary 89 16 95 %     Temp (24hrs), Av.9 °F (37.2 °C), Min:98.2 °F (36.8 °C), Max:99.5 °F (37.5 °C)      LAB:    Recent Labs     25  0842   HGB 9.4*   WBC 12.5*          Physical Exam:    Awake and in no acute distress.  Mood and affect appropriate.  Respirations unlabored and no evidence cyanosis.  No new neurologic deficit.    Assessment:      3 Days Post-Op STATUS POST Procedure(s):  eras\L5 laminectomy and L5-S1 fusion with allograft, L2-S1 instrumentation and iliac fixation.      Plan:     Progressing as expected. Continue PT/OT    Stable for dc to rehab          Anticipate discharge to: 9th floor vs SNF      Signed By: Brody Coleman Jr, MD

## 2025-07-25 NOTE — CARE COORDINATION
Patient with DC orders today. He will be going by hospital transport to [address] [place] [address]. No additional needs made known to RN LINN. Patient and family with no questions or concerns. Patient has met all treatment goals and milestones for discharge. Patient to contact CM if any new needs arise.         07/25/25 0854   Services At/After Discharge   Transition of Care Consult (CM Consult) Discharge Planning;Acute Rehab   Services At/After Discharge Inpatient rehab    Resource Information Provided? No   Mode of Transport at Discharge Other (see comment)  (hospital transport)   Confirm Follow Up Transport Other (see comment)  (hospital transport)   Condition of Participation: Discharge Planning   The Plan for Transition of Care is related to the following treatment goals: return to baseline with assistance of [address] [place] [address] and family   The Patient and/or Patient Representative was provided with a Choice of Provider? Patient   The Patient and/Or Patient Representative agree with the Discharge Plan? Yes   Freedom of Choice list was provided with basic dialogue that supports the patient's individualized plan of care/goals, treatment preferences, and shares the quality data associated with the providers?  Yes

## 2025-07-26 PROCEDURE — 97535 SELF CARE MNGMENT TRAINING: CPT

## 2025-07-26 PROCEDURE — 97116 GAIT TRAINING THERAPY: CPT

## 2025-07-26 PROCEDURE — 97150 GROUP THERAPEUTIC PROCEDURES: CPT

## 2025-07-26 PROCEDURE — 6370000000 HC RX 637 (ALT 250 FOR IP): Performed by: STUDENT IN AN ORGANIZED HEALTH CARE EDUCATION/TRAINING PROGRAM

## 2025-07-26 PROCEDURE — 97110 THERAPEUTIC EXERCISES: CPT

## 2025-07-26 PROCEDURE — 1180000000 HC REHAB R&B

## 2025-07-26 RX ADMIN — LOSARTAN POTASSIUM 50 MG: 50 TABLET, FILM COATED ORAL at 08:09

## 2025-07-26 RX ADMIN — MONTELUKAST 10 MG: 10 TABLET, FILM COATED ORAL at 20:09

## 2025-07-26 RX ADMIN — ATORVASTATIN CALCIUM 20 MG: 20 TABLET, FILM COATED ORAL at 08:09

## 2025-07-26 RX ADMIN — DULOXETINE HYDROCHLORIDE 60 MG: 60 CAPSULE, DELAYED RELEASE ORAL at 08:09

## 2025-07-26 RX ADMIN — GABAPENTIN 600 MG: 300 CAPSULE ORAL at 08:43

## 2025-07-26 RX ADMIN — ACETAMINOPHEN 1000 MG: 500 TABLET, FILM COATED ORAL at 08:09

## 2025-07-26 RX ADMIN — PANTOPRAZOLE SODIUM 40 MG: 40 TABLET, DELAYED RELEASE ORAL at 08:09

## 2025-07-26 RX ADMIN — GABAPENTIN 600 MG: 300 CAPSULE ORAL at 14:27

## 2025-07-26 RX ADMIN — TAMSULOSIN HYDROCHLORIDE 0.4 MG: 0.4 CAPSULE ORAL at 20:09

## 2025-07-26 RX ADMIN — GABAPENTIN 600 MG: 300 CAPSULE ORAL at 20:09

## 2025-07-26 RX ADMIN — ACETAMINOPHEN 1000 MG: 500 TABLET, FILM COATED ORAL at 14:27

## 2025-07-26 RX ADMIN — DOCUSATE SODIUM 50 MG AND SENNOSIDES 8.6 MG 1 TABLET: 8.6; 5 TABLET, FILM COATED ORAL at 20:09

## 2025-07-26 RX ADMIN — ACETAMINOPHEN 1000 MG: 500 TABLET, FILM COATED ORAL at 20:09

## 2025-07-26 NOTE — PROGRESS NOTES
PHYSICAL THERAPY DAILY NOTE    PT Individual Minutes  Time In: 1030  Time Out: 1105  Minutes: 35     PT Concurrent Minutes  Time In: 1005  Time Out: 1030  Minutes: 25           Total Treatment Time:  60 Minutes    Pt. Seen for: AM, Gait Training, Patient Education, Therapeutic Exercise, Transfer Training, and Other     Subjective: Patient reporting he feels OK. Reports his hips feel weak when walking without walker. Reports no increase in back pain during treatment         Objective:  Restrictions/Precautions: Other (Comment), Bed Alarm, Fall Risk (posey alarm)  Activity Level: Up with Assist, Up as Tolerated  Required Braces or Orthoses?: No              Spinal Precautions: No Bending/Lifting/Twisting (BLT)  Other Position/Activity Restrictions: spinal body mechanics for log rolling and supine<>sidelying<>Sit         GROSS ASSESSMENT   Patient resting in w/c at beginning of treatment        COGNITION Daily Assessment    Overall Orientation Status: Within Normal Limits   Overall Cognitive Status: WNL        BED/MAT MOBILITY Daily Assessment     Bed Mobility Comments: NT        TRANSFERS Daily Assessment    Sit to Stand: Supervision (with RW)  Stand to Sit: Supervision (with RW)  Stand Pivot Transfers: Supervision (with RW)  Comment: Increased time and effort with One time cue for spinal body mechanics          GAIT Daily Assessment    Surface: Level tile  Device: Rolling Walker;Parallel Bars  Assistance: Supervision  Quality of Gait: gait training facilitating upright posture with improved hip stance phase stability inhibiting tendency towards trendelenberg L>R  Gait Deviations: Decreased step height;Decreased step length;Slow Victoria  Distance: 150 ft x 1 with RW, 3 set of 8 ft x 6 in II bars with seated rest break between each set  Comments: Cues for controlling step length in order to faciltiating improved hip stance phase stability.  More Ambulation?: No              STEPS/STAIRS Daily Assessment    Stairs?: No

## 2025-07-26 NOTE — PROGRESS NOTES
End of Shift Note      Acute Onset Mental Status change? No    Does the patient have Inattention? Behavior not present    Does the patient have Disorganized Thinking? Behavior not present    Does the patient have Altered Level of Consciousness? Behavior not present    Is the patient impulsive? No    Expression of Ideas and Wants  Does the patient express ideas and wants without difficulty? Yes    Does the patient understand verbal and non-verbal content without cues or repetition? Yes    Self-Care  Eating:  Did the patient eat by mouth? Yes; No assistance required.    Oral Hygiene:  Did the patient use suitable items to clean teeth or gums? Yes; No assistance required.    Toileting Hygiene:  Did the patient void or have a bowel movement? Yes; The patient requires minimum help: Set-up or clean-up;    The patient uses a toilet;     No intake/output data recorded. No intake/output data recorded.    Shower/Bathe Self:  Did the patient have a shower or bath? Yes; The patient requires minimum help: Set-up or clean-up;    Upper Body Dressing:  Did the patient dress or undress above the waist? Yes; No assistance required.    Lower Body Dressing:  Did the patient dress or undress below the waist? Yes; No assistance required.    Mobility  Chair/bed-to-chair Transfer:  Did the patient transfer to and from a bed to a chair or wheelchair? Yes; Requires supervision for safety, steadying, or contact guard assistance.    Toilet Transfer:    Did the patient use a standard toilet with or without a raised seat or bedside commode? Yes; No assistance required.None of the activities are affected.    Bladder   Does the patient urinate? Yes  Does the patient have a catheter? No; Is the patient incontinent? Yes;  The patient is always continent.  Does the patient have stress incontinence? No    Bowel  Date of last BM 7/24/2025     Does the patient have an ostomy? no    Pain  Does the patient have pain that affects any of the following:

## 2025-07-26 NOTE — PROGRESS NOTES
End of Shift Note      Acute Onset Mental Status change? No    Does the patient have Inattention? Behavior not present    Does the patient have Disorganized Thinking? Behavior not present    Does the patient have Altered Level of Consciousness? Behavior not present    Is the patient impulsive? No    Expression of Ideas and Wants  Does the patient express ideas and wants without difficulty? Yes    Does the patient understand verbal and non-verbal content without cues or repetition? Yes    Self-Care  Eating:  Did the patient eat by mouth? Yes; No assistance required.    Toileting Hygiene:  Did the patient void or have a bowel movement? Yes; No assistance required.    The patient uses a toilet;     No intake/output data recorded. No intake/output data recorded.    Mobility  Chair/bed-to-chair Transfer:  Did the patient transfer to and from a bed to a chair or wheelchair? Yes; Requires supervision for safety, steadying, or contact guard assistance.    Toilet Transfer:    Did the patient use a standard toilet with or without a raised seat or bedside commode? Yes; Requires supervision for safety, steadying, or contact guard assistance.None of the activities are affected.    Bladder   Does the patient urinate? Yes  Does the patient have a catheter? No; Is the patient incontinent? No  Does the patient have stress incontinence? No    Bowel  Date of last BM 7/26/25   Does the patient have an ostomy? No; Is the patient incontinent? No    Pain  Does the patient have pain that affects any of the following: Sleep, Therapy Activities, or Day-to-Day Activities? Sleep; Occasionally

## 2025-07-26 NOTE — PLAN OF CARE
Problem: Safety - Adult  Goal: Free from fall injury  7/26/2025 0734 by Niko Rodriguez, RN  Outcome: Progressing  7/25/2025 2122 by Manisha Taylor, RN  Outcome: Progressing

## 2025-07-26 NOTE — PLAN OF CARE
Problem: Safety - Adult  Goal: Free from fall injury  7/25/2025 2122 by Manisha Taylor, RN  Outcome: Progressing  7/25/2025 1548 by Joanne Kaba, RN  Outcome: Progressing     Problem: ABCDS Injury Assessment  Goal: Absence of physical injury  Outcome: Progressing

## 2025-07-26 NOTE — PROGRESS NOTES
[facility] OCCUPATIONAL THERAPY DAILY NOTE  OT Individual Minutes  Time In: 0855  Time Out: 0926  Minutes: 31      OT Concurrent Minutes  Time In: 0926  Time Out: 0956  Minutes: 30        Subjective: Pt agreeable to treatment. Pt reports he would like a shower.   Pain: Patient denies pain.  Interdisciplinary Communication: Collaborated with PT regarding handoff communication.   Precautions: Falls and Spinal Precautions  Lines:N/A  O2 Device: RA      FUNCTIONAL MOBILITY:       Bed Mobility NT    Sit to Stand SBA    Transfer  SBA  Transfer Type: SPT  Equipment: RW    Ambulation SBA  Equipment: RW Cues prn safety/RW management      ACTIVITIES OF DAILY LIVING:       Eating   NT   Oral Hygiene Independent I seated in w/c at sink   Shower/Bathe Partial/moderate assistance SBA-S seated in shower. Long handled sponge to wash feet, assist to dry feet.   Upper Body  Dressing Supervision or touching assistance Change pullover shirt   Lower Body Dressing Substantial/maximal assistance Assist to thread BLE. Cues to dress LLE first to improve ease and independence with task. Assist for clothing up hips.   Donning/Metter Footwear Substantial/maximal assistance SBA to doff R sock. Dep to doff L sock and to don socks and shoes.   Toileting Hygiene Partial/moderate assistance Assist for clothing management   Toilet Transfer    NT   Education Adaptive ADL Techniques, Functional Transfer Training, Precautions, Rolling Walker Management, and Safety Awareness     - Activity Tolerance - Strengthening   Pt completed 12 minutes on the upper body ergometer, ½ forward and ½ backward, with light resistance to increase upper body strength and activity tolerance for integration into functional tasks. Pt took one rest break throughout task.      Assessment: Patient demonstrated good participation in OT treatment to address ADL and UB strength/endurance. Pt continues to benefit from skilled OT services to address remaining deficits and progress toward

## 2025-07-27 PROCEDURE — 97116 GAIT TRAINING THERAPY: CPT

## 2025-07-27 PROCEDURE — 6370000000 HC RX 637 (ALT 250 FOR IP): Performed by: STUDENT IN AN ORGANIZED HEALTH CARE EDUCATION/TRAINING PROGRAM

## 2025-07-27 PROCEDURE — 1180000000 HC REHAB R&B

## 2025-07-27 PROCEDURE — 97530 THERAPEUTIC ACTIVITIES: CPT

## 2025-07-27 PROCEDURE — 97110 THERAPEUTIC EXERCISES: CPT

## 2025-07-27 RX ADMIN — PANTOPRAZOLE SODIUM 40 MG: 40 TABLET, DELAYED RELEASE ORAL at 07:44

## 2025-07-27 RX ADMIN — DULOXETINE HYDROCHLORIDE 60 MG: 60 CAPSULE, DELAYED RELEASE ORAL at 07:44

## 2025-07-27 RX ADMIN — ACETAMINOPHEN 1000 MG: 500 TABLET, FILM COATED ORAL at 13:05

## 2025-07-27 RX ADMIN — GABAPENTIN 600 MG: 300 CAPSULE ORAL at 14:25

## 2025-07-27 RX ADMIN — DOCUSATE SODIUM 50 MG AND SENNOSIDES 8.6 MG 1 TABLET: 8.6; 5 TABLET, FILM COATED ORAL at 20:28

## 2025-07-27 RX ADMIN — ACETAMINOPHEN 1000 MG: 500 TABLET, FILM COATED ORAL at 20:28

## 2025-07-27 RX ADMIN — MONTELUKAST 10 MG: 10 TABLET, FILM COATED ORAL at 20:28

## 2025-07-27 RX ADMIN — GABAPENTIN 600 MG: 300 CAPSULE ORAL at 08:44

## 2025-07-27 RX ADMIN — ACETAMINOPHEN 1000 MG: 500 TABLET, FILM COATED ORAL at 07:44

## 2025-07-27 RX ADMIN — ATORVASTATIN CALCIUM 20 MG: 20 TABLET, FILM COATED ORAL at 07:44

## 2025-07-27 RX ADMIN — LOSARTAN POTASSIUM 50 MG: 50 TABLET, FILM COATED ORAL at 07:44

## 2025-07-27 RX ADMIN — GABAPENTIN 600 MG: 300 CAPSULE ORAL at 20:28

## 2025-07-27 RX ADMIN — TAMSULOSIN HYDROCHLORIDE 0.4 MG: 0.4 CAPSULE ORAL at 20:28

## 2025-07-27 NOTE — PLAN OF CARE
Problem: Safety - Adult  Goal: Free from fall injury  7/27/2025 1939 by Manisha Taylor, RN  Outcome: Progressing  7/27/2025 0805 by Niko Rodriguez, RN  Outcome: Progressing     Problem: ABCDS Injury Assessment  Goal: Absence of physical injury  Outcome: Progressing

## 2025-07-27 NOTE — PROGRESS NOTES
PHYSICAL THERAPY DAILY NOTE    PT Individual Minutes  Time In: 0933  Time Out: 1021  Minutes: 48                 Total Treatment Time:  48 Minutes    Pt. Seen for: AM, Gait Training, Patient Education, Therapeutic Exercise, Transfer Training, and Other     Subjective: Patient reporting he feels OK. Reports he wants to be able to walk better. Reports he has a tendency to walk with his feet turned out         Objective:  Restrictions/Precautions: Other (Comment), Bed Alarm, Fall Risk (posey alarm)  Activity Level: Up with Assist, Up as Tolerated  Required Braces or Orthoses?: No              Spinal Precautions: No Bending/Lifting/Twisting (BLT)  Other Position/Activity Restrictions: spinal body mechanics for log rolling and supine<>sidelying<>Sit         GROSS ASSESSMENT   Patient resting in recliner at beginning of treatment        COGNITION Daily Assessment    Overall Orientation Status: Within Normal Limits   Overall Cognitive Status: WNL        BED/MAT MOBILITY Daily Assessment     Bed Mobility Comments: NT        TRANSFERS Daily Assessment    Sit to Stand: Supervision (with RW)  Stand to Sit: Supervision (with RW)  Stand Pivot Transfers: Supervision (with RW)  Comment: Increased time and effort with One time cue for spinal body mechanics          GAIT Daily Assessment    Surface: Level tile  Device: Rolling Walker;Parallel Bars  Assistance: Supervision  Quality of Gait: gait training facilitating upright posture with improved hip stance phase stability inhibiting tendency towards trendelenberg L>R  Gait Deviations: Decreased step height;Decreased step length;Slow Victoria  Distance: 200 ft x 1 with RW, 3 set of 8 ft x 8 in II bars with seated rest break between each set  Comments: Cues for controlling step length in order to faciltiate improved hip stance phase stability.  More Ambulation?: No              STEPS/STAIRS Daily Assessment    Stairs?: No              BALANCE Daily Assessment   Static standing balance  activities in II bars with stepping one foot on/off 2 inch step facilitating upright posture with single hand support on bars facilitating hip stability and preventing trendelenburg , 2 sets of 10 for each LE with seated rest break between each set Static Sitting: Normal:  Pt. able to maintain balance w/o UE support  Dynamic Sitting: Normal - accepts maximal challenge & can shift weight easily fully in all directions  Static Standing: Good:  Pt. able to maintain balance w/o UE support;  exhibits some postural sway  Dynamic Standing: Fair - accepts minimal challenge;  can maintain balance while turning head/trunk       WHEELCHAIR MOBILITY Daily Assessment          NA                LOWER EXTREMITY EXERCISES   LE motomed x 10 mins at level 2     Pain level: 0 to 4 out of 10  Pain Location:  low back  Pain Interventions: Medication per order, rest, positioning,relaxation, body mechanics      Vital Signs:  /67   Pulse 92   Temp 98.2 °F (36.8 °C) (Oral)   Resp 18   Ht 1.651 m (5' 5\")   Wt 96.4 kg (212 lb 9.6 oz)   SpO2 95%   BMI 35.38 kg/m²       Education:    Education Given To: Patient  Education Provided: Precautions;Safety;Transfer Training;Fall Prevention Strategies;Mobility Training  Education Method: Demonstration;Verbal  Barriers to Learning: None  Education Outcome: Verbalized understanding;Continued education needed;Demonstrated understanding     Interdisciplinary Communication: NA    Pt. Left in recliner call bell in reach needs in reach bed/chair alarm activated         Assessment: Progressing towards goals. Improved gait pattern and posture with cueing during gait training in II bars. Limited carry over with RW         Plan of Care: Continue with POC and progress as tolerated.     Lanre Qiu, PT  7/27/2025

## 2025-07-27 NOTE — PLAN OF CARE
Problem: Safety - Adult  Goal: Free from fall injury  7/27/2025 0805 by Niko Rodriguez, RN  Outcome: Progressing  7/26/2025 2001 by Manisha Taylor, RN  Outcome: Progressing     Problem: ABCDS Injury Assessment  Goal: Absence of physical injury  7/26/2025 2001 by Manisha Taylor, RN  Outcome: Progressing

## 2025-07-27 NOTE — CARE COORDINATION
CM reviewed / screen patient medical chart for planning discharge needs.  CM met with patient to discuss d/c plan and needs.  Patient alert/orient x4.  Patient were able to states *** name, , place, self and situation.  Patient verified demographic, no changes.  Verified PCP *** was last seen *** and insurance ***.  Patient states **  lives with ******.   Patient states *** was ****ADL's and iADL's and do has DME***  available if needed in the home.  Patient states he was independent with driving himself to appointment.  Patient denies any issues with purchasing or affording medications.  CM made patient aware that Team Conference will be on Tuesday /  @ 10 AM.  CM made patient aware that family can be present or via phone to attend Team Conference. CM made patient aware that staff will make recommendation for discharge date, services (HH / Outpatient therapy, etc..) or family training if needed.  Patient has requested CM make contact **** and provide an update on his progress, services, DME's and discharge update that will be recommended.  CM will continue to monitor and remain available for any needs, concerns or questions that may arise.         [phone]   Service Assessment   Patient Orientation Alert and Oriented;Person;Place;Situation;Self   Cognition Alert   History Provided By Patient;Medical Record   Primary Caregiver Self   Support Systems Spouse/Significant Other   Patient's Healthcare Decision Maker is: Legal Next of Kin   PCP Verified by CM Yes   Last Visit to PCP Within last 3 months   Prior Functional Level Independent in ADLs/IADLs   Current Functional Level Assistance with the following:;Bathing;Dressing;Toileting;Mobility   Can patient return to prior living arrangement Yes   Ability to make needs known: Good   Family able to assist with home care needs: Yes   Would you like for me to discuss the discharge plan with any other family members/significant others, and if so, who? Yes

## 2025-07-27 NOTE — PLAN OF CARE
Problem: Safety - Adult  Goal: Free from fall injury  7/26/2025 2001 by Manisha Taylor, RN  Outcome: Progressing  7/26/2025 0734 by Niko Rodriguez, RN  Outcome: Progressing     Problem: ABCDS Injury Assessment  Goal: Absence of physical injury  Outcome: Progressing

## 2025-07-27 NOTE — PROGRESS NOTES
End of Shift Note      Acute Onset Mental Status change? No     Does the patient have Inattention? Behavior not present     Does the patient have Disorganized Thinking? Behavior not present     Does the patient have Altered Level of Consciousness? Behavior not present     Is the patient impulsive? No     Expression of Ideas and Wants  Does the patient express ideas and wants without difficulty? Yes     Does the patient understand verbal and non-verbal content without cues or repetition? Yes     Self-Care  Eating:  Did the patient eat by mouth? Yes; No assistance required.     Oral Hygiene:  Did the patient use suitable items to clean teeth or gums? Yes; No assistance required.     Toileting Hygiene:  Did the patient void or have a bowel movement? Yes; The patient requires minimum help: Set-up or clean-up;     The patient uses a toilet;      No intake/output data recorded. No intake/output data recorded.     Shower/Bathe Self:  Did the patient have a shower or bath? Yes; The patient requires minimum help: Set-up or clean-up;     Upper Body Dressing:  Did the patient dress or undress above the waist? Yes; No assistance required.     Lower Body Dressing:  Did the patient dress or undress below the waist? Yes; No assistance required.     Mobility  Chair/bed-to-chair Transfer:  Did the patient transfer to and from a bed to a chair or wheelchair? Yes; Requires supervision for safety, steadying, or contact guard assistance.     Toilet Transfer:    Did the patient use a standard toilet with or without a raised seat or bedside commode? Yes; No assistance required.None of the activities are affected.     Bladder   Does the patient urinate? Yes  Does the patient have a catheter? No; Is the patient incontinent? Yes;  The patient is always continent.  Does the patient have stress incontinence? No     Bowel  Date of last BM 7/26/2025     Does the patient have an ostomy? no     Pain  Does the patient have pain that affects any of

## 2025-07-27 NOTE — PROGRESS NOTES
Signed         End of Shift Note       Acute Onset Mental Status change? No     Does the patient have Inattention? Behavior not present     Does the patient have Disorganized Thinking? Behavior not present     Does the patient have Altered Level of Consciousness? Behavior not present     Is the patient impulsive? No     Expression of Ideas and Wants  Does the patient express ideas and wants without difficulty? Yes     Does the patient understand verbal and non-verbal content without cues or repetition? Yes     Self-Care  Eating:  Did the patient eat by mouth? Yes; No assistance required.     Toileting Hygiene:  Did the patient void or have a bowel movement? Yes; No assistance required.     The patient uses a toilet;      No intake/output data recorded. No intake/output data recorded.     Mobility  Chair/bed-to-chair Transfer:  Did the patient transfer to and from a bed to a chair or wheelchair? Yes; Requires supervision for safety, steadying, or contact guard assistance.     Toilet Transfer:    Did the patient use a standard toilet with or without a raised seat or bedside commode? Yes; Requires supervision for safety, steadying, or contact guard assistance.None of the activities are affected.     Bladder   Does the patient urinate? Yes  Does the patient have a catheter? No; Is the patient incontinent? No  Does the patient have stress incontinence? No     Bowel  Date of last BM 7/27/25   Does the patient have an ostomy? No; Is the patient incontinent? No     Pain  Does the patient have pain that affects any of the following: Sleep, Therapy Activities, or Day-to-Day Activities? Sleep; Occasionally

## 2025-07-28 LAB
ANION GAP SERPL CALC-SCNC: 13 MMOL/L (ref 7–16)
BASOPHILS # BLD: 0.03 K/UL (ref 0–0.2)
BASOPHILS NFR BLD: 0.4 % (ref 0–2)
BUN SERPL-MCNC: 9 MG/DL (ref 8–23)
CALCIUM SERPL-MCNC: 8.9 MG/DL (ref 8.8–10.2)
CHLORIDE SERPL-SCNC: 101 MMOL/L (ref 98–107)
CO2 SERPL-SCNC: 25 MMOL/L (ref 20–29)
CREAT SERPL-MCNC: 0.65 MG/DL (ref 0.8–1.3)
DIFFERENTIAL METHOD BLD: ABNORMAL
EOSINOPHIL # BLD: 0.17 K/UL (ref 0–0.8)
EOSINOPHIL NFR BLD: 2 % (ref 0.5–7.8)
ERYTHROCYTE [DISTWIDTH] IN BLOOD BY AUTOMATED COUNT: 14.2 % (ref 11.9–14.6)
GLUCOSE SERPL-MCNC: 113 MG/DL (ref 70–99)
HCT VFR BLD AUTO: 26.2 % (ref 41.1–50.3)
HGB BLD-MCNC: 8.8 G/DL (ref 13.6–17.2)
IMM GRANULOCYTES # BLD AUTO: 0.04 K/UL (ref 0–0.5)
IMM GRANULOCYTES NFR BLD AUTO: 0.5 % (ref 0–5)
LYMPHOCYTES # BLD: 1.62 K/UL (ref 0.5–4.6)
LYMPHOCYTES NFR BLD: 19.1 % (ref 13–44)
MAGNESIUM SERPL-MCNC: 1.4 MG/DL (ref 1.8–2.4)
MCH RBC QN AUTO: 31.9 PG (ref 26.1–32.9)
MCHC RBC AUTO-ENTMCNC: 33.6 G/DL (ref 31.4–35)
MCV RBC AUTO: 94.9 FL (ref 82–102)
MONOCYTES # BLD: 1.12 K/UL (ref 0.1–1.3)
MONOCYTES NFR BLD: 13.2 % (ref 4–12)
NEUTS SEG # BLD: 5.48 K/UL (ref 1.7–8.2)
NEUTS SEG NFR BLD: 64.8 % (ref 43–78)
NRBC # BLD: 0 K/UL (ref 0–0.2)
PLATELET # BLD AUTO: 295 K/UL (ref 150–450)
PMV BLD AUTO: 8.9 FL (ref 9.4–12.3)
POTASSIUM SERPL-SCNC: 3.7 MMOL/L (ref 3.5–5.1)
RBC # BLD AUTO: 2.76 M/UL (ref 4.23–5.6)
SODIUM SERPL-SCNC: 140 MMOL/L (ref 136–145)
WBC # BLD AUTO: 8.5 K/UL (ref 4.3–11.1)

## 2025-07-28 PROCEDURE — 97110 THERAPEUTIC EXERCISES: CPT

## 2025-07-28 PROCEDURE — 80048 BASIC METABOLIC PNL TOTAL CA: CPT

## 2025-07-28 PROCEDURE — 85025 COMPLETE CBC W/AUTO DIFF WBC: CPT

## 2025-07-28 PROCEDURE — 83735 ASSAY OF MAGNESIUM: CPT

## 2025-07-28 PROCEDURE — 2500000003 HC RX 250 WO HCPCS: Performed by: STUDENT IN AN ORGANIZED HEALTH CARE EDUCATION/TRAINING PROGRAM

## 2025-07-28 PROCEDURE — 97530 THERAPEUTIC ACTIVITIES: CPT

## 2025-07-28 PROCEDURE — 76937 US GUIDE VASCULAR ACCESS: CPT

## 2025-07-28 PROCEDURE — 6360000002 HC RX W HCPCS: Performed by: STUDENT IN AN ORGANIZED HEALTH CARE EDUCATION/TRAINING PROGRAM

## 2025-07-28 PROCEDURE — 36415 COLL VENOUS BLD VENIPUNCTURE: CPT

## 2025-07-28 PROCEDURE — 6370000000 HC RX 637 (ALT 250 FOR IP): Performed by: STUDENT IN AN ORGANIZED HEALTH CARE EDUCATION/TRAINING PROGRAM

## 2025-07-28 PROCEDURE — 97116 GAIT TRAINING THERAPY: CPT

## 2025-07-28 PROCEDURE — 99232 SBSQ HOSP IP/OBS MODERATE 35: CPT | Performed by: STUDENT IN AN ORGANIZED HEALTH CARE EDUCATION/TRAINING PROGRAM

## 2025-07-28 PROCEDURE — 1180000000 HC REHAB R&B

## 2025-07-28 RX ORDER — LOSARTAN POTASSIUM 50 MG/1
100 TABLET ORAL DAILY
Status: DISCONTINUED | OUTPATIENT
Start: 2025-07-29 | End: 2025-07-31 | Stop reason: HOSPADM

## 2025-07-28 RX ORDER — MAGNESIUM SULFATE IN WATER 40 MG/ML
2000 INJECTION, SOLUTION INTRAVENOUS ONCE
Status: COMPLETED | OUTPATIENT
Start: 2025-07-28 | End: 2025-07-28

## 2025-07-28 RX ORDER — LANOLIN ALCOHOL/MO/W.PET/CERES
400 CREAM (GRAM) TOPICAL 2 TIMES DAILY
Status: DISCONTINUED | OUTPATIENT
Start: 2025-07-28 | End: 2025-07-31 | Stop reason: HOSPADM

## 2025-07-28 RX ADMIN — MAGNESIUM GLUCONATE 500 MG ORAL TABLET 400 MG: 500 TABLET ORAL at 10:37

## 2025-07-28 RX ADMIN — MAGNESIUM GLUCONATE 500 MG ORAL TABLET 400 MG: 500 TABLET ORAL at 19:31

## 2025-07-28 RX ADMIN — GABAPENTIN 400 MG: 100 CAPSULE ORAL at 19:32

## 2025-07-28 RX ADMIN — PANTOPRAZOLE SODIUM 40 MG: 40 TABLET, DELAYED RELEASE ORAL at 08:01

## 2025-07-28 RX ADMIN — MONTELUKAST 10 MG: 10 TABLET, FILM COATED ORAL at 19:31

## 2025-07-28 RX ADMIN — GABAPENTIN 400 MG: 100 CAPSULE ORAL at 14:51

## 2025-07-28 RX ADMIN — ACETAMINOPHEN 1000 MG: 500 TABLET, FILM COATED ORAL at 19:36

## 2025-07-28 RX ADMIN — ACETAMINOPHEN 1000 MG: 500 TABLET, FILM COATED ORAL at 14:52

## 2025-07-28 RX ADMIN — DULOXETINE HYDROCHLORIDE 60 MG: 60 CAPSULE, DELAYED RELEASE ORAL at 08:01

## 2025-07-28 RX ADMIN — MAGNESIUM SULFATE HEPTAHYDRATE 2000 MG: 40 INJECTION, SOLUTION INTRAVENOUS at 11:35

## 2025-07-28 RX ADMIN — ACETAMINOPHEN 1000 MG: 500 TABLET, FILM COATED ORAL at 08:00

## 2025-07-28 RX ADMIN — TAMSULOSIN HYDROCHLORIDE 0.4 MG: 0.4 CAPSULE ORAL at 19:31

## 2025-07-28 RX ADMIN — SODIUM CHLORIDE, PRESERVATIVE FREE 10 ML: 5 INJECTION INTRAVENOUS at 19:37

## 2025-07-28 RX ADMIN — ATORVASTATIN CALCIUM 20 MG: 20 TABLET, FILM COATED ORAL at 08:00

## 2025-07-28 RX ADMIN — LOSARTAN POTASSIUM 50 MG: 50 TABLET, FILM COATED ORAL at 07:59

## 2025-07-28 RX ADMIN — GABAPENTIN 600 MG: 300 CAPSULE ORAL at 08:00

## 2025-07-28 ASSESSMENT — PAIN SCALES - GENERAL: PAINLEVEL_OUTOF10: 1

## 2025-07-28 NOTE — PROGRESS NOTES
End of Shift Note      Acute Onset Mental Status change? No     Does the patient have Inattention? Behavior not present     Does the patient have Disorganized Thinking? Behavior not present     Does the patient have Altered Level of Consciousness? Behavior not present     Is the patient impulsive? No     Expression of Ideas and Wants  Does the patient express ideas and wants without difficulty? Yes     Does the patient understand verbal and non-verbal content without cues or repetition? Yes     Self-Care  Eating:  Did the patient eat by mouth? Yes; No assistance required.     Oral Hygiene:  Did the patient use suitable items to clean teeth or gums? Yes; No assistance required.     Toileting Hygiene:  Did the patient void or have a bowel movement? Yes; The patient requires minimum help: Set-up or clean-up;     The patient uses a toilet;      No intake/output data recorded. No intake/output data recorded.     Shower/Bathe Self:  Did the patient have a shower or bath? Yes; The patient requires minimum help: Set-up or clean-up;     Upper Body Dressing:  Did the patient dress or undress above the waist? Yes; No assistance required.     Lower Body Dressing:  Did the patient dress or undress below the waist? Yes; No assistance required.     Mobility  Chair/bed-to-chair Transfer:  Did the patient transfer to and from a bed to a chair or wheelchair? Yes; Requires supervision for safety, steadying, or contact guard assistance.     Toilet Transfer:    Did the patient use a standard toilet with or without a raised seat or bedside commode? Yes; No assistance required.None of the activities are affected.     Bladder   Does the patient urinate? Yes  Does the patient have a catheter? No; Is the patient incontinent? Yes;  The patient is always continent.  Does the patient have stress incontinence? No     Bowel  Date of last BM 7/27/2025     Does the patient have an ostomy? no     Pain  Does the patient have pain that affects any of

## 2025-07-28 NOTE — PROGRESS NOTES
[facility] OCCUPATIONAL THERAPY DAILY NOTE  OT Individual Minutes  Time In: 1430  Time Out: 1515  Minutes: 45               Subjective: Pt agreeable to treatment. \"I'm not very good at this [cornhole]\"  Pain: No pain expressed.  Interdisciplinary Communication: Collaborated with PT and RN regarding pt status, pt performance, and handoff communication.   Precautions:  Falls, [place] Alarm, and Spinal Precautions   Lines:IV  O2 Device: RA    FUNCTIONAL MOBILITY:       Bed Mobility Supervision    Sit to Stand SBA    Transfer  SBA and CGA  Transfer Type: SPT  Equipment: RW    Ambulation SBA and CGA  Equipment: RW       - Activity Tolerance - Balance - Coordination - Energy Conservation/Pacing  - Range of Motion - Strengthening   Pt completed therapeutic activities to challenge BUE AROM/STR, FM/GM coordination, FM coordination/dexterity, bi-manual coordination, core stability, static balance, dynamic balance, standing tolerance, and activity tolerance in preparation for safe return to max (I) with I/ADLs. Pt tolerated activities well with no c/o pain. . Increased seated rest break duration and frequency required secondary to fatigue..   Pt completed weighted bean bag toss standing with no AD, gaitbelt CGA. Pt retrieved weighted bean bag from tray on R then stepped with L foot and tossed underhand with R hand to target ~6-8ft away at floor level. Pt then completed household mobility from w/c <-> target with RW, CGA-SBA. Pt retrieved weighed bean bag with reacher and returned to basin at floor level. Standing and seated rest breaks with increased duration during/between sets required. Pt required 1-3 verbal cues for directions and precaution adherence. 1-3 LOB noted, primarily when attempting toss with L hand, step with R foot. Pt attempted L hand toss, R hand step x3 tosses, reported discomfort in RLE, pt transitioned back to toss with R hand and step with L foot, denied any pain/discomfort.        - Activity Tolerance -

## 2025-07-28 NOTE — CONSULTS
Ultrasound was used to find the vein which was compressible and without any ultrasound features of an artery or nerve bundle. Skin was cleaned and disinfected prior to IV puncture.  Under real-time ultrasound guidance peripheral access was obtained in the left forearm using 22 G 1.75\" Peripheral IV catheter after 1 attempt(s). No immediate complications noted. Patient tolerated the procedure well.  Refer to IV flowsheet for further documentation.

## 2025-07-28 NOTE — PLAN OF CARE
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 *       Medical/Functional Prognosis: Good     Anticipated functional outcomes/goals and interventions: Performs mobility and self care activities at the highest level of function for planned discharge setting.      Patient's/family's anticipated outcomes/personal goals: to improve strength, endurance, and independence      Physical therapy functional outcome/goal:  Bed mobility  Bed Mobility Comments: NT   Transfers  Sit to Stand: Modified independent (with RW)  Stand to Sit: Modified independent (with RW)  Stand Pivot Transfers: Modified independent (with RW)  Comment: demonstrating correct body mechanics   Ambulation  Surface: Level tile  Device: Rolling Walker, Parallel Bars  Assistance: Supervision  Quality of Gait: gait training facilitating upright posture with improved hip stance phase stability inhibiting tendency towards trendelenberg L>R  Gait Deviations: Decreased step height, Decreased step length, Slow [place]  Distance: 200 ft x 1 with RW, 3 set of 8 ft x 8 in II bars with seated rest break between each set  Comments: Cues for controlling step length in order to faciltiate improved hip stance phase stability.  More Ambulation?: No                          Occupational therapy functional outcome/goal:   Eating  Assistance Needed: Independent  Comment: NT  CARE Score: 6   Oral Hygiene  Assistance Needed: Independent  Comment: I seated in w/c at sink  CARE Score: 6  Shower/Bathe Self  Assistance Needed: Partial/moderate assistance  Comment: SBA-S seated in shower. Long handled sponge to wash feet,  day, > 5 days per week   Occupational Therapy: > 90 minutes per day, > 5 days per week        Any updates/changes to the Required Therapies section in Rehab POC Review document, as compared to the Pre-Admission Screening document, are due to the assessment completed by the interdisciplinary team members. These changes/updates are reviewed and approved by the Physical Medicine & Rehabilitation physician.        Anticipated Discharge Plan  Community Setting:Home Care and Family assistance          Expected length of stay/anticipated discharge date: 7-11 days after admission              Physician:  Yris Patterson DO  Date: 7/28/2025  Time:  5:03 PM

## 2025-07-28 NOTE — PROGRESS NOTES
[facility] OCCUPATIONAL THERAPY DAILY NOTE  OT Individual Minutes  Time In: 0700  Time Out: 0745  Minutes: 45               Subjective: Pt agreeable to treatment. \"I'm already washed, dressed and ready to go\"  Pain: No pain expressed.  Interdisciplinary Communication: Collaborated with PT and RN regarding pt status and pt performance.   Precautions: Falls, [place] Alarm, and Spinal Precautions   Lines:IV  O2 Device: RA    FUNCTIONAL MOBILITY:       Bed Mobility N/A dressed and seated up in recliner on entry    Sit to Stand SBA    Transfer  SBA and CGA  Transfer Type: SPT  Equipment: RW    Ambulation SBA and CGA  Equipment: RW       - Activity Tolerance - Balance - Coordination - Energy Conservation/Pacing  - Range of Motion - Strengthening   Pt completed therapeutic activities to challenge BUE AROM/STR, B/L hand(s) dexterity/pinch, FM/GM coordination, core stability, static balance, dynamic balance, standing tolerance, activity tolerance, complex problem solving, and STM recall in preparation for safe return to max (I) with I/ADLs. Pt tolerated activities well with no c/o pain. . Increased seated rest break duration and frequency required secondary to fatigue..   Pt completed laundry management activity standing at raised table with  [facility]-SBA. Pt retrieved various laundry articles from pile on R using RUE. Pt then folded items using BUE and sorted by article on L with LUE. Increased attention to precaution adherence, pt noted to demonstrate appropriate turning of full body when needed opposed to twisting trunk. Pt completed 32 articles [pillow case(s), washcloth(s), towel(s), sock(s), pant(s)/short(s), and shirt(s)]. Pt required significantly increased time with 3 seated rest breaks 1-3 verbal cues for directions.   Pt completed home-management board activity standing at raised table with  [facility]-SBA. Pt completed Lock(s) activity; pt retrieved key ring from  using BUE and completed 9/9 locks on board. Pt completed

## 2025-07-28 NOTE — PROGRESS NOTES
[place]patient [place] [address]  Tel: [phone]     Physical Medicine and Rehabilitation Progress Note      Demarcus Gaxiola  Admit Date: 7/25/2025  Admit Diagnosis: Polyneuropathy [G62.9]    Subjective     Patient seen this morning laying in bed.  Discussed with patient low magnesium, will give IV replacement.  BP on the higher side, increase losartan dose.  Start decreasing gabapentin as patient endorses pain well-controlled with. All questions and concerns were addressed at this time.    Objective:     Current Facility-Administered Medications   Medication Dose Route Frequency    magnesium oxide (MAG-OX) tablet 400 mg  400 mg Oral BID    gabapentin (NEURONTIN) capsule 400 mg  400 mg Oral TID    [START ON 7/29/2025] losartan (COZAAR) tablet 100 mg  100 mg Oral Daily    acetaminophen (TYLENOL) tablet 650 mg  650 mg Oral Q4H PRN    polyethylene glycol (GLYCOLAX) packet 17 g  17 g Oral Daily PRN    sennosides-docusate sodium (SENOKOT-S) 8.6-50 MG tablet 1 tablet  1 tablet Oral QHS    sodium phosphate (FLEET) rectal enema 1 enema  1 enema Rectal Daily PRN    bisacodyl (DULCOLAX) suppository 10 mg  10 mg Rectal Daily PRN    calcium carbonate (TUMS) chewable tablet 500 mg  500 mg Oral TID PRN    hydrALAZINE (APRESOLINE) tablet 10 mg  10 mg Oral TID PRN    ondansetron (ZOFRAN-ODT) disintegrating tablet 4 mg  4 mg Oral Q8H PRN    carboxymethylcellulose (THERATEARS) 1 % ophthalmic gel 1 drop  1 drop Both Eyes TID PRN    Benzocaine-Menthol (CEPACOL SORE THROAT) 15-2.6 MG lozenge 1 lozenge  1 lozenge Oral Q2H PRN    medicated lip ointment (BLISTEX)   Topical PRN    mineral oil-hydrophilic petrolatum (AQUAPHOR) ointment   Topical BID PRN    sodium chloride (OCEAN, BABY AYR) 0.65 % nasal spray 1 spray  1 spray Each Nostril PRN    acetaminophen (TYLENOL) tablet 1,000 mg  1,000 mg Oral TID    albuterol (PROVENTIL) (2.5 MG/3ML) 0.083% nebulizer solution 2.5 mg  2.5 mg

## 2025-07-28 NOTE — PROGRESS NOTES
PHYSICAL THERAPY DAILY NOTE    PT Individual Minutes  Time In: 1516  Time Out: 1601  Minutes: 45                 Total Treatment Time:  45 Minutes    Pt. Seen for: AM, Gait Training, Patient Education, Therapeutic Exercise, Transfer Training, and Other     Subjective: Patient reporting he feels OK. Reports he wants to go home soon. Reports he will sleep better at home         Objective:  Restrictions/Precautions: Other (Comment), Bed Alarm, Fall Risk (posey alarm)  Activity Level: Up with Assist, Up as Tolerated  Required Braces or Orthoses?: No              Spinal Precautions: No Bending/Lifting/Twisting (BLT)  Other Position/Activity Restrictions: spinal body mechanics for log rolling and supine<>sidelying<>Sit         GROSS ASSESSMENT   Patient resting in w/c at beginning of treatment        COGNITION Daily Assessment    Overall Orientation Status: Within Normal Limits   Overall Cognitive Status: WNL        BED/MAT MOBILITY Daily Assessment     Bed Mobility Comments: NT        TRANSFERS Daily Assessment    Sit to Stand: Modified independent (with RW)  Stand to Sit: Modified independent (with RW)  Stand Pivot Transfers: Modified independent (with RW)  Comment: demonstrating correct body mechanics          GAIT Daily Assessment    Surface: Level tile  Device: Rolling Walker;Parallel Bars  Assistance: Supervision  Quality of Gait: gait training facilitating upright posture with improved hip stance phase stability inhibiting tendency towards trendelenberg L>R  Gait Deviations: Decreased step height;Decreased step length;Slow [place]  Distance: 200 ft x 1 with RW,1 set of 8 ft x 8, and 2 sets of 8 ft x 8 side stepping pattern in II bars with seated rest break between each set  Comments: Cues for controlling step length in order to faciltiate improved hip stance phase stability.  More Ambulation?: No              STEPS/STAIRS Daily Assessment    Stairs?: No              BALANCE Daily Assessment    Static Sitting:

## 2025-07-28 NOTE — PROGRESS NOTES
PHYSICAL THERAPY DAILY NOTE    PT Individual Minutes  Time In: 0830  Time Out: 0915  Minutes: 45                 Total Treatment Time:  45 Minutes    Pt. Seen for: AM, Gait Training, Patient Education, Therapeutic Exercise, Transfer Training, and Other     Subjective: Patient reporting he feels OK. Reports the alarm bells are driving him crazy. Reports he wants to be able to walk on his own         Objective:  Restrictions/Precautions: Other (Comment), Bed Alarm, Fall Risk (posey alarm)  Activity Level: Up with Assist, Up as Tolerated  Required Braces or Orthoses?: No              Spinal Precautions: No Bending/Lifting/Twisting (BLT)  Other Position/Activity Restrictions: spinal body mechanics for log rolling and supine<>sidelying<>Sit         GROSS ASSESSMENT   Patient resting in recliner at beginning of treatment        COGNITION Daily Assessment    Overall Orientation Status: Within Normal Limits   Overall Cognitive Status: WNL        BED/MAT MOBILITY Daily Assessment     Bed Mobility Comments: NT        TRANSFERS Daily Assessment    Sit to Stand: Modified independent (with RW)  Stand to Sit: Modified independent (with RW)  Stand Pivot Transfers: Modified independent (with RW)  Comment: demonstrating correct body mechanics          GAIT Daily Assessment    Surface: Level tile  Device: Rolling Walker;Parallel Bars  Assistance: Supervision  Quality of Gait: gait training facilitating upright posture with improved hip stance phase stability inhibiting tendency towards trendelenberg L>R  Gait Deviations: Decreased step height;Decreased step length;Slow [place]  Distance: 200 ft x 1 with RW, 3 set of 8 ft x 8 in II bars with seated rest break between each set  Comments: Cues for controlling step length in order to faciltiate improved hip stance phase stability.  More Ambulation?: No              STEPS/STAIRS Daily Assessment    Stairs?: No              BALANCE Daily Assessment    Static Sitting: Normal:  Pt. able to

## 2025-07-29 LAB — MAGNESIUM SERPL-MCNC: 1.9 MG/DL (ref 1.8–2.4)

## 2025-07-29 PROCEDURE — 6370000000 HC RX 637 (ALT 250 FOR IP): Performed by: STUDENT IN AN ORGANIZED HEALTH CARE EDUCATION/TRAINING PROGRAM

## 2025-07-29 PROCEDURE — 97116 GAIT TRAINING THERAPY: CPT

## 2025-07-29 PROCEDURE — 97110 THERAPEUTIC EXERCISES: CPT

## 2025-07-29 PROCEDURE — 36415 COLL VENOUS BLD VENIPUNCTURE: CPT

## 2025-07-29 PROCEDURE — 97530 THERAPEUTIC ACTIVITIES: CPT

## 2025-07-29 PROCEDURE — 1180000000 HC REHAB R&B

## 2025-07-29 PROCEDURE — 83735 ASSAY OF MAGNESIUM: CPT

## 2025-07-29 PROCEDURE — 99233 SBSQ HOSP IP/OBS HIGH 50: CPT | Performed by: STUDENT IN AN ORGANIZED HEALTH CARE EDUCATION/TRAINING PROGRAM

## 2025-07-29 PROCEDURE — 2500000003 HC RX 250 WO HCPCS: Performed by: STUDENT IN AN ORGANIZED HEALTH CARE EDUCATION/TRAINING PROGRAM

## 2025-07-29 RX ADMIN — DULOXETINE HYDROCHLORIDE 60 MG: 60 CAPSULE, DELAYED RELEASE ORAL at 08:06

## 2025-07-29 RX ADMIN — PANTOPRAZOLE SODIUM 40 MG: 40 TABLET, DELAYED RELEASE ORAL at 08:06

## 2025-07-29 RX ADMIN — GABAPENTIN 400 MG: 100 CAPSULE ORAL at 14:29

## 2025-07-29 RX ADMIN — TAMSULOSIN HYDROCHLORIDE 0.4 MG: 0.4 CAPSULE ORAL at 19:34

## 2025-07-29 RX ADMIN — LOSARTAN POTASSIUM 100 MG: 50 TABLET, FILM COATED ORAL at 08:06

## 2025-07-29 RX ADMIN — ACETAMINOPHEN 1000 MG: 500 TABLET, FILM COATED ORAL at 14:29

## 2025-07-29 RX ADMIN — ACETAMINOPHEN 1000 MG: 500 TABLET, FILM COATED ORAL at 19:35

## 2025-07-29 RX ADMIN — GABAPENTIN 400 MG: 100 CAPSULE ORAL at 19:34

## 2025-07-29 RX ADMIN — SODIUM CHLORIDE, PRESERVATIVE FREE 10 ML: 5 INJECTION INTRAVENOUS at 08:25

## 2025-07-29 RX ADMIN — MONTELUKAST 10 MG: 10 TABLET, FILM COATED ORAL at 19:35

## 2025-07-29 RX ADMIN — ACETAMINOPHEN 1000 MG: 500 TABLET, FILM COATED ORAL at 08:06

## 2025-07-29 RX ADMIN — ATORVASTATIN CALCIUM 20 MG: 20 TABLET, FILM COATED ORAL at 08:06

## 2025-07-29 RX ADMIN — GABAPENTIN 400 MG: 100 CAPSULE ORAL at 08:06

## 2025-07-29 RX ADMIN — MAGNESIUM GLUCONATE 500 MG ORAL TABLET 400 MG: 500 TABLET ORAL at 08:06

## 2025-07-29 RX ADMIN — MAGNESIUM GLUCONATE 500 MG ORAL TABLET 400 MG: 500 TABLET ORAL at 19:34

## 2025-07-29 NOTE — PROGRESS NOTES
[facility] OCCUPATIONAL THERAPY DAILY NOTE  OT Individual Minutes  Time In: 0706  Time Out: 0744  Minutes: 38               Subjective: Pt agreeable to treatment. \"That [shower] sounds great\"  Pain: No pain expressed.  Interdisciplinary Communication: Collaborated with PT and RN regarding pt status and pt performance.   Precautions: Falls, [place] Alarm, and Spinal Precautions   Lines:IV  O2 Device: RA      FUNCTIONAL MOBILITY:       Bed Mobility Supervision    Sit to Stand Supervision    Transfer  Supervision and SBA  Transfer Type: SPT  Equipment: Grab Bars and RW    Ambulation SBA  Equipment: RW      ACTIVITIES OF DAILY LIVING:       Eating Independent Mod I seated   Oral Hygiene   CGA-SBA oral care standing at sink with RW   Shower/Bathe Supervision or touching assistance S/U / Sup-V UB/LB bathing seated and standing at TTB wtih grab bars, HHSH and LHS, min incr time   Upper Body  Dressing Setup or clean-up assistance S/U doff/don shirt seated   Lower Body Dressing Supervision or touching assistance CGA doff/don underwear and pants seated and standing with grab bars, RW, reacher, reacher edu/training reviewed from prior use, pt demonstrated appropriate understanding, 1-3 verbal cues for directions/safety   Donning/[place] Footwear Partial/moderate assistance Min A overall, sock aid education/training completed, different from one pt has at home, assist with initial sock, pt able to don R sock with AE/AD and don slip-on shoes seated EOB   Toileting Hygiene   See next ADL   Toilet Transfer   CGA with RW   Education Adaptive ADL Techniques, AE/DME Training, Benefits of OT, Energy Conservation, Pacing, Functional Transfer Training, [place] Dressing Strategies, Precautions, Rolling Walker Management, and Safety Awareness     Assessment: Patient tolerated session well overall, pt progressing very well. Pt demonstrated good participation in OT treatment. Pt continues to benefit from skilled OT services to address remaining

## 2025-07-29 NOTE — PATIENT CARE CONFERENCE
[place] [facility] [place], [place]  INPATIENT REHABILITATION  TEAM CONFERENCE NOTE    Conference Date: 2025  Admit Date: 2025 12:51 PM  Patient Name: Demarcus Gaxiola    MRN: [id number]    : 1945 (80 y.o.)  Rehabilitation Admitting Diagnosis: Polyneuropathy [G62.9]           Team Members Present at Conference:  : Mona Rinaldi CM  Nurse:Maura Porter RN  Occupational Therapist:MARGARET Gomez/MÓNICA  Physical Therapist: Lanre Qiu PT  Speech Therapist: N/A    Patient present for conference.  ---------------------------------------------------------------------------------------------------    Case Management:  Patient's PLOF: Independent with IADLs  Patient's Prior Living Situation: Lives with spouse/significant other  Baseline Supervision/Assistance: None  Current issues/needs regarding patient and family discharge status: None    ---------------------------------------------------------------------------------------------------    Functional Assessment:  Most Recent Mobility Scores Per Physical Therapy:   Bed/Mat Mobility Bed Mobility Comments: NT   Transfers Sit to Stand: Modified independent (with RW)  Stand to Sit: Modified independent (with RW)  Stand Pivot Transfers: Modified independent (with RW)  Comment: demonstrating correct body mechanics    Gait Surface: Level tile  Device: Rolling Walker;Parallel Bars  Assistance: Supervision  Quality of Gait: gait training facilitating upright posture with improved hip stance phase stability inhibiting tendency towards trendelenberg L>R  Gait Deviations: Decreased step height;Decreased step length;Slow Victoria  Distance: 200 ft x 1 with RW,1 set of 8 ft x 8, and 2 sets of 8 ft x 8 side stepping pattern in II bars with seated rest break between each set  Comments: Cues for controlling step length in order to faciltiate improved hip stance phase stability.  More Ambulation?: No          Steps/Stairs Stairs?: No       and medication use. Will need to continue routine monitoring of bowel function with appropriate adjustment in bowel regimen as indicated to ensure adequate bowel management; Bladder management: Monitor urinary output and urinary function/dysfunction. Does have the potential for underlying bladder dysfunction given immobility. Therefore, requires continued routine monitoring with appropriate bladder management as indicated to avoid urinary tract complications.      No results for input(s): \"POCGLU\" in the last 72 hours.  No results found for: \"LDLDIRECT\"        ---------------------------------------------------------------------------------------------------    Discharge Plan:  Family Training and Education: Scheduled  Education Topics: ADL training using AE/DME prn, mobility training using AE/DME prn, and safety , level of assist recommended for discharge.  Estimated Discharge Date: 7/31/2025  Discharge Destination: Home with  home health  Services at Discharge: Home Health: Physical Therapy and Nursing  Equipment used/available prior to admission: grab bars  Equipment recommended for discharge: Has all equipment  Barriers to the achievement of predicted outcomes: Pain    ---------------------------------------------------------------------------------------------------        Scribed by: PAULETTE AG RN on 7/29/2025 at 9:40 AM    I approve the established interdisciplinary plan of care as documented within the medical record of Demarcus Gaxiola. I was present and led the interdisciplinary care conference.    DANIELITO GuadalupeO.

## 2025-07-29 NOTE — PROGRESS NOTES
PHYSICAL THERAPY DAILY NOTE    PT Individual Minutes  Time In: 0834  Time Out: 0919  Minutes: 45                 Total Treatment Time:  45 Minutes    Pt. Seen for: AM, Gait Training, Patient Education, Therapeutic Exercise, Transfer Training, and Other     Subjective: Patient reporting he feels OK. Reports he is ready to go home but does not want to go too soon because of what happened after his first surgery         Objective:  Restrictions/Precautions: Other (Comment), Fall Risk  Activity Level: Up as Tolerated, Up with Assist  Required Braces or Orthoses?: No              Spinal Precautions: No Bending/Lifting/Twisting (BLT)  Other Position/Activity Restrictions: spinal body mechanics for log rolling and supine<>sidelying<>Sit         GROSS ASSESSMENT   Patient resting in recliner at beginning of treatment        COGNITION Daily Assessment    Overall Orientation Status: Within Normal Limits   Overall Cognitive Status: WNL        BED/MAT MOBILITY Daily Assessment     Bed Mobility Comments: NT        TRANSFERS Daily Assessment   Modified independent with commode transfers using grab bar and RW. Independent with clothing management and hygiene during toileting Sit to Stand: Modified independent (with RW)  Stand to Sit: Modified independent (with RW)  Stand Pivot Transfers: Modified independent (with RW)  Car Transfer: Modified independent (SPT with RW)  Comment: demonstrating correct body mechanics          GAIT Daily Assessment    Surface: Level tile  Device: Rolling Walker  Assistance: Modified Independent  Quality of Gait: gait training facilitating upright posture with improved hip stance phase stability inhibiting tendency towards trendelenberg L>R  Gait Deviations: Decreased step height;Decreased step length;Slow Victoria  Distance: 50 ft x 1, 120 ft x 1, 200ft x 1  Comments: Cues for controlling step length in order to faciltiate improved hip stance phase stability.  More Ambulation?: Yes   Ambulation  Progressing towards goals. Good understanding of gait deviation correction but difficulty maintaining corrections ambulating with RW due to extent of hip ABD weakness. Improved hip stability during LE standing exercises.         Plan of Care: Continue with POC and progress as tolerated.     Lanre Qiu, PT  7/29/2025

## 2025-07-29 NOTE — PROGRESS NOTES
Minimal assist ambulating in room.  Assist to bed with cpap on and rested without c/o.  Back dressing d/I.  Call light in reach.

## 2025-07-29 NOTE — CARE COORDINATION
Interdisciplinary Tuesday / Thursday, Team Conference Meeting Notes    Interdisciplinary team conference meeting completed to discuss plan of care.       Attending Staff: (Director) Bernie Basurto (Therapy Supervisor), Dr. Patterson, (CM) Mona, (PT) Viv Malik Dana, Erin, (OT) Tania Gomes Joy, Nick (SLP) Genet      Family Members Attending: Spouse and family    Estimated D/C Date: 07/31/25      Recommended Follow-Up Therapy: Staff has recommended (HH) PT/OT/RN/Aide/SW      SNF Facility: NA      Home Health: [facility]    Dialysis: NA  Name of Dialysis Facility: NA      DME: TBD      Family Training: Therapy will schedule family training with family.      Communication with family/caregivers:  CM reviewed / screen patient medical chart for continued stay. Patient needs are continue to be followed by Dr. Yris Patterson. Patient was admitted on 07/25/25. Patient has been approved for estimated length of stay for 11 days. Patient has currently used 4 out of estimated length of stay days. Team Conference staff met with patient / family members and discussed patient progress and barriers for discharge home. Dr. Patterson has recommended a discharge date 07/31/25 / therapy staff has recommended (HH) / DME's and family training.  Patient / spouse / family has requested a referral be made [facility] for (HH) / DME(TBD) referral completed.  Provider for DME (TBD) and has been delivered to patient room.  Family training has been requested.  Therapy staff will schedule family training.  CM will continue to follow patient plan of care and assist with supportive care. CM will continue to follow / monitor for any needs, concerns or questions that may arise.          Name of Ins: [employer] MEDICARE   Policy #: [id number]    Secondary Ins:  Policy #:  Auth #  Admission Date: 07/25/25  Approved Dates:  LOS: 4  Discharge Date: 07/31/25  Contact Name:  Contact #  Fax #:  Updated Clinicals:

## 2025-07-29 NOTE — PROGRESS NOTES
PHYSICAL THERAPY DAILY NOTE    PT Individual Minutes  Time In: 1518  Time Out: 1604  Minutes: 46                 Total Treatment Time:  46 Minutes    Pt. Seen for: AM, Gait Training, Patient Education, Therapeutic Exercise, Transfer Training, and Other     Subjective: Patient reporting he is going home tomorrow. Reports his back feels better this time after surgery. Reports he has a rollator and a RW at home. Reports he uses the RW inside his home and rollator outside his home.         Objective:  Restrictions/Precautions: Other (Comment), Fall Risk  Activity Level: Up as Tolerated, Up with Assist  Required Braces or Orthoses?: No              Spinal Precautions: No Bending/Lifting/Twisting (BLT)  Other Position/Activity Restrictions: spinal body mechanics for log rolling and supine<>sidelying<>Sit         GROSS ASSESSMENT   Patient resting in recliner at beginning of treatment        COGNITION Daily Assessment    Overall Orientation Status: Within Normal Limits   Overall Cognitive Status: WNL        BED/MAT MOBILITY Daily Assessment     Bed Mobility Comments: NT        TRANSFERS Daily Assessment    Sit to Stand: Modified independent (with RW)  Stand to Sit: Modified independent (with RW)  Stand Pivot Transfers: Modified independent (with RW)  Comment: demonstrating correct body mechanics          GAIT Daily Assessment    Surface: Level tile  Device: Rollator  Assistance: Modified Independent  Quality of Gait: gait training facilitating upright posture with improved hip stance phase stability inhibiting tendency towards trendelenberg L>R  Gait Deviations: Decreased step height;Decreased step length;Slow Victoria  Distance: 90 ft x 1 with multiple turns, 120 ft x 1  200ft x 1  Comments: Cues for controlling step length in order to faciltiate improved hip stance phase stability.  More Ambulation?: Yes   Ambulation 2  Surface - 2: ramp  Device 2: Rollator  Assistance 2: Modified Independent  Quality of Gait 2: gait

## 2025-07-29 NOTE — PROGRESS NOTES
[facility] OCCUPATIONAL THERAPY DAILY NOTE  OT Individual Minutes  Time In: 1300  Time Out: 1345  Minutes: 45               Subjective: Pt agreeable to treatment. \"Yeah we can go to the gym now, what better things do I have to do?\"  Pain: No pain expressed.  Interdisciplinary Communication: Collaborated with PT regarding pt status and pt performance.   Precautions: Falls, [place] Alarm, and Spinal Precautions   Lines:IV  O2 Device: RA    FUNCTIONAL MOBILITY:       Bed Mobility NT    Sit to Stand Supervision    Transfer  Supervision  Transfer Type: SPT  Equipment: RW    Ambulation SBA  Equipment: RW       - Activity Tolerance - Balance - Cognition - Energy Conservation/Pacing  - Self-Care - Strengthening   Pt engaged in game of cornhole while in standing and completing household distance functional ambulation to address standing balance/tolerance, ROM, coordination, and activity tolerance for integration into I/ADLs. Pt practiced retrieving bean bags placed at random and at various heights in therapy gym via forward/backward functional ambulation and side steps. Pt then tossed them at board placed ~8 feet away while standing on unstable Air-Ex pad to increase balance challenge. No LOBs noted. Pt demonstrated good balance and coordination throughout. Completed 25 tosses with seated rest break following.       - Activity Tolerance - Strengthening   Pt completed 10 minutes on the upper body ergometer, ½ forward and ½ backward, with light resistance to increase upper body strength and activity tolerance for integration into functional tasks.     Education   Adaptive ADL Techniques, AE/DME Training, Benefits of OT, Energy Conservation, Pacing, Functional Transfer Training, and Safety Awareness     Assessment: Patient presented seated in bedside recliner upon arrival. Pt demonstrated good participation in OT treatment. Pt continues to benefit from skilled OT services to address remaining deficits and progress toward baseline level  of independence and safety. Patient ended session seated in recliner with call bell and needs within reach, with chair/bed alarm activated, and with legs elevated.     Plan: Continue OT POC.     Martha Blanton, NAIF   7/29/2025

## 2025-07-29 NOTE — PROGRESS NOTES
[place] [address]  Tel: [phone]     Physical Medicine and Rehabilitation Progress Note      Demarcus Gaxiola  Admit Date: 7/25/2025  Admit Diagnosis: Polyneuropathy [G62.9]    Subjective     Patient seen this afternoon sitting up straight in chair.  We discussed improvement in magnesium.  Can discontinue IV.  - Plan to check hemoglobin tomorrow during the daytime as patient has requested to not be woken up for labs  Team care conference held and attended  All questions and concerns were addressed at this time.    Objective:     Current Facility-Administered Medications   Medication Dose Route Frequency    magnesium oxide (MAG-OX) tablet 400 mg  400 mg Oral BID    gabapentin (NEURONTIN) capsule 400 mg  400 mg Oral TID    losartan (COZAAR) tablet 100 mg  100 mg Oral Daily    acetaminophen (TYLENOL) tablet 650 mg  650 mg Oral Q4H PRN    polyethylene glycol (GLYCOLAX) packet 17 g  17 g Oral Daily PRN    sennosides-docusate sodium (SENOKOT-S) 8.6-50 MG tablet 1 tablet  1 tablet Oral QHS    sodium phosphate (FLEET) rectal enema 1 enema  1 enema Rectal Daily PRN    bisacodyl (DULCOLAX) suppository 10 mg  10 mg Rectal Daily PRN    calcium carbonate (TUMS) chewable tablet 500 mg  500 mg Oral TID PRN    hydrALAZINE (APRESOLINE) tablet 10 mg  10 mg Oral TID PRN    ondansetron (ZOFRAN-ODT) disintegrating tablet 4 mg  4 mg Oral Q8H PRN    carboxymethylcellulose (THERATEARS) 1 % ophthalmic gel 1 drop  1 drop Both Eyes TID PRN    Benzocaine-Menthol (CEPACOL SORE THROAT) 15-2.6 MG lozenge 1 lozenge  1 lozenge Oral Q2H PRN    medicated lip ointment (BLISTEX)   Topical PRN    mineral oil-hydrophilic petrolatum (AQUAPHOR) ointment   Topical BID PRN    sodium chloride (OCEAN, BABY AYR) 0.65 % nasal spray 1 spray  1 spray Each Nostril PRN    acetaminophen (TYLENOL) tablet 1,000 mg  1,000 mg Oral TID    albuterol (PROVENTIL) (2.5 MG/3ML) 0.083% nebulizer solution  including urinary catheters; use glasses, hearing aids, and dentures as appropriate.  Avoid long periods of sleep during the day. Brief naps can be helpful but long periods of sleep can disrupt the sleep wake cycle for this patient.  Reorientation and visual cues for orientation should be tried  Maintain hydration and bowel function              DISPO: 7/31    No follow-up provider specified.       Team Conference held and attended. I participated in the patient's visit, including the formulation of their treatment plan and the discussion with them of its implementation.  Greater than 50 minutes were spent in direct patient care, discussing current medications, medical conditions, dispo planning, continuation of therapy in the community. Counseling and coordination of care discussed with patient, nursing staff, therapy team and case management.   The patient's medical conditions, functional status, and progress toward discharge goals was discussed with the treatment team and +/- patient family. The patient appears to be making progress toward discharge goals.  All questions from patient and/or friend/family members were discussed in a group setting to allow individual members of the team voice their concerns and praises of the patient in regards to their current progress in the rehabilitation program.  Please see separate team care conference note for more in-depth details.    Concerns from staff and/or family: None    Barriers to discharge/disposition: None     DME: None     other follow- up care needed: Family training to be completed prior to discharge      The patient is able to tolerate and benefit from multidisciplinary acute inpatient rehabilitation. Part of this note may have been written by using a voice dictation software. The note has been proofread but may still contain some grammatical/other typographical errors.      Yris Patterson D.O. PM&R  Inpatient Rehabilitation Medical Director  July 29,

## 2025-07-30 PROBLEM — D72.829 LEUKOCYTOSIS: Status: RESOLVED | Noted: 2025-07-25 | Resolved: 2025-07-28

## 2025-07-30 LAB
HCT VFR BLD AUTO: 28.5 % (ref 41.1–50.3)
HGB BLD-MCNC: 8.9 G/DL (ref 13.6–17.2)

## 2025-07-30 PROCEDURE — 97530 THERAPEUTIC ACTIVITIES: CPT

## 2025-07-30 PROCEDURE — 85014 HEMATOCRIT: CPT

## 2025-07-30 PROCEDURE — 85018 HEMOGLOBIN: CPT

## 2025-07-30 PROCEDURE — 97535 SELF CARE MNGMENT TRAINING: CPT

## 2025-07-30 PROCEDURE — 6370000000 HC RX 637 (ALT 250 FOR IP): Performed by: STUDENT IN AN ORGANIZED HEALTH CARE EDUCATION/TRAINING PROGRAM

## 2025-07-30 PROCEDURE — 99232 SBSQ HOSP IP/OBS MODERATE 35: CPT | Performed by: STUDENT IN AN ORGANIZED HEALTH CARE EDUCATION/TRAINING PROGRAM

## 2025-07-30 PROCEDURE — 97116 GAIT TRAINING THERAPY: CPT

## 2025-07-30 PROCEDURE — 97110 THERAPEUTIC EXERCISES: CPT

## 2025-07-30 PROCEDURE — APPSS15 APP SPLIT SHARED TIME 0-15 MINUTES: Performed by: PHYSICIAN ASSISTANT

## 2025-07-30 PROCEDURE — 1180000000 HC REHAB R&B

## 2025-07-30 PROCEDURE — 36415 COLL VENOUS BLD VENIPUNCTURE: CPT

## 2025-07-30 RX ADMIN — MAGNESIUM GLUCONATE 500 MG ORAL TABLET 400 MG: 500 TABLET ORAL at 07:51

## 2025-07-30 RX ADMIN — ACETAMINOPHEN 1000 MG: 500 TABLET, FILM COATED ORAL at 14:48

## 2025-07-30 RX ADMIN — DULOXETINE HYDROCHLORIDE 60 MG: 60 CAPSULE, DELAYED RELEASE ORAL at 07:51

## 2025-07-30 RX ADMIN — GABAPENTIN 400 MG: 100 CAPSULE ORAL at 07:50

## 2025-07-30 RX ADMIN — DOCUSATE SODIUM 50 MG AND SENNOSIDES 8.6 MG 1 TABLET: 8.6; 5 TABLET, FILM COATED ORAL at 21:22

## 2025-07-30 RX ADMIN — PANTOPRAZOLE SODIUM 40 MG: 40 TABLET, DELAYED RELEASE ORAL at 07:51

## 2025-07-30 RX ADMIN — GABAPENTIN 400 MG: 100 CAPSULE ORAL at 14:48

## 2025-07-30 RX ADMIN — LOSARTAN POTASSIUM 100 MG: 50 TABLET, FILM COATED ORAL at 07:52

## 2025-07-30 RX ADMIN — ATORVASTATIN CALCIUM 20 MG: 20 TABLET, FILM COATED ORAL at 07:52

## 2025-07-30 RX ADMIN — MAGNESIUM GLUCONATE 500 MG ORAL TABLET 400 MG: 500 TABLET ORAL at 21:21

## 2025-07-30 RX ADMIN — MONTELUKAST 10 MG: 10 TABLET, FILM COATED ORAL at 21:22

## 2025-07-30 RX ADMIN — TAMSULOSIN HYDROCHLORIDE 0.4 MG: 0.4 CAPSULE ORAL at 21:22

## 2025-07-30 RX ADMIN — GABAPENTIN 400 MG: 100 CAPSULE ORAL at 21:22

## 2025-07-30 RX ADMIN — ACETAMINOPHEN 1000 MG: 500 TABLET, FILM COATED ORAL at 21:21

## 2025-07-30 RX ADMIN — ACETAMINOPHEN 1000 MG: 500 TABLET, FILM COATED ORAL at 07:51

## 2025-07-30 NOTE — PROGRESS NOTES
OOB minimal assist and walker.  Awake to void several times through night.  No c/o pain.  Back dressing d/I.  Bed alarm set, call light in reach.

## 2025-07-30 NOTE — DISCHARGE SUMMARY
[facility] OCCUPATIONAL THERAPY DISCHARGE NOTE  OT Individual Minutes  Time In: 0700  Time Out: 0745  Minutes: 45               GOALS:  Time Frame for Short Term Goals: 7 days   STG 1: Patient will dress UB with Supervision using AE/DME PRN. *Goal Met, 7/30/2025  STG 2: Patient will dress LB with CGA using AE/DME PRN. *Goal Met, 7/30/2025  STG 3: Patient will don footwear with CGA using AE/DME PRN. *Goal Met, 7/30/2025  STG 4: Patient will bathe with CGA using AE/DME PRN. *Goal Met, 7/30/2025  STG 5: Patient will toilet with CGA using AE/DME PRN. *Goal Met, 7/30/2025     Long Term Goals:  Time Frame for Long Term Goals: 10 days   LTG 1: Patient will dress UB with Setup Assistance using AE/DME PRN. *Goal Met, 7/30/2025  LTG 2: Patient will dress LB with Supervision using AE/DME PRN. *Goal Not Met, continues to require SBA for safety and  VC for precaution management at times, 7/30/2025  LTG 3: Patient will don footwear with Setup Assistance using AE/DME PRN. *Goal Met, 7/30/2025  LTG 4: Patient will bathe with Supervision using AE/DME PRN. *Goal Met, 7/30/2025  LTG 5: Patient will toilet with Supervision using AE/DME PRN. *Goal Met, 7/30/2025  LTG 6: Patient/caregiver will verbalize understanding of OT recommendations regarding ADLs, functional transfers, home safety, AE/DME, energy conservation, safety awareness, activity tolerance, and follow-up therapy to increase safety with functional tasks upon discharge. *Goal Met, 7/30/2025    Subjective: Patient agreeable to therapy. \"I'm excited to be going home, this isn't my first time around the track, so I know it'll be okay.\"  Pain: No pain expressed.  Precautions: Falls, [place] Alarm, and Spinal Precautions   Lines:N/A  O2 Device: RA      FUNCTIONAL MOBILITY:        Bed Mobility Supervision    Sit to Stand SBA    Transfer  SBA  Transfer Type: SPT  Equipment: RW VC for RW management and safety   Ambulation SBA  Equipment: RW      ACTIVITIES OF DAILY LIVING:    Initial Score

## 2025-07-30 NOTE — PLAN OF CARE
Problem: Safety - Adult  Goal: Free from fall injury  7/30/2025 1934 by Ramona Mcallister, RN  Outcome: Progressing  7/30/2025 0811 by Joanne Kaba, RN  Outcome: Progressing

## 2025-07-30 NOTE — PROGRESS NOTES
PHYSICAL THERAPY DISCHARGE SUMMARY    PT Individual Minutes  Time In: 0830  Time Out: 0915  Minutes: 45                   Total Treatment Time:  45 Minutes           Precautions at discharge:      Restrictions/Precautions: Fall Risk  Activity Level: Up as Tolerated  Required Braces or Orthoses?: No               Spinal Precautions: No Bending/Lifting/Twisting (BLT)  Other Position/Activity Restrictions: spinal body mechanics for log rolling and supine<>sidelying<>Sit    Impairments:    Decreased LE strength  [x]     Decreased strength trunk/core  [x]     Decreased AROM   []     Decreased PROM  []    Decreased endurance  []     Decreased balance sitting  []     Decreased balance standing  [x]     Pain   []     Slow ambulation velocity  [x]    Decreased coordination  []    Decreased safety awareness  []       Functional Limitations:   Decreased independence with bed mobility  []     Decreased independence with functional transfers  []     Decreased independence with ambulation  []     Decreased independence with stair negotiation  [x]               Objective:     Vital Signs: BP (!) 148/86   Pulse 87   Temp 97.9 °F (36.6 °C) (Oral)   Resp 18   Ht 1.651 m (5' 5\")   Wt 97.8 kg (215 lb 9.6 oz)   SpO2 97%   BMI 35.88 kg/m²       Pain level: 0 to 2 out of 10  Pain location: low back  Pain interventions: Medication per order, rest, positioning,relaxation, body mechanics       Patient education:    Education Given To: Patient  Education Provided: Safety;Transfer Training;Home Exercise Program;Fall Prevention Strategies;Precautions;Mobility Training  Education Method: Demonstration;Verbal;Printed Information/Hand-outs  Barriers to Learning: None  Education Outcome: Verbalized understanding;Demonstrated understanding    Interdisciplinary Communication: spoke with OT regarding D/C plans     Cognition:   Overall Orientation Status: Within Normal Limits  Overall Cognitive Status: WNL    Lower Extremity Function:      R LE  supine<>sit with Modified Independent (MET)  2.   Pt. will transfer from bed to/from chair with Modified Independent using the least restrictive  assistive device (MET)  3.   Pt. will ambulate 400 feet with RW and Supervision/Standby Assist (MET)  4.   Pt. will ambulate up/down 4 steps with rails and Supervision/Standby Assist (MET)  5.   Pt. will ambulate with SPC and CGA x 100ft (not met)     Pt would benefit from continued skilled physical therapy in order to improve independent functional mobility within the home with use of least restrictive device. Interventions may include range of motion (AROM, PROM B LE/trunk), motor function (B LE/trunk strengthening/coordination), activity tolerance (vitals, oxygen saturation levels), bed mobility training, balance activities, gait training (progressive ambulation program), and functional transfer training.      HEP handout: issued written LE HEP. Independent with hand support on counter top  STANDING EXERCISES Sets Reps Comments   Heel Raises 1 10    Hip Flexion/Marching 1 10    Hip Abduction 1 10    Hip Extension 1 10      .      Pt to be discharged 07/31/2025 with assistance from spouse.     Therapy Recommendations upon discharge:    PT   Equipment needs at discharge:  Patient owns a RW and a rollator     Interdisciplinary Eval, Care Plan, and Patient Education for further information regarding physical therapy discharge summary and plan of care.       Lanre iQu, PT  7/30/2025

## 2025-07-30 NOTE — PROGRESS NOTES
[place] [address]  Tel: [phone]     Physical Medicine and Rehabilitation Progress Note      Demarcus Gaxiola  Admit Date: 7/25/2025  Admit Diagnosis: Polyneuropathy [G62.9]    Subjective     Patient seen this morning during break in therapy. Patient reports buttock and B LE radicular pain is minimal since surgery last week compared to pre-op pain. Hgb 8.9 this AM, low but stable. All questions and concerns were addressed at this time.    Objective:     Current Facility-Administered Medications   Medication Dose Route Frequency    magnesium oxide (MAG-OX) tablet 400 mg  400 mg Oral BID    gabapentin (NEURONTIN) capsule 400 mg  400 mg Oral TID    losartan (COZAAR) tablet 100 mg  100 mg Oral Daily    acetaminophen (TYLENOL) tablet 650 mg  650 mg Oral Q4H PRN    polyethylene glycol (GLYCOLAX) packet 17 g  17 g Oral Daily PRN    sennosides-docusate sodium (SENOKOT-S) 8.6-50 MG tablet 1 tablet  1 tablet Oral QHS    sodium phosphate (FLEET) rectal enema 1 enema  1 enema Rectal Daily PRN    bisacodyl (DULCOLAX) suppository 10 mg  10 mg Rectal Daily PRN    calcium carbonate (TUMS) chewable tablet 500 mg  500 mg Oral TID PRN    hydrALAZINE (APRESOLINE) tablet 10 mg  10 mg Oral TID PRN    ondansetron (ZOFRAN-ODT) disintegrating tablet 4 mg  4 mg Oral Q8H PRN    carboxymethylcellulose (THERATEARS) 1 % ophthalmic gel 1 drop  1 drop Both Eyes TID PRN    Benzocaine-Menthol (CEPACOL SORE THROAT) 15-2.6 MG lozenge 1 lozenge  1 lozenge Oral Q2H PRN    medicated lip ointment (BLISTEX)   Topical PRN    mineral oil-hydrophilic petrolatum (AQUAPHOR) ointment   Topical BID PRN    sodium chloride (OCEAN, BABY AYR) 0.65 % nasal spray 1 spray  1 spray Each Nostril PRN    acetaminophen (TYLENOL) tablet 1,000 mg  1,000 mg Oral TID    albuterol (PROVENTIL) (2.5 MG/3ML) 0.083% nebulizer solution 2.5 mg  2.5 mg Nebulization Q4H PRN    atorvastatin (LIPITOR) tablet 20 mg  20

## 2025-07-30 NOTE — PROGRESS NOTES
PHYSICAL THERAPY DAILY NOTE    PT Individual Minutes  Time In: 1031  Time Out: 1119  Minutes: 48                 Total Treatment Time:  48 Minutes    Pt. Seen for: AM, Gait Training, Therapeutic Exercise, and Transfer Training     Subjective: Patient had no complaints.         Objective:  Restrictions/Precautions: Fall Risk, General Precautions, Bed Alarm  Activity Level: Up as Tolerated, Up with Assist  Required Braces or Orthoses?: No              Spinal Precautions: No Bending/Lifting/Twisting (BLT)  Other Position/Activity Restrictions: spinal body mechanics for log rolling and supine<>sidelying<>Sit         GROSS ASSESSMENT           COGNITION Daily Assessment                 BED/MAT MOBILITY Daily Assessment     Supine to Sit: Unable to assess  Sit to Supine: Unable to assess        TRANSFERS Daily Assessment    Sit to Stand: Independent  Stand to Sit: Independent  Stand Pivot Transfers: Supervision          GAIT Daily Assessment    Surface: Level tile  Device: Rollator  Assistance: Supervision  Distance: 200 x 2  More Ambulation?: No              STEPS/STAIRS Daily Assessment    Stairs?: No              BALANCE Daily Assessment    Static Sitting:   Dynamic Sitting:   Static Standing: Good:  Pt. able to maintain balance w/o UE support;  exhibits some postural sway  Dynamic Standing: Good - accepts moderate challenge;  can maintain balance while picking object off the floor       WHEELCHAIR MOBILITY Daily Assessment                          LOWER EXTREMITY EXERCISES   Rode motomed x 10 minutes     Pain level: no pain noted during session  Pain Location:    Pain Interventions:     Vital Signs:  BP (!) 148/86   Pulse 87   Temp 97.9 °F (36.6 °C) (Oral)   Resp 18   Ht 1.651 m (5' 5\")   Wt 97.8 kg (215 lb 9.6 oz)   SpO2 97%   BMI 35.88 kg/m²       Education:    Education Given To: Patient  Education Provided: Safety  Education Method: Verbal  Education Outcome: Verbalized understanding;Demonstrated

## 2025-07-31 VITALS
BODY MASS INDEX: 35.92 KG/M2 | OXYGEN SATURATION: 96 % | RESPIRATION RATE: 14 BRPM | SYSTOLIC BLOOD PRESSURE: 145 MMHG | WEIGHT: 215.6 LBS | HEIGHT: 65 IN | HEART RATE: 89 BPM | DIASTOLIC BLOOD PRESSURE: 87 MMHG | TEMPERATURE: 97.7 F

## 2025-07-31 PROBLEM — R26.9 GAIT ABNORMALITY: Status: RESOLVED | Noted: 2025-07-23 | Resolved: 2025-07-31

## 2025-07-31 PROBLEM — G62.9 POLYNEUROPATHY: Status: RESOLVED | Noted: 2025-07-25 | Resolved: 2025-07-31

## 2025-07-31 PROCEDURE — 6370000000 HC RX 637 (ALT 250 FOR IP): Performed by: STUDENT IN AN ORGANIZED HEALTH CARE EDUCATION/TRAINING PROGRAM

## 2025-07-31 PROCEDURE — 99239 HOSP IP/OBS DSCHRG MGMT >30: CPT | Performed by: STUDENT IN AN ORGANIZED HEALTH CARE EDUCATION/TRAINING PROGRAM

## 2025-07-31 RX ORDER — GABAPENTIN 400 MG/1
400 CAPSULE ORAL 3 TIMES DAILY
Qty: 90 CAPSULE | Refills: 0 | Status: SHIPPED | OUTPATIENT
Start: 2025-07-31 | End: 2025-08-30

## 2025-07-31 RX ORDER — OXYCODONE HYDROCHLORIDE 5 MG/1
5 TABLET ORAL EVERY 6 HOURS PRN
Qty: 28 TABLET | Refills: 0 | Status: SHIPPED | OUTPATIENT
Start: 2025-07-31 | End: 2025-08-07

## 2025-07-31 RX ADMIN — ACETAMINOPHEN 1000 MG: 500 TABLET, FILM COATED ORAL at 07:20

## 2025-07-31 RX ADMIN — ATORVASTATIN CALCIUM 20 MG: 20 TABLET, FILM COATED ORAL at 07:20

## 2025-07-31 RX ADMIN — LOSARTAN POTASSIUM 100 MG: 50 TABLET, FILM COATED ORAL at 07:19

## 2025-07-31 RX ADMIN — MAGNESIUM GLUCONATE 500 MG ORAL TABLET 400 MG: 500 TABLET ORAL at 07:20

## 2025-07-31 RX ADMIN — DULOXETINE HYDROCHLORIDE 60 MG: 60 CAPSULE, DELAYED RELEASE ORAL at 07:20

## 2025-07-31 RX ADMIN — PANTOPRAZOLE SODIUM 40 MG: 40 TABLET, DELAYED RELEASE ORAL at 07:20

## 2025-07-31 RX ADMIN — GABAPENTIN 400 MG: 100 CAPSULE ORAL at 08:40

## 2025-07-31 NOTE — PLAN OF CARE
Problem: Safety - Adult  Goal: Free from fall injury  7/31/2025 0659 by Niko Rodriguez, RN  Outcome: Progressing  7/30/2025 1934 by Ramona Mcallister, RN  Outcome: Progressing

## 2025-07-31 NOTE — CARE COORDINATION
Pt is for discharge home today with spouse.  Referral has been called/sent to [facility] by [facility] for follow up home care as ordered.  Pt has needed DME in the home.  No additional CM orders received or supportive care needs expressed at this time.

## 2025-07-31 NOTE — DISCHARGE SUMMARY
[facility]  Inpatient Rehab Program      PHYSICAL MEDICINE & REHABILITATION DISCHARGE SUMMARY     Date: 7/31/2025  Admission Date: 7/25/2025  Discharge Date: 7/31/2025    Primary Care Provider: Cezar Wright MD       Admitting Diagnosis:   Polyneuropathy [G62.9]    Principal Problem (Resolved):    Polyneuropathy  Active Problems:    LEOBARDO (obstructive sleep apnea)    Severe obesity (BMI 35.0-39.9) with comorbidity (HCC)    Hypertension    Osteoarthritis    Hypercholesteremia    S/P lumbar fusion    S/P lumbar spinal arthrodesis    Decreased activities of daily living (ADL)    Benign prostatic hyperplasia  Resolved Problems:    Gait abnormality    Leukocytosis      Past Medical History:   Diagnosis Date    Thrasher esophagus     BPH (benign prostatic hyperplasia)     GERD (gastroesophageal reflux disease)     medication daily, thrasher's esophogus, 2 pillows    High frequency hearing loss     Hypercholesteremia     on med    Hypertension     on med for control     Nocturnal hypoxemia     wears CPAP, states prescribed inhaler for congestion caused by CPAP- shabana asthma/copd    OA (osteoarthritis)     LEOBARDO (obstructive sleep apnea) 02/24/2023    wears CPAP, states prescribed inhaler for congestion caused by CPAP- shabana asthma/copd    Rhinitis     SNHL (sensorineural hearing loss)     Tinnitus of both ears     Wheezing     pro-air inhaler prn; last used 3/12/18; [facility] work-up was negative for asthma          PCP FOLLOW-UP:   Taper off gabapentin    Transition of care:  -PCP to review new medications below. PCP to determine whether or not to renew or discontinue these medications at outpatient follow-up appointment  -PCP to assess whether or not patient requires ongoing home health care services, or whether or not patient would benefit from outpatient therapy after completing home health care services after 2-4 weeks.  Renewal of services to be prescribed by PCP.        Discharged  Yes

## 2025-07-31 NOTE — PROGRESS NOTES
End of Shift Note      Acute Onset Mental Status change? No    Does the patient have Inattention? Behavior not present    Does the patient have Disorganized Thinking? Behavior not present    Does the patient have Altered Level of Consciousness? Behavior not present    Is the patient impulsive? No    Expression of Ideas and Wants  Does the patient express ideas and wants without difficulty? Yes    Does the patient understand verbal and non-verbal content without cues or repetition? Yes    Self-Care  Eating:  Did the patient eat by mouth NO    Toileting Hygiene:  Did the patient void or have a bowel movement? Yes; No assistance required.    The patient uses a toilet;     In: 220 [P.O.:220]  Out: -  In: 220   Out: -       Toilet Transfer:    Did the patient use a standard toilet with or without a raised seat or bedside commode? Yes; No assistance required.    Bladder   Does the patient urinate? Yes  Does the patient have a catheter? No; Is the patient incontinent? No  Does the patient have stress incontinence? No    Bowel  Date of last BM 07/30/25   Does the patient have an ostomy? No; Is the patient incontinent? No    Pain  Does the patient have pain that affects any of the following: Sleep, Therapy Activities, or Day-to-Day Activities? None of the activities are affected.

## 2025-07-31 NOTE — PLAN OF CARE
Problem: Safety - Adult  Goal: Free from fall injury  7/31/2025 1122 by Niko Rodriguez, RN  Outcome: Completed  7/31/2025 0659 by Niko Rodriguez, RN  Outcome: Progressing     Problem: ABCDS Injury Assessment  Goal: Absence of physical injury  Outcome: Completed     Problem: Pain  Goal: Verbalizes/displays adequate comfort level or baseline comfort level  Outcome: Completed

## 2025-08-01 ENCOUNTER — TELEPHONE (OUTPATIENT)
Dept: FAMILY MEDICINE CLINIC | Facility: CLINIC | Age: 80
End: 2025-08-01

## 2025-08-01 NOTE — TELEPHONE ENCOUNTER
Care Transitions Initial Follow Up Call    Outreach made within 2 business days of discharge: Yes    Patient: Demarcus Gaxiola Patient : 1945   MRN: [id number]  Reason for Admission: broken back   Discharge Date: 25       Spoke with: Demarcus Gaxiola    Discharge department/facility: [facility] Patient Contact:  Was patient able to fill all prescriptions: Yes  Was patient instructed to bring all medications to the follow-up visit: Yes  Is patient taking all medications as directed in the discharge summary? Yes  Does patient understand their discharge instructions: Yes  Does patient have questions or concerns that need addressed prior to 7-14 day follow up office visit: no    Additional needs identified to be addressed with provider  No needs identified             Scheduled appointment with PCP within 7-14 days    Follow Up  Future Appointments   Date Time Provider Department Center   2025 10:00 AM Richard Mendosa MD [facility]   2025  2:30 PM Asher Perez MD [facility] AM[facility]   9/15/2025  3:00 PM Bertin Gamino MD [facility]       SHAWNA GAO MA

## 2025-08-05 ENCOUNTER — OFFICE VISIT (OUTPATIENT)
Dept: FAMILY MEDICINE CLINIC | Facility: CLINIC | Age: 80
End: 2025-08-05

## 2025-08-05 VITALS
BODY MASS INDEX: 36.15 KG/M2 | SYSTOLIC BLOOD PRESSURE: 118 MMHG | OXYGEN SATURATION: 97 % | DIASTOLIC BLOOD PRESSURE: 62 MMHG | HEIGHT: 65 IN | WEIGHT: 217 LBS | HEART RATE: 108 BPM

## 2025-08-05 DIAGNOSIS — Z98.1 HISTORY OF LUMBAR FUSION: ICD-10-CM

## 2025-08-05 DIAGNOSIS — M80.08XD FRACTURE OF VERTEBRA DUE TO OSTEOPOROSIS WITH ROUTINE HEALING, SUBSEQUENT ENCOUNTER: ICD-10-CM

## 2025-08-05 DIAGNOSIS — M48.061 SPINAL STENOSIS OF LUMBAR REGION, UNSPECIFIED WHETHER NEUROGENIC CLAUDICATION PRESENT: ICD-10-CM

## 2025-08-05 DIAGNOSIS — R79.0 LOW MAGNESIUM LEVEL: ICD-10-CM

## 2025-08-05 DIAGNOSIS — D50.0 BLOOD LOSS ANEMIA: ICD-10-CM

## 2025-08-05 DIAGNOSIS — I10 PRIMARY HYPERTENSION: ICD-10-CM

## 2025-08-05 DIAGNOSIS — Z09 HOSPITAL DISCHARGE FOLLOW-UP: Primary | ICD-10-CM

## 2025-08-05 DIAGNOSIS — E78.00 PURE HYPERCHOLESTEROLEMIA: ICD-10-CM

## 2025-08-05 DIAGNOSIS — G62.9 POLYNEUROPATHY: ICD-10-CM

## 2025-08-05 LAB
ALBUMIN SERPL-MCNC: 3.6 G/DL (ref 3.2–4.6)
ALBUMIN/GLOB SERPL: 1.1 (ref 1–1.9)
ALP SERPL-CCNC: 152 U/L (ref 40–129)
ALT SERPL-CCNC: 18 U/L (ref 8–55)
ANION GAP SERPL CALC-SCNC: 13 MMOL/L (ref 7–16)
AST SERPL-CCNC: 23 U/L (ref 15–37)
BASOPHILS # BLD: 0.05 K/UL (ref 0–0.2)
BASOPHILS NFR BLD: 0.4 % (ref 0–2)
BILIRUB SERPL-MCNC: 0.3 MG/DL (ref 0–1.2)
BUN SERPL-MCNC: 8 MG/DL (ref 8–23)
CALCIUM SERPL-MCNC: 9.6 MG/DL (ref 8.8–10.2)
CHLORIDE SERPL-SCNC: 99 MMOL/L (ref 98–107)
CO2 SERPL-SCNC: 26 MMOL/L (ref 20–29)
CREAT SERPL-MCNC: 0.82 MG/DL (ref 0.8–1.3)
DIFFERENTIAL METHOD BLD: ABNORMAL
EOSINOPHIL # BLD: 0.2 K/UL (ref 0–0.8)
EOSINOPHIL NFR BLD: 1.5 % (ref 0.5–7.8)
ERYTHROCYTE [DISTWIDTH] IN BLOOD BY AUTOMATED COUNT: 14.2 % (ref 11.9–14.6)
GLOBULIN SER CALC-MCNC: 3.2 G/DL (ref 2.3–3.5)
GLUCOSE SERPL-MCNC: 124 MG/DL (ref 70–99)
HCT VFR BLD AUTO: 31.7 % (ref 41.1–50.3)
HGB BLD-MCNC: 10 G/DL (ref 13.6–17.2)
IMM GRANULOCYTES # BLD AUTO: 0.05 K/UL (ref 0–0.5)
IMM GRANULOCYTES NFR BLD AUTO: 0.4 % (ref 0–5)
LYMPHOCYTES # BLD: 2.26 K/UL (ref 0.5–4.6)
LYMPHOCYTES NFR BLD: 17.1 % (ref 13–44)
MAGNESIUM SERPL-MCNC: 2.1 MG/DL (ref 1.8–2.4)
MCH RBC QN AUTO: 31.2 PG (ref 26.1–32.9)
MCHC RBC AUTO-ENTMCNC: 31.5 G/DL (ref 31.4–35)
MCV RBC AUTO: 98.8 FL (ref 82–102)
MONOCYTES # BLD: 1.1 K/UL (ref 0.1–1.3)
MONOCYTES NFR BLD: 8.3 % (ref 4–12)
NEUTS SEG # BLD: 9.56 K/UL (ref 1.7–8.2)
NEUTS SEG NFR BLD: 72.3 % (ref 43–78)
NRBC # BLD: 0 K/UL (ref 0–0.2)
PLATELET # BLD AUTO: 614 K/UL (ref 150–450)
PMV BLD AUTO: 9 FL (ref 9.4–12.3)
POTASSIUM SERPL-SCNC: 4.4 MMOL/L (ref 3.5–5.1)
PROT SERPL-MCNC: 6.7 G/DL (ref 6.3–8.2)
RBC # BLD AUTO: 3.21 M/UL (ref 4.23–5.6)
SODIUM SERPL-SCNC: 138 MMOL/L (ref 136–145)
WBC # BLD AUTO: 13.2 K/UL (ref 4.3–11.1)

## 2025-08-05 RX ORDER — DULOXETIN HYDROCHLORIDE 60 MG/1
60 CAPSULE, DELAYED RELEASE ORAL DAILY
Qty: 90 CAPSULE | Refills: 3 | Status: SHIPPED | OUTPATIENT
Start: 2025-08-05

## 2025-08-05 RX ORDER — MONTELUKAST SODIUM 10 MG/1
10 TABLET ORAL DAILY
Qty: 90 TABLET | Refills: 3 | Status: SHIPPED | OUTPATIENT
Start: 2025-08-05

## 2025-08-05 RX ORDER — ALBUTEROL SULFATE 90 UG/1
2 INHALANT RESPIRATORY (INHALATION) EVERY 4 HOURS PRN
Qty: 18 G | Refills: 3 | Status: SHIPPED | OUTPATIENT
Start: 2025-08-05

## 2025-08-07 ENCOUNTER — OFFICE VISIT (OUTPATIENT)
Age: 80
End: 2025-08-07

## 2025-08-07 DIAGNOSIS — Z98.1 STATUS POST LUMBAR SPINAL ARTHRODESIS: Primary | ICD-10-CM

## 2025-08-07 PROCEDURE — 99024 POSTOP FOLLOW-UP VISIT: CPT | Performed by: ORTHOPAEDIC SURGERY

## (undated) DEVICE — CARDINAL HEALTH FLEXIBLE LIGHT HANDLE COVER: Brand: CARDINAL HEALTH

## (undated) DEVICE — DRAPE MICSCP W51XL150IN FOR LEICA M680 WILD OHS

## (undated) DEVICE — NEUROMONITORING PROBE: Brand: AVS ARIA

## (undated) DEVICE — DRAPE TWL SURG 16X26IN BLU ORB04] ALLCARE INC]

## (undated) DEVICE — CABLE LIGHT W/ TEDAN CONN

## (undated) DEVICE — SUTURE STRATAFIX SPRL SZ 1 L14IN ABSRB VLT L48CM CTX 1/2 SXPD2B405

## (undated) DEVICE — TAP: Brand: XIA 3 SYSTEM - SERRATO

## (undated) DEVICE — GLOVE SURG SZ 8 L12IN FNGR THK79MIL GRN LTX FREE

## (undated) DEVICE — PROBE DILATOR 3: Brand: AVS ARIA

## (undated) DEVICE — TRAY,URINE METER,100% SILICONE,16FR10ML: Brand: MEDLINE

## (undated) DEVICE — SUTURE MONOCRYL + ABSORBABLE MONOFILAMENT 3-0 PS1 12 IN UD SXMP1B101

## (undated) DEVICE — GOWN,ECLIPSE,NONRNF,XL,ST,30/CS: Brand: MEDLINE

## (undated) DEVICE — T4 HOOD

## (undated) DEVICE — POSTERIOR LAMI VANPLT-LUCAS: Brand: MEDLINE INDUSTRIES, INC.

## (undated) DEVICE — SOLUTION IV 1000ML 0.9% SOD CHL

## (undated) DEVICE — SUTURE VCRL SZ 2-0 L27IN ABSRB UD L36MM CP-1 1/2 CIR REV J266H

## (undated) DEVICE — REM POLYHESIVE ADULT PATIENT RETURN ELECTRODE: Brand: VALLEYLAB

## (undated) DEVICE — MCLASS OSCILLATING SAW BLADE 19 X 1.27 (0.050") X 90 MM: Brand: MCLASS

## (undated) DEVICE — FORCEPS BPLR L210MM TIP L1.5 WRK L105MM STR BAYNT INSUL DISP

## (undated) DEVICE — SOLUTION IRRIG 1000ML 09% SOD CHL USP PIC PLAS CONTAINER

## (undated) DEVICE — DISPOSABLE STANDARD BIPOLAR FORCEPS, NON-STICK,: Brand: SPETZLER-MALIS

## (undated) DEVICE — SOLUTION IRRIG 1000ML 0.9% SOD CHL USP POUR PLAS BTL

## (undated) DEVICE — DRAPE SHT 3 QTR PROXIMA 53X77 --

## (undated) DEVICE — CURETTE BNE CEM 10IN DISP --

## (undated) DEVICE — STERILE PACKAGE WITH CANNULA: Brand: LITE BIO DELIVERY SYSTEM

## (undated) DEVICE — 3M™ IOBAN™ 2 ANTIMICROBIAL INCISE DRAPE 6650EZ: Brand: IOBAN™ 2

## (undated) DEVICE — JACKSON TABLE POSITIONER KIT: Brand: MEDLINE INDUSTRIES, INC.

## (undated) DEVICE — KIT EVAC 0.13IN RECT TB DIA10FR 400CC PVC 3 SPR Y CONN DRN

## (undated) DEVICE — 3M™ STERI-DRAPE™ INSTRUMENT POUCH 1018: Brand: STERI-DRAPE™

## (undated) DEVICE — SUTURE STRATAFIX SYMMETRIC SZ 1 L18IN ABSRB VLT CT1 L36CM SXPP1A404

## (undated) DEVICE — SKIN STAPLER: Brand: SIGNET

## (undated) DEVICE — BLADE SAW W12.5XL73.5MM THK0.8MM CUT THK1MM RECIP FOR L BNE

## (undated) DEVICE — DRAPE,TOP,102X53,STERILE: Brand: MEDLINE

## (undated) DEVICE — ADHESIVE SKIN CLOSURE WND 8.661X1.5 IN 22 CM LIQUIBAND SECUR

## (undated) DEVICE — SUTURE MONOCRYL SZ 2-0 L27IN ABSRB UD CP-1 1 L36MM 1/2 CIR REV Y266H

## (undated) DEVICE — BIPOLAR SEALER 23-112-1 AQM 6.0: Brand: AQUAMANTYS ®

## (undated) DEVICE — PACK TKR SZ 6 FEM PREP TRL POST STBL INTUITION SOLO ATTUNE

## (undated) DEVICE — GOWN,REINF,POLY,ECL,PP SLV,XL: Brand: MEDLINE

## (undated) DEVICE — RUBBERBAND FASTENING W1/8XL3IN 2 PER PK

## (undated) DEVICE — ABSORBENT, WATERPROOF, BACTERIA PROOF FILM DRESSING: Brand: OPSITE POST OP 9.5X8.5CM CTN 20

## (undated) DEVICE — ABSORBENT, WATERPROOF, BACTERIA PROOF FILM DRESSING: Brand: OPSITE POST OP 20X10CM CTN 20

## (undated) DEVICE — SOLUTION IRRIG 3000ML 0.9% SOD CHL FLX CONT 0797208] ICU MEDICAL INC]

## (undated) DEVICE — KNIFE SURG DISCECTOMY 190 MM BAYNT

## (undated) DEVICE — SUTURE VICRYL SZ 1 L27IN ABSRB UD L36MM CP-1 1/2 CIR REV CUT J268H

## (undated) DEVICE — SYR LR LCK 1ML GRAD NSAF 30ML --

## (undated) DEVICE — MEDI-VAC NON-CONDUCTIVE SUCTION TUBING: Brand: CARDINAL HEALTH

## (undated) DEVICE — SUTURE VICRYL SZ 2-0 L36IN ABSRB UD L36MM CT-1 1/2 CIR J945H

## (undated) DEVICE — KIT ARMOR C DRP COLLAPSIBLE AND SELF EXP TOP CVR FOR FLUOROSCOPIC

## (undated) DEVICE — 1LYRTR 16FR10ML 100%SILI SNAP: Brand: MEDLINE INDUSTRIES, INC.

## (undated) DEVICE — TRAY PREP DRY W/ PREM GLV 2 APPL 6 SPNG 2 UNDPD 1 OVERWRAP

## (undated) DEVICE — X-RAY SPONGES,12 PLY: Brand: DERMACEA

## (undated) DEVICE — Z DISCONTINUED USE 2744636  DRESSING AQUACEL 14 IN ALG W3.5XL14IN POLYUR FLM CVR W/ HYDRCOLL

## (undated) DEVICE — 2000CC GUARDIAN II: Brand: GUARDIAN

## (undated) DEVICE — SUTURE MONOCRYL + ABSORBABLE MONOFILAMENT 3-0 PS1 27 IN UD SXMP1B104

## (undated) DEVICE — BUTTON SWITCH PENCIL BLADE ELECTRODE, HOLSTER: Brand: EDGE

## (undated) DEVICE — Device: Brand: POWER-FLO®

## (undated) DEVICE — BANDAGE COMPR SELF ADH 5 YDX4 IN TAN STRL PREMIERPRO LF

## (undated) DEVICE — 3000CC GUARDIAN II: Brand: GUARDIAN

## (undated) DEVICE — PENCIL ES L3M BTTN SWCH HOLSTER W/ BLDE ELECTRD EDGE

## (undated) DEVICE — NEEDLE HYPO 18GA L1.5IN PNK S STL HUB POLYPR SHLD REG BVL

## (undated) DEVICE — CORD ES L12FT BPLR FRCP

## (undated) DEVICE — SYR 50ML LR LCK 1ML GRAD NSAF --

## (undated) DEVICE — TUBING, SUCTION, 1/4" X 10', STRAIGHT: Brand: MEDLINE

## (undated) DEVICE — SUTURE VICRYL SZ 2-0 L18IN ABSRB UD CT-1 L36MM 1/2 CIR J839D

## (undated) DEVICE — MEDI-VAC YANKAUER SUCTION HANDLE W/BULBOUS TIP: Brand: CARDINAL HEALTH

## (undated) DEVICE — SOLUTION IRRIG 1000ML H2O PIC PLAS SHATTERPROOF CONTAINER

## (undated) DEVICE — GUIDEWIRE ORTH BVL DISP
Type: IMPLANTABLE DEVICE | Site: SPINE LUMBAR | Status: NON-FUNCTIONAL
Removed: 2025-01-07

## (undated) DEVICE — TRAY CATH 16F DRN BG LTX -- CONVERT TO ITEM 363158

## (undated) DEVICE — SUTURE VCRL SZ 1 L27IN ABSRB UD L36MM CP-1 1/2 CIR REV CUT J268H

## (undated) DEVICE — (D)PREP SKN CHLRAPRP APPL 26ML -- CONVERT TO ITEM 371833

## (undated) DEVICE — AGENT HEMOSTATIC SURGIFLOW MATRIX KIT W/THROMBIN

## (undated) DEVICE — GAUZE,SPONGE,8"X4",12PLY,XRAY,STRL,LF: Brand: MEDLINE

## (undated) DEVICE — FAN SPRAY KIT: Brand: PULSAVAC®

## (undated) DEVICE — DILATOR 1: Brand: AVS ARIA

## (undated) DEVICE — DRAPE,U/SHT,SPLIT,FILM,60X84,STERILE: Brand: MEDLINE

## (undated) DEVICE — PACK PROCEDURE SURG TOT KNEE

## (undated) DEVICE — PROBE DILATOR 2: Brand: AVS ARIA

## (undated) DEVICE — SOLUTION IV 500ML 0.9% SOD CHL FLX CONT

## (undated) DEVICE — UNIVERSAL DRAPES: Brand: MEDLINE INDUSTRIES, INC.

## (undated) DEVICE — DISPOSABLE DRAPE, STERILE, FOR A CDS-3060 5 FOOT TABLE: Brand: PEDIGO PRODUCTS, INC.